# Patient Record
Sex: FEMALE | Race: WHITE | Employment: OTHER | URBAN - METROPOLITAN AREA
[De-identification: names, ages, dates, MRNs, and addresses within clinical notes are randomized per-mention and may not be internally consistent; named-entity substitution may affect disease eponyms.]

---

## 2017-01-11 PROBLEM — M81.0 OSTEOPOROSIS: Status: ACTIVE | Noted: 2017-01-11

## 2017-02-23 ENCOUNTER — HOSPITAL ENCOUNTER (OUTPATIENT)
Dept: INFUSION THERAPY | Age: 77
Discharge: HOME | End: 2017-02-23
Payer: MEDICARE

## 2017-02-23 VITALS
SYSTOLIC BLOOD PRESSURE: 164 MMHG | OXYGEN SATURATION: 96 % | HEART RATE: 69 BPM | DIASTOLIC BLOOD PRESSURE: 79 MMHG | TEMPERATURE: 98 F | RESPIRATION RATE: 18 BRPM

## 2017-02-23 LAB
ANION GAP BLD CALC-SCNC: 8 MMOL/L (ref 7–16)
BUN SERPL-MCNC: 23 MG/DL (ref 8–23)
CALCIUM SERPL-MCNC: 9.3 MG/DL (ref 8.3–10.4)
CHLORIDE SERPL-SCNC: 106 MMOL/L (ref 98–107)
CO2 SERPL-SCNC: 31 MMOL/L (ref 21–32)
CREAT SERPL-MCNC: 1.12 MG/DL (ref 0.6–1)
GLUCOSE SERPL-MCNC: 103 MG/DL (ref 65–100)
POTASSIUM SERPL-SCNC: 4 MMOL/L (ref 3.5–5.1)
SODIUM SERPL-SCNC: 145 MMOL/L (ref 136–145)

## 2017-02-23 PROCEDURE — 96372 THER/PROPH/DIAG INJ SC/IM: CPT

## 2017-02-23 PROCEDURE — 74011250636 HC RX REV CODE- 250/636: Performed by: FAMILY MEDICINE

## 2017-02-23 PROCEDURE — 80048 BASIC METABOLIC PNL TOTAL CA: CPT | Performed by: FAMILY MEDICINE

## 2017-02-23 RX ADMIN — DENOSUMAB 60 MG: 60 INJECTION SUBCUTANEOUS at 15:25

## 2017-02-23 NOTE — PROGRESS NOTES
Arrived to the [facility] ambulatory. Lab via butterfly right AC and Prolia inj completed. Patient tolerated well. Any issues or concerns during appointment: no.  Patient aware of next infusion appointment on. No other apt at this time. Discharged to home ambulatory.

## 2017-08-30 ENCOUNTER — HOSPITAL ENCOUNTER (OUTPATIENT)
Dept: INFUSION THERAPY | Age: 77
Discharge: HOME | End: 2017-08-30
Payer: MEDICARE

## 2017-08-30 VITALS
DIASTOLIC BLOOD PRESSURE: 70 MMHG | HEART RATE: 74 BPM | OXYGEN SATURATION: 98 % | RESPIRATION RATE: 16 BRPM | SYSTOLIC BLOOD PRESSURE: 141 MMHG | TEMPERATURE: 97.8 F

## 2017-08-30 PROCEDURE — 74011250636 HC RX REV CODE- 250/636: Performed by: FAMILY MEDICINE

## 2017-08-30 PROCEDURE — 96372 THER/PROPH/DIAG INJ SC/IM: CPT

## 2017-08-30 RX ADMIN — DENOSUMAB 60 MG: 60 INJECTION SUBCUTANEOUS at 13:00

## 2017-08-30 NOTE — PROGRESS NOTES
Pt ambulatory to area without complaints. Received injection per order, tolerated well. No return appt. Advised to call dr with any issues/concerns. Discharged home without complaints.

## 2017-10-14 ENCOUNTER — HOSPITAL ENCOUNTER (OUTPATIENT)
Dept: MAMMOGRAPHY | Age: 77
Discharge: HOME | End: 2017-10-14
Attending: FAMILY MEDICINE
Payer: MEDICARE

## 2017-10-14 DIAGNOSIS — Z12.31 VISIT FOR SCREENING MAMMOGRAM: ICD-10-CM

## 2017-10-14 PROCEDURE — 77067 SCR MAMMO BI INCL CAD: CPT

## 2018-03-27 PROBLEM — E11.21 TYPE 2 DIABETES WITH NEPHROPATHY (HCC): Status: ACTIVE | Noted: 2018-03-27

## 2018-07-28 ENCOUNTER — HOSPITAL ENCOUNTER (OUTPATIENT)
Dept: MRI IMAGING | Age: 78
Discharge: HOME | End: 2018-07-28
Attending: FAMILY MEDICINE
Payer: MEDICARE

## 2018-07-28 DIAGNOSIS — R42 DIZZINESS: ICD-10-CM

## 2018-07-28 PROCEDURE — 70551 MRI BRAIN STEM W/O DYE: CPT

## 2018-08-16 PROBLEM — I63.81 LACUNAR INFARCTION (HCC): Status: ACTIVE | Noted: 2018-08-16

## 2018-08-16 PROBLEM — R26.81 UNSTEADY GAIT: Status: ACTIVE | Noted: 2018-08-16

## 2018-08-16 PROBLEM — G62.9 AXONAL POLYNEUROPATHY: Status: ACTIVE | Noted: 2018-08-16

## 2018-08-28 ENCOUNTER — HOSPITAL ENCOUNTER (OUTPATIENT)
Dept: ULTRASOUND IMAGING | Age: 78
Discharge: HOME | End: 2018-08-28
Attending: PSYCHIATRY & NEUROLOGY
Payer: MEDICARE

## 2018-08-28 ENCOUNTER — HOSPITAL ENCOUNTER (OUTPATIENT)
Dept: MRI IMAGING | Age: 78
Discharge: HOME | End: 2018-08-28
Attending: PSYCHIATRY & NEUROLOGY
Payer: MEDICARE

## 2018-08-28 DIAGNOSIS — I63.81 LACUNAR INFARCTION (HCC): ICD-10-CM

## 2018-08-28 PROCEDURE — 70544 MR ANGIOGRAPHY HEAD W/O DYE: CPT

## 2018-08-28 PROCEDURE — 93880 EXTRACRANIAL BILAT STUDY: CPT

## 2018-08-29 ENCOUNTER — HOSPITAL ENCOUNTER (OUTPATIENT)
Dept: PHYSICAL THERAPY | Age: 78
Discharge: HOME | End: 2018-08-29
Payer: MEDICARE

## 2018-08-29 DIAGNOSIS — R42 VERTIGO: ICD-10-CM

## 2018-08-29 DIAGNOSIS — R26.81 UNSTEADY GAIT: ICD-10-CM

## 2018-08-29 PROCEDURE — 97162 PT EVAL MOD COMPLEX 30 MIN: CPT

## 2018-08-29 PROCEDURE — G8979 MOBILITY GOAL STATUS: HCPCS

## 2018-08-29 PROCEDURE — G8978 MOBILITY CURRENT STATUS: HCPCS

## 2018-08-29 PROCEDURE — 97530 THERAPEUTIC ACTIVITIES: CPT

## 2018-08-29 PROCEDURE — 97110 THERAPEUTIC EXERCISES: CPT

## 2018-08-29 NOTE — THERAPY EVALUATION
Teja Mills  : [id number] [facility],[address]  [address] [place] [address].  [phone]   [phone]        OUTPATIENT PHYSICAL THERAPY:Initial Assessment 2018         ICD-10: Treatment Diagnosis: Muscle weakness (generalized) (M62.81)Other abnormalities of gait and mobility (R26.89)  Precautions/Allergies:   Adhesive tape-silicones; Azopt [brinzolamide]; Other medication; Statins-hmg-coa reductase inhibitors; and Sulfa (sulfonamide antibiotics)   Fall Risk Score: 7 (? 5 = High Risk)  MD Orders: eval and treat vestibular dysfunction, unsteady gait from neuropathy MEDICAL/REFERRING DIAGNOSIS:  Unsteady gait [R26.81]  Vertigo [R42]   DATE OF ONSET: years  REFERRING PHYSICIAN: Sami Velazquez MD  RETURN PHYSICIAN APPOINTMENT: 10/8/18     INITIAL ASSESSMENT:  Ms. Jaylan Roberts presents with weakness and balance loss from various sources inc neuropathy. She will benefit from skilled PT to increase strength and balance and address vestibular issues as indicated to improve pt's safety with gait. PROBLEM LIST (Impacting functional limitations):  1. Decreased Strength  2. Decreased ADL/Functional Activities  3. Decreased Transfer Abilities  4. Decreased Ambulation Ability/Technique  5. Decreased Balance  6. Decreased Activity Tolerance  7. Decreased Flexibility/Joint Mobility  8. Decreased [place] with Home Exercise Program INTERVENTIONS PLANNED:  1. Balance Exercise  2. Home Exercise Program (HEP)  3. Therapeutic Activites  4. Therapeutic Exercise/Strengthening    TREATMENT PLAN:  Effective Dates: 2018 TO 2018 (90 days). Frequency/Duration: 2 times a week for 90 Days  GOALS: (Goals have been discussed and agreed upon with patient.)  Short-Term Functional Goals: Time Frame: 2 weeks  1. Pt to report compliance with HEP. 2. Pt to increase DF to 5 degrees bilaterally for improved gait mechanics. Discharge Goals: Time Frame: 12 weeks  1.  Pt to increase hip abd strength to 4/5 for more stability in gait. 2. Pt to demonstrate > 4 sec SLS for reciprocal gait on stairs. 3. Pt to use assistive device if needed for safe ambulation. Rehabilitation Potential For Stated Goals: Fair  Regarding Lisa Sotomayor's therapy, I certify that the treatment plan above will be carried out by a therapist or under their direction. Thank you for this referral,  Иван Ramirez, PT       Referring Physician Signature: Sandie Au MD              Date                      HISTORY:   History of Present Injury/Illness (Reason for Referral):  Pt has hearing loss in left ear and was told to get hearing aids. She did not and saw another specialist who said she needed treatment on her ear before she got hearing aids. She has had that and is being treated for a mastoid infection. She has diabetic neuropathy. She is going to have a NCV 10/8. She tripped over her 's oxygen line and fell about 3 months ago. She loses her balance a lot. She does not want to use a walker. She takes 2 meds that cause dizziness. She walks her dog in her yard and walks to get the mail but no exercise. She was told her vertigo was meniere's years ago but other doctors have contradicted that. She had vestibular rehab without any symptom changes. Past Medical History/Comorbidities:   Ms. 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); Cardiomyopathy, ischemic (1/14/2016); Chronic vertigo; DM2 (diabetes mellitus, type 2) ([address] GERD (gastroesophageal reflux disease); Hearing loss; History of AAA (abdominal aortic aneurysm) repair (2002, 1/14/2016); History of bilateral cataract extraction; History of coronary artery disease (2006, 2007); History of gallbladder disease; History of hernia repair; History of seborrheic keratosis; Hyperlipidemia; Hypertension; Lacunar infarction ([address] (8/16/2018); Meniere's disease; Menopause;  Morbid obesity ([address] OA (osteoarthritis); Orthostatic hypotension (1/14/2016); Osteopenia with high risk of fracture (2014); Post-menopausal osteoporosis; Postmenopausal osteoporosis; Unspecified sleep apnea; Unsteady gait (8/16/2018); and Visit for dental examination. Ms. Frank Hong  has a past surgical history that includes hx open cholecystectomy (1979); hx orthopaedic; hx cataract removal (3/10); hx hernia repair; pr cardiac surg procedure unlist; hx hysterectomy (1997); hx oophorectomy (1982); and pr breast surgery procedure unlisted (mid 2000's). Social History/Living Environment:     lives in 2 story home with  but doesn't need to go upstairs  Prior Level of Function/Work/Activity:  retired  Dominant Side:         RIGHT  Current Medications:    Current Outpatient Prescriptions:     potassium chloride (KLOR-CON) 10 mEq tablet, Take 1 Tab by mouth daily. , Disp: 90 Tab, Rfl: 3    triamterene-hydroCHLOROthiazide (MAXZIDE) 37.5-25 mg per tablet, Take 1 Tab by mouth daily. , Disp: 90 Tab, Rfl: 3    losartan (COZAAR) 100 mg tablet, Take 1 Tab by mouth daily. , Disp: 90 Tab, Rfl: 3    clopidogrel (PLAVIX) 75 mg tab, Take 1 Tab by mouth daily. , Disp: 90 Tab, Rfl: 3    metoprolol succinate (TOPROL XL) 100 mg tablet, Take 1 Tab by mouth nightly., Disp: 90 Tab, Rfl: 3    pitavastatin calcium (LIVALO) 4 mg tab tablet, Take 1 Tab by mouth daily. , Disp: 90 Tab, Rfl: 1    nitroglycerin (NITROSTAT) 0.4 mg SL tablet, 1 Tab by SubLINGual route every five (5) minutes as needed. , Disp: 25 Tab, Rfl: 6    fluticasone (FLONASE) 50 mcg/actuation nasal spray, 2 Sprays by Both Nostrils route daily for 90 days. , Disp: 3 Bottle, Rfl: 3    albuterol (PROAIR HFA) 90 mcg/actuation inhaler, Take 2 Puffs by inhalation four (4) times daily as needed for up to 90 days. , Disp: 3 Inhaler, Rfl: 3    Cholecalciferol, Vitamin D3, (VITAMIN D3) 1,000 unit cap, Take 2,000 Units by mouth daily. , Disp: , Rfl:     aspirin (ASPIRIN) 325 mg tablet, Take 325 mg by mouth every morning., Disp: , Rfl:    Date Last Reviewed:  8/29/2018   # of Personal Factors/Comorbidities that affect the Plan of Care: 1-2: MODERATE COMPLEXITY   EXAMINATION:   Observation/Orthostatic Postural Assessment:          Pt has antalgic gait with right glut med lurch. The left shoe is worn across the heel more than the right. Palpation:          Mild tenderness at left greater trochanter  ROM:     0 degrees DF bilaterally      Strength:     Right hip abd, ext, PF 2/5, quad 5/5  Left hip abd, ext 3/5, PF 2/5, quad 5/5               Neurological Screen:        unremarkable  Functional Mobility:         Gait/Ambulation:  Antalgic level surfaces        Transfers:  Uses UE's to assist        Stairs:  Step-to pattern and uses UE's to pull up  Balance:          Unable to perform   Body Structures Involved:  1. Nerves  2. Muscles Body Functions Affected:  1. Sensory/Pain  2. Neuromusculoskeletal  3. Movement Related Activities and Participation Affected:  1. General Tasks and Demands  2. Mobility  3. Self Care  4. Domestic Life  5. Community, Social and [place]   # of elements that affect the Plan of Care: 3: MODERATE COMPLEXITY   CLINICAL PRESENTATION:   Presentation: Evolving clinical presentation with changing clinical characteristics: MODERATE COMPLEXITY   CLINICAL DECISION MAKING:   Outcome Measure: Tool Used: Diaz Balance Scale  Score:  Initial: 39/56 Most Recent: X/56 (Date: -- )   Interpretation of Score: Each section is scored on a 0-4 scale, 0 representing the patients inability to perform the task and 4 representing independence. The scores of each section are added together for a total score of 56. The higher the patients score, the more independent the patient is. Any score below 45 indicates increased risk for falls. Score 56 55-45 44-34 33-23 22-12 11-1 0   Modifier CH CI CJ CK CL CM CN     ?  Mobility - Walking and Moving Around:     - CURRENT STATUS: CJ - 20%-39% impaired, limited or restricted    - GOAL STATUS: CI - 1%-19% impaired, limited or restricted    - D/C STATUS:  ---------------To be determined---------------    Medical Necessity:   · Patient demonstrates fair rehab potential due to higher previous functional level. Reason for Services/Other Comments:  · Patient continues to require present interventions due to patient's inability to ambulate safely all surfaces. Use of outcome tool(s) and clinical judgement create a POC that gives a: Questionable prediction of patient's progress: MODERATE COMPLEXITY   TREATMENT:   (In addition to Assessment/Re-Assessment sessions the following treatments were rendered)  THERAPEUTIC ACTIVITY: ( see below for minutes): Therapeutic activities per grid below to improve mobility. Required moderate verbal and manual cues to promote motor control of bilateral, lower extremity(s). THERAPEUTIC EXERCISE: (see below for minutes):  Exercises per grid below to improve strength. Required moderate verbal and manual cues to promote proper body alignment, promote proper body posture and promote proper body mechanics. Progressed resistance, range, repetitions and complexity of movement as indicated. Date: 8/29/18       Modalities:                                Therapeutic Exercise: 15 mins       LAQ B 20 reps       Slant board 3 x 10 sec       Clams B 20 reps       S/L hip abd B With assist  20 reps                       Proprioceptive Activities:                                Manual Therapy:                        Therapeutic Activities: 15 mins       Pt education, postural education, HEP 5 mins       bridging 20 reps       SLS holding pelvis level With assist  5 reps               HEP: see hand out  [employer] Portal  Treatment/Session Assessment:  Pt has right > left hip weakness with subsequent antalgic gait.   She doesn't want to address the vestibular issues with therapy right now as she is still undergoing treatment with the ENT and will let us know if she wants to pursue this at a later date. She understood HEP technique and rationale. · Pain/ Symptoms: Initial:   0/10 Post Session:  0/10 ·   Compliance with Program/Exercises: Will assess as treatment progresses. · Recommendations/Intent for next treatment session: \"Next visit will focus on advancements to more challenging activities\".   Total Treatment Duration:  PT Patient Time In/Time Out  Time In: 0845  Time Out: 0930     Grant Lopez, PT

## 2018-08-31 ENCOUNTER — HOSPITAL ENCOUNTER (OUTPATIENT)
Dept: PHYSICAL THERAPY | Age: 78
Discharge: HOME | End: 2018-08-31
Payer: MEDICARE

## 2018-08-31 PROCEDURE — 97530 THERAPEUTIC ACTIVITIES: CPT

## 2018-08-31 PROCEDURE — 97110 THERAPEUTIC EXERCISES: CPT

## 2018-08-31 NOTE — PROGRESS NOTES
pls notify pt, MRA brain vessels were normal; carotid ultrasound showed mild irregularity, but no blockage. Results are good. Continue plavix.

## 2018-08-31 NOTE — PROGRESS NOTES
Efra Bryant  : [phone] [id number] [facility],[address]  [address] [place] [address]  [phone]   [phone]        OUTPATIENT PHYSICAL THERAPY:Daily Note 2018         ICD-10: Treatment Diagnosis: Muscle weakness (generalized) (M62.81)Other abnormalities of gait and mobility (R26.89)  Precautions/Allergies:   Adhesive tape-silicones; Azopt [brinzolamide]; Other medication; Statins-hmg-coa reductase inhibitors; and Sulfa (sulfonamide antibiotics)   Fall Risk Score: 7 (? 5 = High Risk)  MD Orders: eval and treat vestibular dysfunction, unsteady gait from neuropathy MEDICAL/REFERRING DIAGNOSIS:  Unsteadiness on feet [R26.81]  Dizziness and giddiness [R42]   DATE OF ONSET: years  REFERRING PHYSICIAN: April Verde MD  RETURN PHYSICIAN APPOINTMENT: 10/8/18     INITIAL ASSESSMENT:  Ms. Nanci Dillon presents with weakness and balance loss from various sources inc neuropathy. She will benefit from skilled PT to increase strength and balance and address vestibular issues as indicated to improve pt's safety with gait. PROBLEM LIST (Impacting functional limitations):  1. Decreased Strength  2. Decreased ADL/Functional Activities  3. Decreased Transfer Abilities  4. Decreased Ambulation Ability/Technique  5. Decreased Balance  6. Decreased Activity Tolerance  7. Decreased Flexibility/Joint Mobility  8. Decreased [place] with Home Exercise Program INTERVENTIONS PLANNED:  1. Balance Exercise  2. Home Exercise Program (HEP)  3. Therapeutic Activites  4. Therapeutic Exercise/Strengthening    TREATMENT PLAN:  Effective Dates: 2018 TO 2018 (90 days). Frequency/Duration: 2 times a week for 90 Days  GOALS: (Goals have been discussed and agreed upon with patient.)  Short-Term Functional Goals: Time Frame: 2 weeks  1. Pt to report compliance with HEP. 2. Pt to increase DF to 5 degrees bilaterally for improved gait mechanics.   Discharge Goals: Time Frame: 12 weeks  1. Pt to increase hip abd strength to 4/5 for more stability in gait. 2. Pt to demonstrate > 4 sec SLS for reciprocal gait on stairs. 3. Pt to use assistive device if needed for safe ambulation. HISTORY:   History of Present Injury/Illness (Reason for Referral):  Pt has hearing loss in left ear and was told to get hearing aids. She did not and saw another specialist who said she needed treatment on her ear before she got hearing aids. She has had that and is being treated for a mastoid infection. She has diabetic neuropathy. She is going to have a NCV 10/8. She tripped over her 's oxygen line and fell about 3 months ago. She loses her balance a lot. She does not want to use a walker. She takes 2 meds that cause dizziness. She walks her dog in her yard and walks to get the mail but no exercise. She was told her vertigo was meniere's years ago but other doctors have contradicted that. She had vestibular rehab without any symptom changes. Past Medical History/Comorbidities:   Ms. 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); Cardiomyopathy, ischemic (1/14/2016); Chronic vertigo; DM2 (diabetes mellitus, type 2) ([address] GERD (gastroesophageal reflux disease); Hearing loss; History of AAA (abdominal aortic aneurysm) repair (2002, 1/14/2016); History of bilateral cataract extraction; History of coronary artery disease (2006, 2007); History of gallbladder disease; History of hernia repair; History of seborrheic keratosis; Hyperlipidemia; Hypertension; Lacunar infarction ([address] (8/16/2018); Meniere's disease; Menopause; Morbid obesity ([address] OA (osteoarthritis); Orthostatic hypotension (1/14/2016); Osteopenia with high risk of fracture (2014); Post-menopausal osteoporosis; Postmenopausal osteoporosis; Unspecified sleep apnea; Unsteady gait (8/16/2018); and Visit for dental examination.   Ms. Elizabeth Burton  has a past surgical history that includes hx open cholecystectomy (1979); hx orthopaedic; hx cataract removal (3/10); hx hernia repair; pr cardiac surg procedure unlist; hx hysterectomy (1997); hx oophorectomy (1982); and pr breast surgery procedure unlisted (mid 2000's). Social History/Living Environment:     lives in 2 story home with  but doesn't need to go upstairs  Prior Level of Function/Work/Activity:  retired  Dominant Side:         RIGHT  Current Medications:    Current Outpatient Prescriptions:     potassium chloride (KLOR-CON) 10 mEq tablet, Take 1 Tab by mouth daily. , Disp: 90 Tab, Rfl: 3    triamterene-hydroCHLOROthiazide (MAXZIDE) 37.5-25 mg per tablet, Take 1 Tab by mouth daily. , Disp: 90 Tab, Rfl: 3    losartan (COZAAR) 100 mg tablet, Take 1 Tab by mouth daily. , Disp: 90 Tab, Rfl: 3    clopidogrel (PLAVIX) 75 mg tab, Take 1 Tab by mouth daily. , Disp: 90 Tab, Rfl: 3    metoprolol succinate (TOPROL XL) 100 mg tablet, Take 1 Tab by mouth nightly., Disp: 90 Tab, Rfl: 3    pitavastatin calcium (LIVALO) 4 mg tab tablet, Take 1 Tab by mouth daily. , Disp: 90 Tab, Rfl: 1    nitroglycerin (NITROSTAT) 0.4 mg SL tablet, 1 Tab by SubLINGual route every five (5) minutes as needed. , Disp: 25 Tab, Rfl: 6    fluticasone (FLONASE) 50 mcg/actuation nasal spray, 2 Sprays by Both Nostrils route daily for 90 days. , Disp: 3 Bottle, Rfl: 3    albuterol (PROAIR HFA) 90 mcg/actuation inhaler, Take 2 Puffs by inhalation four (4) times daily as needed for up to 90 days. , Disp: 3 Inhaler, Rfl: 3    Cholecalciferol, Vitamin D3, (VITAMIN D3) 1,000 unit cap, Take 2,000 Units by mouth daily. , Disp: , Rfl:     aspirin (ASPIRIN) 325 mg tablet, Take 325 mg by mouth every morning., Disp: , Rfl:    Date Last Reviewed:  8/31/2018   EXAMINATION:   Observation/Orthostatic Postural Assessment:          Pt has antalgic gait with right glut med lurch. The left shoe is worn across the heel more than the right.   Palpation:          Mild tenderness at left greater trochanter  ROM:     0 degrees DF bilaterally      Strength:     Right hip abd, ext, PF 2/5, quad 5/5  Left hip abd, ext 3/5, PF 2/5, quad 5/5               Neurological Screen:        unremarkable  Functional Mobility:         Gait/Ambulation:  Antalgic level surfaces        Transfers:  Uses UE's to assist        Stairs:  Step-to pattern and uses UE's to pull up  Balance:          Unable to perform   Body Structures Involved:  1. Nerves  2. Muscles Body Functions Affected:  1. Sensory/Pain  2. Neuromusculoskeletal  3. Movement Related Activities and Participation Affected:  1. General Tasks and Demands  2. Mobility  3. Self Care  4. Domestic Life  5. Community, Social and Civic Life   CLINICAL PRESENTATION:   CLINICAL DECISION MAKING:   Outcome Measure: Tool Used: Diaz Balance Scale  Score:  Initial: 39/56 Most Recent: X/56 (Date: -- )   Interpretation of Score: Each section is scored on a 0-4 scale, 0 representing the patients inability to perform the task and 4 representing independence. The scores of each section are added together for a total score of 56. The higher the patients score, the more independent the patient is. Any score below 45 indicates increased risk for falls. Score 56 55-45 44-34 33-23 22-12 11-1 0   Modifier CH CI CJ CK CL CM CN     ? Mobility - Walking and Moving Around:     - CURRENT STATUS: CJ - 20%-39% impaired, limited or restricted    - GOAL STATUS: CI - 1%-19% impaired, limited or restricted    - D/C STATUS:  ---------------To be determined---------------    Medical Necessity:   · Patient demonstrates fair rehab potential due to higher previous functional level. Reason for Services/Other Comments:  · Patient continues to require present interventions due to patient's inability to ambulate safely all surfaces.    TREATMENT:   (In addition to Assessment/Re-Assessment sessions the following treatments were rendered)  THERAPEUTIC ACTIVITY: ( see below for minutes): Therapeutic activities per grid below to improve mobility. Required moderate verbal and manual cues to promote motor control of bilateral, lower extremity(s). THERAPEUTIC EXERCISE: (see below for minutes):  Exercises per grid below to improve strength. Required moderate verbal and manual cues to promote proper body alignment, promote proper body posture and promote proper body mechanics. Progressed resistance, range, repetitions and complexity of movement as indicated. Date: 8/29/18 8/31/18  Visit 2      Modalities:                                Therapeutic Exercise: 15 mins 25 mins      LAQ B 20 reps 30 reps      Slant board 3 x 10 sec 3 x 10 sec      Clams B 20 reps 30 reps      S/L hip abd B With assist  20 reps With assist  30 reps      Glut sets  Supine, standing  5 mins      Calf raises  30 reps              Proprioceptive Activities:                                Manual Therapy:                        Therapeutic Activities: 15 mins 15 mins      Pt education, postural education, HEP 5 mins       bridging 20 reps 20 reps      SLS holding pelvis level B With assist  5 reps Wit assist  5 reps      Sit to stand  15 reps  With assist              HEP: see hand out  [employer] Portal  Treatment/Session Assessment:  Marked weakness in hip/core muscles. Good ex rogelio. Unable to perform glut set. Continue as indicated. · Pain/ Symptoms: Initial:   0/10 Post Session:  No pain after ex's. ·   Compliance with Program/Exercises: Will assess as treatment progresses. · Recommendations/Intent for next treatment session: \"Next visit will focus on advancements to more challenging activities\".   Total Treatment Duration:  PT Patient Time In/Time Out  Time In: 0845  Time Out: 0930     Blu Lopez, PT

## 2018-09-05 ENCOUNTER — HOSPITAL ENCOUNTER (OUTPATIENT)
Dept: PHYSICAL THERAPY | Age: 78
Discharge: HOME | End: 2018-09-05
Payer: MEDICARE

## 2018-09-05 PROCEDURE — 97530 THERAPEUTIC ACTIVITIES: CPT

## 2018-09-05 PROCEDURE — 97110 THERAPEUTIC EXERCISES: CPT

## 2018-09-05 NOTE — PROGRESS NOTES
Evelyn Oh  : 1940 [id number] [facility],[address]  [address] [place] [address].  [phone]   [phone]        OUTPATIENT PHYSICAL THERAPY:Daily Note 2018         ICD-10: Treatment Diagnosis: Muscle weakness (generalized) (M62.81)Other abnormalities of gait and mobility (R26.89)  Precautions/Allergies:   Adhesive tape-silicones; Azopt [brinzolamide]; Other medication; Statins-hmg-coa reductase inhibitors; and Sulfa (sulfonamide antibiotics)   Fall Risk Score: 7 (? 5 = High Risk)  MD Orders: eval and treat vestibular dysfunction, unsteady gait from neuropathy MEDICAL/REFERRING DIAGNOSIS:  Unsteadiness on feet [R26.81]  Dizziness and giddiness [R42]   DATE OF ONSET: years  REFERRING PHYSICIAN: Soha Mabry MD  RETURN PHYSICIAN APPOINTMENT: 10/8/18     INITIAL ASSESSMENT:  Ms. Kathe Lane presents with weakness and balance loss from various sources inc neuropathy. She will benefit from skilled PT to increase strength and balance and address vestibular issues as indicated to improve pt's safety with gait. PROBLEM LIST (Impacting functional limitations):  1. Decreased Strength  2. Decreased ADL/Functional Activities  3. Decreased Transfer Abilities  4. Decreased Ambulation Ability/Technique  5. Decreased Balance  6. Decreased Activity Tolerance  7. Decreased Flexibility/Joint Mobility  8. Decreased [place] with Home Exercise Program INTERVENTIONS PLANNED:  1. Balance Exercise  2. Home Exercise Program (HEP)  3. Therapeutic Activites  4. Therapeutic Exercise/Strengthening    TREATMENT PLAN:  Effective Dates: 2018 TO 2018 (90 days). Frequency/Duration: 2 times a week for 90 Days  GOALS: (Goals have been discussed and agreed upon with patient.)  Short-Term Functional Goals: Time Frame: 2 weeks  1. Pt to report compliance with HEP. 2. Pt to increase DF to 5 degrees bilaterally for improved gait mechanics.   Discharge Goals: Time Frame: 12 weeks  1. Pt to increase hip abd strength to 4/5 for more stability in gait. 2. Pt to demonstrate > 4 sec SLS for reciprocal gait on stairs. 3. Pt to use assistive device if needed for safe ambulation. HISTORY:   History of Present Injury/Illness (Reason for Referral):  Pt has hearing loss in left ear and was told to get hearing aids. She did not and saw another specialist who said she needed treatment on her ear before she got hearing aids. She has had that and is being treated for a mastoid infection. She has diabetic neuropathy. She is going to have a NCV 10/8. She tripped over her 's oxygen line and fell about 3 months ago. She loses her balance a lot. She does not want to use a walker. She takes 2 meds that cause dizziness. She walks her dog in her yard and walks to get the mail but no exercise. She was told her vertigo was meniere's years ago but other doctors have contradicted that. She had vestibular rehab without any symptom changes. Past Medical History/Comorbidities:   Ms. 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); Cardiomyopathy, ischemic (1/14/2016); Chronic vertigo; DM2 (diabetes mellitus, type 2) ([address] GERD (gastroesophageal reflux disease); Hearing loss; History of AAA (abdominal aortic aneurysm) repair (2002, 1/14/2016); History of bilateral cataract extraction; History of coronary artery disease (2006, 2007); History of gallbladder disease; History of hernia repair; History of seborrheic keratosis; Hyperlipidemia; Hypertension; Lacunar infarction ([address] (8/16/2018); Meniere's disease; Menopause; Morbid obesity ([address] OA (osteoarthritis); Orthostatic hypotension (1/14/2016); Osteopenia with high risk of fracture (2014); Post-menopausal osteoporosis; Postmenopausal osteoporosis; Unspecified sleep apnea; Unsteady gait (8/16/2018); and Visit for dental examination.   Ms. Leah Soria  has a past surgical history that includes hx open cholecystectomy (1979); hx orthopaedic; hx cataract removal (3/10); hx hernia repair; pr cardiac surg procedure unlist; hx hysterectomy (1997); hx oophorectomy (1982); and pr breast surgery procedure unlisted (mid 2000's). Social History/Living Environment:     lives in 2 story home with  but doesn't need to go upstairs  Prior Level of Function/Work/Activity:  retired  Dominant Side:         RIGHT  Current Medications:    Current Outpatient Prescriptions:     potassium chloride (KLOR-CON) 10 mEq tablet, Take 1 Tab by mouth daily. , Disp: 90 Tab, Rfl: 3    triamterene-hydroCHLOROthiazide (MAXZIDE) 37.5-25 mg per tablet, Take 1 Tab by mouth daily. , Disp: 90 Tab, Rfl: 3    losartan (COZAAR) 100 mg tablet, Take 1 Tab by mouth daily. , Disp: 90 Tab, Rfl: 3    clopidogrel (PLAVIX) 75 mg tab, Take 1 Tab by mouth daily. , Disp: 90 Tab, Rfl: 3    metoprolol succinate (TOPROL XL) 100 mg tablet, Take 1 Tab by mouth nightly., Disp: 90 Tab, Rfl: 3    pitavastatin calcium (LIVALO) 4 mg tab tablet, Take 1 Tab by mouth daily. , Disp: 90 Tab, Rfl: 1    nitroglycerin (NITROSTAT) 0.4 mg SL tablet, 1 Tab by SubLINGual route every five (5) minutes as needed. , Disp: 25 Tab, Rfl: 6    fluticasone (FLONASE) 50 mcg/actuation nasal spray, 2 Sprays by Both Nostrils route daily for 90 days. , Disp: 3 Bottle, Rfl: 3    albuterol (PROAIR HFA) 90 mcg/actuation inhaler, Take 2 Puffs by inhalation four (4) times daily as needed for up to 90 days. , Disp: 3 Inhaler, Rfl: 3    Cholecalciferol, Vitamin D3, (VITAMIN D3) 1,000 unit cap, Take 2,000 Units by mouth daily. , Disp: , Rfl:     aspirin (ASPIRIN) 325 mg tablet, Take 325 mg by mouth every morning., Disp: , Rfl:    Date Last Reviewed:  9/5/2018   EXAMINATION:   Observation/Orthostatic Postural Assessment:          Pt has antalgic gait with right glut med lurch. The left shoe is worn across the heel more than the right.   Palpation:          Mild tenderness at left greater trochanter  ROM:     0 degrees DF bilaterally      Strength:     Right hip abd, ext, PF 2/5, quad 5/5  Left hip abd, ext 3/5, PF 2/5, quad 5/5               Neurological Screen:        unremarkable  Functional Mobility:         Gait/Ambulation:  Antalgic level surfaces        Transfers:  Uses UE's to assist        Stairs:  Step-to pattern and uses UE's to pull up  Balance:          Unable to perform   Body Structures Involved:  1. Nerves  2. Muscles Body Functions Affected:  1. Sensory/Pain  2. Neuromusculoskeletal  3. Movement Related Activities and Participation Affected:  1. General Tasks and Demands  2. Mobility  3. Self Care  4. Domestic Life  5. Community, Social and Civic Life   CLINICAL PRESENTATION:   CLINICAL DECISION MAKING:   Outcome Measure: Tool Used: Diaz Balance Scale  Score:  Initial: 39/56 Most Recent: X/56 (Date: -- )   Interpretation of Score: Each section is scored on a 0-4 scale, 0 representing the patients inability to perform the task and 4 representing independence. The scores of each section are added together for a total score of 56. The higher the patients score, the more independent the patient is. Any score below 45 indicates increased risk for falls. Score 56 55-45 44-34 33-23 22-12 11-1 0   Modifier CH CI CJ CK CL CM CN     ? Mobility - Walking and Moving Around:     - CURRENT STATUS: CJ - 20%-39% impaired, limited or restricted    - GOAL STATUS: CI - 1%-19% impaired, limited or restricted    - D/C STATUS:  ---------------To be determined---------------    Medical Necessity:   · Patient demonstrates fair rehab potential due to higher previous functional level. Reason for Services/Other Comments:  · Patient continues to require present interventions due to patient's inability to ambulate safely all surfaces.    TREATMENT:   (In addition to Assessment/Re-Assessment sessions the following treatments were rendered)  THERAPEUTIC ACTIVITY: ( see below for minutes): Therapeutic activities per grid below to improve mobility. Required moderate verbal and manual cues to promote motor control of bilateral, lower extremity(s). THERAPEUTIC EXERCISE: (see below for minutes):  Exercises per grid below to improve strength. Required moderate verbal and manual cues to promote proper body alignment, promote proper body posture and promote proper body mechanics. Progressed resistance, range, repetitions and complexity of movement as indicated. Date: 8/29/18 8/31/18  Visit 2 9/5/18  Visit 3       Modalities:                                        Therapeutic Exercise: 15 mins 25 mins 20 mins       LAQ B 20 reps 30 reps        Slant board 3 x 10 sec 3 x 10 sec 5 x 10 sec       Clams B 20 reps 30 reps 30 reps       S/L hip abd B With assist  20 reps With assist  30 reps 30 reps  With assist       Glut sets  Supine, standing  5 mins        Calf raises  30 reps 30 reps       Standing right hip diagonal   30 reps                 Proprioceptive Activities:                                        Manual Therapy:                              Therapeutic Activities: 15 mins 15 mins 25 mins       Pt education, postural education, HEP 5 mins         bridging 20 reps 20 reps 30 reps       SLS holding pelvis level B With assist  5 reps With assist  5 reps With assist  5 reps       Sit to stand  15 reps  With assist 20 reps  With assist       Stepping with left foot holding pelvis level with UE support   X 15 reps       Foam standing eyes open and closed   4 mins                           HEP: see hand out  [employer] Portal  Treatment/Session Assessment:  Able to control pelvis with cueing. Weakness persists in hip abd and quads. Will continue as indicated. · Pain/ Symptoms: Initial:   I am dizzy today. I am out of my antibiotics today so the doctor won't be happy to know it didn't work. My legs are fine. The left one was sore after I left for a couple of days.   0/10  Post Session:  No pain after ex's. ·   Compliance with Program/Exercises: Will assess as treatment progresses. · Recommendations/Intent for next treatment session: \"Next visit will focus on advancements to more challenging activities\".   Total Treatment Duration:  PT Patient Time In/Time Out  Time In: 0930  Time Out: 1015 April Albert Lopez PT

## 2018-09-07 ENCOUNTER — HOSPITAL ENCOUNTER (OUTPATIENT)
Dept: PHYSICAL THERAPY | Age: 78
Discharge: HOME | End: 2018-09-07
Payer: MEDICARE

## 2018-09-07 PROCEDURE — 97530 THERAPEUTIC ACTIVITIES: CPT

## 2018-09-07 PROCEDURE — 97110 THERAPEUTIC EXERCISES: CPT

## 2018-09-07 NOTE — PROGRESS NOTES
Coretta Nelson  : 1940 [facility] [address]  [phone]   [phone]        OUTPATIENT PHYSICAL THERAPY:Daily Note 2018         ICD-10: Treatment Diagnosis: Muscle weakness (generalized) (M62.81)Other abnormalities of gait and mobility (R26.89)  Precautions/Allergies:   Adhesive tape-silicones; Azopt [brinzolamide]; Other medication; Statins-hmg-coa reductase inhibitors; and Sulfa (sulfonamide antibiotics)   Fall Risk Score: 7 (? 5 = High Risk)  MD Orders: eval and treat vestibular dysfunction, unsteady gait from neuropathy MEDICAL/REFERRING DIAGNOSIS:  Unsteadiness on feet [R26.81]  Dizziness and giddiness [R42]   DATE OF ONSET: years  REFERRING PHYSICIAN: Sierra Cool MD  RETURN PHYSICIAN APPOINTMENT: 10/8/18     INITIAL ASSESSMENT:  Ms. Dayron Dowling presents with weakness and balance loss from various sources inc neuropathy. She will benefit from skilled PT to increase strength and balance and address vestibular issues as indicated to improve pt's safety with gait. PROBLEM LIST (Impacting functional limitations):  1. Decreased Strength  2. Decreased ADL/Functional Activities  3. Decreased Transfer Abilities  4. Decreased Ambulation Ability/Technique  5. Decreased Balance  6. Decreased Activity Tolerance  7. Decreased Flexibility/Joint Mobility  8. Decreased [place] with Home Exercise Program INTERVENTIONS PLANNED:  1. Balance Exercise  2. Home Exercise Program (HEP)  3. Therapeutic Activites  4. Therapeutic Exercise/Strengthening    TREATMENT PLAN:  Effective Dates: 2018 TO 2018 (90 days). Frequency/Duration: 2 times a week for 90 Days  GOALS: (Goals have been discussed and agreed upon with patient.)  Short-Term Functional Goals: Time Frame: 2 weeks  1. Pt to report compliance with HEP. 2. Pt to increase DF to 5 degrees bilaterally for improved gait mechanics.   Discharge Goals: Time Frame: 12 weeks  1. Pt to increase hip abd strength to 4/5 for more stability in gait. 2. Pt to demonstrate > 4 sec SLS for reciprocal gait on stairs. 3. Pt to use assistive device if needed for safe ambulation. HISTORY:   History of Present Injury/Illness (Reason for Referral):  Pt has hearing loss in left ear and was told to get hearing aids. She did not and saw another specialist who said she needed treatment on her ear before she got hearing aids. She has had that and is being treated for a mastoid infection. She has diabetic neuropathy. She is going to have a NCV 10/8. She tripped over her 's oxygen line and fell about 3 months ago. She loses her balance a lot. She does not want to use a walker. She takes 2 meds that cause dizziness. She walks her dog in her yard and walks to get the mail but no exercise. She was told her vertigo was meniere's years ago but other doctors have contradicted that. She had vestibular rehab without any symptom changes. Past Medical History/Comorbidities:   Ms. 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); Cardiomyopathy, ischemic (1/14/2016); Chronic vertigo; DM2 (diabetes mellitus, type 2) ([address] GERD (gastroesophageal reflux disease); Hearing loss; History of AAA (abdominal aortic aneurysm) repair (2002, 1/14/2016); History of bilateral cataract extraction; History of coronary artery disease (2006, 2007); History of gallbladder disease; History of hernia repair; History of seborrheic keratosis; Hyperlipidemia; Hypertension; Lacunar infarction ([address] (8/16/2018); Meniere's disease; Menopause; Morbid obesity ([address] OA (osteoarthritis); Orthostatic hypotension (1/14/2016); Osteopenia with high risk of fracture (2014); Post-menopausal osteoporosis; Postmenopausal osteoporosis; Unspecified sleep apnea; Unsteady gait (8/16/2018); and Visit for dental examination.   Ms. Leah Soria  has a past surgical history that includes hx open cholecystectomy (1979); hx orthopaedic; hx cataract removal (3/10); hx hernia repair; pr cardiac surg procedure unlist; hx hysterectomy (1997); hx oophorectomy (1982); and pr breast surgery procedure unlisted (mid 2000's). Social History/Living Environment:     lives in 2 story home with  but doesn't need to go upstairs  Prior Level of Function/Work/Activity:  retired  Dominant Side:         RIGHT  Current Medications:    Current Outpatient Prescriptions:     potassium chloride (KLOR-CON) 10 mEq tablet, Take 1 Tab by mouth daily. , Disp: 90 Tab, Rfl: 3    triamterene-hydroCHLOROthiazide (MAXZIDE) 37.5-25 mg per tablet, Take 1 Tab by mouth daily. , Disp: 90 Tab, Rfl: 3    losartan (COZAAR) 100 mg tablet, Take 1 Tab by mouth daily. , Disp: 90 Tab, Rfl: 3    clopidogrel (PLAVIX) 75 mg tab, Take 1 Tab by mouth daily. , Disp: 90 Tab, Rfl: 3    metoprolol succinate (TOPROL XL) 100 mg tablet, Take 1 Tab by mouth nightly., Disp: 90 Tab, Rfl: 3    pitavastatin calcium (LIVALO) 4 mg tab tablet, Take 1 Tab by mouth daily. , Disp: 90 Tab, Rfl: 1    nitroglycerin (NITROSTAT) 0.4 mg SL tablet, 1 Tab by SubLINGual route every five (5) minutes as needed. , Disp: 25 Tab, Rfl: 6    fluticasone (FLONASE) 50 mcg/actuation nasal spray, 2 Sprays by Both Nostrils route daily for 90 days. , Disp: 3 Bottle, Rfl: 3    albuterol (PROAIR HFA) 90 mcg/actuation inhaler, Take 2 Puffs by inhalation four (4) times daily as needed for up to 90 days. , Disp: 3 Inhaler, Rfl: 3    Cholecalciferol, Vitamin D3, (VITAMIN D3) 1,000 unit cap, Take 2,000 Units by mouth daily. , Disp: , Rfl:     aspirin (ASPIRIN) 325 mg tablet, Take 325 mg by mouth every morning., Disp: , Rfl:    Date Last Reviewed:  9/7/2018   EXAMINATION:   Observation/Orthostatic Postural Assessment:          Pt has antalgic gait with right glut med lurch. The left shoe is worn across the heel more than the right.   Palpation:          Mild tenderness at left greater trochanter  ROM:     0 degrees DF bilaterally      Strength:     Right hip abd, ext, PF 2/5, quad 5/5  Left hip abd, ext 3/5, PF 2/5, quad 5/5               Neurological Screen:        unremarkable  Functional Mobility:         Gait/Ambulation:  Antalgic level surfaces        Transfers:  Uses UE's to assist        Stairs:  Step-to pattern and uses UE's to pull up  Balance:          Unable to perform   Body Structures Involved:  1. Nerves  2. Muscles Body Functions Affected:  1. Sensory/Pain  2. Neuromusculoskeletal  3. Movement Related Activities and Participation Affected:  1. General Tasks and Demands  2. Mobility  3. Self Care  4. Domestic Life  5. Community, Social and Civic Life   CLINICAL PRESENTATION:   CLINICAL DECISION MAKING:   Outcome Measure: Tool Used: Diaz Balance Scale  Score:  Initial: 39/56 Most Recent: X/56 (Date: -- )   Interpretation of Score: Each section is scored on a 0-4 scale, 0 representing the patients inability to perform the task and 4 representing independence. The scores of each section are added together for a total score of 56. The higher the patients score, the more independent the patient is. Any score below 45 indicates increased risk for falls. Score 56 55-45 44-34 33-23 22-12 11-1 0   Modifier CH CI CJ CK CL CM CN     ? Mobility - Walking and Moving Around:     - CURRENT STATUS: CJ - 20%-39% impaired, limited or restricted    - GOAL STATUS: CI - 1%-19% impaired, limited or restricted    - D/C STATUS:  ---------------To be determined---------------    Medical Necessity:   · Patient demonstrates fair rehab potential due to higher previous functional level. Reason for Services/Other Comments:  · Patient continues to require present interventions due to patient's inability to ambulate safely all surfaces.    TREATMENT:   (In addition to Assessment/Re-Assessment sessions the following treatments were rendered)  THERAPEUTIC ACTIVITY: ( see below for minutes): Therapeutic activities per grid below to improve mobility. Required moderate verbal and manual cues to promote motor control of bilateral, lower extremity(s). THERAPEUTIC EXERCISE: (see below for minutes):  Exercises per grid below to improve strength. Required moderate verbal and manual cues to promote proper body alignment, promote proper body posture and promote proper body mechanics. Progressed resistance, range, repetitions and complexity of movement as indicated. Date: 8/29/18 8/31/18  Visit 2 9/5/18  Visit 3 9/7/18  Visit 4      Modalities:                                        Therapeutic Exercise: 15 mins 25 mins 20 mins 20 mins      LAQ B 20 reps 30 reps        Slant board 3 x 10 sec 3 x 10 sec 5 x 10 sec 5 x 10 sec      Clams B 20 reps 30 reps 30 reps 30 reps      S/L hip abd B With assist  20 reps With assist  30 reps 30 reps  With assist 30 reps  With assist      Glut sets  Supine, standing  5 mins        Calf raises  30 reps 30 reps 30 reps      Standing right hip diagonal   30 reps 30 reps                Proprioceptive Activities:                                        Manual Therapy:                              Therapeutic Activities: 15 mins 15 mins 25 mins 25 mins      Pt education, postural education, HEP 5 mins         bridging 20 reps 20 reps 30 reps 30 reps      SLS holding pelvis level B With assist  5 reps With assist  5 reps With assist  5 reps With assist  5 reps      Sit to stand  15 reps  With assist 20 reps  With assist       Stepping with left foot holding pelvis level with UE support   X 15 reps X 30 reps      Foam standing eyes open and closed   4 mins 6 mins                          HEP: see hand out  [employer] Portal  Treatment/Session Assessment:   Pt has significant limp until cued to control pelvis. She can hold with min pelvic drop. · Pain/ Symptoms: Initial:  0/10 The ENT doesn't think I have BPPV. He wants the neurologist to see what she thinks.   Post Session: 0/10 ·   Compliance with Program/Exercises: Will assess as treatment progresses. · Recommendations/Intent for next treatment session: \"Next visit will focus on advancements to more challenging activities\".   Total Treatment Duration:  PT Patient Time In/Time Out  Time In: 1100  Time Out: Chris Olvera PT

## 2018-09-12 ENCOUNTER — HOSPITAL ENCOUNTER (OUTPATIENT)
Dept: PHYSICAL THERAPY | Age: 78
Discharge: HOME | End: 2018-09-12
Payer: MEDICARE

## 2018-09-12 PROCEDURE — 97530 THERAPEUTIC ACTIVITIES: CPT

## 2018-09-12 PROCEDURE — 97110 THERAPEUTIC EXERCISES: CPT

## 2018-09-12 NOTE — PROGRESS NOTES
Jarrod Berg  : 1940 [address] [place] [address],[address]  [address] [place] [address].  [phone]   [phone]        OUTPATIENT PHYSICAL THERAPY:Daily Note 2018         ICD-10: Treatment Diagnosis: Muscle weakness (generalized) (M62.81)Other abnormalities of gait and mobility (R26.89)  Precautions/Allergies:   Adhesive tape-silicones; Azopt [brinzolamide]; Other medication; Statins-hmg-coa reductase inhibitors; and Sulfa (sulfonamide antibiotics)   Fall Risk Score: 7 (? 5 = High Risk)  MD Orders: eval and treat vestibular dysfunction, unsteady gait from neuropathy MEDICAL/REFERRING DIAGNOSIS:  Unsteadiness on feet [R26.81]  Dizziness and giddiness [R42]   DATE OF ONSET: years  REFERRING PHYSICIAN: Michelle Harris MD  RETURN PHYSICIAN APPOINTMENT: 10/8/18     INITIAL ASSESSMENT:  Ms. Neela Galloway presents with weakness and balance loss from various sources inc neuropathy. She will benefit from skilled PT to increase strength and balance and address vestibular issues as indicated to improve pt's safety with gait. PROBLEM LIST (Impacting functional limitations):  1. Decreased Strength  2. Decreased ADL/Functional Activities  3. Decreased Transfer Abilities  4. Decreased Ambulation Ability/Technique  5. Decreased Balance  6. Decreased Activity Tolerance  7. Decreased Flexibility/Joint Mobility  8. Decreased [place] with Home Exercise Program INTERVENTIONS PLANNED:  1. Balance Exercise  2. Home Exercise Program (HEP)  3. Therapeutic Activites  4. Therapeutic Exercise/Strengthening    TREATMENT PLAN:  Effective Dates: 2018 TO 2018 (90 days). Frequency/Duration: 2 times a week for 90 Days  GOALS: (Goals have been discussed and agreed upon with patient.)  Short-Term Functional Goals: Time Frame: 2 weeks  1. Pt to report compliance with HEP. 2. Pt to increase DF to 5 degrees bilaterally for improved gait mechanics.   Discharge Goals: Time Frame: 12 weeks  1. Pt to increase hip abd strength to 4/5 for more stability in gait. 2. Pt to demonstrate > 4 sec SLS for reciprocal gait on stairs. 3. Pt to use assistive device if needed for safe ambulation. HISTORY:   History of Present Injury/Illness (Reason for Referral):  Pt has hearing loss in left ear and was told to get hearing aids. She did not and saw another specialist who said she needed treatment on her ear before she got hearing aids. She has had that and is being treated for a mastoid infection. She has diabetic neuropathy. She is going to have a NCV 10/8. She tripped over her 's oxygen line and fell about 3 months ago. She loses her balance a lot. She does not want to use a walker. She takes 2 meds that cause dizziness. She walks her dog in her yard and walks to get the mail but no exercise. She was told her vertigo was meniere's years ago but other doctors have contradicted that. She had vestibular rehab without any symptom changes. Past Medical History/Comorbidities:   Ms. 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); Cardiomyopathy, ischemic (1/14/2016); Chronic vertigo; DM2 (diabetes mellitus, type 2) ([address] GERD (gastroesophageal reflux disease); Hearing loss; History of AAA (abdominal aortic aneurysm) repair (2002, 1/14/2016); History of bilateral cataract extraction; History of coronary artery disease (2006, 2007); History of gallbladder disease; History of hernia repair; History of seborrheic keratosis; Hyperlipidemia; Hypertension; Lacunar infarction ([address] (8/16/2018); Meniere's disease; Menopause; Morbid obesity ([address] OA (osteoarthritis); Orthostatic hypotension (1/14/2016); Osteopenia with high risk of fracture (2014); Post-menopausal osteoporosis; Postmenopausal osteoporosis; Unspecified sleep apnea; Unsteady gait (8/16/2018); and Visit for dental examination.   Ms. Karoline Lopez  has a past surgical history that includes hx open cholecystectomy (1979); hx orthopaedic; hx cataract removal (3/10); hx hernia repair; pr cardiac surg procedure unlist; hx hysterectomy (1997); hx oophorectomy (1982); and pr breast surgery procedure unlisted (mid 2000's). Social History/Living Environment:     lives in 2 story home with  but doesn't need to go upstairs  Prior Level of Function/Work/Activity:  retired  Dominant Side:         RIGHT  Current Medications:    Current Outpatient Prescriptions:     pitavastatin calcium (LIVALO) 4 mg tab tablet, Take 1 Tab by mouth daily. , Disp: 90 Tab, Rfl: 3    potassium chloride (KLOR-CON) 10 mEq tablet, Take 1 Tab by mouth daily. , Disp: 90 Tab, Rfl: 3    triamterene-hydroCHLOROthiazide (MAXZIDE) 37.5-25 mg per tablet, Take 1 Tab by mouth daily. , Disp: 90 Tab, Rfl: 3    losartan (COZAAR) 100 mg tablet, Take 1 Tab by mouth daily. , Disp: 90 Tab, Rfl: 3    clopidogrel (PLAVIX) 75 mg tab, Take 1 Tab by mouth daily. , Disp: 90 Tab, Rfl: 3    metoprolol succinate (TOPROL XL) 100 mg tablet, Take 1 Tab by mouth nightly., Disp: 90 Tab, Rfl: 3    nitroglycerin (NITROSTAT) 0.4 mg SL tablet, 1 Tab by SubLINGual route every five (5) minutes as needed. , Disp: 25 Tab, Rfl: 6    fluticasone (FLONASE) 50 mcg/actuation nasal spray, 2 Sprays by Both Nostrils route daily for 90 days. , Disp: 3 Bottle, Rfl: 3    albuterol (PROAIR HFA) 90 mcg/actuation inhaler, Take 2 Puffs by inhalation four (4) times daily as needed for up to 90 days. , Disp: 3 Inhaler, Rfl: 3    Cholecalciferol, Vitamin D3, (VITAMIN D3) 1,000 unit cap, Take 2,000 Units by mouth daily. , Disp: , Rfl:     aspirin (ASPIRIN) 325 mg tablet, Take 325 mg by mouth every morning., Disp: , Rfl:    Date Last Reviewed:  9/12/2018   EXAMINATION:   Observation/Orthostatic Postural Assessment:          Pt has antalgic gait with right glut med lurch. The left shoe is worn across the heel more than the right.   Palpation:          Mild tenderness at left greater trochanter  ROM:     0 degrees DF bilaterally      Strength:     Right hip abd, ext, PF 2/5, quad 5/5  Left hip abd, ext 3/5, PF 2/5, quad 5/5               Neurological Screen:        unremarkable  Functional Mobility:         Gait/Ambulation:  Antalgic level surfaces        Transfers:  Uses UE's to assist        Stairs:  Step-to pattern and uses UE's to pull up  Balance:          Unable to perform   Body Structures Involved:  1. Nerves  2. Muscles Body Functions Affected:  1. Sensory/Pain  2. Neuromusculoskeletal  3. Movement Related Activities and Participation Affected:  1. General Tasks and Demands  2. Mobility  3. Self Care  4. Domestic Life  5. Community, Social and Civic Life   CLINICAL PRESENTATION:   CLINICAL DECISION MAKING:   Outcome Measure: Tool Used: Diaz Balance Scale  Score:  Initial: 39/56 Most Recent: X/56 (Date: -- )   Interpretation of Score: Each section is scored on a 0-4 scale, 0 representing the patients inability to perform the task and 4 representing independence. The scores of each section are added together for a total score of 56. The higher the patients score, the more independent the patient is. Any score below 45 indicates increased risk for falls. Score 56 55-45 44-34 33-23 22-12 11-1 0   Modifier CH CI CJ CK CL CM CN     ? Mobility - Walking and Moving Around:     - CURRENT STATUS: CJ - 20%-39% impaired, limited or restricted    - GOAL STATUS: CI - 1%-19% impaired, limited or restricted    - D/C STATUS:  ---------------To be determined---------------    Medical Necessity:   · Patient demonstrates fair rehab potential due to higher previous functional level. Reason for Services/Other Comments:  · Patient continues to require present interventions due to patient's inability to ambulate safely all surfaces.    TREATMENT:   (In addition to Assessment/Re-Assessment sessions the following treatments were rendered)  THERAPEUTIC ACTIVITY: ( see below for minutes): Therapeutic activities per grid below to improve mobility. Required moderate verbal and manual cues to promote motor control of bilateral, lower extremity(s). THERAPEUTIC EXERCISE: (see below for minutes):  Exercises per grid below to improve strength. Required moderate verbal and manual cues to promote proper body alignment, promote proper body posture and promote proper body mechanics. Progressed resistance, range, repetitions and complexity of movement as indicated. Date: 8/29/18 8/31/18  Visit 2 9/5/18  Visit 3 9/7/18  Visit 4 9/12/18  Visit 5     Modalities:                                        Therapeutic Exercise: 15 mins 25 mins 20 mins 20 mins 20 mins     LAQ B 20 reps 30 reps   30 reps     Slant board 3 x 10 sec 3 x 10 sec 5 x 10 sec 5 x 10 sec 5 x 10 sec     Clams B 20 reps 30 reps 30 reps 30 reps 30 reps     S/L hip abd B With assist  20 reps With assist  30 reps 30 reps  With assist 30 reps  With assist 30 reps  With assist     Glut sets  Supine, standing  5 mins        Calf raises  30 reps 30 reps 30 reps 30 reps     Standing right hip diagonal   30 reps 30 reps 30 reps               Proprioceptive Activities:                                        Manual Therapy:                              Therapeutic Activities: 15 mins 15 mins 25 mins 25 mins 25 mins     Pt education, postural education, HEP 5 mins         bridging 20 reps 20 reps 30 reps 30 reps 30 reps     SLS holding pelvis level B With assist  5 reps With assist  5 reps With assist  5 reps With assist  5 reps With assist  5 reps     Sit to stand  15 reps  With assist 20 reps  With assist       Stepping with left foot holding pelvis level with UE support   X 15 reps X 30 reps 30 reps     Foam standing eyes open and closed   4 mins 6 mins 5 mins                         HEP: see hand out  [facility] Portal  Treatment/Session Assessment:   Pt showing steady progress with ex rogelio and performance. Right > left hip weakness.  Continue as indicated. · Pain/ Symptoms: Initial: no pain. I came down the steps today for the first time. Post Session:  0/10 no pain just tired ·   Compliance with Program/Exercises: Will assess as treatment progresses. · Recommendations/Intent for next treatment session: \"Next visit will focus on advancements to more challenging activities\".   Total Treatment Duration:  PT Patient Time In/Time Out  Time In: 0930  Time Out: 1015     Leatha Lopez, PT

## 2018-09-14 ENCOUNTER — HOSPITAL ENCOUNTER (OUTPATIENT)
Dept: PHYSICAL THERAPY | Age: 78
Discharge: HOME | End: 2018-09-14
Payer: MEDICARE

## 2018-09-14 PROCEDURE — 97530 THERAPEUTIC ACTIVITIES: CPT

## 2018-09-14 PROCEDURE — 97110 THERAPEUTIC EXERCISES: CPT

## 2018-09-14 NOTE — PROGRESS NOTES
Reji Aiken  : 1940 [address]  [phone]   [phone]        OUTPATIENT PHYSICAL THERAPY:Daily Note 2018         ICD-10: Treatment Diagnosis: Muscle weakness (generalized) (M62.81)Other abnormalities of gait and mobility (R26.89)  Precautions/Allergies:   Adhesive tape-silicones; Azopt [brinzolamide]; Other medication; Statins-hmg-coa reductase inhibitors; and Sulfa (sulfonamide antibiotics)   Fall Risk Score: 7 (? 5 = High Risk)  MD Orders: eval and treat vestibular dysfunction, unsteady gait from neuropathy MEDICAL/REFERRING DIAGNOSIS:  Unsteadiness on feet [R26.81]  Dizziness and giddiness [R42]   DATE OF ONSET: years  REFERRING PHYSICIAN: Chuck Magallon MD  RETURN PHYSICIAN APPOINTMENT: 10/8/18     INITIAL ASSESSMENT:  Ms. Socorro Burch presents with weakness and balance loss from various sources inc neuropathy. She will benefit from skilled PT to increase strength and balance and address vestibular issues as indicated to improve pt's safety with gait. PROBLEM LIST (Impacting functional limitations):  1. Decreased Strength  2. Decreased ADL/Functional Activities  3. Decreased Transfer Abilities  4. Decreased Ambulation Ability/Technique  5. Decreased Balance  6. Decreased Activity Tolerance  7. Decreased Flexibility/Joint Mobility  8. Decreased [place] with Home Exercise Program INTERVENTIONS PLANNED:  1. Balance Exercise  2. Home Exercise Program (HEP)  3. Therapeutic Activites  4. Therapeutic Exercise/Strengthening    TREATMENT PLAN:  Effective Dates: 2018 TO 2018 (90 days). Frequency/Duration: 2 times a week for 90 Days  GOALS: (Goals have been discussed and agreed upon with patient.)  Short-Term Functional Goals: Time Frame: 2 weeks  1. Pt to report compliance with HEP. 2. Pt to increase DF to 5 degrees bilaterally for improved gait mechanics.   Discharge Goals: Time Frame: 12 weeks  1. Pt to increase hip abd strength to 4/5 for more stability in gait. 2. Pt to demonstrate > 4 sec SLS for reciprocal gait on stairs. 3. Pt to use assistive device if needed for safe ambulation. HISTORY:   History of Present Injury/Illness (Reason for Referral):  Pt has hearing loss in left ear and was told to get hearing aids. She did not and saw another specialist who said she needed treatment on her ear before she got hearing aids. She has had that and is being treated for a mastoid infection. She has diabetic neuropathy. She is going to have a NCV 10/8. She tripped over her 's oxygen line and fell about 3 months ago. She loses her balance a lot. She does not want to use a walker. She takes 2 meds that cause dizziness. She walks her dog in her yard and walks to get the mail but no exercise. She was told her vertigo was meniere's years ago but other doctors have contradicted that. She had vestibular rehab without any symptom changes. Past Medical History/Comorbidities:   Ms. 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); Cardiomyopathy, ischemic (1/14/2016); Chronic vertigo; DM2 (diabetes mellitus, type 2) ([address] GERD (gastroesophageal reflux disease); Hearing loss; History of AAA (abdominal aortic aneurysm) repair (2002, 1/14/2016); History of bilateral cataract extraction; History of coronary artery disease (2006, 2007); History of gallbladder disease; History of hernia repair; History of seborrheic keratosis; Hyperlipidemia; Hypertension; Lacunar infarction ([address] (8/16/2018); Meniere's disease; Menopause; Morbid obesity ([address] OA (osteoarthritis); Orthostatic hypotension (1/14/2016); Osteopenia with high risk of fracture (2014); Post-menopausal osteoporosis; Postmenopausal osteoporosis; Unspecified sleep apnea; Unsteady gait (8/16/2018); and Visit for dental examination.   Ms. Leah Soria  has a past surgical history that includes hx open cholecystectomy (1979); hx orthopaedic; hx cataract removal (3/10); hx hernia repair; pr cardiac surg procedure unlist; hx hysterectomy (1997); hx oophorectomy (1982); and pr breast surgery procedure unlisted (mid 2000's). Social History/Living Environment:     lives in 2 story home with  but doesn't need to go upstairs  Prior Level of Function/Work/Activity:  retired  Dominant Side:         RIGHT  Current Medications:    Current Outpatient Prescriptions:     pitavastatin calcium (LIVALO) 4 mg tab tablet, Take 1 Tab by mouth daily. , Disp: 90 Tab, Rfl: 3    potassium chloride (KLOR-CON) 10 mEq tablet, Take 1 Tab by mouth daily. , Disp: 90 Tab, Rfl: 3    triamterene-hydroCHLOROthiazide (MAXZIDE) 37.5-25 mg per tablet, Take 1 Tab by mouth daily. , Disp: 90 Tab, Rfl: 3    losartan (COZAAR) 100 mg tablet, Take 1 Tab by mouth daily. , Disp: 90 Tab, Rfl: 3    clopidogrel (PLAVIX) 75 mg tab, Take 1 Tab by mouth daily. , Disp: 90 Tab, Rfl: 3    metoprolol succinate (TOPROL XL) 100 mg tablet, Take 1 Tab by mouth nightly., Disp: 90 Tab, Rfl: 3    nitroglycerin (NITROSTAT) 0.4 mg SL tablet, 1 Tab by SubLINGual route every five (5) minutes as needed. , Disp: 25 Tab, Rfl: 6    fluticasone (FLONASE) 50 mcg/actuation nasal spray, 2 Sprays by Both Nostrils route daily for 90 days. , Disp: 3 Bottle, Rfl: 3    albuterol (PROAIR HFA) 90 mcg/actuation inhaler, Take 2 Puffs by inhalation four (4) times daily as needed for up to 90 days. , Disp: 3 Inhaler, Rfl: 3    Cholecalciferol, Vitamin D3, (VITAMIN D3) 1,000 unit cap, Take 2,000 Units by mouth daily. , Disp: , Rfl:     aspirin (ASPIRIN) 325 mg tablet, Take 325 mg by mouth every morning., Disp: , Rfl:    Date Last Reviewed:  9/14/2018   EXAMINATION:   Observation/Orthostatic Postural Assessment:          Pt has antalgic gait with right glut med lurch. The left shoe is worn across the heel more than the right.   Palpation:          Mild tenderness at left greater trochanter  ROM:     0 degrees DF bilaterally      Strength:     Right hip abd, ext, PF 2/5, quad 5/5  Left hip abd, ext 3/5, PF 2/5, quad 5/5               Neurological Screen:        unremarkable  Functional Mobility:         Gait/Ambulation:  Antalgic level surfaces        Transfers:  Uses UE's to assist        Stairs:  Step-to pattern and uses UE's to pull up  Balance:          Unable to perform   Body Structures Involved:  1. Nerves  2. Muscles Body Functions Affected:  1. Sensory/Pain  2. Neuromusculoskeletal  3. Movement Related Activities and Participation Affected:  1. General Tasks and Demands  2. Mobility  3. Self Care  4. Domestic Life  5. Community, Social and Civic Life   CLINICAL PRESENTATION:   CLINICAL DECISION MAKING:   Outcome Measure: Tool Used: Diaz Balance Scale  Score:  Initial: 39/56 Most Recent: X/56 (Date: -- )   Interpretation of Score: Each section is scored on a 0-4 scale, 0 representing the patients inability to perform the task and 4 representing independence. The scores of each section are added together for a total score of 56. The higher the patients score, the more independent the patient is. Any score below 45 indicates increased risk for falls. Score 56 55-45 44-34 33-23 22-12 11-1 0   Modifier CH CI CJ CK CL CM CN     ? Mobility - Walking and Moving Around:     - CURRENT STATUS: CJ - 20%-39% impaired, limited or restricted    - GOAL STATUS: CI - 1%-19% impaired, limited or restricted    - D/C STATUS:  ---------------To be determined---------------    Medical Necessity:   · Patient demonstrates fair rehab potential due to higher previous functional level. Reason for Services/Other Comments:  · Patient continues to require present interventions due to patient's inability to ambulate safely all surfaces.    TREATMENT:   (In addition to Assessment/Re-Assessment sessions the following treatments were rendered)  THERAPEUTIC ACTIVITY: ( see below for minutes): Therapeutic activities per grid below to improve mobility. Required moderate verbal and manual cues to promote motor control of bilateral, lower extremity(s). THERAPEUTIC EXERCISE: (see below for minutes):  Exercises per grid below to improve strength. Required moderate verbal and manual cues to promote proper body alignment, promote proper body posture and promote proper body mechanics. Progressed resistance, range, repetitions and complexity of movement as indicated.     Date: 8/29/18 8/31/18  Visit 2 9/5/18  Visit 3 9/7/18  Visit 4 9/12/18  Visit 5 9/14/18  Visit 6    Modalities:                                        Therapeutic Exercise: 15 mins 25 mins 20 mins 20 mins 20 mins 20 mins    LAQ B 20 reps 30 reps   30 reps 30 reps      Slant board 3 x 10 sec 3 x 10 sec 5 x 10 sec 5 x 10 sec 5 x 10 sec 5 x 10 sec    Clams B 20 reps 30 reps 30 reps 30 reps 30 reps 30 reps    S/L hip abd B With assist  20 reps With assist  30 reps 30 reps  With assist 30 reps  With assist 30 reps  With assist 30 reps  With assist    Glut sets  Supine, standing  5 mins        Calf raises  30 reps 30 reps 30 reps 30 reps 30 reps    Standing right hip diagonal   30 reps 30 reps 30 reps 30 reps              Proprioceptive Activities:                                        Manual Therapy:                              Therapeutic Activities: 15 mins 15 mins 25 mins 25 mins 25 mins 25 mins    Pt education, postural education, HEP 5 mins         bridging 20 reps 20 reps 30 reps 30 reps 30 reps 30 reps    SLS holding pelvis level B With assist  5 reps With assist  5 reps With assist  5 reps With assist  5 reps With assist  5 reps With assist  5 reps    Sit to stand  15 reps  With assist 20 reps  With assist       Stepping with left foot holding pelvis level with UE support   X 15 reps X 30 reps 30 reps 30 reps    Foam standing eyes open and closed   4 mins 6 mins 5 mins 5 mins                        HEP: see hand out  [facility] Portal  Treatment/Session Assessment:   Right > left hip weakness. Good effort and rogelio. Continue as indicated. · Pain/ Symptoms: Initial: no pain. The right hip gets more tired than the left. It is hard to hold that side. Post Session:  0/10 no pain just tired ·   Compliance with Program/Exercises: Will assess as treatment progresses. · Recommendations/Intent for next treatment session: \"Next visit will focus on advancements to more challenging activities\".   Total Treatment Duration:  PT Patient Time In/Time Out  Time In: 0845  Time Out: 0930     Tawnya Lopez, PT

## 2018-09-19 ENCOUNTER — HOSPITAL ENCOUNTER (OUTPATIENT)
Dept: PHYSICAL THERAPY | Age: 78
Discharge: HOME | End: 2018-09-19
Payer: MEDICARE

## 2018-09-19 PROCEDURE — 97110 THERAPEUTIC EXERCISES: CPT

## 2018-09-19 PROCEDURE — 97530 THERAPEUTIC ACTIVITIES: CPT

## 2018-09-21 ENCOUNTER — HOSPITAL ENCOUNTER (OUTPATIENT)
Dept: PHYSICAL THERAPY | Age: 78
Discharge: HOME | End: 2018-09-21
Payer: MEDICARE

## 2018-09-21 PROCEDURE — 97110 THERAPEUTIC EXERCISES: CPT

## 2018-09-21 PROCEDURE — G8978 MOBILITY CURRENT STATUS: HCPCS

## 2018-09-21 PROCEDURE — 97530 THERAPEUTIC ACTIVITIES: CPT

## 2018-09-21 PROCEDURE — G8979 MOBILITY GOAL STATUS: HCPCS

## 2018-09-21 NOTE — PROGRESS NOTES
Kerline Santillan  : 1940 [id number] [facility],[address]  [address] [place] [address].  [phone]   [phone]        OUTPATIENT PHYSICAL THERAPY:Daily Note and Progress Report 2018         ICD-10: Treatment Diagnosis: Muscle weakness (generalized) (M62.81)Other abnormalities of gait and mobility (R26.89)  Precautions/Allergies:   Adhesive tape-silicones; Azopt [brinzolamide]; Other medication; Statins-hmg-coa reductase inhibitors; and Sulfa (sulfonamide antibiotics)   Fall Risk Score: 7 (? 5 = High Risk)  MD Orders: eval and treat vestibular dysfunction, unsteady gait from neuropathy MEDICAL/REFERRING DIAGNOSIS:  Unsteadiness on feet [R26.81]  Dizziness and giddiness [R42]   DATE OF ONSET: years  REFERRING PHYSICIAN: Kemi Becerra MD  RETURN PHYSICIAN APPOINTMENT: 10/8/18     INITIAL ASSESSMENT:  Ms. Justyna Byrd presents with weakness and balance loss from various sources inc neuropathy. She will benefit from skilled PT to increase strength and balance and address vestibular issues as indicated to improve pt's safety with gait. PROBLEM LIST (Impacting functional limitations):  1. Decreased Strength  2. Decreased ADL/Functional Activities  3. Decreased Transfer Abilities  4. Decreased Ambulation Ability/Technique  5. Decreased Balance  6. Decreased Activity Tolerance  7. Decreased Flexibility/Joint Mobility  8. Decreased [place] with Home Exercise Program INTERVENTIONS PLANNED:  1. Balance Exercise  2. Home Exercise Program (HEP)  3. Therapeutic Activites  4. Therapeutic Exercise/Strengthening    TREATMENT PLAN:  Effective Dates: 2018 TO 2018 (90 days). Frequency/Duration: 2 times a week for 90 Days  GOALS: (Goals have been discussed and agreed upon with patient.)  Short-Term Functional Goals: Time Frame: 2 weeks  1. Pt to report compliance with HEP. - ongoing  2. Pt to increase DF to 5 degrees bilaterally for improved gait mechanics. - MET  Discharge Goals: Time Frame: 12 weeks  1. Pt to increase hip abd strength to 4/5 for more stability in gait. - ongoing  2. Pt to demonstrate > 4 sec SLS for reciprocal gait on stairs. - ongoing  3. Pt to use assistive device if needed for safe ambulation. - MET              HISTORY:   History of Present Injury/Illness (Reason for Referral):  Pt has hearing loss in left ear and was told to get hearing aids. She did not and saw another specialist who said she needed treatment on her ear before she got hearing aids. She has had that and is being treated for a mastoid infection. She has diabetic neuropathy. She is going to have a NCV 10/8. She tripped over her 's oxygen line and fell about 3 months ago. She loses her balance a lot. She does not want to use a walker. She takes 2 meds that cause dizziness. She walks her dog in her yard and walks to get the mail but no exercise. She was told her vertigo was meniere's years ago but other doctors have contradicted that. She had vestibular rehab without any symptom changes. Past Medical History/Comorbidities:   Ms. 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); Cardiomyopathy, ischemic (1/14/2016); Chronic vertigo; DM2 (diabetes mellitus, type 2) ([address] GERD (gastroesophageal reflux disease); Hearing loss; History of AAA (abdominal aortic aneurysm) repair (2002, 1/14/2016); History of bilateral cataract extraction; History of coronary artery disease (2006, 2007); History of gallbladder disease; History of hernia repair; History of seborrheic keratosis; Hyperlipidemia; Hypertension; Lacunar infarction ([address] (8/16/2018); Meniere's disease; Menopause; Morbid obesity ([address] OA (osteoarthritis); Orthostatic hypotension (1/14/2016); Osteopenia with high risk of fracture (2014); Post-menopausal osteoporosis; Postmenopausal osteoporosis; Unspecified sleep apnea; Unsteady gait (8/16/2018); and Visit for dental examination.   Ms. Delmy Fontenot  has a past surgical history that includes hx open cholecystectomy (1979); hx orthopaedic; hx cataract removal (3/10); hx hernia repair; pr cardiac surg procedure unlist; hx hysterectomy (1997); hx oophorectomy (1982); and pr breast surgery procedure unlisted (mid 2000's). Social History/Living Environment:     lives in 2 story home with  but doesn't need to go upstairs  Prior Level of Function/Work/Activity:  retired  Dominant Side:         RIGHT  Current Medications:    Current Outpatient Prescriptions:     pitavastatin calcium (LIVALO) 4 mg tab tablet, Take 1 Tab by mouth daily. , Disp: 90 Tab, Rfl: 3    potassium chloride (KLOR-CON) 10 mEq tablet, Take 1 Tab by mouth daily. , Disp: 90 Tab, Rfl: 3    triamterene-hydroCHLOROthiazide (MAXZIDE) 37.5-25 mg per tablet, Take 1 Tab by mouth daily. , Disp: 90 Tab, Rfl: 3    losartan (COZAAR) 100 mg tablet, Take 1 Tab by mouth daily. , Disp: 90 Tab, Rfl: 3    clopidogrel (PLAVIX) 75 mg tab, Take 1 Tab by mouth daily. , Disp: 90 Tab, Rfl: 3    metoprolol succinate (TOPROL XL) 100 mg tablet, Take 1 Tab by mouth nightly., Disp: 90 Tab, Rfl: 3    nitroglycerin (NITROSTAT) 0.4 mg SL tablet, 1 Tab by SubLINGual route every five (5) minutes as needed. , Disp: 25 Tab, Rfl: 6    fluticasone (FLONASE) 50 mcg/actuation nasal spray, 2 Sprays by Both Nostrils route daily for 90 days. , Disp: 3 Bottle, Rfl: 3    albuterol (PROAIR HFA) 90 mcg/actuation inhaler, Take 2 Puffs by inhalation four (4) times daily as needed for up to 90 days. , Disp: 3 Inhaler, Rfl: 3    Cholecalciferol, Vitamin D3, (VITAMIN D3) 1,000 unit cap, Take 2,000 Units by mouth daily. , Disp: , Rfl:     aspirin (ASPIRIN) 325 mg tablet, Take 325 mg by mouth every morning., Disp: , Rfl:    Date Last Reviewed:  9/21/2018   EXAMINATION:   Observation/Orthostatic Postural Assessment:          Pt has antalgic gait with right glut med lurch.   The left shoe is worn across the heel more than the right.  Palpation:          Mild tenderness at left greater trochanter  ROM:     5 degrees DF bilaterally 9/21/18      Strength:     Right hip abd, ext, PF 3-/5, quad 5/5  Left hip abd, ext 3+/5, PF 3-/5, quad 5/5  9/21/18               Neurological Screen:        unremarkable  Functional Mobility:         Gait/Ambulation:  Antalgic level surfaces        Transfers:  Uses UE's to assist        Stairs:  Step-to pattern and uses UE's to pull up  Balance:         < 1 sec B 9/21/18   Body Structures Involved:  1. Nerves  2. Muscles Body Functions Affected:  1. Sensory/Pain  2. Neuromusculoskeletal  3. Movement Related Activities and Participation Affected:  1. General Tasks and Demands  2. Mobility  3. Self Care  4. Domestic Life  5. Community, Social and Civic Life   CLINICAL PRESENTATION:   CLINICAL DECISION MAKING:   Outcome Measure: Tool Used: Diaz Balance Scale  Score:  Initial: 39/56 Most Recent: 43/56 (Date: 9/21/18 )   Interpretation of Score: Each section is scored on a 0-4 scale, 0 representing the patients inability to perform the task and 4 representing independence. The scores of each section are added together for a total score of 56. The higher the patients score, the more independent the patient is. Any score below 45 indicates increased risk for falls. Score 56 55-45 44-34 33-23 22-12 11-1 0   Modifier CH CI CJ CK CL CM CN     ? Mobility - Walking and Moving Around:     - CURRENT STATUS: CJ - 20%-39% impaired, limited or restricted    - GOAL STATUS: CI - 1%-19% impaired, limited or restricted    - D/C STATUS:  ---------------To be determined---------------    Medical Necessity:   · Patient demonstrates fair rehab potential due to higher previous functional level. Reason for Services/Other Comments:  · Patient continues to require present interventions due to patient's inability to ambulate safely all surfaces.    TREATMENT:   (In addition to Assessment/Re-Assessment sessions the following treatments were rendered)  THERAPEUTIC ACTIVITY: ( see below for minutes): Therapeutic activities per grid below to improve mobility. Required moderate verbal and manual cues to promote motor control of bilateral, lower extremity(s). THERAPEUTIC EXERCISE: (see below for minutes):  Exercises per grid below to improve strength. Required moderate verbal and manual cues to promote proper body alignment, promote proper body posture and promote proper body mechanics. Progressed resistance, range, repetitions and complexity of movement as indicated.     Date: 8/29/18 8/31/18  Visit 2 9/5/18  Visit 3 9/7/18  Visit 4 9/12/18  Visit 5 9/14/18  Visit 6 9/19/18  Visit 7 9/21/18  Visit 8  PN     Modalities:                                                    Therapeutic Exercise: 15 mins 25 mins 20 mins 20 mins 20 mins 20 mins 25 mins 25 mins     LAQ B 20 reps 30 reps   30 reps 30 reps   30 reps  5 lb 30 reps  5 lb     Slant board 3 x 10 sec 3 x 10 sec 5 x 10 sec 5 x 10 sec 5 x 10 sec 5 x 10 sec 5 x 10 sec 5 x 10 sec     Clams B 20 reps 30 reps 30 reps 30 reps 30 reps 30 reps 2 x 20 reps 2 x 20 reps     S/L hip abd B With assist  20 reps With assist  30 reps 30 reps  With assist 30 reps  With assist 30 reps  With assist 30 reps  With assist 2 x 20 reps  With assist 2 x 20 reps  With assist     Glut sets  Supine, standing  5 mins           Calf raises  30 reps 30 reps 30 reps 30 reps 30 reps 30 reps 30 reps     Standing right hip diagonal   30 reps 30 reps 30 reps 30 reps 30 reps 30 reps                  Proprioceptive Activities:                                                    Manual Therapy:                                       Therapeutic Activities: 15 mins 15 mins 25 mins 25 mins 25 mins 25 mins 20 mins 20 mins     Pt education, postural education, HEP 5 mins            bridging 20 reps 20 reps 30 reps 30 reps 30 reps 30 reps 30 reps 30 reps     SLS holding pelvis level B With assist  5 reps With assist  5 reps With assist  5 reps With assist  5 reps With assist  5 reps With assist  5 reps 5 reps  With assist  X 5 with assist     Sit to stand  15 reps  With assist 20 reps  With assist          Stepping with left foot holding pelvis level with UE support   X 15 reps X 30 reps 30 reps 30 reps 30 reps 30 reps     Foam standing eyes open and closed   4 mins 6 mins 5 mins 5 mins 5 mins 5 mins                               HEP: see hand out  [employer] Portal  Treatment/Session Assessment:  Pt has shown some strength gains. She reports not doing ex's at home much because she sits with her . Continue with strengthening and balance training as rogelio. · Pain/ Symptoms: Initial:  0/10 Post Session:  0/10 I just get really tired. ·   Compliance with Program/Exercises: Will assess as treatment progresses. · Recommendations/Intent for next treatment session: \"Next visit will focus on advancements to more challenging activities\".   Total Treatment Duration:  PT Patient Time In/Time Out  Time In: 0845  Time Out: 0930 April Albert Lopez PT

## 2018-09-26 ENCOUNTER — HOSPITAL ENCOUNTER (OUTPATIENT)
Dept: PHYSICAL THERAPY | Age: 78
Discharge: HOME | End: 2018-09-26
Payer: MEDICARE

## 2018-09-26 PROCEDURE — 97530 THERAPEUTIC ACTIVITIES: CPT

## 2018-09-26 PROCEDURE — 97110 THERAPEUTIC EXERCISES: CPT

## 2018-09-26 NOTE — PROGRESS NOTES
Toño Goodman  : 1940 Dakota Melita P[address]  [address] [place] [address].  [phone]   [phone]        OUTPATIENT PHYSICAL THERAPY:Daily Note 2018         ICD-10: Treatment Diagnosis: Muscle weakness (generalized) (M62.81)Other abnormalities of gait and mobility (R26.89)  Precautions/Allergies:   Adhesive tape-silicones; Azopt [brinzolamide]; Other medication; Statins-hmg-coa reductase inhibitors; and Sulfa (sulfonamide antibiotics)   Fall Risk Score: 7 (? 5 = High Risk)  MD Orders: eval and treat vestibular dysfunction, unsteady gait from neuropathy MEDICAL/REFERRING DIAGNOSIS:  Unsteadiness on feet [R26.81]  Dizziness and giddiness [R42]   DATE OF ONSET: years  REFERRING PHYSICIAN: Anna Quintana MD  RETURN PHYSICIAN APPOINTMENT: 10/8/18     INITIAL ASSESSMENT:  Ms. Tete De La Torre presents with weakness and balance loss from various sources inc neuropathy. She will benefit from skilled PT to increase strength and balance and address vestibular issues as indicated to improve pt's safety with gait. PROBLEM LIST (Impacting functional limitations):  1. Decreased Strength  2. Decreased ADL/Functional Activities  3. Decreased Transfer Abilities  4. Decreased Ambulation Ability/Technique  5. Decreased Balance  6. Decreased Activity Tolerance  7. Decreased Flexibility/Joint Mobility  8. Decreased [place] with Home Exercise Program INTERVENTIONS PLANNED:  1. Balance Exercise  2. Home Exercise Program (HEP)  3. Therapeutic Activites  4. Therapeutic Exercise/Strengthening    TREATMENT PLAN:  Effective Dates: 2018 TO 2018 (90 days). Frequency/Duration: 2 times a week for 90 Days  GOALS: (Goals have been discussed and agreed upon with patient.)  Short-Term Functional Goals: Time Frame: 2 weeks  1. Pt to report compliance with HEP. - ongoing  2.  Pt to increase DF to 5 degrees bilaterally for improved gait mechanics. - MET  Discharge Goals: Time Frame: 12 weeks  1. Pt to increase hip abd strength to 4/5 for more stability in gait. - ongoing  2. Pt to demonstrate > 4 sec SLS for reciprocal gait on stairs. - ongoing  3. Pt to use assistive device if needed for safe ambulation. - MET              HISTORY:   History of Present Injury/Illness (Reason for Referral):  Pt has hearing loss in left ear and was told to get hearing aids. She did not and saw another specialist who said she needed treatment on her ear before she got hearing aids. She has had that and is being treated for a mastoid infection. She has diabetic neuropathy. She is going to have a NCV 10/8. She tripped over her 's oxygen line and fell about 3 months ago. She loses her balance a lot. She does not want to use a walker. She takes 2 meds that cause dizziness. She walks her dog in her yard and walks to get the mail but no exercise. She was told her vertigo was meniere's years ago but other doctors have contradicted that. She had vestibular rehab without any symptom changes. Past Medical History/Comorbidities:   Ms. 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); Cardiomyopathy, ischemic (1/14/2016); Chronic vertigo; DM2 (diabetes mellitus, type 2) ([address] GERD (gastroesophageal reflux disease); Hearing loss; History of AAA (abdominal aortic aneurysm) repair (2002, 1/14/2016); History of bilateral cataract extraction; History of coronary artery disease (2006, 2007); History of gallbladder disease; History of hernia repair; History of seborrheic keratosis; Hyperlipidemia; Hypertension; Lacunar infarction ([address] (8/16/2018); Meniere's disease; Menopause; Morbid obesity ([address] OA (osteoarthritis); Orthostatic hypotension (1/14/2016); Osteopenia with high risk of fracture (2014); Post-menopausal osteoporosis; Postmenopausal osteoporosis; Unspecified sleep apnea; Unsteady gait (8/16/2018); and Visit for dental examination.   Ms. Hetal Davis  has a past surgical history that includes hx open cholecystectomy (1979); hx orthopaedic; hx cataract removal (3/10); hx hernia repair; pr cardiac surg procedure unlist; hx hysterectomy (1997); hx oophorectomy (1982); and pr breast surgery procedure unlisted (mid 2000's). Social History/Living Environment:     lives in 2 story home with  but doesn't need to go upstairs  Prior Level of Function/Work/Activity:  retired  Dominant Side:         RIGHT  Current Medications:    Current Outpatient Prescriptions:     pitavastatin calcium (LIVALO) 4 mg tab tablet, Take 1 Tab by mouth daily. , Disp: 90 Tab, Rfl: 3    potassium chloride (KLOR-CON) 10 mEq tablet, Take 1 Tab by mouth daily. , Disp: 90 Tab, Rfl: 3    triamterene-hydroCHLOROthiazide (MAXZIDE) 37.5-25 mg per tablet, Take 1 Tab by mouth daily. , Disp: 90 Tab, Rfl: 3    losartan (COZAAR) 100 mg tablet, Take 1 Tab by mouth daily. , Disp: 90 Tab, Rfl: 3    clopidogrel (PLAVIX) 75 mg tab, Take 1 Tab by mouth daily. , Disp: 90 Tab, Rfl: 3    metoprolol succinate (TOPROL XL) 100 mg tablet, Take 1 Tab by mouth nightly., Disp: 90 Tab, Rfl: 3    nitroglycerin (NITROSTAT) 0.4 mg SL tablet, 1 Tab by SubLINGual route every five (5) minutes as needed. , Disp: 25 Tab, Rfl: 6    fluticasone (FLONASE) 50 mcg/actuation nasal spray, 2 Sprays by Both Nostrils route daily for 90 days. , Disp: 3 Bottle, Rfl: 3    albuterol (PROAIR HFA) 90 mcg/actuation inhaler, Take 2 Puffs by inhalation four (4) times daily as needed for up to 90 days. , Disp: 3 Inhaler, Rfl: 3    Cholecalciferol, Vitamin D3, (VITAMIN D3) 1,000 unit cap, Take 2,000 Units by mouth daily. , Disp: , Rfl:     aspirin (ASPIRIN) 325 mg tablet, Take 325 mg by mouth every morning., Disp: , Rfl:    Date Last Reviewed:  9/26/2018   EXAMINATION:   Observation/Orthostatic Postural Assessment:          Pt has antalgic gait with right glut med lurch. The left shoe is worn across the heel more than the right.   Palpation: Mild tenderness at left greater trochanter  ROM:     5 degrees DF bilaterally 9/21/18      Strength:     Right hip abd, ext, PF 3-/5, quad 5/5  Left hip abd, ext 3+/5, PF 3-/5, quad 5/5  9/21/18               Neurological Screen:        unremarkable  Functional Mobility:         Gait/Ambulation:  Antalgic level surfaces        Transfers:  Uses UE's to assist        Stairs:  Step-to pattern and uses UE's to pull up  Balance:         < 1 sec B 9/21/18   Body Structures Involved:  1. Nerves  2. Muscles Body Functions Affected:  1. Sensory/Pain  2. Neuromusculoskeletal  3. Movement Related Activities and Participation Affected:  1. General Tasks and Demands  2. Mobility  3. Self Care  4. Domestic Life  5. Community, Social and Civic Life   CLINICAL PRESENTATION:   CLINICAL DECISION MAKING:   Outcome Measure: Tool Used: Diaz Balance Scale  Score:  Initial: 39/56 Most Recent: 43/56 (Date: 9/21/18 )   Interpretation of Score: Each section is scored on a 0-4 scale, 0 representing the patients inability to perform the task and 4 representing independence. The scores of each section are added together for a total score of 56. The higher the patients score, the more independent the patient is. Any score below 45 indicates increased risk for falls. Score 56 55-45 44-34 33-23 22-12 11-1 0   Modifier CH CI CJ CK CL CM CN     ? Mobility - Walking and Moving Around:     - CURRENT STATUS: CJ - 20%-39% impaired, limited or restricted    - GOAL STATUS: CI - 1%-19% impaired, limited or restricted    - D/C STATUS:  ---------------To be determined---------------    Medical Necessity:   · Patient demonstrates fair rehab potential due to higher previous functional level. Reason for Services/Other Comments:  · Patient continues to require present interventions due to patient's inability to ambulate safely all surfaces.    TREATMENT:   (In addition to Assessment/Re-Assessment sessions the following treatments were rendered)  THERAPEUTIC ACTIVITY: ( see below for minutes): Therapeutic activities per grid below to improve mobility. Required moderate verbal and manual cues to promote motor control of bilateral, lower extremity(s). THERAPEUTIC EXERCISE: (see below for minutes):  Exercises per grid below to improve strength. Required moderate verbal and manual cues to promote proper body alignment, promote proper body posture and promote proper body mechanics. Progressed resistance, range, repetitions and complexity of movement as indicated.     Date: 8/29/18 8/31/18  Visit 2 9/5/18  Visit 3 9/7/18  Visit 4 9/12/18  Visit 5 9/14/18  Visit 6 9/19/18  Visit 7 9/21/18  Visit 8  PN 9/26/18  Visit 9      Modalities:                                                            Therapeutic Exercise: 15 mins 25 mins 20 mins 20 mins 20 mins 20 mins 25 mins 25 mins 20 mins      LAQ B 20 reps 30 reps   30 reps 30 reps   30 reps  5 lb 30 reps  5 lb       Slant board 3 x 10 sec 3 x 10 sec 5 x 10 sec 5 x 10 sec 5 x 10 sec 5 x 10 sec 5 x 10 sec 5 x 10 sec 5 x 10 sec      Clams B 20 reps 30 reps 30 reps 30 reps 30 reps 30 reps 2 x 20 reps 2 x 20 reps 30 reps      S/L hip abd B With assist  20 reps With assist  30 reps 30 reps  With assist 30 reps  With assist 30 reps  With assist 30 reps  With assist 2 x 20 reps  With assist 2 x 20 reps  With assist 30 reps  assist      Glut sets  Supine, standing  5 mins             Calf raises  30 reps 30 reps 30 reps 30 reps 30 reps 30 reps 30 reps 30 reps      Standing right hip diagonal   30 reps 30 reps 30 reps 30 reps 30 reps 30 reps 30 reps                     Proprioceptive Activities:                                                            Manual Therapy:                                             Therapeutic Activities: 15 mins 15 mins 25 mins 25 mins 25 mins 25 mins 20 mins 20 mins 25 mins      Pt education, postural education, HEP 5 mins              bridging 20 reps 20 reps 30 reps 30 reps 30 reps 30 reps 30 reps 30 reps 30 reps      SLS holding pelvis level B With assist  5 reps With assist  5 reps With assist  5 reps With assist  5 reps With assist  5 reps With assist  5 reps 5 reps  With assist  X 5 with assist X 5   assist      Sit to stand  15 reps  With assist 20 reps  With assist      20 reps  assist      Stepping with left foot holding pelvis level with UE support   X 15 reps X 30 reps 30 reps 30 reps 30 reps 30 reps 30 reps      Foam standing eyes open and closed   4 mins 6 mins 5 mins 5 mins 5 mins 5 mins 5 mins      Step ups lat         3 inches  X 10 each                     HEP: see hand out  [employer] Portal  Treatment/Session Assessment:  Right hip weakness functionally remains. Some good strength gains noted. Continue as indicated. · Pain/ Symptoms: Initial:  0/10 I can tell I am stronger even at home now. Post Session:  0/10  ·   Compliance with Program/Exercises: Will assess as treatment progresses. · Recommendations/Intent for next treatment session: \"Next visit will focus on advancements to more challenging activities\".   Total Treatment Duration:  PT Patient Time In/Time Out  Time In: 0930  Time Out: 1015     Silvia Lopez, PT

## 2018-09-28 ENCOUNTER — HOSPITAL ENCOUNTER (OUTPATIENT)
Dept: PHYSICAL THERAPY | Age: 78
Discharge: HOME | End: 2018-09-28
Payer: MEDICARE

## 2018-09-28 PROCEDURE — 97530 THERAPEUTIC ACTIVITIES: CPT

## 2018-09-28 PROCEDURE — 97110 THERAPEUTIC EXERCISES: CPT

## 2018-10-05 ENCOUNTER — HOSPITAL ENCOUNTER (OUTPATIENT)
Dept: PHYSICAL THERAPY | Age: 78
Discharge: HOME | End: 2018-10-05
Payer: MEDICARE

## 2018-10-10 ENCOUNTER — HOSPITAL ENCOUNTER (OUTPATIENT)
Dept: PHYSICAL THERAPY | Age: 78
Discharge: HOME | End: 2018-10-10
Payer: MEDICARE

## 2018-10-10 PROCEDURE — 97530 THERAPEUTIC ACTIVITIES: CPT

## 2018-10-10 PROCEDURE — 97110 THERAPEUTIC EXERCISES: CPT

## 2018-10-10 NOTE — PROGRESS NOTES
Mika Celi  : 1940 [address] [place] [address],[address]  [address] [place] [address]  [phone]   [phone]        OUTPATIENT PHYSICAL THERAPY:Daily Note 10/10/2018         ICD-10: Treatment Diagnosis: Muscle weakness (generalized) (M62.81)Other abnormalities of gait and mobility (R26.89)  Precautions/Allergies:   Adhesive tape-silicones; Azopt [brinzolamide]; Other medication; Statins-hmg-coa reductase inhibitors; and Sulfa (sulfonamide antibiotics)   Fall Risk Score: 7 (? 5 = High Risk)  MD Orders: eval and treat vestibular dysfunction, unsteady gait from neuropathy MEDICAL/REFERRING DIAGNOSIS:  Unsteadiness on feet [R26.81]  Dizziness and giddiness [R42]   DATE OF ONSET: years  REFERRING PHYSICIAN: Shamir Christianson MD  RETURN PHYSICIAN APPOINTMENT: 10/8/18     INITIAL ASSESSMENT:  Ms. Juani Key presents with weakness and balance loss from various sources inc neuropathy. She will benefit from skilled PT to increase strength and balance and address vestibular issues as indicated to improve pt's safety with gait. PROBLEM LIST (Impacting functional limitations):  1. Decreased Strength  2. Decreased ADL/Functional Activities  3. Decreased Transfer Abilities  4. Decreased Ambulation Ability/Technique  5. Decreased Balance  6. Decreased Activity Tolerance  7. Decreased Flexibility/Joint Mobility  8. Decreased [place] with Home Exercise Program INTERVENTIONS PLANNED:  1. Balance Exercise  2. Home Exercise Program (HEP)  3. Therapeutic Activites  4. Therapeutic Exercise/Strengthening    TREATMENT PLAN:  Effective Dates: 2018 TO 2018 (90 days). Frequency/Duration: 2 times a week for 90 Days  GOALS: (Goals have been discussed and agreed upon with patient.)  Short-Term Functional Goals: Time Frame: 2 weeks  1. Pt to report compliance with HEP. - ongoing  2.  Pt to increase DF to 5 degrees bilaterally for improved gait mechanics. - MET  Discharge Goals: Time Frame: 12 weeks  1. Pt to increase hip abd strength to 4/5 for more stability in gait. - ongoing  2. Pt to demonstrate > 4 sec SLS for reciprocal gait on stairs. - ongoing  3. Pt to use assistive device if needed for safe ambulation. - MET              HISTORY:   History of Present Injury/Illness (Reason for Referral):  Pt has hearing loss in left ear and was told to get hearing aids. She did not and saw another specialist who said she needed treatment on her ear before she got hearing aids. She has had that and is being treated for a mastoid infection. She has diabetic neuropathy. She is going to have a NCV 10/8. She tripped over her 's oxygen line and fell about 3 months ago. She loses her balance a lot. She does not want to use a walker. She takes 2 meds that cause dizziness. She walks her dog in her yard and walks to get the mail but no exercise. She was told her vertigo was meniere's years ago but other doctors have contradicted that. She had vestibular rehab without any symptom changes. Past Medical History/Comorbidities:   Ms. 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); Cardiomyopathy, ischemic (1/14/2016); Chronic vertigo; DM2 (diabetes mellitus, type 2) ([address] GERD (gastroesophageal reflux disease); Hearing loss; History of AAA (abdominal aortic aneurysm) repair (2002, 1/14/2016); History of bilateral cataract extraction; History of coronary artery disease (2006, 2007); History of gallbladder disease; History of hernia repair; History of seborrheic keratosis; Hyperlipidemia; Hypertension; Lacunar infarction (8/16/2018); Meniere's disease; Menopause; Morbid obesity ([address] OA (osteoarthritis); Orthostatic hypotension (1/14/2016); Osteopenia with high risk of fracture (2014); Post-menopausal osteoporosis; Postmenopausal osteoporosis; Unspecified sleep apnea; Unsteady gait (8/16/2018); and Visit for dental examination.   Ms. Roverto Weber  has a past surgical history that includes hx open cholecystectomy (1979); hx orthopaedic; hx cataract removal (3/10); hx hernia repair; pr cardiac surg procedure unlist; hx hysterectomy (1997); hx oophorectomy (1982); and pr breast surgery procedure unlisted (mid 2000's). Social History/Living Environment:     lives in 2 story home with  but doesn't need to go upstairs  Prior Level of Function/Work/Activity:  retired  Dominant Side:         RIGHT  Current Medications:    Current Outpatient Prescriptions:     pitavastatin calcium (LIVALO) 4 mg tab tablet, Take 1 Tab by mouth daily. , Disp: 90 Tab, Rfl: 3    potassium chloride (KLOR-CON) 10 mEq tablet, Take 1 Tab by mouth daily. , Disp: 90 Tab, Rfl: 3    triamterene-hydroCHLOROthiazide (MAXZIDE) 37.5-25 mg per tablet, Take 1 Tab by mouth daily. , Disp: 90 Tab, Rfl: 3    losartan (COZAAR) 100 mg tablet, Take 1 Tab by mouth daily. , Disp: 90 Tab, Rfl: 3    clopidogrel (PLAVIX) 75 mg tab, Take 1 Tab by mouth daily. , Disp: 90 Tab, Rfl: 3    metoprolol succinate (TOPROL XL) 100 mg tablet, Take 1 Tab by mouth nightly., Disp: 90 Tab, Rfl: 3    nitroglycerin (NITROSTAT) 0.4 mg SL tablet, 1 Tab by SubLINGual route every five (5) minutes as needed. , Disp: 25 Tab, Rfl: 6    fluticasone (FLONASE) 50 mcg/actuation nasal spray, 2 Sprays by Both Nostrils route daily for 90 days. , Disp: 3 Bottle, Rfl: 3    albuterol (PROAIR HFA) 90 mcg/actuation inhaler, Take 2 Puffs by inhalation four (4) times daily as needed for up to 90 days. , Disp: 3 Inhaler, Rfl: 3    Cholecalciferol, Vitamin D3, (VITAMIN D3) 1,000 unit cap, Take 2,000 Units by mouth daily. , Disp: , Rfl:     aspirin (ASPIRIN) 325 mg tablet, Take 325 mg by mouth every morning., Disp: , Rfl:    Date Last Reviewed:  10/10/2018   EXAMINATION:   Observation/Orthostatic Postural Assessment:          Pt has antalgic gait with right glut med lurch. The left shoe is worn across the heel more than the right.   Palpation:          Mild tenderness at left greater trochanter  ROM:     5 degrees DF bilaterally 9/21/18      Strength:     Right hip abd, ext, PF 3-/5, quad 5/5  Left hip abd, ext 3+/5, PF 3-/5, quad 5/5  9/21/18               Neurological Screen:        unremarkable  Functional Mobility:         Gait/Ambulation:  Antalgic level surfaces        Transfers:  Uses UE's to assist        Stairs:  Step-to pattern and uses UE's to pull up  Balance:         < 1 sec B 9/21/18   Body Structures Involved:  1. Nerves  2. Muscles Body Functions Affected:  1. Sensory/Pain  2. Neuromusculoskeletal  3. Movement Related Activities and Participation Affected:  1. General Tasks and Demands  2. Mobility  3. Self Care  4. Domestic Life  5. Community, Social and Civic Life   CLINICAL PRESENTATION:   CLINICAL DECISION MAKING:   Outcome Measure: Tool Used: Diaz Balance Scale  Score:  Initial: 39/56 Most Recent: 43/56 (Date: 9/21/18 )   Interpretation of Score: Each section is scored on a 0-4 scale, 0 representing the patients inability to perform the task and 4 representing independence. The scores of each section are added together for a total score of 56. The higher the patients score, the more independent the patient is. Any score below 45 indicates increased risk for falls. Score 56 55-45 44-34 33-23 22-12 11-1 0   Modifier CH CI CJ CK CL CM CN     ? Mobility - Walking and Moving Around:     - CURRENT STATUS: CJ - 20%-39% impaired, limited or restricted    - GOAL STATUS: CI - 1%-19% impaired, limited or restricted    - D/C STATUS:  ---------------To be determined---------------    Medical Necessity:   · Patient demonstrates fair rehab potential due to higher previous functional level. Reason for Services/Other Comments:  · Patient continues to require present interventions due to patient's inability to ambulate safely all surfaces.    TREATMENT:   (In addition to Assessment/Re-Assessment sessions the following treatments were rendered)  THERAPEUTIC ACTIVITY: ( see below for minutes): Therapeutic activities per grid below to improve mobility. Required moderate verbal and manual cues to promote motor control of bilateral, lower extremity(s). THERAPEUTIC EXERCISE: (see below for minutes):  Exercises per grid below to improve strength. Required moderate verbal and manual cues to promote proper body alignment, promote proper body posture and promote proper body mechanics. Progressed resistance, range, repetitions and complexity of movement as indicated.     Date: 8/29/18 8/31/18  Visit 2 9/5/18  Visit 3 9/7/18  Visit 4 9/12/18  Visit 5 9/14/18  Visit 6 9/19/18  Visit 7 9/21/18  Visit 8  PN 9/26/18  Visit 9 9/28/18  Visit 10 10/10/18  Visit 11    Modalities:                                                            Therapeutic Exercise: 15 mins 25 mins 20 mins 20 mins 20 mins 20 mins 25 mins 25 mins 20 mins 20 mins 20 mins    LAQ B 20 reps 30 reps   30 reps 30 reps   30 reps  5 lb 30 reps  5 lb       Slant board 3 x 10 sec 3 x 10 sec 5 x 10 sec 5 x 10 sec 5 x 10 sec 5 x 10 sec 5 x 10 sec 5 x 10 sec 5 x 10 sec 5 x 10 sec 5 x 10 sec    Clams B 20 reps 30 reps 30 reps 30 reps 30 reps 30 reps 2 x 20 reps 2 x 20 reps 30 reps X 30 X 30    S/L hip abd B With assist  20 reps With assist  30 reps 30 reps  With assist 30 reps  With assist 30 reps  With assist 30 reps  With assist 2 x 20 reps  With assist 2 x 20 reps  With assist 30 reps  assist X 30 assist  X 30   With assist    Glut sets  Supine, standing  5 mins             Calf raises  30 reps 30 reps 30 reps 30 reps 30 reps 30 reps 30 reps 30 reps X 30 X 30    Standing right hip diagonal   30 reps 30 reps 30 reps 30 reps 30 reps 30 reps 30 reps X 30 X 30    Standing hp ext          X 30 X 30                   Proprioceptive Activities:                                                            Manual Therapy:                                             Therapeutic Activities: 15 mins 15 mins 25 mins 25 mins 25 mins 25 mins 20 mins 20 mins 25 mins 25 mins 25 mins    Pt education, postural education, HEP 5 mins              bridging 20 reps 20 reps 30 reps 30 reps 30 reps 30 reps 30 reps 30 reps 30 reps X 30 X 30    SLS holding pelvis level B With assist  5 reps With assist  5 reps With assist  5 reps With assist  5 reps With assist  5 reps With assist  5 reps 5 reps  With assist  X 5 with assist X 5   assist X 5 assist X 5 assist    Sit to stand  15 reps  With assist 20 reps  With assist      20 reps  assist Squats   X 20 Squats  X 20    Stepping with left foot holding pelvis level with UE support   X 15 reps X 30 reps 30 reps 30 reps 30 reps 30 reps 30 reps X 30 X 30    Foam standing eyes open and closed   4 mins 6 mins 5 mins 5 mins 5 mins 5 mins 5 mins 5 mins 5 mins    Step ups lat         3 inches  X 10 each      perturbations          3 mins 3 mins                   HEP: see hand out  [employer] Portal  Treatment/Session Assessment: right hip weakness remains most limiting with gait, limping. Continue as indicated. · Pain/ Symptoms: Initial:  0/10 Post Session:  0/10  ·   Compliance with Program/Exercises: Will assess as treatment progresses. · Recommendations/Intent for next treatment session: \"Next visit will focus on advancements to more challenging activities\".   Total Treatment Duration:  PT Patient Time In/Time Out  Time In: 1100  Time Out: Chris Olvera, PT

## 2018-10-12 ENCOUNTER — HOSPITAL ENCOUNTER (OUTPATIENT)
Dept: PHYSICAL THERAPY | Age: 78
Discharge: HOME | End: 2018-10-12
Payer: MEDICARE

## 2018-10-12 PROCEDURE — 97110 THERAPEUTIC EXERCISES: CPT

## 2018-10-12 PROCEDURE — 97530 THERAPEUTIC ACTIVITIES: CPT

## 2018-10-12 NOTE — PROGRESS NOTES
Trena Meier  : 1940 [address] [address].  [phone]   [phone]        OUTPATIENT PHYSICAL THERAPY:Daily Note 10/12/2018         ICD-10: Treatment Diagnosis: Muscle weakness (generalized) (M62.81)Other abnormalities of gait and mobility (R26.89)  Precautions/Allergies:   Adhesive tape-silicones; Azopt [brinzolamide]; Other medication; Statins-hmg-coa reductase inhibitors; and Sulfa (sulfonamide antibiotics)   Fall Risk Score: 7 (? 5 = High Risk)  MD Orders: eval and treat vestibular dysfunction, unsteady gait from neuropathy MEDICAL/REFERRING DIAGNOSIS:  Unsteadiness on feet [R26.81]  Dizziness and giddiness [R42]   DATE OF ONSET: years  REFERRING PHYSICIAN: Ava Kemp MD  RETURN PHYSICIAN APPOINTMENT: 10/8/18     INITIAL ASSESSMENT:  Ms. Freeman Samaritan presents with weakness and balance loss from various sources inc neuropathy. She will benefit from skilled PT to increase strength and balance and address vestibular issues as indicated to improve pt's safety with gait. PROBLEM LIST (Impacting functional limitations):  1. Decreased Strength  2. Decreased ADL/Functional Activities  3. Decreased Transfer Abilities  4. Decreased Ambulation Ability/Technique  5. Decreased Balance  6. Decreased Activity Tolerance  7. Decreased Flexibility/Joint Mobility  8. Decreased [place] with Home Exercise Program INTERVENTIONS PLANNED:  1. Balance Exercise  2. Home Exercise Program (HEP)  3. Therapeutic Activites  4. Therapeutic Exercise/Strengthening    TREATMENT PLAN:  Effective Dates: 2018 TO 2018 (90 days). Frequency/Duration: 2 times a week for 90 Days  GOALS: (Goals have been discussed and agreed upon with patient.)  Short-Term Functional Goals: Time Frame: 2 weeks  1. Pt to report compliance with HEP. - ongoing  2.  Pt to increase DF to 5 degrees bilaterally for improved gait mechanics. - MET  Discharge Goals: Time Frame: 12 weeks  1. Pt to increase hip abd strength to 4/5 for more stability in gait. - ongoing  2. Pt to demonstrate > 4 sec SLS for reciprocal gait on stairs. - ongoing  3. Pt to use assistive device if needed for safe ambulation. - MET              HISTORY:   History of Present Injury/Illness (Reason for Referral):  Pt has hearing loss in left ear and was told to get hearing aids. She did not and saw another specialist who said she needed treatment on her ear before she got hearing aids. She has had that and is being treated for a mastoid infection. She has diabetic neuropathy. She is going to have a NCV 10/8. She tripped over her 's oxygen line and fell about 3 months ago. She loses her balance a lot. She does not want to use a walker. She takes 2 meds that cause dizziness. She walks her dog in her yard and walks to get the mail but no exercise. She was told her vertigo was meniere's years ago but other doctors have contradicted that. She had vestibular rehab without any symptom changes. Past Medical History/Comorbidities:   Ms. 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); Cardiomyopathy, ischemic (1/14/2016); Chronic vertigo; DM2 (diabetes mellitus, type 2) ([address] GERD (gastroesophageal reflux disease); Hearing loss; History of AAA (abdominal aortic aneurysm) repair (2002, 1/14/2016); History of bilateral cataract extraction; History of coronary artery disease (2006, 2007); History of gallbladder disease; History of hernia repair; History of seborrheic keratosis; Hyperlipidemia; Hypertension; Lacunar infarction (8/16/2018); Meniere's disease; Menopause; Morbid obesity ([address] OA (osteoarthritis); Orthostatic hypotension (1/14/2016); Osteopenia with high risk of fracture (2014); Post-menopausal osteoporosis; Postmenopausal osteoporosis; Unspecified sleep apnea; Unsteady gait (8/16/2018); and Visit for dental examination.   Ms. Omkar Wilder  has a past surgical history that includes hx open cholecystectomy (1979); hx orthopaedic; hx cataract removal (3/10); hx hernia repair; pr cardiac surg procedure unlist; hx hysterectomy (1997); hx oophorectomy (1982); and pr breast surgery procedure unlisted (mid 2000's). Social History/Living Environment:     lives in 2 story home with  but doesn't need to go upstairs  Prior Level of Function/Work/Activity:  retired  Dominant Side:         RIGHT  Current Medications:    Current Outpatient Prescriptions:     pitavastatin calcium (LIVALO) 4 mg tab tablet, Take 1 Tab by mouth daily. , Disp: 90 Tab, Rfl: 3    potassium chloride (KLOR-CON) 10 mEq tablet, Take 1 Tab by mouth daily. , Disp: 90 Tab, Rfl: 3    triamterene-hydroCHLOROthiazide (MAXZIDE) 37.5-25 mg per tablet, Take 1 Tab by mouth daily. , Disp: 90 Tab, Rfl: 3    losartan (COZAAR) 100 mg tablet, Take 1 Tab by mouth daily. , Disp: 90 Tab, Rfl: 3    clopidogrel (PLAVIX) 75 mg tab, Take 1 Tab by mouth daily. , Disp: 90 Tab, Rfl: 3    metoprolol succinate (TOPROL XL) 100 mg tablet, Take 1 Tab by mouth nightly., Disp: 90 Tab, Rfl: 3    nitroglycerin (NITROSTAT) 0.4 mg SL tablet, 1 Tab by SubLINGual route every five (5) minutes as needed. , Disp: 25 Tab, Rfl: 6    fluticasone (FLONASE) 50 mcg/actuation nasal spray, 2 Sprays by Both Nostrils route daily for 90 days. , Disp: 3 Bottle, Rfl: 3    albuterol (PROAIR HFA) 90 mcg/actuation inhaler, Take 2 Puffs by inhalation four (4) times daily as needed for up to 90 days. , Disp: 3 Inhaler, Rfl: 3    Cholecalciferol, Vitamin D3, (VITAMIN D3) 1,000 unit cap, Take 2,000 Units by mouth daily. , Disp: , Rfl:     aspirin (ASPIRIN) 325 mg tablet, Take 325 mg by mouth every morning., Disp: , Rfl:    Date Last Reviewed:  10/12/2018   EXAMINATION:   Observation/Orthostatic Postural Assessment:          Pt has antalgic gait with right glut med lurch. The left shoe is worn across the heel more than the right.   Palpation:          Mild tenderness at left greater trochanter  ROM:     5 degrees DF bilaterally 9/21/18      Strength:     Right hip abd, ext, PF 3-/5, quad 5/5  Left hip abd, ext 3+/5, PF 3-/5, quad 5/5  9/21/18               Neurological Screen:        unremarkable  Functional Mobility:         Gait/Ambulation:  Antalgic level surfaces        Transfers:  Uses UE's to assist        Stairs:  Step-to pattern and uses UE's to pull up  Balance:         < 1 sec B 9/21/18   Body Structures Involved:  1. Nerves  2. Muscles Body Functions Affected:  1. Sensory/Pain  2. Neuromusculoskeletal  3. Movement Related Activities and Participation Affected:  1. General Tasks and Demands  2. Mobility  3. Self Care  4. Domestic Life  5. Community, Social and Civic Life   CLINICAL PRESENTATION:   CLINICAL DECISION MAKING:   Outcome Measure: Tool Used: Diaz Balance Scale  Score:  Initial: 39/56 Most Recent: 43/56 (Date: 9/21/18 )   Interpretation of Score: Each section is scored on a 0-4 scale, 0 representing the patients inability to perform the task and 4 representing independence. The scores of each section are added together for a total score of 56. The higher the patients score, the more independent the patient is. Any score below 45 indicates increased risk for falls. Score 56 55-45 44-34 33-23 22-12 11-1 0   Modifier CH CI CJ CK CL CM CN     ? Mobility - Walking and Moving Around:     - CURRENT STATUS: CJ - 20%-39% impaired, limited or restricted    - GOAL STATUS: CI - 1%-19% impaired, limited or restricted    - D/C STATUS:  ---------------To be determined---------------    Medical Necessity:   · Patient demonstrates fair rehab potential due to higher previous functional level. Reason for Services/Other Comments:  · Patient continues to require present interventions due to patient's inability to ambulate safely all surfaces.    TREATMENT:   (In addition to Assessment/Re-Assessment sessions the following treatments were rendered)  THERAPEUTIC ACTIVITY: ( see below for minutes): Therapeutic activities per grid below to improve mobility. Required moderate verbal and manual cues to promote motor control of bilateral, lower extremity(s). THERAPEUTIC EXERCISE: (see below for minutes):  Exercises per grid below to improve strength. Required moderate verbal and manual cues to promote proper body alignment, promote proper body posture and promote proper body mechanics. Progressed resistance, range, repetitions and complexity of movement as indicated.     Date: 8/29/18 8/31/18  Visit 2 9/5/18  Visit 3 9/7/18  Visit 4 9/12/18  Visit 5 9/14/18  Visit 6 9/19/18  Visit 7 9/21/18  Visit 8  PN 9/26/18  Visit 9 9/28/18  Visit 10 10/10/18  Visit 11 10/12/18  Visit 12      Modalities:                                                                        Therapeutic Exercise: 15 mins 25 mins 20 mins 20 mins 20 mins 20 mins 25 mins 25 mins 20 mins 20 mins 20 mins 20 mins      LAQ B 20 reps 30 reps   30 reps 30 reps   30 reps  5 lb 30 reps  5 lb          Slant board 3 x 10 sec 3 x 10 sec 5 x 10 sec 5 x 10 sec 5 x 10 sec 5 x 10 sec 5 x 10 sec 5 x 10 sec 5 x 10 sec 5 x 10 sec 5 x 10 sec 5 x 10 sec      Clams B 20 reps 30 reps 30 reps 30 reps 30 reps 30 reps 2 x 20 reps 2 x 20 reps 30 reps X 30 X 30 X 30      S/L hip abd B With assist  20 reps With assist  30 reps 30 reps  With assist 30 reps  With assist 30 reps  With assist 30 reps  With assist 2 x 20 reps  With assist 2 x 20 reps  With assist 30 reps  assist X 30 assist  X 30   With assist X 30   With assist      Glut sets  Supine, standing  5 mins                Calf raises  30 reps 30 reps 30 reps 30 reps 30 reps 30 reps 30 reps 30 reps X 30 X 30 X 30      Standing right hip diagonal   30 reps 30 reps 30 reps 30 reps 30 reps 30 reps 30 reps X 30 X 30 X 30      Standing hp ext          X 30 X 30 X 30      Seated manual hip abd resistance             X 30                        Proprioceptive Activities:                                                                        Manual Therapy:                                                      Therapeutic Activities: 15 mins 15 mins 25 mins 25 mins 25 mins 25 mins 20 mins 20 mins 25 mins 25 mins 25 mins 25 mins      Pt education, postural education, HEP 5 mins                 bridging 20 reps 20 reps 30 reps 30 reps 30 reps 30 reps 30 reps 30 reps 30 reps X 30 X 30 X 30 symm  X 20 staggered foot position      SLS holding pelvis level B With assist  5 reps With assist  5 reps With assist  5 reps With assist  5 reps With assist  5 reps With assist  5 reps 5 reps  With assist  X 5 with assist X 5   assist X 5 assist X 5 assist X 10      Sit to stand  15 reps  With assist 20 reps  With assist      20 reps  assist Squats   X 20 Squats  X 20 Squats  X 20      Stepping with left foot holding pelvis level with UE support   X 15 reps X 30 reps 30 reps 30 reps 30 reps 30 reps 30 reps X 30 X 30 X 30      Foam standing eyes open and closed   4 mins 6 mins 5 mins 5 mins 5 mins 5 mins 5 mins 5 mins 5 mins 5 mins      Step ups lat         3 inches  X 10 each         perturbations          3 mins 3 mins       Lat stepping            3 laps      Walking holding pelvis level watching in mirror            4 laps                        HEP: see hand out  [facility] Portal  Treatment/Session Assessment:  Pt has a difficult time stabilizing hips in gait, right > left. Continue as indicated. · Pain/ Symptoms: Initial:  0/10 Post Session:  0/10  ·   Compliance with Program/Exercises: Will assess as treatment progresses. · Recommendations/Intent for next treatment session: \"Next visit will focus on advancements to more challenging activities\".   Total Treatment Duration:  PT Patient Time In/Time Out  Time In: 0930  Time Out: 1015     Elizabeth Lopez PT

## 2018-10-17 ENCOUNTER — HOSPITAL ENCOUNTER (OUTPATIENT)
Dept: PHYSICAL THERAPY | Age: 78
Discharge: HOME | End: 2018-10-17
Payer: MEDICARE

## 2018-10-17 PROCEDURE — 97530 THERAPEUTIC ACTIVITIES: CPT

## 2018-10-17 PROCEDURE — 97110 THERAPEUTIC EXERCISES: CPT

## 2018-10-17 NOTE — PROGRESS NOTES
Angela Began  : 1940 [id number] [facility]  [address] [place] [address].  [phone]   [phone]        OUTPATIENT PHYSICAL THERAPY:Daily Note 10/17/2018         ICD-10: Treatment Diagnosis: Muscle weakness (generalized) (M62.81)Other abnormalities of gait and mobility (R26.89)  Precautions/Allergies:   Adhesive tape-silicones; Azopt [brinzolamide]; Other medication; Statins-hmg-coa reductase inhibitors; and Sulfa (sulfonamide antibiotics)   Fall Risk Score: 7 (? 5 = High Risk)  MD Orders: eval and treat vestibular dysfunction, unsteady gait from neuropathy MEDICAL/REFERRING DIAGNOSIS:  Unsteadiness on feet [R26.81]  Dizziness and giddiness [R42]   DATE OF ONSET: years  REFERRING PHYSICIAN: Adiel Cain MD  RETURN PHYSICIAN APPOINTMENT: 10/8/18     INITIAL ASSESSMENT:  Ms. Allen Grayson presents with weakness and balance loss from various sources inc neuropathy. She will benefit from skilled PT to increase strength and balance and address vestibular issues as indicated to improve pt's safety with gait. PROBLEM LIST (Impacting functional limitations):  1. Decreased Strength  2. Decreased ADL/Functional Activities  3. Decreased Transfer Abilities  4. Decreased Ambulation Ability/Technique  5. Decreased Balance  6. Decreased Activity Tolerance  7. Decreased Flexibility/Joint Mobility  8. Decreased [place] with Home Exercise Program INTERVENTIONS PLANNED:  1. Balance Exercise  2. Home Exercise Program (HEP)  3. Therapeutic Activites  4. Therapeutic Exercise/Strengthening    TREATMENT PLAN:  Effective Dates: 2018 TO 2018 (90 days). Frequency/Duration: 2 times a week for 90 Days  GOALS: (Goals have been discussed and agreed upon with patient.)  Short-Term Functional Goals: Time Frame: 2 weeks  1. Pt to report compliance with HEP. - ongoing  2.  Pt to increase DF to 5 degrees bilaterally for improved gait mechanics. - MET  Discharge Goals: Time Frame: 12 weeks  1. Pt to increase hip abd strength to 4/5 for more stability in gait. - ongoing  2. Pt to demonstrate > 4 sec SLS for reciprocal gait on stairs. - ongoing  3. Pt to use assistive device if needed for safe ambulation. - MET              HISTORY:   History of Present Injury/Illness (Reason for Referral):  Pt has hearing loss in left ear and was told to get hearing aids. She did not and saw another specialist who said she needed treatment on her ear before she got hearing aids. She has had that and is being treated for a mastoid infection. She has diabetic neuropathy. She is going to have a NCV 10/8. She tripped over her 's oxygen line and fell about 3 months ago. She loses her balance a lot. She does not want to use a walker. She takes 2 meds that cause dizziness. She walks her dog in her yard and walks to get the mail but no exercise. She was told her vertigo was meniere's years ago but other doctors have contradicted that. She had vestibular rehab without any symptom changes. Past Medical History/Comorbidities:   Ms. Chance Jurado  has a past medical history of Asthma, Axonal polyneuropathy, Cardiomyopathy, ischemic, Chronic vertigo, DM2 (diabetes mellitus, type 2) ([address] GERD (gastroesophageal reflux disease), Hearing loss, History of AAA (abdominal aortic aneurysm) repair, History of bilateral cataract extraction, History of coronary artery disease, History of gallbladder disease, History of hernia repair, History of seborrheic keratosis, Hyperlipidemia, Hypertension, Lacunar infarction, Meniere's disease, Menopause, Morbid obesity ([address] OA (osteoarthritis), Orthostatic hypotension, Osteopenia with high risk of fracture, Post-menopausal osteoporosis, Postmenopausal osteoporosis, Unspecified sleep apnea, Unsteady gait, and Visit for dental examination.   Ms. Chance Jurado  has a past surgical history that includes hx open cholecystectomy (1979); hx orthopaedic; hx cataract removal (3/10); hx hernia repair; pr cardiac surg procedure unlist; hx hysterectomy (1997); hx oophorectomy (1982); pr breast surgery procedure unlisted (mid 2000's); VITRECTOMY POSTERIOR 25 GAUGE (Left, 9/17/2012); and VITRECTOMY POSTERIOR (Left, 3/31/2010). Social History/Living Environment:     lives in 2 story home with  but doesn't need to go upstairs  Prior Level of Function/Work/Activity:  retired  Dominant Side:         RIGHT  Current Medications:    Current Outpatient Medications:     cyanocobalamin (VITAMIN B-12) 1,000 mcg sublingual tablet, Take 1,000 mcg by mouth daily. Indications: OTC, Disp: , Rfl:     varicella-zoster recombinant, PF, (SHINGRIX, PF,) 50 mcg/0.5 mL susr injection, 0.5mL by IntraMUSCular route once now and then repeat in 2-6 months, Disp: 0.5 mL, Rfl: 1    pitavastatin calcium (LIVALO) 4 mg tab tablet, Take 1 Tab by mouth daily. , Disp: 90 Tab, Rfl: 3    potassium chloride (KLOR-CON) 10 mEq tablet, Take 1 Tab by mouth daily. , Disp: 90 Tab, Rfl: 3    triamterene-hydroCHLOROthiazide (MAXZIDE) 37.5-25 mg per tablet, Take 1 Tab by mouth daily. , Disp: 90 Tab, Rfl: 3    losartan (COZAAR) 100 mg tablet, Take 1 Tab by mouth daily. , Disp: 90 Tab, Rfl: 3    clopidogrel (PLAVIX) 75 mg tab, Take 1 Tab by mouth daily. , Disp: 90 Tab, Rfl: 3    metoprolol succinate (TOPROL XL) 100 mg tablet, Take 1 Tab by mouth nightly., Disp: 90 Tab, Rfl: 3    nitroglycerin (NITROSTAT) 0.4 mg SL tablet, 1 Tab by SubLINGual route every five (5) minutes as needed. , Disp: 25 Tab, Rfl: 6    fluticasone (FLONASE) 50 mcg/actuation nasal spray, 2 Sprays by Both Nostrils route daily for 90 days. , Disp: 3 Bottle, Rfl: 3    albuterol (PROAIR HFA) 90 mcg/actuation inhaler, Take 2 Puffs by inhalation four (4) times daily as needed for up to 90 days. , Disp: 3 Inhaler, Rfl: 3    Cholecalciferol, Vitamin D3, (VITAMIN D3) 1,000 unit cap, Take 2,000 Units by mouth daily. , Disp: , Rfl:     aspirin (ASPIRIN) 325 mg tablet, Take 325 mg by mouth every morning., Disp: , Rfl:    Date Last Reviewed:  10/17/2018   EXAMINATION:   Observation/Orthostatic Postural Assessment:          Pt has antalgic gait with right glut med lurch. The left shoe is worn across the heel more than the right. Palpation:          Mild tenderness at left greater trochanter  ROM:     5 degrees DF bilaterally 9/21/18      Strength:     Right hip abd, ext, PF 3-/5, quad 5/5  Left hip abd, ext 3+/5, PF 3-/5, quad 5/5  9/21/18               Neurological Screen:        unremarkable  Functional Mobility:         Gait/Ambulation:  Antalgic level surfaces        Transfers:  Uses UE's to assist        Stairs:  Step-to pattern and uses UE's to pull up  Balance:         < 1 sec B 9/21/18   Body Structures Involved:  1. Nerves  2. Muscles Body Functions Affected:  1. Sensory/Pain  2. Neuromusculoskeletal  3. Movement Related Activities and Participation Affected:  1. General Tasks and Demands  2. Mobility  3. Self Care  4. Domestic Life  5. Community, Social and Civic Life   CLINICAL PRESENTATION:   CLINICAL DECISION MAKING:   Outcome Measure: Tool Used: Diaz Balance Scale  Score:  Initial: 39/56 Most Recent: 43/56 (Date: 9/21/18 )   Interpretation of Score: Each section is scored on a 0-4 scale, 0 representing the patients inability to perform the task and 4 representing independence. The scores of each section are added together for a total score of 56. The higher the patients score, the more independent the patient is. Any score below 45 indicates increased risk for falls. Score 56 55-45 44-34 33-23 22-12 11-1 0   Modifier CH CI CJ CK CL CM CN     ?  Mobility - Walking and Moving Around:     - CURRENT STATUS: CJ - 20%-39% impaired, limited or restricted    - GOAL STATUS: CI - 1%-19% impaired, limited or restricted    - D/C STATUS:  ---------------To be determined---------------    Medical Necessity:   · Patient demonstrates fair rehab potential due to higher previous functional level. Reason for Services/Other Comments:  · Patient continues to require present interventions due to patient's inability to ambulate safely all surfaces. TREATMENT:   (In addition to Assessment/Re-Assessment sessions the following treatments were rendered)  THERAPEUTIC ACTIVITY: ( see below for minutes): Therapeutic activities per grid below to improve mobility. Required moderate verbal and manual cues to promote motor control of bilateral, lower extremity(s). THERAPEUTIC EXERCISE: (see below for minutes):  Exercises per grid below to improve strength. Required moderate verbal and manual cues to promote proper body alignment, promote proper body posture and promote proper body mechanics. Progressed resistance, range, repetitions and complexity of movement as indicated.     Date: 8/29/18 8/31/18  Visit 2 9/5/18  Visit 3 9/7/18  Visit 4 9/12/18  Visit 5 9/14/18  Visit 6 9/19/18  Visit 7 9/21/18  Visit 8  PN 9/26/18  Visit 9 9/28/18  Visit 10 10/10/18  Visit 11 10/12/18  Visit 12 10/17/18  Visit 13     Modalities:                                                                        Therapeutic Exercise: 15 mins 25 mins 20 mins 20 mins 20 mins 20 mins 25 mins 25 mins 20 mins 20 mins 20 mins 20 mins 15 mins     LAQ B 20 reps 30 reps   30 reps 30 reps   30 reps  5 lb 30 reps  5 lb          Slant board 3 x 10 sec 3 x 10 sec 5 x 10 sec 5 x 10 sec 5 x 10 sec 5 x 10 sec 5 x 10 sec 5 x 10 sec 5 x 10 sec 5 x 10 sec 5 x 10 sec 5 x 10 sec      Clams B 20 reps 30 reps 30 reps 30 reps 30 reps 30 reps 2 x 20 reps 2 x 20 reps 30 reps X 30 X 30 X 30      S/L hip abd B With assist  20 reps With assist  30 reps 30 reps  With assist 30 reps  With assist 30 reps  With assist 30 reps  With assist 2 x 20 reps  With assist 2 x 20 reps  With assist 30 reps  assist X 30 assist  X 30   With assist X 30   With assist      Glut sets  Supine, standing  5 mins                Calf raises  30 reps 30 reps 30 reps 30 reps 30 reps 30 reps 30 reps 30 reps X 30 X 30 X 30 X 30     Standing right hip diagonal   30 reps 30 reps 30 reps 30 reps 30 reps 30 reps 30 reps X 30 X 30 X 30 X 30     Standing hp ext          X 30 X 30 X 30 X 30     Seated manual hip abd resistance             X 30      Seated marching             X 30                       Proprioceptive Activities:                                                                        Manual Therapy:                                                      Therapeutic Activities: 15 mins 15 mins 25 mins 25 mins 25 mins 25 mins 20 mins 20 mins 25 mins 25 mins 25 mins 25 mins 25 mins     Pt education, postural education, HEP 5 mins                 bridging 20 reps 20 reps 30 reps 30 reps 30 reps 30 reps 30 reps 30 reps 30 reps X 30 X 30 X 30 symm  X 20 staggered foot position      SLS holding pelvis level B With assist  5 reps With assist  5 reps With assist  5 reps With assist  5 reps With assist  5 reps With assist  5 reps 5 reps  With assist  X 5 with assist X 5   assist X 5 assist X 5 assist X 10 X 10      Sit to stand  15 reps  With assist 20 reps  With assist      20 reps  assist Squats   X 20 Squats  X 20 Squats  X 20 Squats  X 20     Stepping with left foot holding pelvis level with UE support   X 15 reps X 30 reps 30 reps 30 reps 30 reps 30 reps 30 reps X 30 X 30 X 30 X 30     Foam standing eyes open and closed   4 mins 6 mins 5 mins 5 mins 5 mins 5 mins 5 mins 5 mins 5 mins 5 mins      Step ups lat         3 inches  X 10 each    3 inches   x 15 each     perturbations          3 mins 3 mins       Lat stepping            3 laps 4 laps     Walking holding pelvis level watching in mirror            4 laps X 3 laps     Tandem stance              X 2 cycles     HEP: see hand out  [facility] Portal  Treatment/Session Assessment:  Gait improved with cues until pt fatigues then pelvic drop becomes significant again. Continue strengthening as rogelio.     · Pain/ Symptoms: Initial:  0/10 Post Session:  0/10  ·   Compliance with Program/Exercises: Will assess as treatment progresses. · Recommendations/Intent for next treatment session: \"Next visit will focus on advancements to more challenging activities\".   Total Treatment Duration:  PT Patient Time In/Time Out  Time In: 0415  Time Out: 0500     Leigh Lopez, NISHA

## 2018-10-19 ENCOUNTER — HOSPITAL ENCOUNTER (OUTPATIENT)
Dept: PHYSICAL THERAPY | Age: 78
Discharge: HOME | End: 2018-10-19
Payer: MEDICARE

## 2018-10-19 PROCEDURE — 97110 THERAPEUTIC EXERCISES: CPT

## 2018-10-19 PROCEDURE — 97530 THERAPEUTIC ACTIVITIES: CPT

## 2018-10-19 NOTE — PROGRESS NOTES
[facility]  : [address]  [address] [facility].  [phone]   [phone]        OUTPATIENT PHYSICAL THERAPY:Daily Note 10/19/2018         ICD-10: Treatment Diagnosis: Muscle weakness (generalized) (M62.81)Other abnormalities of gait and mobility (R26.89)  Precautions/Allergies:   Adhesive tape-silicones; Azopt [brinzolamide]; Other medication; Statins-hmg-coa reductase inhibitors; and Sulfa (sulfonamide antibiotics)   Fall Risk Score: 7 (? 5 = High Risk)  MD Orders: eval and treat vestibular dysfunction, unsteady gait from neuropathy MEDICAL/REFERRING DIAGNOSIS:  Unsteadiness on feet [R26.81]  Dizziness and giddiness [R42]   DATE OF ONSET: years  REFERRING PHYSICIAN: Carlie Russell MD  RETURN PHYSICIAN APPOINTMENT: 10/8/18     INITIAL ASSESSMENT:  Ms. Neal Garcia presents with weakness and balance loss from various sources inc neuropathy. She will benefit from skilled PT to increase strength and balance and address vestibular issues as indicated to improve pt's safety with gait. PROBLEM LIST (Impacting functional limitations):  1. Decreased Strength  2. Decreased ADL/Functional Activities  3. Decreased Transfer Abilities  4. Decreased Ambulation Ability/Technique  5. Decreased Balance  6. Decreased Activity Tolerance  7. Decreased Flexibility/Joint Mobility  8. Decreased [place] with Home Exercise Program INTERVENTIONS PLANNED:  1. Balance Exercise  2. Home Exercise Program (HEP)  3. Therapeutic Activites  4. Therapeutic Exercise/Strengthening    TREATMENT PLAN:  Effective Dates: 2018 TO 2018 (90 days). Frequency/Duration: 2 times a week for 90 Days  GOALS: (Goals have been discussed and agreed upon with patient.)  Short-Term Functional Goals: Time Frame: 2 weeks  1. Pt to report compliance with HEP. - ongoing  2.  Pt to increase DF to 5 degrees bilaterally for improved gait mechanics. - MET  Discharge Goals: Time Frame: 12 weeks  1. Pt to increase hip abd strength to 4/5 for more stability in gait. - ongoing  2. Pt to demonstrate > 4 sec SLS for reciprocal gait on stairs. - ongoing  3. Pt to use assistive device if needed for safe ambulation. - MET              HISTORY:   History of Present Injury/Illness (Reason for Referral):  Pt has hearing loss in left ear and was told to get hearing aids. She did not and saw another specialist who said she needed treatment on her ear before she got hearing aids. She has had that and is being treated for a mastoid infection. She has diabetic neuropathy. She is going to have a NCV 10/8. She tripped over her 's oxygen line and fell about 3 months ago. She loses her balance a lot. She does not want to use a walker. She takes 2 meds that cause dizziness. She walks her dog in her yard and walks to get the mail but no exercise. She was told her vertigo was meniere's years ago but other doctors have contradicted that. She had vestibular rehab without any symptom changes. Past Medical History/Comorbidities:   Ms. Ricardo Licona  has a past medical history of Asthma, Axonal polyneuropathy, Cardiomyopathy, ischemic, Chronic vertigo, DM2 (diabetes mellitus, type 2) ([address] GERD (gastroesophageal reflux disease), Hearing loss, History of AAA (abdominal aortic aneurysm) repair, History of bilateral cataract extraction, History of coronary artery disease, History of gallbladder disease, History of hernia repair, History of seborrheic keratosis, Hyperlipidemia, Hypertension, Lacunar infarction, Meniere's disease, Menopause, Morbid obesity ([address] OA (osteoarthritis), Orthostatic hypotension, Osteopenia with high risk of fracture, Post-menopausal osteoporosis, Postmenopausal osteoporosis, Unspecified sleep apnea, Unsteady gait, and Visit for dental examination.   Ms. Ricardo Licona  has a past surgical history that includes hx open cholecystectomy (1979); hx orthopaedic; hx cataract removal (3/10); hx hernia repair; pr cardiac surg procedure unlist; hx hysterectomy (1997); hx oophorectomy (1982); pr breast surgery procedure unlisted (mid 2000's); VITRECTOMY POSTERIOR 25 GAUGE (Left, 9/17/2012); and VITRECTOMY POSTERIOR (Left, 3/31/2010). Social History/Living Environment:     lives in 2 story home with  but doesn't need to go upstairs  Prior Level of Function/Work/Activity:  retired  Dominant Side:         RIGHT  Current Medications:    Current Outpatient Medications:     cyanocobalamin (VITAMIN B-12) 1,000 mcg sublingual tablet, Take 1,000 mcg by mouth daily. Indications: OTC, Disp: , Rfl:     varicella-zoster recombinant, PF, (SHINGRIX, PF,) 50 mcg/0.5 mL susr injection, 0.5mL by IntraMUSCular route once now and then repeat in 2-6 months, Disp: 0.5 mL, Rfl: 1    pitavastatin calcium (LIVALO) 4 mg tab tablet, Take 1 Tab by mouth daily. , Disp: 90 Tab, Rfl: 3    potassium chloride (KLOR-CON) 10 mEq tablet, Take 1 Tab by mouth daily. , Disp: 90 Tab, Rfl: 3    triamterene-hydroCHLOROthiazide (MAXZIDE) 37.5-25 mg per tablet, Take 1 Tab by mouth daily. , Disp: 90 Tab, Rfl: 3    losartan (COZAAR) 100 mg tablet, Take 1 Tab by mouth daily. , Disp: 90 Tab, Rfl: 3    clopidogrel (PLAVIX) 75 mg tab, Take 1 Tab by mouth daily. , Disp: 90 Tab, Rfl: 3    metoprolol succinate (TOPROL XL) 100 mg tablet, Take 1 Tab by mouth nightly., Disp: 90 Tab, Rfl: 3    nitroglycerin (NITROSTAT) 0.4 mg SL tablet, 1 Tab by SubLINGual route every five (5) minutes as needed. , Disp: 25 Tab, Rfl: 6    fluticasone (FLONASE) 50 mcg/actuation nasal spray, 2 Sprays by Both Nostrils route daily for 90 days. , Disp: 3 Bottle, Rfl: 3    albuterol (PROAIR HFA) 90 mcg/actuation inhaler, Take 2 Puffs by inhalation four (4) times daily as needed for up to 90 days. , Disp: 3 Inhaler, Rfl: 3    Cholecalciferol, Vitamin D3, (VITAMIN D3) 1,000 unit cap, Take 2,000 Units by mouth daily. , Disp: , Rfl:     aspirin (ASPIRIN) 325 mg tablet, Take 325 mg by mouth every morning., Disp: , Rfl:    Date Last Reviewed:  10/19/2018   EXAMINATION:   Observation/Orthostatic Postural Assessment:          Pt has antalgic gait with right glut med lurch. The left shoe is worn across the heel more than the right. Palpation:          Mild tenderness at left greater trochanter  ROM:     5 degrees DF bilaterally 9/21/18      Strength:     Right hip abd, ext, PF 3-/5, quad 5/5  Left hip abd, ext 3+/5, PF 3-/5, quad 5/5  9/21/18               Neurological Screen:        unremarkable  Functional Mobility:         Gait/Ambulation:  Antalgic level surfaces        Transfers:  Uses UE's to assist        Stairs:  Step-to pattern and uses UE's to pull up  Balance:         < 1 sec B 9/21/18   Body Structures Involved:  1. Nerves  2. Muscles Body Functions Affected:  1. Sensory/Pain  2. Neuromusculoskeletal  3. Movement Related Activities and Participation Affected:  1. General Tasks and Demands  2. Mobility  3. Self Care  4. Domestic Life  5. Community, Social and Civic Life   CLINICAL PRESENTATION:   CLINICAL DECISION MAKING:   Outcome Measure: Tool Used: Diaz Balance Scale  Score:  Initial: 39/56 Most Recent: 43/56 (Date: 9/21/18 )   Interpretation of Score: Each section is scored on a 0-4 scale, 0 representing the patients inability to perform the task and 4 representing independence. The scores of each section are added together for a total score of 56. The higher the patients score, the more independent the patient is. Any score below 45 indicates increased risk for falls. Score 56 55-45 44-34 33-23 22-12 11-1 0   Modifier CH CI CJ CK CL CM CN     ?  Mobility - Walking and Moving Around:     - CURRENT STATUS: CJ - 20%-39% impaired, limited or restricted    - GOAL STATUS: CI - 1%-19% impaired, limited or restricted    - D/C STATUS:  ---------------To be determined---------------    Medical Necessity:   · Patient demonstrates fair rehab potential due to higher previous functional level. Reason for Services/Other Comments:  · Patient continues to require present interventions due to patient's inability to ambulate safely all surfaces. TREATMENT:   (In addition to Assessment/Re-Assessment sessions the following treatments were rendered)  THERAPEUTIC ACTIVITY: ( see below for minutes): Therapeutic activities per grid below to improve mobility. Required moderate verbal and manual cues to promote motor control of bilateral, lower extremity(s). THERAPEUTIC EXERCISE: (see below for minutes):  Exercises per grid below to improve strength. Required moderate verbal and manual cues to promote proper body alignment, promote proper body posture and promote proper body mechanics. Progressed resistance, range, repetitions and complexity of movement as indicated.     Date: 8/29/18 8/31/18  Visit 2 9/5/18  Visit 3 9/7/18  Visit 4 9/12/18  Visit 5 9/14/18  Visit 6 9/19/18  Visit 7 9/21/18  Visit 8  PN 9/26/18  Visit 9 9/28/18  Visit 10 10/10/18  Visit 11 10/12/18  Visit 12 10/17/18  Visit 13 10/19/18  Visit 14      Modalities:                                                                                Therapeutic Exercise: 15 mins 25 mins 20 mins 20 mins 20 mins 20 mins 25 mins 25 mins 20 mins 20 mins 20 mins 20 mins 15 mins 15 mins      LAQ B 20 reps 30 reps   30 reps 30 reps   30 reps  5 lb 30 reps  5 lb            Slant board 3 x 10 sec 3 x 10 sec 5 x 10 sec 5 x 10 sec 5 x 10 sec 5 x 10 sec 5 x 10 sec 5 x 10 sec 5 x 10 sec 5 x 10 sec 5 x 10 sec 5 x 10 sec  5 x 10 sec      Clams B 20 reps 30 reps 30 reps 30 reps 30 reps 30 reps 2 x 20 reps 2 x 20 reps 30 reps X 30 X 30 X 30        S/L hip abd B With assist  20 reps With assist  30 reps 30 reps  With assist 30 reps  With assist 30 reps  With assist 30 reps  With assist 2 x 20 reps  With assist 2 x 20 reps  With assist 30 reps  assist X 30 assist  X 30   With assist X 30   With assist        Glut sets  Supine, standing  5 mins Calf raises  30 reps 30 reps 30 reps 30 reps 30 reps 30 reps 30 reps 30 reps X 30 X 30 X 30 X 30 X 30      Standing right hip diagonal   30 reps 30 reps 30 reps 30 reps 30 reps 30 reps 30 reps X 30 X 30 X 30 X 30 X 30      Standing hp ext          X 30 X 30 X 30 X 30 X 30      Seated manual hip abd resistance             X 30        Seated marching             X 30 Standing   X 30                          Proprioceptive Activities:                                                                                Manual Therapy:                                                            Therapeutic Activities: 15 mins 15 mins 25 mins 25 mins 25 mins 25 mins 20 mins 20 mins 25 mins 25 mins 25 mins 25 mins 25 mins 25 mins      Pt education, postural education, HEP 5 mins                   bridging 20 reps 20 reps 30 reps 30 reps 30 reps 30 reps 30 reps 30 reps 30 reps X 30 X 30 X 30 symm  X 20 staggered foot position        SLS holding pelvis level B With assist  5 reps With assist  5 reps With assist  5 reps With assist  5 reps With assist  5 reps With assist  5 reps 5 reps  With assist  X 5 with assist X 5   assist X 5 assist X 5 assist X 10 X 10  X 10      Sit to stand  15 reps  With assist 20 reps  With assist      20 reps  assist Squats   X 20 Squats  X 20 Squats  X 20 Squats  X 20 Squats  X 20      Stepping with left foot holding pelvis level with UE support   X 15 reps X 30 reps 30 reps 30 reps 30 reps 30 reps 30 reps X 30 X 30 X 30 X 30 X 30      Foam standing eyes open and closed   4 mins 6 mins 5 mins 5 mins 5 mins 5 mins 5 mins 5 mins 5 mins 5 mins  4 mins      Step ups lat         3 inches  X 10 each    3 inches   x 15 each 3 inches   X 15 each      perturbations          3 mins 3 mins         Lat stepping            3 laps 4 laps 4 laps      Walking holding pelvis level watching in mirror            4 laps X 3 laps 3 laps      Tandem stance              X 2 cycles X 2 cycles      HEP: see hand out  [facility] Portal  Treatment/Session Assessment:  Pt can control right hip weakness better initially until she fatigues. She fatigues quickly. Continue as indicated. · Pain/ Symptoms: Initial:  0/10  I was a little sore in my hips last time but of course I usually am. Post Session:  0/10 legs are just tired ·   Compliance with Program/Exercises: Will assess as treatment progresses. · Recommendations/Intent for next treatment session: \"Next visit will focus on advancements to more challenging activities\".   Total Treatment Duration:  PT Patient Time In/Time Out  Time In: 0930  Time Out: 1015     Yoon Lopez, PT

## 2018-10-23 ENCOUNTER — HOSPITAL ENCOUNTER (OUTPATIENT)
Dept: PHYSICAL THERAPY | Age: 78
Discharge: HOME | End: 2018-10-23
Payer: MEDICARE

## 2018-10-23 PROCEDURE — 97530 THERAPEUTIC ACTIVITIES: CPT

## 2018-10-23 PROCEDURE — 97110 THERAPEUTIC EXERCISES: CPT

## 2018-10-23 NOTE — PROGRESS NOTES
Carla Chawla  : 1940 [id number] [address].  [phone]   [phone]        OUTPATIENT PHYSICAL THERAPY:Daily Note 10/23/2018         ICD-10: Treatment Diagnosis: Muscle weakness (generalized) (M62.81)Other abnormalities of gait and mobility (R26.89)  Precautions/Allergies:   Adhesive tape-silicones; Azopt [brinzolamide]; Other medication; Statins-hmg-coa reductase inhibitors; and Sulfa (sulfonamide antibiotics)   Fall Risk Score: 7 (? 5 = High Risk)  MD Orders: eval and treat vestibular dysfunction, unsteady gait from neuropathy MEDICAL/REFERRING DIAGNOSIS:  Unsteadiness on feet [R26.81]  Dizziness and giddiness [R42]   DATE OF ONSET: years  REFERRING PHYSICIAN: Donnis Ganser, MD  RETURN PHYSICIAN APPOINTMENT: 10/8/18     INITIAL ASSESSMENT:  Ms. Corinna Jarvis presents with weakness and balance loss from various sources inc neuropathy. She will benefit from skilled PT to increase strength and balance and address vestibular issues as indicated to improve pt's safety with gait. PROBLEM LIST (Impacting functional limitations):  1. Decreased Strength  2. Decreased ADL/Functional Activities  3. Decreased Transfer Abilities  4. Decreased Ambulation Ability/Technique  5. Decreased Balance  6. Decreased Activity Tolerance  7. Decreased Flexibility/Joint Mobility  8. Decreased [place] with Home Exercise Program INTERVENTIONS PLANNED:  1. Balance Exercise  2. Home Exercise Program (HEP)  3. Therapeutic Activites  4. Therapeutic Exercise/Strengthening    TREATMENT PLAN:  Effective Dates: 2018 TO 2018 (90 days). Frequency/Duration: 2 times a week for 90 Days  GOALS: (Goals have been discussed and agreed upon with patient.)  Short-Term Functional Goals: Time Frame: 2 weeks  1. Pt to report compliance with HEP. - ongoing  2.  Pt to increase DF to 5 degrees bilaterally for improved gait mechanics. - MET  Discharge Goals: Time Frame: 12 weeks  1. Pt to increase hip abd strength to 4/5 for more stability in gait. - ongoing  2. Pt to demonstrate > 4 sec SLS for reciprocal gait on stairs. - ongoing  3. Pt to use assistive device if needed for safe ambulation. - MET              HISTORY:   History of Present Injury/Illness (Reason for Referral):  Pt has hearing loss in left ear and was told to get hearing aids. She did not and saw another specialist who said she needed treatment on her ear before she got hearing aids. She has had that and is being treated for a mastoid infection. She has diabetic neuropathy. She is going to have a NCV 10/8. She tripped over her 's oxygen line and fell about 3 months ago. She loses her balance a lot. She does not want to use a walker. She takes 2 meds that cause dizziness. She walks her dog in her yard and walks to get the mail but no exercise. She was told her vertigo was meniere's years ago but other doctors have contradicted that. She had vestibular rehab without any symptom changes. Past Medical History/Comorbidities:   Ms. Juanito Watson  has a past medical history of Asthma, Axonal polyneuropathy, Cardiomyopathy, ischemic, Chronic vertigo, DM2 (diabetes mellitus, type 2) ([address] GERD (gastroesophageal reflux disease), Hearing loss, History of AAA (abdominal aortic aneurysm) repair, History of bilateral cataract extraction, History of coronary artery disease, History of gallbladder disease, History of hernia repair, History of seborrheic keratosis, Hyperlipidemia, Hypertension, Lacunar infarction, Meniere's disease, Menopause, Morbid obesity ([address] OA (osteoarthritis), Orthostatic hypotension, Osteopenia with high risk of fracture, Post-menopausal osteoporosis, Postmenopausal osteoporosis, Unspecified sleep apnea, Unsteady gait, and Visit for dental examination.   Ms. Juaniot Watson  has a past surgical history that includes hx open cholecystectomy (1979); hx orthopaedic; hx cataract removal (3/10); hx hernia repair; pr cardiac surg procedure unlist; hx hysterectomy (1997); hx oophorectomy (1982); pr breast surgery procedure unlisted (mid 2000's); VITRECTOMY POSTERIOR 25 GAUGE (Left, 9/17/2012); and VITRECTOMY POSTERIOR (Left, 3/31/2010). Social History/Living Environment:     lives in 2 story home with  but doesn't need to go upstairs  Prior Level of Function/Work/Activity:  retired  Dominant Side:         RIGHT  Current Medications:    Current Outpatient Medications:     cyanocobalamin (VITAMIN B-12) 1,000 mcg sublingual tablet, Take 1,000 mcg by mouth daily. Indications: OTC, Disp: , Rfl:     varicella-zoster recombinant, PF, (SHINGRIX, PF,) 50 mcg/0.5 mL susr injection, 0.5mL by IntraMUSCular route once now and then repeat in 2-6 months, Disp: 0.5 mL, Rfl: 1    pitavastatin calcium (LIVALO) 4 mg tab tablet, Take 1 Tab by mouth daily. , Disp: 90 Tab, Rfl: 3    potassium chloride (KLOR-CON) 10 mEq tablet, Take 1 Tab by mouth daily. , Disp: 90 Tab, Rfl: 3    triamterene-hydroCHLOROthiazide (MAXZIDE) 37.5-25 mg per tablet, Take 1 Tab by mouth daily. , Disp: 90 Tab, Rfl: 3    losartan (COZAAR) 100 mg tablet, Take 1 Tab by mouth daily. , Disp: 90 Tab, Rfl: 3    clopidogrel (PLAVIX) 75 mg tab, Take 1 Tab by mouth daily. , Disp: 90 Tab, Rfl: 3    metoprolol succinate (TOPROL XL) 100 mg tablet, Take 1 Tab by mouth nightly., Disp: 90 Tab, Rfl: 3    nitroglycerin (NITROSTAT) 0.4 mg SL tablet, 1 Tab by SubLINGual route every five (5) minutes as needed. , Disp: 25 Tab, Rfl: 6    fluticasone (FLONASE) 50 mcg/actuation nasal spray, 2 Sprays by Both Nostrils route daily for 90 days. , Disp: 3 Bottle, Rfl: 3    albuterol (PROAIR HFA) 90 mcg/actuation inhaler, Take 2 Puffs by inhalation four (4) times daily as needed for up to 90 days. , Disp: 3 Inhaler, Rfl: 3    Cholecalciferol, Vitamin D3, (VITAMIN D3) 1,000 unit cap, Take 2,000 Units by mouth daily. , Disp: , Rfl:     aspirin (ASPIRIN) 325 mg tablet, Take 325 mg by mouth every morning., Disp: , Rfl:    Date Last Reviewed:  10/23/2018   EXAMINATION:   Observation/Orthostatic Postural Assessment:          Pt has antalgic gait with right glut med lurch. The left shoe is worn across the heel more than the right. Palpation:          Mild tenderness at left greater trochanter  ROM:     5 degrees DF bilaterally 9/21/18      Strength:     Right hip abd, ext, PF 3-/5, quad 5/5  Left hip abd, ext 3+/5, PF 3-/5, quad 5/5  9/21/18               Neurological Screen:        unremarkable  Functional Mobility:         Gait/Ambulation:  Antalgic level surfaces        Transfers:  Uses UE's to assist        Stairs:  Step-to pattern and uses UE's to pull up  Balance:         < 1 sec B 9/21/18   Body Structures Involved:  1. Nerves  2. Muscles Body Functions Affected:  1. Sensory/Pain  2. Neuromusculoskeletal  3. Movement Related Activities and Participation Affected:  1. General Tasks and Demands  2. Mobility  3. Self Care  4. Domestic Life  5. Community, Social and Civic Life   CLINICAL PRESENTATION:   CLINICAL DECISION MAKING:   Outcome Measure: Tool Used: Diaz Balance Scale  Score:  Initial: 39/56 Most Recent: 43/56 (Date: 9/21/18 )   Interpretation of Score: Each section is scored on a 0-4 scale, 0 representing the patients inability to perform the task and 4 representing independence. The scores of each section are added together for a total score of 56. The higher the patients score, the more independent the patient is. Any score below 45 indicates increased risk for falls. Score 56 55-45 44-34 33-23 22-12 11-1 0   Modifier CH CI CJ CK CL CM CN     ?  Mobility - Walking and Moving Around:     - CURRENT STATUS: CJ - 20%-39% impaired, limited or restricted    - GOAL STATUS: CI - 1%-19% impaired, limited or restricted    - D/C STATUS:  ---------------To be determined---------------    Medical Necessity:   · Patient demonstrates fair rehab potential due to higher previous functional level. Reason for Services/Other Comments:  · Patient continues to require present interventions due to patient's inability to ambulate safely all surfaces. TREATMENT:   (In addition to Assessment/Re-Assessment sessions the following treatments were rendered)  THERAPEUTIC ACTIVITY: ( see below for minutes): Therapeutic activities per grid below to improve mobility. Required moderate verbal and manual cues to promote motor control of bilateral, lower extremity(s). THERAPEUTIC EXERCISE: (see below for minutes):  Exercises per grid below to improve strength. Required moderate verbal and manual cues to promote proper body alignment, promote proper body posture and promote proper body mechanics. Progressed resistance, range, repetitions and complexity of movement as indicated.     Date: 8/29/18 8/31/18  Visit 2 9/5/18  Visit 3 9/7/18  Visit 4 9/12/18  Visit 5 9/14/18  Visit 6 9/19/18  Visit 7 9/21/18  Visit 8  PN 9/26/18  Visit 9 9/28/18  Visit 10 10/10/18  Visit 11 10/12/18  Visit 12 10/17/18  Visit 13 10/19/18  Visit 14 10/23/18  Visit 15     Modalities:                                                                                Therapeutic Exercise: 15 mins 25 mins 20 mins 20 mins 20 mins 20 mins 25 mins 25 mins 20 mins 20 mins 20 mins 20 mins 15 mins 15 mins 15 mins     LAQ B 20 reps 30 reps   30 reps 30 reps   30 reps  5 lb 30 reps  5 lb            Slant board 3 x 10 sec 3 x 10 sec 5 x 10 sec 5 x 10 sec 5 x 10 sec 5 x 10 sec 5 x 10 sec 5 x 10 sec 5 x 10 sec 5 x 10 sec 5 x 10 sec 5 x 10 sec  5 x 10 sec 5 x 10 sec     Clams B 20 reps 30 reps 30 reps 30 reps 30 reps 30 reps 2 x 20 reps 2 x 20 reps 30 reps X 30 X 30 X 30        S/L hip abd B With assist  20 reps With assist  30 reps 30 reps  With assist 30 reps  With assist 30 reps  With assist 30 reps  With assist 2 x 20 reps  With assist 2 x 20 reps  With assist 30 reps  assist X 30 assist  X 30   With assist X 30   With assist Glut sets  Supine, standing  5 mins                  Calf raises  30 reps 30 reps 30 reps 30 reps 30 reps 30 reps 30 reps 30 reps X 30 X 30 X 30 X 30 X 30 X 30     Standing right hip diagonal   30 reps 30 reps 30 reps 30 reps 30 reps 30 reps 30 reps X 30 X 30 X 30 X 30 X 30 X 30     Standing hp ext          X 30 X 30 X 30 X 30 X 30 X 30     Seated manual hip abd resistance             X 30        Seated marching             X 30 Standing   X 30 Standing  X 30                         Proprioceptive Activities:                                                                                Manual Therapy:                                                            Therapeutic Activities: 15 mins 15 mins 25 mins 25 mins 25 mins 25 mins 20 mins 20 mins 25 mins 25 mins 25 mins 25 mins 25 mins 25 mins 25 mins     Pt education, postural education, HEP 5 mins                   bridging 20 reps 20 reps 30 reps 30 reps 30 reps 30 reps 30 reps 30 reps 30 reps X 30 X 30 X 30 symm  X 20 staggered foot position        SLS holding pelvis level B With assist  5 reps With assist  5 reps With assist  5 reps With assist  5 reps With assist  5 reps With assist  5 reps 5 reps  With assist  X 5 with assist X 5   assist X 5 assist X 5 assist X 10 X 10  X 10 X 10     Sit to stand  15 reps  With assist 20 reps  With assist      20 reps  assist Squats   X 20 Squats  X 20 Squats  X 20 Squats  X 20 Squats  X 20 Squats  X 20     Stepping with left foot holding pelvis level with UE support   X 15 reps X 30 reps 30 reps 30 reps 30 reps 30 reps 30 reps X 30 X 30 X 30 X 30 X 30 X 30     Foam standing eyes open and closed   4 mins 6 mins 5 mins 5 mins 5 mins 5 mins 5 mins 5 mins 5 mins 5 mins  4 mins 4 mins     Step ups lat         3 inches  X 10 each    3 inches   x 15 each 3 inches   X 15 each 3 inches  X 15 each     perturbations          3 mins 3 mins         Lat stepping            3 laps 4 laps 4 laps 4 laps     Walking holding pelvis level watching in mirror            4 laps X 3 laps 3 laps      Tandem stance              X 2 cycles X 2 cycles X 2 cycles     HEP: see hand out  [employer]  Treatment/Session Assessment:   Right hip weakness persists. Pt seems to require cues both manually and verbally to control glut med lurch as she doesn't do it spontaneously or with just verbal reminders. Continue strengthening as indicated. · Pain/ Symptoms: Initial:  0/10 I guess it is getting stronger. It is hard to tell. I am dizzy. He cleaned my ear out again yesterday. Post Session:  0/10 ·   Compliance with Program/Exercises: Will assess as treatment progresses. · Recommendations/Intent for next treatment session: \"Next visit will focus on advancements to more challenging activities\".   Total Treatment Duration:  PT Patient Time In/Time Out  Time In: 1015  Time Out: [address]

## 2018-10-26 ENCOUNTER — HOSPITAL ENCOUNTER (OUTPATIENT)
Dept: PHYSICAL THERAPY | Age: 78
Discharge: HOME | End: 2018-10-26
Payer: MEDICARE

## 2018-10-26 PROCEDURE — G8978 MOBILITY CURRENT STATUS: HCPCS

## 2018-10-26 PROCEDURE — 97530 THERAPEUTIC ACTIVITIES: CPT

## 2018-10-26 PROCEDURE — 97110 THERAPEUTIC EXERCISES: CPT

## 2018-10-26 PROCEDURE — G8979 MOBILITY GOAL STATUS: HCPCS

## 2018-10-30 ENCOUNTER — HOSPITAL ENCOUNTER (OUTPATIENT)
Dept: PHYSICAL THERAPY | Age: 78
Discharge: HOME | End: 2018-10-30
Payer: MEDICARE

## 2018-10-30 PROCEDURE — 97110 THERAPEUTIC EXERCISES: CPT

## 2018-10-30 PROCEDURE — 97530 THERAPEUTIC ACTIVITIES: CPT

## 2018-10-30 NOTE — PROGRESS NOTES
Leticia Mcbride  : 1940 [id number] [address].  [phone]   [phone]        OUTPATIENT PHYSICAL THERAPY:Daily Note 10/30/2018         ICD-10: Treatment Diagnosis: Muscle weakness (generalized) (M62.81)Other abnormalities of gait and mobility (R26.89)  Precautions/Allergies:   Adhesive tape-silicones; Azopt [brinzolamide]; Other medication; Statins-hmg-coa reductase inhibitors; and Sulfa (sulfonamide antibiotics)   Fall Risk Score: 7 (? 5 = High Risk)  MD Orders: eval and treat vestibular dysfunction, unsteady gait from neuropathy MEDICAL/REFERRING DIAGNOSIS:  Unsteadiness on feet [R26.81]  Dizziness and giddiness [R42]   DATE OF ONSET: years  REFERRING PHYSICIAN: Ifrah Acuna MD  RETURN PHYSICIAN APPOINTMENT: 10/8/18     INITIAL ASSESSMENT:  Ms. Roverto Weber presents with weakness and balance loss from various sources inc neuropathy. She will benefit from skilled PT to increase strength and balance and address vestibular issues as indicated to improve pt's safety with gait. PROBLEM LIST (Impacting functional limitations):  1. Decreased Strength  2. Decreased ADL/Functional Activities  3. Decreased Transfer Abilities  4. Decreased Ambulation Ability/Technique  5. Decreased Balance  6. Decreased Activity Tolerance  7. Decreased Flexibility/Joint Mobility  8. Decreased [place] with Home Exercise Program INTERVENTIONS PLANNED:  1. Balance Exercise  2. Home Exercise Program (HEP)  3. Therapeutic Activites  4. Therapeutic Exercise/Strengthening    TREATMENT PLAN:  Effective Dates: 2018 TO 2018 (90 days). Frequency/Duration: 2 times a week for 90 Days  GOALS: (Goals have been discussed and agreed upon with patient.)  Short-Term Functional Goals: Time Frame: 2 weeks  1. Pt to report compliance with HEP. - ongoing  2.  Pt to increase DF to 5 degrees bilaterally for improved gait mechanics. - MET  Discharge Goals: Time Frame: 12 weeks  1. Pt to increase hip abd strength to 4/5 for more stability in gait. - ongoing  2. Pt to demonstrate > 4 sec SLS for reciprocal gait on stairs. - ongoing  3. Pt to use assistive device if needed for safe ambulation. - MET              HISTORY:   History of Present Injury/Illness (Reason for Referral):  Pt has hearing loss in left ear and was told to get hearing aids. She did not and saw another specialist who said she needed treatment on her ear before she got hearing aids. She has had that and is being treated for a mastoid infection. She has diabetic neuropathy. She is going to have a NCV 10/8. She tripped over her 's oxygen line and fell about 3 months ago. She loses her balance a lot. She does not want to use a walker. She takes 2 meds that cause dizziness. She walks her dog in her yard and walks to get the mail but no exercise. She was told her vertigo was meniere's years ago but other doctors have contradicted that. She had vestibular rehab without any symptom changes. Past Medical History/Comorbidities:   Ms. Melisa Mariano  has a past medical history of Asthma, Axonal polyneuropathy, Cardiomyopathy, ischemic, Chronic vertigo, DM2 (diabetes mellitus, type 2) ([address] GERD (gastroesophageal reflux disease), Hearing loss, History of AAA (abdominal aortic aneurysm) repair, History of bilateral cataract extraction, History of coronary artery disease, History of gallbladder disease, History of hernia repair, History of seborrheic keratosis, Hyperlipidemia, Hypertension, Lacunar infarction, Meniere's disease, Menopause, Morbid obesity ([address] OA (osteoarthritis), Orthostatic hypotension, Osteopenia with high risk of fracture, Post-menopausal osteoporosis, Postmenopausal osteoporosis, Unspecified sleep apnea, Unsteady gait, and Visit for dental examination.   Ms. Melisa Mariano  has a past surgical history that includes hx open cholecystectomy (1979); hx orthopaedic; hx cataract removal (3/10); hx hernia repair; pr cardiac surg procedure unlist; hx hysterectomy (1997); hx oophorectomy (1982); pr breast surgery procedure unlisted (mid 2000's); VITRECTOMY POSTERIOR 25 GAUGE (Left, 9/17/2012); and VITRECTOMY POSTERIOR (Left, 3/31/2010). Social History/Living Environment:     lives in 2 story home with  but doesn't need to go upstairs  Prior Level of Function/Work/Activity:  retired  Dominant Side:         RIGHT  Current Medications:    Current Outpatient Medications:     cyanocobalamin (VITAMIN B-12) 1,000 mcg sublingual tablet, Take 1,000 mcg by mouth daily. Indications: OTC, Disp: , Rfl:     varicella-zoster recombinant, PF, (SHINGRIX, PF,) 50 mcg/0.5 mL susr injection, 0.5mL by IntraMUSCular route once now and then repeat in 2-6 months, Disp: 0.5 mL, Rfl: 1    pitavastatin calcium (LIVALO) 4 mg tab tablet, Take 1 Tab by mouth daily. , Disp: 90 Tab, Rfl: 3    potassium chloride (KLOR-CON) 10 mEq tablet, Take 1 Tab by mouth daily. , Disp: 90 Tab, Rfl: 3    triamterene-hydroCHLOROthiazide (MAXZIDE) 37.5-25 mg per tablet, Take 1 Tab by mouth daily. , Disp: 90 Tab, Rfl: 3    losartan (COZAAR) 100 mg tablet, Take 1 Tab by mouth daily. , Disp: 90 Tab, Rfl: 3    clopidogrel (PLAVIX) 75 mg tab, Take 1 Tab by mouth daily. , Disp: 90 Tab, Rfl: 3    metoprolol succinate (TOPROL XL) 100 mg tablet, Take 1 Tab by mouth nightly., Disp: 90 Tab, Rfl: 3    nitroglycerin (NITROSTAT) 0.4 mg SL tablet, 1 Tab by SubLINGual route every five (5) minutes as needed. , Disp: 25 Tab, Rfl: 6    fluticasone (FLONASE) 50 mcg/actuation nasal spray, 2 Sprays by Both Nostrils route daily for 90 days. , Disp: 3 Bottle, Rfl: 3    albuterol (PROAIR HFA) 90 mcg/actuation inhaler, Take 2 Puffs by inhalation four (4) times daily as needed for up to 90 days. , Disp: 3 Inhaler, Rfl: 3    Cholecalciferol, Vitamin D3, (VITAMIN D3) 1,000 unit cap, Take 2,000 Units by mouth daily. , Disp: , Rfl:     aspirin (ASPIRIN) 325 mg tablet, Take 325 mg by mouth every morning., Disp: , Rfl:    Date Last Reviewed:  10/30/2018   EXAMINATION:   Observation/Orthostatic Postural Assessment:          Pt has antalgic gait with right glut med lurch. The left shoe is worn across the heel more than the right. Palpation:          Mild tenderness at left greater trochanter  ROM:     5 degrees DF bilaterally 9/21/18      Strength:     Right hip abd, ext, PF 3-/5, quad 5/5  Left hip abd, ext 3+/5, PF 3-/5, quad 5/5  10/26/18               Neurological Screen:        unremarkable  Functional Mobility:         Gait/Ambulation:  Antalgic level surfaces        Transfers:  Uses UE's to assist        Stairs:  Step-to pattern and uses UE's to pull up  Balance:          1 sec R, 2 sec L  10/26/18   Body Structures Involved:  1. Nerves  2. Muscles Body Functions Affected:  1. Sensory/Pain  2. Neuromusculoskeletal  3. Movement Related Activities and Participation Affected:  1. General Tasks and Demands  2. Mobility  3. Self Care  4. Domestic Life  5. Community, Social and Civic Life   CLINICAL PRESENTATION:   CLINICAL DECISION MAKING:   Outcome Measure: Tool Used: Diaz Balance Scale  Score:  Initial: 39/56 Most Recent: 43/56 (Date: 9/21/18 )                       44/56 (Date: 10/26/18)   Interpretation of Score: Each section is scored on a 0-4 scale, 0 representing the patients inability to perform the task and 4 representing independence. The scores of each section are added together for a total score of 56. The higher the patients score, the more independent the patient is. Any score below 45 indicates increased risk for falls. Score 56 55-45 44-34 33-23 22-12 11-1 0   Modifier CH CI CJ CK CL CM CN     ?  Mobility - Walking and Moving Around:     - CURRENT STATUS: CJ - 20%-39% impaired, limited or restricted    - GOAL STATUS: CI - 1%-19% impaired, limited or restricted    - D/C STATUS:  ---------------To be determined---------------    Medical Necessity: · Patient demonstrates fair rehab potential due to higher previous functional level. Reason for Services/Other Comments:  · Patient continues to require present interventions due to patient's inability to ambulate safely all surfaces. TREATMENT:   (In addition to Assessment/Re-Assessment sessions the following treatments were rendered)  THERAPEUTIC ACTIVITY: ( see below for minutes): Therapeutic activities per grid below to improve mobility. Required moderate verbal and manual cues to promote motor control of bilateral, lower extremity(s). THERAPEUTIC EXERCISE: (see below for minutes):  Exercises per grid below to improve strength. Required moderate verbal and manual cues to promote proper body alignment, promote proper body posture and promote proper body mechanics. Progressed resistance, range, repetitions and complexity of movement as indicated.     Date: 8/29/18 8/31/18  Visit 2 9/5/18  Visit 3 9/7/18  Visit 4 9/12/18  Visit 5 9/14/18  Visit 6 9/19/18  Visit 7 9/21/18  Visit 8  PN 9/26/18  Visit 9 9/28/18  Visit 10 10/10/18  Visit 11 10/12/18  Visit 12 10/17/18  Visit 13 10/19/18  Visit 14 10/23/18  Visit 15 10/26/18  Visit 16  PN 10/30/18  Visit 17    Modalities:                                                                                    Therapeutic Exercise: 15 mins 25 mins 20 mins 20 mins 20 mins 20 mins 25 mins 25 mins 20 mins 20 mins 20 mins 20 mins 15 mins 15 mins 15 mins 15 mins 15 mins    LAQ B 20 reps 30 reps   30 reps 30 reps   30 reps  5 lb 30 reps  5 lb             Slant board 3 x 10 sec 3 x 10 sec 5 x 10 sec 5 x 10 sec 5 x 10 sec 5 x 10 sec 5 x 10 sec 5 x 10 sec 5 x 10 sec 5 x 10 sec 5 x 10 sec 5 x 10 sec  5 x 10 sec 5 x 10 sec 5 x 10 sec 5 x 10 sec    Clams B 20 reps 30 reps 30 reps 30 reps 30 reps 30 reps 2 x 20 reps 2 x 20 reps 30 reps X 30 X 30 X 30         S/L hip abd B With assist  20 reps With assist  30 reps 30 reps  With assist 30 reps  With assist 30 reps  With assist 30 reps  With assist 2 x 20 reps  With assist 2 x 20 reps  With assist 30 reps  assist X 30 assist  X 30   With assist X 30   With assist         Glut sets  Supine, standing  5 mins                   Calf raises  30 reps 30 reps 30 reps 30 reps 30 reps 30 reps 30 reps 30 reps X 30 X 30 X 30 X 30 X 30 X 30 X 30 X 30    Standing right hip diagonal   30 reps 30 reps 30 reps 30 reps 30 reps 30 reps 30 reps X 30 X 30 X 30 X 30 X 30 X 30 X 30 X 30    Standing hp ext          X 30 X 30 X 30 X 30 X 30 X 30 X 30 X 30    Seated manual hip abd resistance             X 30         Seated marching             X 30 Standing   X 30 Standing  X 30 Standing  X 30 Standing  X 30                         Proprioceptive Activities:                                                                                    Manual Therapy:                                                               Therapeutic Activities: 15 mins 15 mins 25 mins 25 mins 25 mins 25 mins 20 mins 20 mins 25 mins 25 mins 25 mins 25 mins 25 mins 25 mins 25 mins 25 mins 25 mins    Pt education, postural education, HEP 5 mins                    bridging 20 reps 20 reps 30 reps 30 reps 30 reps 30 reps 30 reps 30 reps 30 reps X 30 X 30 X 30 symm  X 20 staggered foot position         SLS holding pelvis level B With assist  5 reps With assist  5 reps With assist  5 reps With assist  5 reps With assist  5 reps With assist  5 reps 5 reps  With assist  X 5 with assist X 5   assist X 5 assist X 5 assist X 10 X 10  X 10 X 10 X 10 X 10    Sit to stand  15 reps  With assist 20 reps  With assist      20 reps  assist Squats   X 20 Squats  X 20 Squats  X 20 Squats  X 20 Squats  X 20 Squats  X 20 Squats  X 30 Squats   X 30    Stepping with left foot holding pelvis level with UE support   X 15 reps X 30 reps 30 reps 30 reps 30 reps 30 reps 30 reps X 30 X 30 X 30 X 30 X 30 X 30 X 30 X 30    Foam standing eyes open and closed   4 mins 6 mins 5 mins 5 mins 5 mins 5 mins 5 mins 5 mins 5 mins 5 mins  4 mins 4 mins  3 mins    Step ups lat         3 inches  X 10 each    3 inches   x 15 each 3 inches   X 15 each 3 inches  X 15 each 3 inches   X 15 3 inches  X 20 B    perturbations          3 mins 3 mins          Lat stepping            3 laps 4 laps 4 laps 4 laps 4 laps On foam  4 laps    Walking holding pelvis level watching in mirror            4 laps X 3 laps 3 laps       Tandem stance              X 2 cycles X 2 cycles X 2 cycles X 2 cycles X 2 cycles    HEP: see hand out  [facility] Portal  Treatment/Session Assessment:  Pt needs cues to hold pelvis level but can engage right glut med more efficiently. Continue as indicated. · Pain/ Symptoms: 0/10 no pain. I found out I have a lot of infection and need 4 teeth pulled on that side with the drainage from my ear so that may be adding to all my issues. Post Session:  0/10 ·   Compliance with Program/Exercises: Will assess as treatment progresses. · Recommendations/Intent for next treatment session: \"Next visit will focus on advancements to more challenging activities\".   Total Treatment Duration:  PT Patient Time In/Time Out  Time In: 1015  Time Out: [address] [facility],

## 2018-11-02 ENCOUNTER — HOSPITAL ENCOUNTER (OUTPATIENT)
Dept: PHYSICAL THERAPY | Age: 78
Discharge: HOME | End: 2018-11-02
Payer: MEDICARE

## 2018-11-02 PROCEDURE — G8980 MOBILITY D/C STATUS: HCPCS

## 2018-11-02 PROCEDURE — 97530 THERAPEUTIC ACTIVITIES: CPT

## 2018-11-02 PROCEDURE — G8979 MOBILITY GOAL STATUS: HCPCS

## 2018-11-02 PROCEDURE — 97110 THERAPEUTIC EXERCISES: CPT

## 2018-11-02 NOTE — THERAPY DISCHARGE
Manisha Fernandez  : 1940 [id number] [facility],[address]  [address] [place] [address]  [phone]   [phone]        OUTPATIENT PHYSICAL THERAPY:Daily Note and Discharge 2018         ICD-10: Treatment Diagnosis: Muscle weakness (generalized) (M62.81)Other abnormalities of gait and mobility (R26.89)  Precautions/Allergies:   Adhesive tape-silicones; Azopt [brinzolamide]; Other medication; Statins-hmg-coa reductase inhibitors; and Sulfa (sulfonamide antibiotics)   Fall Risk Score: 7 (? 5 = High Risk)  MD Orders: eval and treat vestibular dysfunction, unsteady gait from neuropathy MEDICAL/REFERRING DIAGNOSIS:  Unsteadiness on feet [R26.81]  Dizziness and giddiness [R42]   DATE OF ONSET: years  REFERRING PHYSICIAN: Cynthia Gallo MD  RETURN PHYSICIAN APPOINTMENT: 10/8/18     INITIAL ASSESSMENT:  Ms. Adarsh Bullock presents with weakness and balance loss from various sources inc neuropathy. She will benefit from skilled PT to increase strength and balance and address vestibular issues as indicated to improve pt's safety with gait. PROBLEM LIST (Impacting functional limitations):  1. Decreased Strength  2. Decreased ADL/Functional Activities  3. Decreased Transfer Abilities  4. Decreased Ambulation Ability/Technique  5. Decreased Balance  6. Decreased Activity Tolerance  7. Decreased Flexibility/Joint Mobility  8. Decreased [place] with Home Exercise Program INTERVENTIONS PLANNED:  1. Balance Exercise  2. Home Exercise Program (HEP)  3. Therapeutic Activites  4. Therapeutic Exercise/Strengthening    TREATMENT PLAN:  Effective Dates: 2018 TO 2018 (90 days). Frequency/Duration: 2 times a week for 90 Days  GOALS: (Goals have been discussed and agreed upon with patient.)  Short-Term Functional Goals: Time Frame: 2 weeks  1. Pt to report compliance with HEP. - ongoing  2. Pt to increase DF to 5 degrees bilaterally for improved gait mechanics.  - MET  Discharge Goals: Time Frame: 12 weeks  1. Pt to increase hip abd strength to 4/5 for more stability in gait. - ongoing  2. Pt to demonstrate > 4 sec SLS for reciprocal gait on stairs. - ongoing  3. Pt to use assistive device if needed for safe ambulation. - MET              HISTORY:   History of Present Injury/Illness (Reason for Referral):  Pt has hearing loss in left ear and was told to get hearing aids. She did not and saw another specialist who said she needed treatment on her ear before she got hearing aids. She has had that and is being treated for a mastoid infection. She has diabetic neuropathy. She is going to have a NCV 10/8. She tripped over her 's oxygen line and fell about 3 months ago. She loses her balance a lot. She does not want to use a walker. She takes 2 meds that cause dizziness. She walks her dog in her yard and walks to get the mail but no exercise. She was told her vertigo was meniere's years ago but other doctors have contradicted that. She had vestibular rehab without any symptom changes. Past Medical History/Comorbidities:   Ms. Frank Burgess  has a past medical history of Asthma, Axonal polyneuropathy, Cardiomyopathy, ischemic, Chronic vertigo, DM2 (diabetes mellitus, type 2) ([address] GERD (gastroesophageal reflux disease), Hearing loss, History of AAA (abdominal aortic aneurysm) repair, History of bilateral cataract extraction, History of coronary artery disease, History of gallbladder disease, History of hernia repair, History of seborrheic keratosis, Hyperlipidemia, Hypertension, Lacunar infarction, Meniere's disease, Menopause, Morbid obesity ([address] OA (osteoarthritis), Orthostatic hypotension, Osteopenia with high risk of fracture, Post-menopausal osteoporosis, Postmenopausal osteoporosis, Unspecified sleep apnea, Unsteady gait, and Visit for dental examination.   Ms. Frank Burgess  has a past surgical history that includes hx open cholecystectomy (1979); hx orthopaedic; hx cataract removal (3/10); hx hernia repair; pr cardiac surg procedure unlist; hx hysterectomy (1997); hx oophorectomy (1982); pr breast surgery procedure unlisted (mid 2000's); VITRECTOMY POSTERIOR 25 GAUGE (Left, 9/17/2012); and VITRECTOMY POSTERIOR (Left, 3/31/2010). Social History/Living Environment:     lives in 2 story home with  but doesn't need to go upstairs  Prior Level of Function/Work/Activity:  retired  Dominant Side:         RIGHT  Current Medications:    Current Outpatient Medications:     cyanocobalamin (VITAMIN B-12) 1,000 mcg sublingual tablet, Take 1,000 mcg by mouth daily. Indications: OTC, Disp: , Rfl:     varicella-zoster recombinant, PF, (SHINGRIX, PF,) 50 mcg/0.5 mL susr injection, 0.5mL by IntraMUSCular route once now and then repeat in 2-6 months, Disp: 0.5 mL, Rfl: 1    pitavastatin calcium (LIVALO) 4 mg tab tablet, Take 1 Tab by mouth daily. , Disp: 90 Tab, Rfl: 3    potassium chloride (KLOR-CON) 10 mEq tablet, Take 1 Tab by mouth daily. , Disp: 90 Tab, Rfl: 3    triamterene-hydroCHLOROthiazide (MAXZIDE) 37.5-25 mg per tablet, Take 1 Tab by mouth daily. , Disp: 90 Tab, Rfl: 3    losartan (COZAAR) 100 mg tablet, Take 1 Tab by mouth daily. , Disp: 90 Tab, Rfl: 3    clopidogrel (PLAVIX) 75 mg tab, Take 1 Tab by mouth daily. , Disp: 90 Tab, Rfl: 3    metoprolol succinate (TOPROL XL) 100 mg tablet, Take 1 Tab by mouth nightly., Disp: 90 Tab, Rfl: 3    nitroglycerin (NITROSTAT) 0.4 mg SL tablet, 1 Tab by SubLINGual route every five (5) minutes as needed. , Disp: 25 Tab, Rfl: 6    fluticasone (FLONASE) 50 mcg/actuation nasal spray, 2 Sprays by Both Nostrils route daily for 90 days. , Disp: 3 Bottle, Rfl: 3    albuterol (PROAIR HFA) 90 mcg/actuation inhaler, Take 2 Puffs by inhalation four (4) times daily as needed for up to 90 days. , Disp: 3 Inhaler, Rfl: 3    Cholecalciferol, Vitamin D3, (VITAMIN D3) 1,000 unit cap, Take 2,000 Units by mouth daily. , Disp: , Rfl:     aspirin (ASPIRIN) 325 mg tablet, Take 325 mg by mouth every morning., Disp: , Rfl:    Date Last Reviewed:  11/2/2018   EXAMINATION:   Observation/Orthostatic Postural Assessment:          Pt has antalgic gait with right glut med lurch. The left shoe is worn across the heel more than the right. Palpation:          Mild tenderness at left greater trochanter  ROM:     5 degrees DF bilaterally 9/21/18      Strength:     Right hip abd, ext, PF 3+/5, quad 5/5  Left hip abd, ext 4/5, PF 4/5, quad 5/5  11/2/18               Neurological Screen:        unremarkable  Functional Mobility:         Gait/Ambulation:  Antalgic level surfaces        Transfers:  Uses UE's to assist        Stairs:  reciprocal  Balance:          1 sec R, 2 sec L  11/218   Body Structures Involved:  1. Nerves  2. Muscles Body Functions Affected:  1. Sensory/Pain  2. Neuromusculoskeletal  3. Movement Related Activities and Participation Affected:  1. General Tasks and Demands  2. Mobility  3. Self Care  4. Domestic Life  5. Community, Social and Civic Life   CLINICAL PRESENTATION:   CLINICAL DECISION MAKING:   Outcome Measure: Tool Used: Diaz Balance Scale  Score:  Initial: 39/56 Most Recent: 43/56 (Date: 9/21/18 )                       44/56 (Date: 10/26/18)                       45/56 (Date: 11/2/18)   Interpretation of Score: Each section is scored on a 0-4 scale, 0 representing the patients inability to perform the task and 4 representing independence. The scores of each section are added together for a total score of 56. The higher the patients score, the more independent the patient is. Any score below 45 indicates increased risk for falls. Score 56 55-45 44-34 33-23 22-12 11-1 0   Modifier CH CI CJ CK CL CM CN     ?  Mobility - Walking and Moving Around:     - CURRENT STATUS: CI - 1%-19% impaired, limited or restricted    - GOAL STATUS: CI - 1%-19% impaired, limited or restricted    - D/C STATUS:  CI - 1%-19% impaired, limited or restricted     TREATMENT:   (In addition to Assessment/Re-Assessment sessions the following treatments were rendered)  THERAPEUTIC ACTIVITY: ( see below for minutes): Therapeutic activities per grid below to improve mobility. Required moderate verbal and manual cues to promote motor control of bilateral, lower extremity(s). THERAPEUTIC EXERCISE: (see below for minutes):  Exercises per grid below to improve strength. Required moderate verbal and manual cues to promote proper body alignment, promote proper body posture and promote proper body mechanics. Progressed resistance, range, repetitions and complexity of movement as indicated.     Date: 8/29/18 8/31/18  Visit 2 9/5/18  Visit 3 9/7/18  Visit 4 9/12/18  Visit 5 9/14/18  Visit 6 9/19/18  Visit 7 9/21/18  Visit 8  PN 9/26/18  Visit 9 9/28/18  Visit 10 10/10/18  Visit 11 10/12/18  Visit 12 10/17/18  Visit 13 10/19/18  Visit 14 10/23/18  Visit 15 10/26/18  Visit 16  PN 10/30/18  Visit 17 11/2/18  Visit 18   Modalities:                                                                                    Therapeutic Exercise: 15 mins 25 mins 20 mins 20 mins 20 mins 20 mins 25 mins 25 mins 20 mins 20 mins 20 mins 20 mins 15 mins 15 mins 15 mins 15 mins 15 mins 15 mins   LAQ B 20 reps 30 reps   30 reps 30 reps   30 reps  5 lb 30 reps  5 lb             Slant board 3 x 10 sec 3 x 10 sec 5 x 10 sec 5 x 10 sec 5 x 10 sec 5 x 10 sec 5 x 10 sec 5 x 10 sec 5 x 10 sec 5 x 10 sec 5 x 10 sec 5 x 10 sec  5 x 10 sec 5 x 10 sec 5 x 10 sec 5 x 10 sec 5 x 10 sec   Clams B 20 reps 30 reps 30 reps 30 reps 30 reps 30 reps 2 x 20 reps 2 x 20 reps 30 reps X 30 X 30 X 30         S/L hip abd B With assist  20 reps With assist  30 reps 30 reps  With assist 30 reps  With assist 30 reps  With assist 30 reps  With assist 2 x 20 reps  With assist 2 x 20 reps  With assist 30 reps  assist X 30 assist  X 30   With assist X 30   With assist         Glut sets  Supine, standing  5 mins Calf raises  30 reps 30 reps 30 reps 30 reps 30 reps 30 reps 30 reps 30 reps X 30 X 30 X 30 X 30 X 30 X 30 X 30 X 30 X 30   Standing right hip diagonal   30 reps 30 reps 30 reps 30 reps 30 reps 30 reps 30 reps X 30 X 30 X 30 X 30 X 30 X 30 X 30 X 30 X 30   Standing hp ext          X 30 X 30 X 30 X 30 X 30 X 30 X 30 X 30 X 30   Seated manual hip abd resistance             X 30         Seated marching             X 30 Standing   X 30 Standing  X 30 Standing  X 30 Standing  X 30 Standing  X 30                        Proprioceptive Activities:                                                                                    Manual Therapy:                                                               Therapeutic Activities: 15 mins 15 mins 25 mins 25 mins 25 mins 25 mins 20 mins 20 mins 25 mins 25 mins 25 mins 25 mins 25 mins 25 mins 25 mins 25 mins 25 mins 25 mins   Pt education, postural education, HEP 5 mins                    bridging 20 reps 20 reps 30 reps 30 reps 30 reps 30 reps 30 reps 30 reps 30 reps X 30 X 30 X 30 symm  X 20 staggered foot position         SLS holding pelvis level B With assist  5 reps With assist  5 reps With assist  5 reps With assist  5 reps With assist  5 reps With assist  5 reps 5 reps  With assist  X 5 with assist X 5   assist X 5 assist X 5 assist X 10 X 10  X 10 X 10 X 10 X 10 X 10   Sit to stand  15 reps  With assist 20 reps  With assist      20 reps  assist Squats   X 20 Squats  X 20 Squats  X 20 Squats  X 20 Squats  X 20 Squats  X 20 Squats  X 30 Squats   X 30 Squats  X 30   Stepping with left foot holding pelvis level with UE support   X 15 reps X 30 reps 30 reps 30 reps 30 reps 30 reps 30 reps X 30 X 30 X 30 X 30 X 30 X 30 X 30 X 30 X 30   Foam standing eyes open and closed   4 mins 6 mins 5 mins 5 mins 5 mins 5 mins 5 mins 5 mins 5 mins 5 mins  4 mins 4 mins  3 mins 3 mins   Step ups lat         3 inches  X 10 each    3 inches   x 15 each 3 inches   X 15 each 3 inches  X 15 each 3 inches   X 15 3 inches  X 20 B 3 inches  X 20 B   perturbations          3 mins 3 mins          Lat stepping            3 laps 4 laps 4 laps 4 laps 4 laps On foam  4 laps On foam  4 laps   Walking holding pelvis level watching in mirror            4 laps X 3 laps 3 laps       Tandem stance              X 2 cycles X 2 cycles X 2 cycles X 2 cycles X 2 cycles X 2 cycles   HEP: see hand out  [facility]  Treatment/Session Assessment: Pt has shown good steady improvement in strength and stability in gait. Weakness persists but pt is independent with ambulation on all surfaces inc reciprocal pattern on stairs. She is to continue with HEP and contact me with any questions. · Pain/ Symptoms: 0/10 I am doing better I think. I feel good about what I can do but of course this ear and head thing make a big difference as well. I hope they get this figured out soon.  Post Session:  0/10     Total Treatment Duration:  PT Patient Time In/Time Out  Time In: 0930  Time Out: 1015     Sara Lopez, PT

## 2018-11-20 ENCOUNTER — HOSPITAL ENCOUNTER (OUTPATIENT)
Dept: ULTRASOUND IMAGING | Age: 78
Discharge: HOME | End: 2018-11-20
Attending: FAMILY MEDICINE
Payer: MEDICARE

## 2018-11-20 DIAGNOSIS — R10.31 RLQ ABDOMINAL PAIN: ICD-10-CM

## 2018-11-20 PROCEDURE — 76700 US EXAM ABDOM COMPLETE: CPT

## 2018-12-03 ENCOUNTER — HOSPITAL ENCOUNTER (OUTPATIENT)
Dept: CT IMAGING | Age: 78
Discharge: HOME | End: 2018-12-03
Attending: FAMILY MEDICINE
Payer: MEDICARE

## 2018-12-03 DIAGNOSIS — R10.31 RLQ ABDOMINAL PAIN: ICD-10-CM

## 2018-12-03 LAB — CREAT BLD-MCNC: 1.2 MG/DL (ref 0.8–1.5)

## 2018-12-03 PROCEDURE — 82565 ASSAY OF CREATININE: CPT

## 2018-12-03 PROCEDURE — 74177 CT ABD & PELVIS W/CONTRAST: CPT

## 2018-12-03 PROCEDURE — 74011000258 HC RX REV CODE- 258: Performed by: FAMILY MEDICINE

## 2018-12-03 PROCEDURE — 74011636320 HC RX REV CODE- 636/320: Performed by: FAMILY MEDICINE

## 2018-12-03 RX ORDER — SODIUM CHLORIDE 0.9 % (FLUSH) 0.9 %
10 SYRINGE (ML) INJECTION
Status: COMPLETED | OUTPATIENT
Start: 2018-12-03 | End: 2018-12-03

## 2018-12-03 RX ADMIN — IOPAMIDOL 100 ML: 755 INJECTION, SOLUTION INTRAVENOUS at 13:51

## 2018-12-03 RX ADMIN — Medication 10 ML: at 13:51

## 2018-12-03 RX ADMIN — SODIUM CHLORIDE 100 ML: 900 INJECTION, SOLUTION INTRAVENOUS at 13:51

## 2018-12-03 RX ADMIN — DIATRIZOATE MEGLUMINE AND DIATRIZOATE SODIUM 15 ML: 660; 100 LIQUID ORAL; RECTAL at 13:51

## 2018-12-28 ENCOUNTER — HOSPITAL ENCOUNTER (OUTPATIENT)
Dept: MAMMOGRAPHY | Age: 78
Discharge: HOME | End: 2018-12-28
Attending: FAMILY MEDICINE
Payer: MEDICARE

## 2018-12-28 DIAGNOSIS — Z12.39 SCREENING BREAST EXAMINATION: ICD-10-CM

## 2018-12-28 DIAGNOSIS — M94.9 DISORDER OF BONE AND CARTILAGE: ICD-10-CM

## 2018-12-28 DIAGNOSIS — M89.9 DISORDER OF BONE AND CARTILAGE: ICD-10-CM

## 2018-12-28 PROCEDURE — 77067 SCR MAMMO BI INCL CAD: CPT

## 2018-12-28 PROCEDURE — 77080 DXA BONE DENSITY AXIAL: CPT

## 2019-05-02 PROBLEM — Z98.890 HISTORY OF AAA (ABDOMINAL AORTIC ANEURYSM) REPAIR: Status: ACTIVE | Noted: 2019-05-02

## 2020-05-20 ENCOUNTER — HOSPITAL ENCOUNTER (EMERGENCY)
Age: 80
Discharge: HOME | End: 2020-05-20
Attending: EMERGENCY MEDICINE
Payer: MEDICARE

## 2020-05-20 ENCOUNTER — APPOINTMENT (OUTPATIENT)
Dept: CT IMAGING | Age: 80
End: 2020-05-20
Attending: EMERGENCY MEDICINE
Payer: MEDICARE

## 2020-05-20 VITALS
TEMPERATURE: 98 F | RESPIRATION RATE: 17 BRPM | HEART RATE: 75 BPM | BODY MASS INDEX: 28.82 KG/M2 | HEIGHT: 65 IN | WEIGHT: 173 LBS | OXYGEN SATURATION: 98 % | DIASTOLIC BLOOD PRESSURE: 80 MMHG | SYSTOLIC BLOOD PRESSURE: 181 MMHG

## 2020-05-20 DIAGNOSIS — R51.9 ACUTE NONINTRACTABLE HEADACHE, UNSPECIFIED HEADACHE TYPE: Primary | ICD-10-CM

## 2020-05-20 DIAGNOSIS — I16.0 HYPERTENSIVE URGENCY: ICD-10-CM

## 2020-05-20 LAB
ALBUMIN SERPL-MCNC: 4.1 G/DL (ref 3.2–4.6)
ALBUMIN/GLOB SERPL: 1.2 {RATIO} (ref 1.2–3.5)
ALP SERPL-CCNC: 122 U/L (ref 50–136)
ALT SERPL-CCNC: 19 U/L (ref 12–65)
ANION GAP SERPL CALC-SCNC: 9 MMOL/L (ref 7–16)
AST SERPL-CCNC: 14 U/L (ref 15–37)
ATRIAL RATE: 91 BPM
BASOPHILS # BLD: 0 K/UL (ref 0–0.2)
BASOPHILS NFR BLD: 1 % (ref 0–2)
BILIRUB SERPL-MCNC: 0.3 MG/DL (ref 0.2–1.1)
BUN SERPL-MCNC: 20 MG/DL (ref 8–23)
CALCIUM SERPL-MCNC: 9.5 MG/DL (ref 8.3–10.4)
CALCULATED P AXIS, ECG09: 63 DEGREES
CALCULATED R AXIS, ECG10: -55 DEGREES
CALCULATED T AXIS, ECG11: 112 DEGREES
CHLORIDE SERPL-SCNC: 103 MMOL/L (ref 98–107)
CO2 SERPL-SCNC: 29 MMOL/L (ref 21–32)
CREAT SERPL-MCNC: 0.98 MG/DL (ref 0.6–1)
DIAGNOSIS, 93000: NORMAL
DIFFERENTIAL METHOD BLD: NORMAL
EOSINOPHIL # BLD: 0.2 K/UL (ref 0–0.8)
EOSINOPHIL NFR BLD: 2 % (ref 0.5–7.8)
ERYTHROCYTE [DISTWIDTH] IN BLOOD BY AUTOMATED COUNT: 13.1 % (ref 11.9–14.6)
GLOBULIN SER CALC-MCNC: 3.5 G/DL (ref 2.3–3.5)
GLUCOSE SERPL-MCNC: 140 MG/DL (ref 65–100)
HCT VFR BLD AUTO: 40.8 % (ref 35.8–46.3)
HGB BLD-MCNC: 12.8 G/DL (ref 11.7–15.4)
IMM GRANULOCYTES # BLD AUTO: 0 K/UL (ref 0–0.5)
IMM GRANULOCYTES NFR BLD AUTO: 0 % (ref 0–5)
LYMPHOCYTES # BLD: 3.1 K/UL (ref 0.5–4.6)
LYMPHOCYTES NFR BLD: 39 % (ref 13–44)
MCH RBC QN AUTO: 28.6 PG (ref 26.1–32.9)
MCHC RBC AUTO-ENTMCNC: 31.4 G/DL (ref 31.4–35)
MCV RBC AUTO: 91.3 FL (ref 79.6–97.8)
MONOCYTES # BLD: 0.8 K/UL (ref 0.1–1.3)
MONOCYTES NFR BLD: 10 % (ref 4–12)
NEUTS SEG # BLD: 3.9 K/UL (ref 1.7–8.2)
NEUTS SEG NFR BLD: 48 % (ref 43–78)
NRBC # BLD: 0 K/UL (ref 0–0.2)
P-R INTERVAL, ECG05: 168 MS
PLATELET # BLD AUTO: 193 K/UL (ref 150–450)
PMV BLD AUTO: 11.8 FL (ref 9.4–12.3)
POTASSIUM SERPL-SCNC: 3.6 MMOL/L (ref 3.5–5.1)
PROT SERPL-MCNC: 7.6 G/DL (ref 6.3–8.2)
Q-T INTERVAL, ECG07: 366 MS
QRS DURATION, ECG06: 146 MS
QTC CALCULATION (BEZET), ECG08: 450 MS
RBC # BLD AUTO: 4.47 M/UL (ref 4.05–5.2)
SODIUM SERPL-SCNC: 141 MMOL/L (ref 136–145)
VENTRICULAR RATE, ECG03: 91 BPM
WBC # BLD AUTO: 8 K/UL (ref 4.3–11.1)

## 2020-05-20 PROCEDURE — 96375 TX/PRO/DX INJ NEW DRUG ADDON: CPT

## 2020-05-20 PROCEDURE — 99285 EMERGENCY DEPT VISIT HI MDM: CPT

## 2020-05-20 PROCEDURE — 85025 COMPLETE CBC W/AUTO DIFF WBC: CPT

## 2020-05-20 PROCEDURE — 93005 ELECTROCARDIOGRAM TRACING: CPT | Performed by: EMERGENCY MEDICINE

## 2020-05-20 PROCEDURE — 74011250636 HC RX REV CODE- 250/636: Performed by: EMERGENCY MEDICINE

## 2020-05-20 PROCEDURE — 80053 COMPREHEN METABOLIC PANEL: CPT

## 2020-05-20 PROCEDURE — 96374 THER/PROPH/DIAG INJ IV PUSH: CPT

## 2020-05-20 PROCEDURE — 70450 CT HEAD/BRAIN W/O DYE: CPT

## 2020-05-20 RX ORDER — HYDRALAZINE HYDROCHLORIDE 20 MG/ML
10 INJECTION INTRAMUSCULAR; INTRAVENOUS
Status: COMPLETED | OUTPATIENT
Start: 2020-05-20 | End: 2020-05-20

## 2020-05-20 RX ORDER — METOCLOPRAMIDE HYDROCHLORIDE 5 MG/ML
5 INJECTION INTRAMUSCULAR; INTRAVENOUS
Status: COMPLETED | OUTPATIENT
Start: 2020-05-20 | End: 2020-05-20

## 2020-05-20 RX ORDER — DIPHENHYDRAMINE HYDROCHLORIDE 50 MG/ML
12.5 INJECTION, SOLUTION INTRAMUSCULAR; INTRAVENOUS
Status: COMPLETED | OUTPATIENT
Start: 2020-05-20 | End: 2020-05-20

## 2020-05-20 RX ORDER — HYDRALAZINE HYDROCHLORIDE 50 MG/1
50 TABLET, FILM COATED ORAL 3 TIMES DAILY
Qty: 270 TAB | Refills: 3 | Status: SHIPPED | OUTPATIENT
Start: 2020-05-20 | End: 2020-07-14

## 2020-05-20 RX ADMIN — SODIUM CHLORIDE 1000 ML: 9 INJECTION, SOLUTION INTRAVENOUS at 17:05

## 2020-05-20 RX ADMIN — DIPHENHYDRAMINE HYDROCHLORIDE 12.5 MG: 50 INJECTION, SOLUTION INTRAMUSCULAR; INTRAVENOUS at 17:05

## 2020-05-20 RX ADMIN — HYDRALAZINE HYDROCHLORIDE 10 MG: 20 INJECTION, SOLUTION INTRAMUSCULAR; INTRAVENOUS at 17:06

## 2020-05-20 RX ADMIN — METOCLOPRAMIDE 5 MG: 5 INJECTION, SOLUTION INTRAMUSCULAR; INTRAVENOUS at 17:05

## 2020-05-20 NOTE — ED PROVIDER NOTES
68-year-old female with history of hypertension, Ménière's disease, history of left mastoidectomy secondary to spontaneous CSF leak, diabetes, GERD and coronary disease presents to the emergency department complaining of headache noted on awakening this morning. Patient denies any significant history of headaches, but thinks this 1 might be a migraine second of the fact she also saw these semicircular lines in the periphery of her vision which have now improved after dose of Tylenol. She also complains of very high blood pressure. She states after her surgery for the CSF leak last October she was placed on acetazolamide and her cardiologist stopped her [address]. She was subsequently placed on hydralazine, which she was taking until approximately a month ago when she ran out. Patient states her acetazolamide was stopped 2 months ago, and she was never placed back on her Maxide. She denies any new visual changes although they are currently working up some visual loss in her left eye. She denies any paralysis or paresthesias or neck pain, nausea or vomiting. The history is provided by the patient. Hypertension    This is a new problem. Episode onset: Unclear, patient's blood pressure machine stopped working correctly approximately a month ago. The problem has been gradually worsening. Associated symptoms include blurred vision and headaches. Pertinent negatives include no chest pain, no orthopnea, no palpitations, no PND, no anxiety, no confusion, no malaise/fatigue, no tinnitus, no neck pain, no peripheral edema, no dizziness, no shortness of breath, no nausea and no vomiting. There are no associated agents to hypertension. Risk factors include hypertension, dyslipidemia, diabetes mellitus, postmenopause and obesity.         Past Medical History:   Diagnosis Date    Asthma     exercise induced?-- has chronic cough-- prn inhaler    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    Cardiomyopathy, ischemic 1/14/2016    Chronic vertigo     DM2 (diabetes mellitus, type 2) ([facility])     does not check glucose    GERD (gastroesophageal reflux disease)     Hearing loss     History of bilateral cataract extraction     History of coronary artery disease 2006, 2007    MI x 2 after stents placed-- with in 1 week-- stents x3    History of gallbladder disease     History of hernia repair     History of seborrheic keratosis     Hyperlipidemia     Hypertension     Lacunar infarction ([address] 8/16/2018    Meniere's disease     Menopause     Morbid obesity ([address]     OA (osteoarthritis)     Orthostatic hypotension 1/14/2016    Osteopenia with high risk of fracture 2014    Osteopenia with significant interval density loss in the hips since 2014    Post-menopausal osteoporosis     Postmenopausal osteoporosis     Unspecified sleep apnea     does not use c pap, pt. states asymptomatic since weight loss    Unsteady gait 8/16/2018    Visit for dental examination     SEVERAL YEARS AGO       Past Surgical History:   Procedure Laterality Date    BREAST SURGERY PROCEDURE UNLISTED  mid 2000's    benign right breast biopsy    CARDIAC SURG PROCEDURE UNLIST      heart cath x 4, last 2007, stents x 3, last 2006    HX CATARACT REMOVAL  3/10    bilat and retina repair    HX HERNIA REPAIR      multiple surgery site- midline, umbilical    HX HYSTERECTOMY  1997    HX MASTOIDECTOMY Left     repair of leaking csf into middle ear    HX OOPHORECTOMY  1982     and cyst    HX OPEN CHOLECYSTECTOMY  1979    HX ORTHOPAEDIC      left  -- hammer toes         Family History:   Problem Relation Age of Onset    Heart Disease Father     Heart Attack Father 76        mi    Diabetes Brother     Stroke Paternal Aunt     Heart Disease Paternal Uncle     Heart Disease Maternal Grandfather     Stroke Maternal Grandfather     Heart Disease Paternal Grandmother     Stroke Paternal Grandmother     Hypertension Mother     Breast Cancer Neg Hx        Social History     Socioeconomic History    Marital status:      Spouse name: Not on file    Number of children: Not on file    Years of education: Not on file    Highest education level: Not on file   Occupational History    Not on file   Social Needs    Financial resource strain: Not on file    Food insecurity     Worry: Not on file     Inability: Not on file    Transportation needs     Medical: Not on file     Non-medical: Not on file   Tobacco Use    Smoking status: Never Smoker    Smokeless tobacco: Never Used   Substance and Sexual Activity    Alcohol use: No    Drug use: No     Types: Prescription, OTC    Sexual activity: Not on file   Lifestyle    Physical activity     Days per week: Not on file     Minutes per session: Not on file    Stress: Not on file   Relationships    Social connections     Talks on phone: Not on file     Gets together: Not on file     Attends Yazidi service: Not on file     Active member of club or organization: Not on file     Attends meetings of clubs or organizations: Not on file     Relationship status: Not on file    Intimate partner violence     Fear of current or ex partner: Not on file     Emotionally abused: Not on file     Physically abused: Not on file     Forced sexual activity: Not on file   Other Topics Concern    Not on file   Social History Narrative    Not on file         ALLERGIES: Adhesive tape-silicones; Azopt [brinzolamide]; Other medication; Statins-hmg-coa reductase inhibitors; and Sulfa (sulfonamide antibiotics)    Review of Systems   Constitutional: Negative for chills, fever and malaise/fatigue. HENT: Negative for tinnitus. Eyes: Positive for blurred vision. Respiratory: Negative for shortness of breath. Cardiovascular: Negative for chest pain, palpitations, orthopnea and PND. Gastrointestinal: Negative for abdominal pain, nausea and vomiting. Musculoskeletal: Negative for neck pain. Neurological: Positive for headaches. Negative for dizziness. Psychiatric/Behavioral: Negative for confusion. All other systems reviewed and are negative. Vitals:    05/20/20 1615 05/20/20 1620 05/20/20 1631   BP: (!) 227/100     Pulse: 95 90 99   Resp: 18 14    Temp: 98 °F (36.7 °C)     SpO2: 99% 96% 98%   Weight: 78.5 kg (173 lb)     Height: 5' 5\" (1.651 m)              Physical Exam  Vitals signs and nursing note reviewed. Constitutional:       General: She is not in acute distress. Appearance: She is well-developed. HENT:      Head: Normocephalic and atraumatic. Right Ear: External ear normal.      Left Ear: External ear normal.      Nose: Nose normal.   Eyes:      Extraocular Movements: Extraocular movements intact. Conjunctiva/sclera: Conjunctivae normal.      Pupils: Pupils are equal, round, and reactive to light. Neck:      Musculoskeletal: Normal range of motion and neck supple. Cardiovascular:      Rate and Rhythm: Normal rate and regular rhythm. Pulses: Normal pulses. Heart sounds: Normal heart sounds. No murmur. Pulmonary:      Effort: Pulmonary effort is normal.      Breath sounds: Normal breath sounds. Abdominal:      General: Bowel sounds are normal.      Palpations: Abdomen is soft. Tenderness: There is no abdominal tenderness. Musculoskeletal: Normal range of motion. Skin:     General: Skin is warm and dry. Capillary Refill: Capillary refill takes less than 2 seconds. Neurological:      Mental Status: She is alert and oriented to person, place, and time. Cranial Nerves: Cranial nerves are intact. Sensory: Sensation is intact. Motor: Motor function is intact. Coordination: Coordination is intact.    Psychiatric:         Mood and Affect: Mood and affect normal.         Speech: Speech normal.         Behavior: Behavior normal.         Cognition and Memory: Cognition and memory normal.          MDM  Number of Diagnoses or Management Options  Acute nonintractable headache, unspecified headache type: new and requires workup  Hypertensive urgency: new and requires workup     Amount and/or Complexity of Data Reviewed  Clinical lab tests: reviewed and ordered  Tests in the radiology section of CPT®: ordered and reviewed  Obtain history from someone other than the patient: yes  Review and summarize past medical records: yes  Independent visualization of images, tracings, or specimens: yes    Risk of Complications, Morbidity, and/or Mortality  Presenting problems: high  Diagnostic procedures: moderate  Management options: moderate    Patient Progress  Patient progress: improved    ED Course as of May 20 1822   Wed May 20, 2020   1732 Headache resolved, blood pressure 172/69. Patient currently being transported to CT.    [BB]      ED Course User Index  [BB] Betsy Dawn MD       Procedures    The patient was observed in the ED. patient asymptomatic at time of discharge. Results Reviewed:      Recent Results (from the past 24 hour(s))   CBC WITH AUTOMATED DIFF    Collection Time: 05/20/20  4:25 PM   Result Value Ref Range    WBC 8.0 4.3 - 11.1 K/uL    RBC 4.47 4.05 - 5.2 M/uL    HGB 12.8 11.7 - 15.4 g/dL    HCT 40.8 35.8 - 46.3 %    MCV 91.3 79.6 - 97.8 FL    MCH 28.6 26.1 - 32.9 PG    MCHC 31.4 31.4 - 35.0 g/dL    RDW 13.1 11.9 - 14.6 %    PLATELET 807 317 - 314 K/uL    MPV 11.8 9.4 - 12.3 FL    ABSOLUTE NRBC 0.00 0.0 - 0.2 K/uL    DF AUTOMATED      NEUTROPHILS 48 43 - 78 %    LYMPHOCYTES 39 13 - 44 %    MONOCYTES 10 4.0 - 12.0 %    EOSINOPHILS 2 0.5 - 7.8 %    BASOPHILS 1 0.0 - 2.0 %    IMMATURE GRANULOCYTES 0 0.0 - 5.0 %    ABS. NEUTROPHILS 3.9 1.7 - 8.2 K/UL    ABS. LYMPHOCYTES 3.1 0.5 - 4.6 K/UL    ABS. MONOCYTES 0.8 0.1 - 1.3 K/UL    ABS. EOSINOPHILS 0.2 0.0 - 0.8 K/UL    ABS. BASOPHILS 0.0 0.0 - 0.2 K/UL    ABS. IMM.  GRANS. 0.0 0.0 - 0.5 K/UL   METABOLIC PANEL, COMPREHENSIVE    Collection Time: 05/20/20  4:25 PM   Result Value Ref Range    Sodium 141 136 - 145 mmol/L Potassium 3.6 3.5 - 5.1 mmol/L    Chloride 103 98 - 107 mmol/L    CO2 29 21 - 32 mmol/L    Anion gap 9 7 - 16 mmol/L    Glucose 140 (H) 65 - 100 mg/dL    BUN 20 8 - 23 MG/DL    Creatinine 0.98 0.6 - 1.0 MG/DL    GFR est AA >60 >60 ml/min/1.73m2    GFR est non-AA 58 (L) >60 ml/min/1.73m2    Calcium 9.5 8.3 - 10.4 MG/DL    Bilirubin, total 0.3 0.2 - 1.1 MG/DL    ALT (SGPT) 19 12 - 65 U/L    AST (SGOT) 14 (L) 15 - 37 U/L    Alk. phosphatase 122 50 - 136 U/L    Protein, total 7.6 6.3 - 8.2 g/dL    Albumin 4.1 3.2 - 4.6 g/dL    Globulin 3.5 2.3 - 3.5 g/dL    A-G Ratio 1.2 1.2 - 3.5       CT HEAD WO CONT   Final Result   Impression: Chronic appearing white matter change without acute intracranial   abnormality. I discussed the results of all labs, procedures, radiographs, and treatments with the patient and available family. Treatment plan is agreed upon and the patient is ready for discharge. All voiced understanding of the discharge plan and medication instructions or changes as appropriate. Questions about treatment in the ED were answered. All were encouraged to return should symptoms worsen or new problems develop.

## 2020-05-20 NOTE — ED TRIAGE NOTES
Pt ambulatory to room #5 wearing a mask. Pt reports she woke-up this morning \"with a strange headache in my right temple. As the day has gone one, the headache has been dull and stretched across my forehead. \" Pt reports she took her BP at home and got 223/100. Pt reports she took her BP several times, 10 minutes apart, and got similar readings. Pt reports she took all of the readings in the left arm. Pt reports she has been taking her BP medication as directed and has not missed any readings. Pt reports having visual disturbances with the right temple headache this morning. Pt states, \"I woke-up, and right when I sat up, I had an intense pain in my right temple. I had little jagged edges in my vision where I couldn't see normally, but all that has gone away now. I'm just having a dull headache across my forehead now. \" Pt reports some mild SOB. Pt denies any CP. Pt reports difficulty hearing in left ear. Pt is seen at [address] for left hearing problems and \"lightheadedness. \" Pt denies any sudden weakness, difficulty speaking or slurred speech. Pt reports she took an Extra Strength Tylenol around 1430 \"and it helped my headache a lot. \" Pt rates her current pain at 2/10.

## 2020-05-20 NOTE — ED NOTES
I have reviewed discharge instructions with the patient. The patient verbalized understanding. Patient left ED via Discharge Method: ambulatory to Home with daughter  Opportunity for questions and clarification provided. Patient given 1 scripts. No e-sign        To continue your aftercare when you leave the hospital, you may receive an automated call from our care team to check in on how you are doing. This is a free service and part of our promise to provide the best care and service to meet your aftercare needs.  If you have questions, or wish to unsubscribe from this service please call [phone]. Thank you for Choosing our [facility] Emergency Department.

## 2020-05-20 NOTE — DISCHARGE INSTRUCTIONS
Patient Education        Headache: Care Instructions  Your Care Instructions    Headaches have many possible causes. Most headaches aren't a sign of a more serious problem, and they will get better on their own. Home treatment may help you feel better faster. The doctor has checked you carefully, but problems can develop later. If you notice any problems or new symptoms, get medical treatment right away. Follow-up care is a key part of your treatment and safety. Be sure to make and go to all appointments, and call your doctor if you are having problems. It's also a good idea to know your test results and keep a list of the medicines you take. How can you care for yourself at home? · Do not drive if you have taken a prescription pain medicine. · Rest in a quiet, dark room until your headache is gone. Close your eyes and try to relax or go to sleep. Don't watch TV or read. · Put a cold, moist cloth or cold pack on the painful area for 10 to 20 minutes at a time. Put a thin cloth between the cold pack and your skin. · Use a warm, moist towel or a heating pad set on low to relax tight shoulder and neck muscles. · Have someone gently massage your neck and shoulders. · Take pain medicines exactly as directed. ? If the doctor gave you a prescription medicine for pain, take it as prescribed. ? If you are not taking a prescription pain medicine, ask your doctor if you can take an over-the-counter medicine. · Be careful not to take pain medicine more often than the instructions allow, because you may get worse or more frequent headaches when the medicine wears off. · Do not ignore new symptoms that occur with a headache, such as a fever, weakness or numbness, vision changes, or confusion. These may be signs of a more serious problem. To prevent headaches  · Keep a headache diary so you can figure out what triggers your headaches. Avoiding triggers may help you prevent headaches.  Record when each headache began, how long it lasted, and what the pain was like (throbbing, aching, stabbing, or dull). Write down any other symptoms you had with the headache, such as nausea, flashing lights or dark spots, or sensitivity to bright light or loud noise. Note if the headache occurred near your period. List anything that might have triggered the headache, such as certain foods (chocolate, cheese, wine) or odors, smoke, bright light, stress, or lack of sleep. · Find healthy ways to deal with stress. Headaches are most common during or right after stressful times. Take time to relax before and after you do something that has caused a headache in the past.  · Try to keep your muscles relaxed by keeping good posture. Check your jaw, face, neck, and shoulder muscles for tension, and try relaxing them. When sitting at a desk, change positions often, and stretch for 30 seconds each hour. · Get plenty of sleep and exercise. · Eat regularly and well. Long periods without food can trigger a headache. · Treat yourself to a massage. Some people find that regular massages are very helpful in relieving tension. · Limit caffeine by not drinking too much coffee, tea, or soda. But don't quit caffeine suddenly, because that can also give you headaches. · Reduce eyestrain from computers by blinking frequently and looking away from the computer screen every so often. Make sure you have proper eyewear and that your monitor is set up properly, about an arm's length away. · Seek help if you have depression or anxiety. Your headaches may be linked to these conditions. Treatment can both prevent headaches and help with symptoms of anxiety or depression. When should you call for help? Call 911 anytime you think you may need emergency care. For example, call if:    · You have signs of a stroke. These may include:  ? Sudden numbness, paralysis, or weakness in your face, arm, or leg, especially on only one side of your body.   ? Sudden vision changes. ? Sudden trouble speaking. ? Sudden confusion or trouble understanding simple statements. ? Sudden problems with walking or balance. ? A sudden, severe headache that is different from past headaches.    Call your doctor now or seek immediate medical care if:    · You have a new or worse headache.     · Your headache gets much worse. Where can you learn more? Go to http://tayla-ami.info/  Enter M271 in the search box to learn more about \"Headache: Care Instructions. \"  Current as of: November 19, 2019Content Version: 12.4  © 9760-2670 [employer]. Care instructions adapted under license by [employer] (which disclaims liability or warranty for this information). If you have questions about a medical condition or this instruction, always ask your healthcare professional. [address] any warranty or liability for your use of this information. Patient Education        Acute High Blood Pressure: Care Instructions  Your Care Instructions    Acute high blood pressure is very high blood pressure. It's a serious problem. Very high blood pressure can damage your brain, heart, eyes, and kidneys. You may have been given medicines to lower your blood pressure. You may have gotten them as pills or through a needle in one of your veins. This is called an IV. And maybe you were given other medicines too. These can be needed when high blood pressure causes other problems. To keep your blood pressure at a lower level, you may need to make healthy lifestyle changes. And you will probably need to take medicines. Be sure to follow up with your doctor about your blood pressure and what you can do about it. Follow-up care is a key part of your treatment and safety. Be sure to make and go to all appointments, and call your doctor if you are having problems.  It's also a good idea to know your test results and keep a list of the medicines you take.  How can you care for yourself at home? · See your doctor as often as he or she recommends. This is to make sure your blood pressure is under control. You will probably go at least 2 times a year. But it may be more often at first.  · Take your blood pressure medicine exactly as prescribed. You may take one or more types. They include diuretics, beta-blockers, ACE inhibitors, calcium channel blockers, and angiotensin II receptor blockers. Call your doctor if you think you are having a problem with your medicine. · If you take blood pressure medicine, talk to your doctor before you take decongestants or anti-inflammatory medicine, such as ibuprofen. These can raise blood pressure. · Learn how to check your blood pressure at home. Check it often. · Ask your doctor if it's okay to drink alcohol. · Talk to your doctor about lifestyle changes that can help blood pressure. These include being active and managing your weight. · Don't smoke. Smoking increases your risk for heart attack and stroke. When should you call for help? Call  911 anytime you think you may need emergency care. This may mean having symptoms that suggest that your blood pressure is causing a serious heart or blood vessel problem. Your blood pressure may be over 180/120.   For example, call  911 if:    · You have symptoms of a heart attack. These may include:  ? Chest pain or pressure, or a strange feeling in the chest.  ? Sweating. ? Shortness of breath. ? Nausea or vomiting. ? Pain, pressure, or a strange feeling in the back, neck, jaw, or upper belly or in one or both shoulders or arms. ? Lightheadedness or sudden weakness. ? A fast or irregular heartbeat.     · You have symptoms of a stroke. These may include:  ? Sudden numbness, tingling, weakness, or loss of movement in your face, arm, or leg, especially on only one side of your body. ? Sudden vision changes. ? Sudden trouble speaking.   ? Sudden confusion or trouble understanding simple statements. ? Sudden problems with walking or balance. ? A sudden, severe headache that is different from past headaches.     · You have severe back or belly pain.    Do not wait until your blood pressure comes down on its own. Get help right away.   Call your doctor now or seek immediate care if:    · Your blood pressure is much higher than normal (such as 180/120 or higher), but you don't have symptoms.     · You think high blood pressure is causing symptoms, such as:  ? Severe headache.  ? Blurry vision.    Watch closely for changes in your health, and be sure to contact your doctor if:    · Your blood pressure measures higher than your doctor recommends at least 2 times. That means the top number is higher or the bottom number is higher, or both.     · You think you may be having side effects from your blood pressure medicine. Where can you learn more? Go to http://tayla-ami.info/  Enter H919 in the search box to learn more about \"Acute High Blood Pressure: Care Instructions. \"  Current as of: December 15, 2019Content Version: 12.4  © 1867-3797 [employer]. Care instructions adapted under license by [employer] (which disclaims liability or warranty for this information). If you have questions about a medical condition or this instruction, always ask your healthcare professional. [address] any warranty or liability for your use of this information.

## 2020-05-21 ENCOUNTER — PATIENT OUTREACH (OUTPATIENT)
Dept: CASE MANAGEMENT | Age: 80
End: 2020-05-21

## 2020-05-21 NOTE — PROGRESS NOTES
Patient contacted regarding recent discharge and COVID-19 risk   Care Transition Nurse/ Ambulatory Care Manager contacted the patient by telephone to perform post discharge assessment. Verified name and  with patient as identifiers. Patient has following risk factors of: diabetes. CTN/ACM reviewed discharge instructions, medical action plan and red flags related to discharge diagnosis. Reviewed and educated them on any new and changed medications related to discharge diagnosis. Advised obtaining a 90-day supply of all daily and as-needed medications. Education provided regarding infection prevention, and signs and symptoms of COVID-19 and when to seek medical attention with patient who verbalized understanding. Discussed exposure protocols and quarantine from [address] you at higher risk for severe illness  and given an opportunity for questions and concerns. The patient agrees to contact the COVID-19 hotline [phone] or PCP office for questions related to their healthcare. CTN/ACM provided contact information for future reference. From CDC: Are you at higher risk for severe illness?  Wash your hands often.  Avoid close contact (6 feet, which is about two arm lengths) with people who are sick.  Put distance between yourself and other people if COVID-19 is spreading in your community.  Clean and disinfect frequently touched surfaces.  Avoid all cruise travel and non-essential air travel.  Call your healthcare professional if you have concerns about COVID-19 and your underlying condition or if you are sick. For more information on steps you can take to protect yourself, see CDC's How to Protect Yourself      Patient/family/caregiver given information for Angelito Diaz and agrees to enroll no  Patient's preferred e-mail:    Patient's preferred phone number:   Based on Loop alert triggers, patient will be contacted by nurse care manager for worsening symptoms.     Plan for follow-up call in 7-14 days based on severity of symptoms and risk factors.

## 2020-06-05 ENCOUNTER — PATIENT OUTREACH (OUTPATIENT)
Dept: CASE MANAGEMENT | Age: 80
End: 2020-06-05

## 2020-06-05 NOTE — PROGRESS NOTES
This note will not be viewable in [address] Patient resolved from Transition of Care episode on 6/5/2020  Discussed COVID-19 related testing which was not done at this time. Test results were not done. Patient informed of results, if available? Patient/family has been provided the following resources and education related to COVID-19:                         Signs, symptoms and red flags related to COVID-19            CDC exposure and quarantine guidelines            [employer] exposure contact - [phone]            Contact for their local Department of Health                 Patient currently reports that the following symptoms have improved:  no new symptoms and no worsening symptoms. No further outreach scheduled with this CTN/ACM/LPN/HC/ MA. Episode of Care resolved. Patient has this CTN/ACM/LPN/HC/MA contact information if future needs arise.

## 2020-10-29 ENCOUNTER — HOSPITAL ENCOUNTER (OUTPATIENT)
Dept: GENERAL RADIOLOGY | Age: 80
Discharge: HOME | End: 2020-10-29

## 2020-10-29 DIAGNOSIS — R05.9 COUGH: ICD-10-CM

## 2021-03-24 PROBLEM — I65.22 LEFT CAROTID ARTERY STENOSIS: Status: ACTIVE | Noted: 2021-03-24

## 2021-05-19 PROBLEM — H90.A32 MIXED CONDUCTIVE AND SENSORINEURAL HEARING LOSS OF LEFT EAR WITH RESTRICTED HEARING OF RIGHT EAR: Status: ACTIVE | Noted: 2018-06-07

## 2021-05-19 PROBLEM — G96.00 CSF LEAK: Status: ACTIVE | Noted: 2019-07-30

## 2021-07-09 PROBLEM — R00.2 PALPITATIONS: Status: ACTIVE | Noted: 2021-07-09

## 2021-10-28 PROBLEM — I65.22 LEFT CAROTID ARTERY STENOSIS: Status: RESOLVED | Noted: 2021-03-24 | Resolved: 2021-10-28

## 2022-02-16 ENCOUNTER — TRANSCRIBE ORDER (OUTPATIENT)
Dept: SCHEDULING | Age: 82
End: 2022-02-16

## 2022-02-16 DIAGNOSIS — G96.00 CSF LEAK: Primary | ICD-10-CM

## 2022-03-02 ENCOUNTER — HOSPITAL ENCOUNTER (OUTPATIENT)
Dept: CT IMAGING | Age: 82
Discharge: HOME | End: 2022-03-02
Attending: OTOLARYNGOLOGY

## 2022-03-02 ENCOUNTER — HOSPITAL ENCOUNTER (OUTPATIENT)
Dept: CT IMAGING | Age: 82
Discharge: HOME | End: 2022-03-02
Attending: NURSE PRACTITIONER

## 2022-03-02 DIAGNOSIS — G96.00 CSF LEAK: ICD-10-CM

## 2022-03-02 DIAGNOSIS — G44.52 NEW DAILY PERSISTENT HEADACHE: ICD-10-CM

## 2022-03-18 PROBLEM — G62.9 AXONAL POLYNEUROPATHY: Status: ACTIVE | Noted: 2018-08-16

## 2022-03-19 PROBLEM — G96.00 CSF LEAK: Status: ACTIVE | Noted: 2019-07-30

## 2022-03-19 PROBLEM — R26.81 UNSTEADY GAIT: Status: ACTIVE | Noted: 2018-08-16

## 2022-03-19 PROBLEM — H90.A32 MIXED CONDUCTIVE AND SENSORINEURAL HEARING LOSS OF LEFT EAR WITH RESTRICTED HEARING OF RIGHT EAR: Status: ACTIVE | Noted: 2018-06-07

## 2022-03-19 PROBLEM — E11.21 TYPE 2 DIABETES WITH NEPHROPATHY (HCC): Status: ACTIVE | Noted: 2018-03-27

## 2022-03-19 PROBLEM — I63.81 LACUNAR INFARCTION (HCC): Status: ACTIVE | Noted: 2018-08-16

## 2022-03-19 PROBLEM — R00.2 PALPITATIONS: Status: ACTIVE | Noted: 2021-07-09

## 2022-03-19 PROBLEM — Z98.890 HISTORY OF AAA (ABDOMINAL AORTIC ANEURYSM) REPAIR: Status: ACTIVE | Noted: 2019-05-02

## 2022-03-19 PROBLEM — M81.0 OSTEOPOROSIS: Status: ACTIVE | Noted: 2017-01-11

## 2022-06-30 DIAGNOSIS — E11.42 TYPE 2 DIABETES MELLITUS WITH DIABETIC POLYNEUROPATHY, WITHOUT LONG-TERM CURRENT USE OF INSULIN (HCC): ICD-10-CM

## 2022-06-30 DIAGNOSIS — E78.00 HYPERCHOLESTEROLEMIA: ICD-10-CM

## 2022-06-30 DIAGNOSIS — E55.9 VITAMIN D DEFICIENCY: ICD-10-CM

## 2022-06-30 DIAGNOSIS — I10 ESSENTIAL HYPERTENSION: Primary | ICD-10-CM

## 2022-07-07 ENCOUNTER — TELEPHONE (OUTPATIENT)
Dept: FAMILY MEDICINE CLINIC | Facility: CLINIC | Age: 82
End: 2022-07-07

## 2022-07-07 LAB
AVERAGE GLUCOSE: ABNORMAL
HBA1C MFR BLD: 6.5 %

## 2022-07-07 NOTE — TELEPHONE ENCOUNTER
----- Message from Gabriela Stewart sent at 7/7/2022 11:03 AM EDT -----  Subject: Appointment Request    Reason for Call: Established Patient Appointment needed: Routine (Patient   Request) No Script    QUESTIONS    Reason for appointment request? Available appointments did not meet   patient need     Additional Information for Provider? pt. is requesting an in person appt.   late morning or after lunch, the only thing available are virtual visits   and she does not want a virtual  ---------------------------------------------------------------------------  --------------  [id number]  [phone]; OK to leave message on voicemail  ---------------------------------------------------------------------------  --------------  SCRIPT ANSWERS  JACOB Screen: Moody Dawson

## 2022-07-08 ENCOUNTER — TELEPHONE (OUTPATIENT)
Dept: FAMILY MEDICINE CLINIC | Facility: CLINIC | Age: 82
End: 2022-07-08

## 2022-07-08 NOTE — TELEPHONE ENCOUNTER
----- Message from Sharita Wood sent at 7/8/2022 11:06 AM EDT -----  Subject: Appointment Request    Reason for Call: Established Patient Appointment needed: Routine Existing   Condition Follow Up    QUESTIONS    Reason for appointment request? Available appointments did not meet   patient need     Additional Information for Provider? pt needs to set up a f/u for her labs   that in in person.    ---------------------------------------------------------------------------  --------------  Arnold LORA  [phone]; OK to leave message on voicemail  ---------------------------------------------------------------------------  --------------  SCRIPT ANSWERS  JACOB Screen: Melva Sol

## 2022-07-13 ENCOUNTER — OFFICE VISIT (OUTPATIENT)
Dept: FAMILY MEDICINE CLINIC | Facility: CLINIC | Age: 82
End: 2022-07-13
Payer: MEDICARE

## 2022-07-13 VITALS
HEIGHT: 64 IN | SYSTOLIC BLOOD PRESSURE: 130 MMHG | WEIGHT: 152 LBS | OXYGEN SATURATION: 97 % | BODY MASS INDEX: 25.95 KG/M2 | HEART RATE: 73 BPM | DIASTOLIC BLOOD PRESSURE: 80 MMHG

## 2022-07-13 DIAGNOSIS — I10 ESSENTIAL HYPERTENSION: ICD-10-CM

## 2022-07-13 DIAGNOSIS — G96.00 CSF LEAK: ICD-10-CM

## 2022-07-13 DIAGNOSIS — E11.42 TYPE 2 DIABETES MELLITUS WITH DIABETIC POLYNEUROPATHY, WITHOUT LONG-TERM CURRENT USE OF INSULIN (HCC): Primary | ICD-10-CM

## 2022-07-13 DIAGNOSIS — I25.10 CORONARY ARTERY DISEASE INVOLVING NATIVE HEART WITHOUT ANGINA PECTORIS, UNSPECIFIED VESSEL OR LESION TYPE: ICD-10-CM

## 2022-07-13 DIAGNOSIS — N18.30 STAGE 3 CHRONIC KIDNEY DISEASE, UNSPECIFIED WHETHER STAGE 3A OR 3B CKD (HCC): ICD-10-CM

## 2022-07-13 DIAGNOSIS — E03.9 HYPOTHYROIDISM, UNSPECIFIED TYPE: ICD-10-CM

## 2022-07-13 DIAGNOSIS — E78.00 HYPERCHOLESTEROLEMIA: ICD-10-CM

## 2022-07-13 DIAGNOSIS — E55.9 VITAMIN D DEFICIENCY: ICD-10-CM

## 2022-07-13 PROCEDURE — G8417 CALC BMI ABV UP PARAM F/U: HCPCS | Performed by: NURSE PRACTITIONER

## 2022-07-13 PROCEDURE — 3044F HG A1C LEVEL LT 7.0%: CPT | Performed by: NURSE PRACTITIONER

## 2022-07-13 PROCEDURE — 1090F PRES/ABSN URINE INCON ASSESS: CPT | Performed by: NURSE PRACTITIONER

## 2022-07-13 PROCEDURE — 1036F TOBACCO NON-USER: CPT | Performed by: NURSE PRACTITIONER

## 2022-07-13 PROCEDURE — G8399 PT W/DXA RESULTS DOCUMENT: HCPCS | Performed by: NURSE PRACTITIONER

## 2022-07-13 PROCEDURE — 99214 OFFICE O/P EST MOD 30 MIN: CPT | Performed by: NURSE PRACTITIONER

## 2022-07-13 PROCEDURE — G8427 DOCREV CUR MEDS BY ELIG CLIN: HCPCS | Performed by: NURSE PRACTITIONER

## 2022-07-13 PROCEDURE — 1123F ACP DISCUSS/DSCN MKR DOCD: CPT | Performed by: NURSE PRACTITIONER

## 2022-07-13 RX ORDER — MONTELUKAST SODIUM 10 MG/1
10 TABLET ORAL DAILY
Qty: 90 TABLET | Refills: 3 | Status: SHIPPED | OUTPATIENT
Start: 2022-07-13

## 2022-07-13 RX ORDER — LEVOTHYROXINE SODIUM 0.03 MG/1
25 TABLET ORAL
Qty: 90 TABLET | Refills: 3 | Status: SHIPPED | OUTPATIENT
Start: 2022-07-13 | End: 2022-10-21 | Stop reason: ALTCHOICE

## 2022-07-13 RX ORDER — FAMOTIDINE 40 MG/1
40 TABLET, FILM COATED ORAL DAILY
Qty: 90 TABLET | Refills: 3 | Status: SHIPPED | OUTPATIENT
Start: 2022-07-13

## 2022-07-13 RX ORDER — TRIAMTERENE AND HYDROCHLOROTHIAZIDE 37.5; 25 MG/1; MG/1
1 CAPSULE ORAL DAILY
Qty: 90 CAPSULE | Refills: 3 | Status: SHIPPED | OUTPATIENT
Start: 2022-07-13

## 2022-07-13 RX ORDER — ERGOCALCIFEROL (VITAMIN D2) 1250 MCG
2000 CAPSULE ORAL DAILY
COMMUNITY

## 2022-07-13 NOTE — PROGRESS NOTES
PROGRESS NOTE    Chief Complaint   Patient presents with    Medication Refill    Follow-up     lab follow up       SUBJECTIVE:     Wong Paulino is a very pleasant 78 y.o. female with hx of hypertension, hyperlipidemia, CAD, asthma, squamous cell carcinoma of left ear, CSF leak, and arthritis, seen today in office for follow-up for lab results review and medication refill. Patient reports feeling well overall. She is currently following Dr. Lori Stephens with ENT with [address] [facility] with recent squamous cell of left ear since May. Patient reports she had lesion removed but still not healing. Patient was recently seen 2 days ago and had another biopsy completed. Her left ear remained swollen and red. She was started on Cipro twice daily. She is scheduled to return back every 2 weeks to follow with her ENT to ensure complete resolve. Patient also has been seen and evaluated her cardiologist.  Plavix was discontinued due to easy bruising. Patient is still currently taking ASA 81 mg daily. Otherwise, patient reports no chest pain, no shortness of breath, no palpitation, no unilateral or focal weakness, no nausea, no vomiting, no diarrhea. Past Medical History, Past Surgical History, Family history, Social History, and Medications were all reviewed with the patient today and updated as necessary.        Current Outpatient Medications   Medication Sig Dispense Refill    ergocalciferol (ERGOCALCIFEROL) 1.25 MG (26046 UT) capsule Take 2,000 Units by mouth daily      mupirocin (BACTROBAN) 2 % ointment       levothyroxine (SYNTHROID) 25 MCG tablet Take 1 tablet by mouth every morning (before breakfast) And wait at least 30 minutes before eating for thyroid 90 tablet 3    triamterene-hydroCHLOROthiazide (DYAZIDE) 37.5-25 MG per capsule Take 1 capsule by mouth daily For blood pressure 90 capsule 3    famotidine (PEPCID) 40 MG tablet Take 1 tablet by mouth daily For stomach reflux/protection 90 tablet 3  montelukast (SINGULAIR) 10 MG tablet Take 1 tablet by mouth daily For cough/postnasal drip 90 tablet 3    pitavastatin (LIVALO) 4 MG TABS tablet Take 1 tablet by mouth daily For cholesterol 90 tablet 3    albuterol sulfate  (90 Base) MCG/ACT inhaler Inhale 2 puffs into the lungs every 4 hours as needed      aspirin 81 MG EC tablet Take 81 mg by mouth daily      Cholecalciferol 50 MCG (2000 UT) CAPS Take 2,000 Units by mouth daily      fluticasone (FLONASE) 50 MCG/ACT nasal spray 2 sprays by Nasal route daily      metoprolol succinate (TOPROL XL) 100 MG extended release tablet Take 100 mg by mouth      nitroGLYCERIN (NITROSTAT) 0.4 MG SL tablet Place 0.4 mg under the tongue      senna-docusate (PERICOLACE) 8.6-50 MG per tablet Take 2 tablets by mouth       No current facility-administered medications for this visit. Allergies   Allergen Reactions    Other Hives, Itching and Rash     Band aids cause contact rash after several hours.  Brinzolamide Nausea Only    Hydralazine Other (See Comments)     Other reaction(s): Drowsiness/Fatigue    Adhesive Tape Rash     Band aids cause contact rash after several hours. Band aids cause contact rash after several hours.  Statins Other (See Comments) and Rash     Other reaction(s):  Other (See Comments), Other- (not listed) - Allergy  Aching muscles  Aching muscles  Aching muscles  Aching muscles      Sulfa Antibiotics Nausea Only, Other (See Comments) and Nausea And Vomiting     High fever, \"exterme nausea\"  Other reaction(s): Fever  High fever, \"exterme nausea\"       Patient Active Problem List   Diagnosis    Axonal polyneuropathy    Meniere's disease    Type 2 diabetes mellitus with diabetic polyneuropathy, without long-term current use of insulin (HCC)    Orthostatic hypotension    Essential hypertension    Palpitations    CSF leak    Mixed conductive and sensorineural hearing loss of left ear with restricted hearing of right ear  CAD (coronary artery disease)    Lacunar infarction ([address]    Unsteady gait    History of AAA (abdominal aortic aneurysm) repair    Hypercholesterolemia    GERD (gastroesophageal reflux disease)    AAA [facility] aneurysm) [facility]    Osteoporosis    Type 2 diabetes with nephropathy ([facility])    Vertigo    Cardiomyopathy, ischemic    Hearing loss    Chronic renal disease, stage III ([address] [[id number]]     Past Medical History:   Diagnosis Date    Asthma     exercise induced?-- has chronic cough-- prn inhaler    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    Cardiomyopathy, ischemic 1/14/2016    Chronic vertigo     DM2 (diabetes mellitus, type 2) ([facility])     does not check glucose    GERD (gastroesophageal reflux disease)     Hearing loss     History of bilateral cataract extraction     History of coronary artery disease 2006, 2007    MI x 2 after stents placed-- with in 1 week-- stents x3    History of gallbladder disease     History of hernia repair     History of seborrheic keratosis     Hyperlipidemia     Hypertension     Lacunar infarction ([address] 8/16/2018    Meniere's disease     Menopause     Morbid obesity ([address]     OA (osteoarthritis)     Orthostatic hypotension 1/14/2016    Osteopenia with high risk of fracture 2014    Osteopenia with significant interval density loss in the hips since 2014    Post-menopausal osteoporosis     Postmenopausal osteoporosis     Unspecified sleep apnea     does not use c pap, pt. states asymptomatic since weight loss    Unsteady gait 8/16/2018    Visit for dental examination     SEVERAL YEARS AGO     Past Surgical History:   Procedure Laterality Date    BREAST SURGERY  mid 2000's    benign right breast biopsy    CATARACT REMOVAL  3/10    bilat and retina repair    CHOLECYSTECTOMY, OPEN  1979    HERNIA REPAIR      multiple surgery site- midline, umbilical    HYSTERECTOMY (CERVIX STATUS UNKNOWN)  1997    MASTOIDECTOMY Left     repair of leaking csf into middle ear    ORTHOPEDIC SURGERY      left  -- hammer toes    OVARY REMOVAL  1982     and cyst    [place] CARDIAC SURG PROCEDURE UNLIST      heart cath x 4, last 2007, stents x 3, last 2006     Family History   Problem Relation Age of Onset    Diabetes Brother     Stroke Paternal Aunt     Breast Cancer Neg Hx     Heart Disease Paternal Uncle     Heart Disease Father     Stroke Paternal Grandmother     Heart Disease Maternal Grandfather     Stroke Maternal Grandfather     Heart Disease Paternal Grandmother     Heart Attack Father 76        mi    Hypertension Mother      Social History     Tobacco Use    Smoking status: Never Smoker    Smokeless tobacco: Never Used   Substance Use Topics    Alcohol use: No         REVIEW OF SYSTEM    Review of Systems   Constitutional: Negative. Negative for activity change, appetite change, chills, diaphoresis, fatigue, fever and unexpected weight change. HENT: Negative. Negative for congestion, dental problem, drooling, ear discharge, ear pain, facial swelling, hearing loss, mouth sores, nosebleeds, postnasal drip, rhinorrhea, sinus pressure, sinus pain, sneezing, sore throat, tinnitus, trouble swallowing and voice change. Eyes: Negative. Negative for photophobia, pain, discharge, redness, itching and visual disturbance. Respiratory: Negative. Negative for apnea, cough, choking, chest tightness, shortness of breath, wheezing and stridor. Cardiovascular: Negative. Negative for chest pain, palpitations and leg swelling. Gastrointestinal: Negative. Negative for abdominal distention, abdominal pain, anal bleeding, blood in stool, constipation, diarrhea, nausea, rectal pain and vomiting. Endocrine: Negative. Negative for cold intolerance, heat intolerance, polydipsia, polyphagia and polyuria. Genitourinary: Negative.   Negative for decreased urine volume, difficulty urinating, dysuria, enuresis, flank pain, frequency, genital sores, hematuria and urgency. Musculoskeletal: Negative. Negative for arthralgias, back pain, gait problem, joint swelling, myalgias, neck pain and neck stiffness. Skin: Positive for rash. Negative for color change, pallor and wound. Left ear redness and swelling   Allergic/Immunologic: Negative. Negative for environmental allergies, food allergies and immunocompromised state. Neurological: Negative. Negative for dizziness, tremors, seizures, syncope, facial asymmetry, speech difficulty, weakness, light-headedness, numbness and headaches. Hematological: Negative. Negative for adenopathy. Does not bruise/bleed easily. Psychiatric/Behavioral: Negative. Negative for agitation, behavioral problems, confusion, decreased concentration, dysphoric mood, hallucinations, self-injury, sleep disturbance and suicidal ideas. The patient is not nervous/anxious and is not hyperactive. OBJECTIVE:  /80 (Site: Left Upper Arm, Position: Sitting)   Pulse 73   Ht 5' 4\" (1.626 m)   Wt 152 lb (68.9 kg)   SpO2 97%   BMI 26.09 kg/m²      Physical Exam  Constitutional:       General: She is not in acute distress. Appearance: Normal appearance. She is not ill-appearing, toxic-appearing or diaphoretic. HENT:      Head: Normocephalic and atraumatic. Ears:      Comments: Left pinna erythematous and mild swelling, small wound with suture intact  Neck:      Vascular: No carotid bruit. Cardiovascular:      Rate and Rhythm: Normal rate and regular rhythm. Pulses: Normal pulses. Heart sounds: Normal heart sounds. Pulmonary:      Effort: Pulmonary effort is normal. No respiratory distress. Breath sounds: Normal breath sounds. No stridor. No wheezing, rhonchi or rales. Chest:      Chest wall: No tenderness. Abdominal:      General: Abdomen is flat. Bowel sounds are normal. There is no distension. Palpations: Abdomen is soft.  There is no shifting dullness, fluid wave, hepatomegaly, splenomegaly, mass or pulsatile mass. Tenderness: There is no abdominal tenderness. There is no right CVA tenderness, left CVA tenderness, guarding or rebound. Negative signs include Corbin's sign, Rovsing's sign, McBurney's sign, psoas sign and obturator sign. Hernia: No hernia is present. Musculoskeletal:         General: Normal range of motion. Cervical back: Normal range of motion and neck supple. No rigidity or tenderness. Lymphadenopathy:      Cervical: No cervical adenopathy. Skin:     General: Skin is warm and dry. Capillary Refill: Capillary refill takes less than 2 seconds. Neurological:      General: No focal deficit present. Mental Status: She is alert and oriented to person, place, and time. Psychiatric:         Mood and Affect: Mood normal.         Behavior: Behavior normal.         Thought Content: Thought content normal.         Judgment: Judgment normal.           Medical problems and test results were reviewed with the patient today. ASSESSMENT and PLAN    1. Type 2 diabetes mellitus with diabetic polyneuropathy, without long-term current use of insulin ([facility])  -     Comprehensive Metabolic Panel; Future  -     Hemoglobin A1C; Future  -     Lipid Panel; Future  -     TSH; Future  -     AMB POC URINE, MICROALBUMIN; Future  -     Magnesium; Future  -     AMB POC KTY COMPLETE CBC; Future    A1c has improved from 6.6 now to 6.5. We will have patient continue with low carbohydrate diet and increase in exercise regimen as tolerated. We will recheck labs in 3 months. 2. Hypothyroidism, unspecified type   TSH is normal at 2.6. We will have patient continue with current therapy. Repeat level in 3 months. 3. Hypercholesterolemia   Lipid panel stable with LDL of 76. At goal.  We will have patient continue current therapy. 4. Essential hypertension   Blood pressure today office 130/80. Continue current therapy.     5. Coronary artery disease involving native heart without angina pectoris, unspecified vessel or lesion type   Stable. Per cardiology recommendation. 6. Stage 3 chronic kidney disease, unspecified whether stage 3a or 3b CKD (HCC)   Creatinine is improving at 1.38 from 1.44. Patient reports she has been trying to increase her water intake daily. Discussed referral to nephrology for further evaluation. Patient respectfully declined this time. 7. Vitamin D deficiency  -     Vitamin D 25 Hydroxy; Future    Vitamin D level is normal at 56.5. Continue current therapy. 8. CSF leak   Patient is currently following ENT for her CSF leak monitoring and for her squamous cell lesion of left ear. She is currently scheduled for every 2-week follow-up until lesion is completely healed. We will defer to specialist for recommendation and treatment. Orders Placed This Encounter   Procedures    Comprehensive Metabolic Panel     Standing Status:   Future     Standing Expiration Date:   7/14/2023    Hemoglobin A1C     Standing Status:   Future     Standing Expiration Date:   7/14/2023    Lipid Panel     Standing Status:   Future     Standing Expiration Date:   7/14/2023     Order Specific Question:   Is Patient Fasting?/# of Hours     Answer:   8 Hours    TSH     Standing Status:   Future     Standing Expiration Date:   7/14/2023    Vitamin D 25 Hydroxy     Standing Status:   Future     Standing Expiration Date:   7/14/2023    Magnesium     Standing Status:   Future     Standing Expiration Date:   7/14/2023    AMB POC URINE, MICROALBUMIN     Standing Status:   Future     Standing Expiration Date:   7/14/2023    AMB POC KTY COMPLETE CBC     Standing Status:   Future     Standing Expiration Date:   7/14/2023         Elements of this note have been dictated using speech recognition software. As a result, errors of speech recognition may have occurred.       On this date 7/13/2022 I have spent 30 minutes reviewing previous notes, test results and face to face with the patient discussing the diagnosis and importance of compliance with the treatment plan as well as documenting on the day of the visit. Greater than 50% of this visit was spent counseling the patient about test results, prognosis, importance of compliance, education about disease process, benefits of medications, instructions for management of acute symptoms, and follow up plans. Return in about 3 months (around 10/13/2022) for Follow up with fasting labs prior.      Daphne Gonzalez, APRN - CNP

## 2022-07-14 PROBLEM — N18.30 CHRONIC RENAL DISEASE, STAGE III (HCC): Status: ACTIVE | Noted: 2022-07-14

## 2022-07-14 ASSESSMENT — ENCOUNTER SYMPTOMS
WHEEZING: 0
ABDOMINAL PAIN: 0
COUGH: 0
NAUSEA: 0
EYE REDNESS: 0
GASTROINTESTINAL NEGATIVE: 1
ALLERGIC/IMMUNOLOGIC NEGATIVE: 1
VOMITING: 0
BACK PAIN: 0
RECTAL PAIN: 0
CONSTIPATION: 0
COLOR CHANGE: 0
PHOTOPHOBIA: 0
RESPIRATORY NEGATIVE: 1
ABDOMINAL DISTENTION: 0
CHOKING: 0
STRIDOR: 0
SINUS PRESSURE: 0
EYES NEGATIVE: 1
DIARRHEA: 0
CHEST TIGHTNESS: 0
EYE ITCHING: 0
SORE THROAT: 0
EYE DISCHARGE: 0
APNEA: 0
RHINORRHEA: 0
BLOOD IN STOOL: 0
VOICE CHANGE: 0
FACIAL SWELLING: 0
TROUBLE SWALLOWING: 0
EYE PAIN: 0
SINUS PAIN: 0
ANAL BLEEDING: 0
SHORTNESS OF BREATH: 0

## 2022-07-24 ENCOUNTER — APPOINTMENT (OUTPATIENT)
Dept: ULTRASOUND IMAGING | Age: 82
End: 2022-07-24
Payer: MEDICARE

## 2022-07-24 ENCOUNTER — HOSPITAL ENCOUNTER (EMERGENCY)
Age: 82
Discharge: HOME | End: 2022-07-24
Attending: EMERGENCY MEDICINE | Admitting: EMERGENCY MEDICINE
Payer: MEDICARE

## 2022-07-24 VITALS
BODY MASS INDEX: 25.95 KG/M2 | WEIGHT: 152 LBS | RESPIRATION RATE: 16 BRPM | SYSTOLIC BLOOD PRESSURE: 133 MMHG | DIASTOLIC BLOOD PRESSURE: 77 MMHG | HEART RATE: 87 BPM | HEIGHT: 64 IN | TEMPERATURE: 97.3 F | OXYGEN SATURATION: 97 %

## 2022-07-24 DIAGNOSIS — M76.62 ACHILLES TENDINITIS OF LEFT LOWER EXTREMITY: ICD-10-CM

## 2022-07-24 DIAGNOSIS — R11.2 NON-INTRACTABLE VOMITING WITH NAUSEA, UNSPECIFIED VOMITING TYPE: ICD-10-CM

## 2022-07-24 DIAGNOSIS — M79.89 LEG SWELLING: Primary | ICD-10-CM

## 2022-07-24 LAB
ALBUMIN SERPL-MCNC: 3.7 G/DL (ref 3.2–4.6)
ALBUMIN/GLOB SERPL: 1.4 {RATIO} (ref 1.2–3.5)
ALP SERPL-CCNC: 68 U/L (ref 50–136)
ALT SERPL-CCNC: 21 U/L (ref 12–65)
ANION GAP SERPL CALC-SCNC: 5 MMOL/L (ref 7–16)
AST SERPL-CCNC: 18 U/L (ref 15–37)
BASOPHILS # BLD: 0.1 K/UL (ref 0–0.2)
BASOPHILS NFR BLD: 0 % (ref 0–2)
BILIRUB SERPL-MCNC: 0.5 MG/DL (ref 0.2–1.1)
BUN SERPL-MCNC: 33 MG/DL (ref 8–23)
CALCIUM SERPL-MCNC: 9.6 MG/DL (ref 8.3–10.4)
CHLORIDE SERPL-SCNC: 108 MMOL/L (ref 98–107)
CO2 SERPL-SCNC: 29 MMOL/L (ref 21–32)
CREAT SERPL-MCNC: 1.3 MG/DL (ref 0.6–1)
DIFFERENTIAL METHOD BLD: ABNORMAL
EKG ATRIAL RATE: 71 BPM
EKG DIAGNOSIS: NORMAL
EKG P AXIS: 12 DEGREES
EKG P-R INTERVAL: 175 MS
EKG Q-T INTERVAL: 449 MS
EKG QRS DURATION: 167 MS
EKG QTC CALCULATION (BAZETT): 485 MS
EKG R AXIS: -70 DEGREES
EKG T AXIS: 111 DEGREES
EKG VENTRICULAR RATE: 70 BPM
EOSINOPHIL # BLD: 0 K/UL (ref 0–0.8)
EOSINOPHIL NFR BLD: 0 % (ref 0.5–7.8)
ERYTHROCYTE [DISTWIDTH] IN BLOOD BY AUTOMATED COUNT: 12.7 % (ref 11.9–14.6)
GLOBULIN SER CALC-MCNC: 2.7 G/DL (ref 2.3–3.5)
GLUCOSE SERPL-MCNC: 137 MG/DL (ref 65–100)
HCT VFR BLD AUTO: 39.7 % (ref 35.8–46.3)
HGB BLD-MCNC: 12.7 G/DL (ref 11.7–15.4)
IMM GRANULOCYTES # BLD AUTO: 0.1 K/UL (ref 0–0.5)
IMM GRANULOCYTES NFR BLD AUTO: 1 % (ref 0–5)
LIPASE SERPL-CCNC: 97 U/L (ref 73–393)
LYMPHOCYTES # BLD: 1.2 K/UL (ref 0.5–4.6)
LYMPHOCYTES NFR BLD: 10 % (ref 13–44)
MCH RBC QN AUTO: 28.9 PG (ref 26.1–32.9)
MCHC RBC AUTO-ENTMCNC: 32 G/DL (ref 31.4–35)
MCV RBC AUTO: 90.4 FL (ref 79.6–97.8)
MONOCYTES # BLD: 1 K/UL (ref 0.1–1.3)
MONOCYTES NFR BLD: 8 % (ref 4–12)
NEUTS SEG # BLD: 9.4 K/UL (ref 1.7–8.2)
NEUTS SEG NFR BLD: 81 % (ref 43–78)
NRBC # BLD: 0 K/UL (ref 0–0.2)
PLATELET # BLD AUTO: 200 K/UL (ref 150–450)
PMV BLD AUTO: 11 FL (ref 9.4–12.3)
POTASSIUM SERPL-SCNC: 3.3 MMOL/L (ref 3.5–5.1)
PROT SERPL-MCNC: 6.4 G/DL (ref 6.3–8.2)
RBC # BLD AUTO: 4.39 M/UL (ref 4.05–5.2)
SODIUM SERPL-SCNC: 142 MMOL/L (ref 136–145)
WBC # BLD AUTO: 11.6 K/UL (ref 4.3–11.1)

## 2022-07-24 PROCEDURE — 76881 US COMPL JOINT R-T W/IMG: CPT

## 2022-07-24 PROCEDURE — 80053 COMPREHEN METABOLIC PANEL: CPT

## 2022-07-24 PROCEDURE — 93971 EXTREMITY STUDY: CPT

## 2022-07-24 PROCEDURE — 83690 ASSAY OF LIPASE: CPT

## 2022-07-24 PROCEDURE — 99284 EMERGENCY DEPT VISIT MOD MDM: CPT

## 2022-07-24 PROCEDURE — 85025 COMPLETE CBC W/AUTO DIFF WBC: CPT

## 2022-07-24 ASSESSMENT — PAIN - FUNCTIONAL ASSESSMENT: PAIN_FUNCTIONAL_ASSESSMENT: 0-10

## 2022-07-24 ASSESSMENT — ENCOUNTER SYMPTOMS
DIARRHEA: 0
FACIAL SWELLING: 0
ABDOMINAL PAIN: 1
SHORTNESS OF BREATH: 0
VOMITING: 1
NAUSEA: 1

## 2022-07-24 ASSESSMENT — PAIN DESCRIPTION - ORIENTATION: ORIENTATION: LEFT

## 2022-07-24 ASSESSMENT — PAIN DESCRIPTION - LOCATION: LOCATION: FOOT;LEG

## 2022-07-24 ASSESSMENT — PAIN DESCRIPTION - DESCRIPTORS: DESCRIPTORS: ACHING

## 2022-07-24 ASSESSMENT — PAIN DESCRIPTION - PAIN TYPE: TYPE: ACUTE PAIN

## 2022-07-24 ASSESSMENT — PAIN SCALES - GENERAL: PAINLEVEL_OUTOF10: 1

## 2022-07-24 NOTE — ED TRIAGE NOTES
Patient ambulatory to triage with mask in place. Patient reports recent left ear surgery to remove cancer that she has been taking cipro abx for since July 12, 2022. Pt reports since she has been taking the cipro she has began having left leg calf pain with swelling to left ankle which is causing her difficulty walking. Purple discoloration noted to the inside of left hill/ankle. Reports having nausea and vomiting that began during the night last night. A+O x4. Daughter currently at bedside. Also states she had upper abdominal pain since this morning and reports feeling constipated.

## 2022-07-24 NOTE — ED PROVIDER NOTES
[facility] Emergency Department Provider Note                   PCP:                BERTO Villarreal CNP               Age: 78 y.o. Sex: female       ICD-10-CM    1. Leg swelling  M79.89 Vascular duplex lower extremity venous left     Vascular duplex lower extremity venous left      2. Achilles tendinitis of left lower extremity  M76.62       3. Non-intractable vomiting with nausea, unspecified vomiting type  R11.2           DISPOSITION Decision To Discharge 07/24/2022 02:31:16 PM        MDM  Number of Diagnoses or Management Options  Achilles tendinitis of left lower extremity  Leg swelling  Non-intractable vomiting with nausea, unspecified vomiting type  Diagnosis management comments: I wore appropriate PPE throughout this patient's ED visit. Herb Olivares MD, 1:20 PM      2:34 PM  No DVT. Acute Achilles tendinitis from recent fluoroquinolone use. Discussed risks of rupture and return precautions. Amount and/or Complexity of Data Reviewed  Clinical lab tests: reviewed and ordered  Tests in the radiology section of CPT®: ordered and reviewed  Review and summarize past medical records: yes         Orders Placed This Encounter   Procedures    APPLY [address] LEFT NON VASC COMPLETE    CBC with Auto Differential    Comprehensive Metabolic Panel    Lipase    Apply ace wrap    EKG 12 Lead    Vascular duplex lower extremity venous left        Vicky Bhatt is a 78 y.o. female who presents to the Emergency Department with chief complaint of    Chief Complaint   Patient presents with    Other      78-year-old female with a history of Ménière's disease, sleep apnea, high cholesterol, orthostatic hypotension, cardiomyopathy, diabetes, GERD, hypertension presents with shooting pain radiating from her left calf to her ankle followed by swelling and bruising around her ankle 3 days ago. She has been taking ciprofloxacin since July 12 after having a biopsy of her left ear.   She also reports diffuse abdominal cramping with multiple zones of vomiting last night. Vomiting has resolved, but still has nausea. No documented fevers or diarrhea. No leg injury or similar symptoms in the past.  She has had constipation since starting antibiotic. Review of Systems   Constitutional:  Negative for fever. HENT:  Negative for facial swelling. Eyes:  Negative for visual disturbance. Respiratory:  Negative for shortness of breath. Cardiovascular:  Positive for leg swelling. Negative for chest pain. Gastrointestinal:  Positive for abdominal pain, nausea and vomiting. Negative for diarrhea. Genitourinary:  Negative for dysuria. Musculoskeletal:  Positive for arthralgias, joint swelling and myalgias. Skin:  Positive for wound. Negative for rash. Neurological:  Negative for speech difficulty. Psychiatric/Behavioral:  Negative for confusion. All other systems reviewed and are negative.     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    Cardiomyopathy, ischemic 1/14/2016    Chronic vertigo     DM2 (diabetes mellitus, type 2) ([facility])     does not check glucose    GERD (gastroesophageal reflux disease)     Hearing loss     History of bilateral cataract extraction     History of coronary artery disease 2006, 2007    MI x 2 after stents placed-- with in 1 week-- stents x3    History of gallbladder disease     History of hernia repair     History of seborrheic keratosis     Hyperlipidemia     Hypertension     Lacunar infarction ([address] 8/16/2018    Meniere's disease     Menopause     Morbid obesity ([address]     OA (osteoarthritis)     Orthostatic hypotension 1/14/2016    Osteopenia with high risk of fracture 2014    Osteopenia with significant interval density loss in the hips since 2014    Post-menopausal osteoporosis     Postmenopausal osteoporosis     Unspecified sleep apnea     does not use c pap, pt. states asymptomatic since weight loss    Unsteady gait 8/16/2018    Visit for dental examination     SEVERAL YEARS AGO        Past Surgical History:   Procedure Laterality Date    BREAST SURGERY  mid 2000's    benign right breast biopsy    CATARACT REMOVAL  3/10    bilat and retina repair    CHOLECYSTECTOMY, OPEN  1979    HERNIA REPAIR      multiple surgery site- midline, umbilical    HYSTERECTOMY (CERVIX STATUS UNKNOWN)  1997    MASTOIDECTOMY Left     repair of leaking csf into middle ear    ORTHOPEDIC SURGERY      left  -- hammer toes    OVARY REMOVAL  1982     and cyst    [place] CARDIAC SURG PROCEDURE UNLIST      heart cath x 4, last 2007, stents x 3, last 2006        Family History   Problem Relation Age of Onset    Diabetes Brother     Stroke Paternal Aunt     Breast Cancer Neg Hx     Heart Disease Paternal Uncle     Heart Disease Father     Stroke Paternal Grandmother     Heart Disease Maternal Grandfather     Stroke Maternal Grandfather     Heart Disease Paternal Grandmother     Heart Attack Father 76        mi    Hypertension Mother         Social History     Socioeconomic History    Marital status:     Tobacco Use    Smoking status: Never    Smokeless tobacco: Never   Substance and Sexual Activity    Alcohol use: No    Drug use: No     Types: OTC, Prescription         Other, Brinzolamide, Hydralazine, Adhesive tape, Statins, and Sulfa antibiotics     Previous Medications    ALBUTEROL SULFATE  (90 BASE) MCG/ACT INHALER    Inhale 2 puffs into the lungs every 4 hours as needed    ASPIRIN 81 MG EC TABLET    Take 81 mg by mouth daily    CHOLECALCIFEROL 50 MCG (2000 UT) CAPS    Take 2,000 Units by mouth daily    ERGOCALCIFEROL (ERGOCALCIFEROL) 1.25 MG (62220 UT) CAPSULE    Take 2,000 Units by mouth daily    FAMOTIDINE (PEPCID) 40 MG TABLET    Take 1 tablet by mouth daily For stomach reflux/protection    FLUTICASONE (FLONASE) 50 MCG/ACT NASAL SPRAY    2 sprays by Nasal route daily LEVOTHYROXINE (SYNTHROID) 25 MCG TABLET    Take 1 tablet by mouth every morning (before breakfast) And wait at least 30 minutes before eating for thyroid    METOPROLOL SUCCINATE (TOPROL XL) 100 MG EXTENDED RELEASE TABLET    Take 100 mg by mouth    MONTELUKAST (SINGULAIR) 10 MG TABLET    Take 1 tablet by mouth daily For cough/postnasal drip    MUPIROCIN (BACTROBAN) 2 % OINTMENT        NITROGLYCERIN (NITROSTAT) 0.4 MG SL TABLET    Place 0.4 mg under the tongue    PITAVASTATIN (LIVALO) 4 MG TABS TABLET    Take 1 tablet by mouth daily For cholesterol    SENNA-DOCUSATE (PERICOLACE) 8.6-50 MG PER TABLET    Take 2 tablets by mouth    TRIAMTERENE-HYDROCHLOROTHIAZIDE (DYAZIDE) 37.5-25 MG PER CAPSULE    Take 1 capsule by mouth daily For blood pressure        Vitals signs and nursing note reviewed. Patient Vitals for the past 4 hrs:   Temp Pulse Resp BP SpO2   07/24/22 1253 98.3 °F (36.8 °C) 74 18 (!) 147/59 97 %          Physical Exam  Vitals and nursing note reviewed. Constitutional:       Appearance: Normal appearance. She is well-developed. HENT:      Head: Normocephalic and atraumatic. Ears:        Nose: Nose normal.      Mouth/Throat:      Mouth: Mucous membranes are moist.   Eyes:      Extraocular Movements: Extraocular movements intact. Pupils: Pupils are equal, round, and reactive to light. Cardiovascular:      Rate and Rhythm: Normal rate and regular rhythm. Heart sounds: Normal heart sounds. Pulmonary:      Effort: Pulmonary effort is normal. No respiratory distress. Breath sounds: Normal breath sounds. Abdominal:      General: Abdomen is flat. There is no distension. Tenderness: There is no abdominal tenderness. Musculoskeletal:         General: Normal range of motion. Left lower leg: Swelling and tenderness present. No bony tenderness. Edema present. Left ankle: Swelling and ecchymosis present. Normal range of motion. Left Achilles Tendon: Tenderness present.

## 2022-07-24 NOTE — ED NOTES
I have reviewed discharge instructions with the patient. The patient verbalized understanding. Patient left ED via Discharge Method: ambulatory to Home with self. Opportunity for questions and clarification provided. Patient given 1 scripts. To continue your aftercare when you leave the hospital, you may receive an automated call from our care team to check in on how you are doing. This is a free service and part of our promise to provide the best care and service to meet your aftercare needs.  If you have questions, or wish to unsubscribe from this service please call [phone]. Thank you for Choosing our [address] [place] [address] Emergency Department.         Harman Morales RN  07/24/22 [id number]

## 2022-08-11 ENCOUNTER — CARE COORDINATION (OUTPATIENT)
Dept: CARE COORDINATION | Facility: CLINIC | Age: 82
End: 2022-08-11

## 2022-08-17 ENCOUNTER — HOME HEALTH ADMISSION (OUTPATIENT)
Dept: HOME HEALTH SERVICES | Facility: HOME HEALTH | Age: 82
End: 2022-08-17
Payer: MEDICARE

## 2022-08-17 ENCOUNTER — OFFICE VISIT (OUTPATIENT)
Dept: FAMILY MEDICINE CLINIC | Facility: CLINIC | Age: 82
End: 2022-08-17
Payer: MEDICARE

## 2022-08-17 VITALS
BODY MASS INDEX: 26.09 KG/M2 | OXYGEN SATURATION: 98 % | DIASTOLIC BLOOD PRESSURE: 82 MMHG | HEART RATE: 76 BPM | WEIGHT: 152 LBS | SYSTOLIC BLOOD PRESSURE: 122 MMHG

## 2022-08-17 DIAGNOSIS — M79.662 PAIN IN LEFT LOWER LEG: ICD-10-CM

## 2022-08-17 DIAGNOSIS — Z48.02 ENCOUNTER FOR REMOVAL OF SUTURES: ICD-10-CM

## 2022-08-17 DIAGNOSIS — M77.52 TENDINITIS OF LEFT ANKLE: Primary | ICD-10-CM

## 2022-08-17 DIAGNOSIS — M25.572 CHRONIC PAIN OF LEFT ANKLE: ICD-10-CM

## 2022-08-17 DIAGNOSIS — G89.29 CHRONIC PAIN OF LEFT ANKLE: ICD-10-CM

## 2022-08-17 PROBLEM — G47.30 SLEEP APNEA: Status: ACTIVE | Noted: 2022-08-17

## 2022-08-17 PROBLEM — R47.9 DIFFICULTY WITH SPEECH: Status: ACTIVE | Noted: 2019-06-20

## 2022-08-17 PROBLEM — H65.32 CHRONIC MUCOID OTITIS MEDIA OF LEFT EAR: Status: ACTIVE | Noted: 2019-04-25

## 2022-08-17 PROBLEM — R13.10 DYSPHAGIA: Status: ACTIVE | Noted: 2019-06-20

## 2022-08-17 PROBLEM — H92.12 OTORRHEA OF LEFT EAR: Status: ACTIVE | Noted: 2018-08-20

## 2022-08-17 PROCEDURE — 99214 OFFICE O/P EST MOD 30 MIN: CPT | Performed by: NURSE PRACTITIONER

## 2022-08-17 PROCEDURE — G8399 PT W/DXA RESULTS DOCUMENT: HCPCS | Performed by: NURSE PRACTITIONER

## 2022-08-17 PROCEDURE — G8417 CALC BMI ABV UP PARAM F/U: HCPCS | Performed by: NURSE PRACTITIONER

## 2022-08-17 PROCEDURE — G8427 DOCREV CUR MEDS BY ELIG CLIN: HCPCS | Performed by: NURSE PRACTITIONER

## 2022-08-17 PROCEDURE — 1123F ACP DISCUSS/DSCN MKR DOCD: CPT | Performed by: NURSE PRACTITIONER

## 2022-08-17 PROCEDURE — 1036F TOBACCO NON-USER: CPT | Performed by: NURSE PRACTITIONER

## 2022-08-17 PROCEDURE — 1090F PRES/ABSN URINE INCON ASSESS: CPT | Performed by: NURSE PRACTITIONER

## 2022-08-17 ASSESSMENT — ENCOUNTER SYMPTOMS
TROUBLE SWALLOWING: 0
WHEEZING: 0
RECTAL PAIN: 0
PHOTOPHOBIA: 0
SHORTNESS OF BREATH: 0
SORE THROAT: 0
FACIAL SWELLING: 0
APNEA: 0
GASTROINTESTINAL NEGATIVE: 1
EYE DISCHARGE: 0
EYES NEGATIVE: 1
SINUS PAIN: 0
CHEST TIGHTNESS: 0
RESPIRATORY NEGATIVE: 1
EYE REDNESS: 0
BLOOD IN STOOL: 0
DIARRHEA: 0
ALLERGIC/IMMUNOLOGIC NEGATIVE: 1
COLOR CHANGE: 0
CONSTIPATION: 0
ABDOMINAL PAIN: 0
EYE ITCHING: 0
BACK PAIN: 0
EYE PAIN: 0
NAUSEA: 0
CHOKING: 0
ANAL BLEEDING: 0
ABDOMINAL DISTENTION: 0
COUGH: 0
RHINORRHEA: 0
VOMITING: 0
SINUS PRESSURE: 0
VOICE CHANGE: 0
STRIDOR: 0

## 2022-08-17 NOTE — PROGRESS NOTES
breakfast) And wait at least 30 minutes before eating for thyroid 90 tablet 3    triamterene-hydroCHLOROthiazide (DYAZIDE) 37.5-25 MG per capsule Take 1 capsule by mouth daily For blood pressure 90 capsule 3    famotidine (PEPCID) 40 MG tablet Take 1 tablet by mouth daily For stomach reflux/protection 90 tablet 3    montelukast (SINGULAIR) 10 MG tablet Take 1 tablet by mouth daily For cough/postnasal drip 90 tablet 3    pitavastatin (LIVALO) 4 MG TABS tablet Take 1 tablet by mouth daily For cholesterol 90 tablet 3    albuterol sulfate  (90 Base) MCG/ACT inhaler Inhale 2 puffs into the lungs every 4 hours as needed      aspirin 81 MG EC tablet Take 81 mg by mouth daily      Cholecalciferol 50 MCG (2000 UT) CAPS Take 2,000 Units by mouth daily      fluticasone (FLONASE) 50 MCG/ACT nasal spray 2 sprays by Nasal route daily      metoprolol succinate (TOPROL XL) 100 MG extended release tablet Take 100 mg by mouth      nitroGLYCERIN (NITROSTAT) 0.4 MG SL tablet Place 0.4 mg under the tongue      senna-docusate (PERICOLACE) 8.6-50 MG per tablet Take 2 tablets by mouth       No current facility-administered medications for this visit. Allergies   Allergen Reactions    Other Hives, Itching and Rash     Band aids cause contact rash after several hours. Brinzolamide Nausea Only    Ciprofloxacin      tendinitis    Hydralazine Other (See Comments)     Other reaction(s): Drowsiness/Fatigue    Adhesive Tape Rash     Band aids cause contact rash after several hours. Band aids cause contact rash after several hours. Statins Other (See Comments) and Rash     Other reaction(s):  Other (See Comments), Other- (not listed) - Allergy  Aching muscles  Aching muscles  Aching muscles  Aching muscles      Sulfa Antibiotics Nausea Only, Other (See Comments) and Nausea And Vomiting     High fever, \"exterme nausea\"  Other reaction(s): Fever  High fever, \"exterme nausea\"       Patient Active Problem List   Diagnosis Axonal polyneuropathy    Meniere's disease    Type 2 diabetes mellitus with diabetic polyneuropathy, without long-term current use of insulin ([facility])    Orthostatic hypotension    Essential hypertension    Palpitations    CSF leak    Mixed conductive and sensorineural hearing loss of left ear with restricted hearing of right ear    CAD (coronary artery disease)    Lacunar infarction ([address]    Unsteady gait    History of AAA (abdominal aortic aneurysm) repair    Hypercholesterolemia    GERD (gastroesophageal reflux disease)    AAA (abdominal aortic aneurysm) ([facility])    Osteoporosis    Type 2 diabetes with nephropathy ([facility])    Vertigo    Cardiomyopathy, ischemic    Hearing loss    Chronic renal disease, stage III ([facility]) [[id number]]    Chronic mucoid otitis media of left ear    Difficulty with speech    Dysphagia    Otorrhea of left ear    Sleep apnea     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    Cardiomyopathy, ischemic 1/14/2016    Chronic vertigo     DM2 (diabetes mellitus, type 2) ([facility])     does not check glucose    GERD (gastroesophageal reflux disease)     Hearing loss     History of bilateral cataract extraction     History of coronary artery disease 2006, 2007    MI x 2 after stents placed-- with in 1 week-- stents x3    History of gallbladder disease     History of hernia repair     History of seborrheic keratosis     Hyperlipidemia     Hypertension     Lacunar infarction ([address] 8/16/2018    Meniere's disease     Menopause     Morbid obesity ([address]     OA (osteoarthritis)     Orthostatic hypotension 1/14/2016    Osteopenia with high risk of fracture 2014    Osteopenia with significant interval density loss in the hips since 2014    Post-menopausal osteoporosis     Postmenopausal osteoporosis     Unspecified sleep apnea     does not use c pap, pt. states asymptomatic since weight loss    Unsteady gait 8/16/2018    Visit for dental examination     SEVERAL YEARS AGO     Past Surgical History:   Procedure Laterality Date    BREAST SURGERY  mid 2000's    benign right breast biopsy    CATARACT REMOVAL  3/10    bilat and retina repair    CHOLECYSTECTOMY, OPEN  1979    HERNIA REPAIR      multiple surgery site- midline, umbilical    HYSTERECTOMY (CERVIX STATUS UNKNOWN)  1997    MASTOIDECTOMY Left     repair of leaking csf into middle ear    ORTHOPEDIC SURGERY      left  -- hammer toes    OVARY REMOVAL  1982     and cyst    CT CARDIAC SURG PROCEDURE UNLIST      heart cath x 4, last 2007, stents x 3, last 2006     Family History   Problem Relation Age of Onset    Diabetes Brother     Stroke Paternal Aunt     Breast Cancer Neg Hx     Heart Disease Paternal Uncle     Heart Disease Father     Stroke Paternal Grandmother     Heart Disease Maternal Grandfather     Stroke Maternal Grandfather     Heart Disease Paternal Grandmother     Heart Attack Father 76        mi    Hypertension Mother      Social History     Tobacco Use    Smoking status: Never    Smokeless tobacco: Never   Substance Use Topics    Alcohol use: No         REVIEW OF SYSTEM    Review of Systems   Constitutional: Negative. Negative for activity change, appetite change, chills, diaphoresis, fatigue, fever and unexpected weight change. HENT: Negative. Negative for congestion, dental problem, drooling, ear discharge, ear pain, facial swelling, hearing loss, mouth sores, nosebleeds, postnasal drip, rhinorrhea, sinus pressure, sinus pain, sneezing, sore throat, tinnitus, trouble swallowing and voice change. Eyes: Negative. Negative for photophobia, pain, discharge, redness, itching and visual disturbance. Respiratory: Negative. Negative for apnea, cough, choking, chest tightness, shortness of breath, wheezing and stridor. Cardiovascular: Negative. Negative for chest pain, palpitations and leg swelling. Gastrointestinal: Negative.   Negative for abdominal distention, abdominal pain, anal bleeding, blood in stool, constipation, diarrhea, nausea, rectal pain and vomiting. Endocrine: Negative. Negative for cold intolerance, heat intolerance, polydipsia, polyphagia and polyuria. Genitourinary: Negative. Negative for decreased urine volume, difficulty urinating, dysuria, enuresis, flank pain, frequency, genital sores, hematuria and urgency. Musculoskeletal:  Positive for arthralgias (Left ankle and foot), gait problem (Left leg swelling and discomfort) and myalgias (Left calf tightness and pain). Negative for back pain, joint swelling, neck pain and neck stiffness. Skin: Negative. Negative for color change, pallor, rash and wound. Allergic/Immunologic: Negative. Negative for environmental allergies, food allergies and immunocompromised state. Neurological:  Negative for dizziness, tremors, seizures, syncope, facial asymmetry, speech difficulty, weakness, light-headedness, numbness and headaches. Hematological: Negative. Negative for adenopathy. Does not bruise/bleed easily. Psychiatric/Behavioral: Negative. Negative for agitation, behavioral problems, confusion, decreased concentration, dysphoric mood, hallucinations, self-injury, sleep disturbance and suicidal ideas. The patient is not nervous/anxious and is not hyperactive. OBJECTIVE:  /82 (Site: Left Upper Arm, Position: Sitting)   Pulse 76   Wt 152 lb (68.9 kg)   SpO2 98%   BMI 26.09 kg/m²      Physical Exam  Constitutional:       General: She is not in acute distress. Appearance: Normal appearance. She is not ill-appearing, toxic-appearing or diaphoretic. HENT:      Head: Normocephalic and atraumatic. Right Ear: External ear normal.      Ears:      Comments: Left external auricle with good granulous tissue with 1 small suture noted. There is no open wound or lesion, no active drainage or discharge. Mild tenderness to palpation. No warmth.   Cardiovascular:      Rate and Rhythm: Normal rate and regular rhythm. Pulses: Normal pulses. Heart sounds: Normal heart sounds. Pulmonary:      Effort: Pulmonary effort is normal. No respiratory distress. Breath sounds: Normal breath sounds. No stridor. No wheezing, rhonchi or rales. Chest:      Chest wall: No tenderness. Abdominal:      Palpations: There is no shifting dullness, fluid wave, hepatomegaly, splenomegaly or pulsatile mass. Tenderness: Negative signs include Corbin's sign, Rovsing's sign, McBurney's sign, psoas sign and obturator sign. Musculoskeletal:      Left lower leg: Edema (Trace edema at Achilles region and medial malleolus region.) present. Legs:       Comments: Patient with flexion of left foot at approximately 15 degrees. Extension of left foot at approximately 60 degrees. Tenderness with movement. Lateral movement of left ankle limited   Skin:     General: Skin is warm and dry. Capillary Refill: Capillary refill takes less than 2 seconds. Neurological:      General: No focal deficit present. Mental Status: She is alert and oriented to person, place, and time. Psychiatric:         Mood and Affect: Mood normal.         Behavior: Behavior normal.         Thought Content: Thought content normal.         Judgment: Judgment normal.         Medical problems and test results were reviewed with the patient today. ASSESSMENT and PLAN    1. Tendinitis of left ankle  -     [facility]    Swelling and tenderness significantly improved per patient since incidence occurrence on 7/24. Patient has limited range of motion and due to recent incident in addition to her age, increased risk of injuries and accidents, home health physical therapy ordered. 2. Chronic pain of left ankle  -     [address]    As above. 3. Pain in left lower leg  -     [address]    As above.     4. 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  Suture removed without incident or difficulty. Patient tolerated procedure well. No bleeding, no drainage or discharge. Patient was advised to monitor area for any signs or symptoms of infection and to let us know immediately of any concerns. Orders Placed This Encounter   Procedures    [facility]     Referral Priority:   Routine     Referral Type:   [address] [facility]     Referral Reason:   Specialty Services Required     Requested Specialty:   [address]     Number of Visits Requested:   1         Elements of this note have been dictated using speech recognition software. As a result, errors of speech recognition may have occurred. On this date 8/17/2022 I have spent 30 minutes reviewing previous notes, test results and face to face with the patient discussing the diagnosis and importance of compliance with the treatment plan as well as documenting on the day of the visit. Greater than 50% of this visit was spent counseling the patient about test results, prognosis, importance of compliance, education about disease process, benefits of medications, instructions for management of acute symptoms, and follow up plans. No follow-ups on file.      Angeles Faulkner, APRN - CNP

## 2022-08-19 ENCOUNTER — HOME CARE VISIT (OUTPATIENT)
Dept: SCHEDULING | Facility: HOME HEALTH | Age: 82
End: 2022-08-19
Payer: MEDICARE

## 2022-08-19 VITALS
SYSTOLIC BLOOD PRESSURE: 120 MMHG | RESPIRATION RATE: 16 BRPM | HEART RATE: 72 BPM | DIASTOLIC BLOOD PRESSURE: 78 MMHG | OXYGEN SATURATION: 98 % | TEMPERATURE: 98.2 F

## 2022-08-19 PROCEDURE — 400013 HH SOC

## 2022-08-19 PROCEDURE — 1090000002 HH PPS REVENUE DEBIT

## 2022-08-19 PROCEDURE — 1090000001 HH PPS REVENUE CREDIT

## 2022-08-19 PROCEDURE — G0151 HHCP-SERV OF PT,EA 15 MIN: HCPCS

## 2022-08-19 PROCEDURE — 0221000100 HH NO PAY CLAIM PROCEDURE

## 2022-08-19 ASSESSMENT — ENCOUNTER SYMPTOMS
HEMOPTYSIS: 0
DYSPNEA ACTIVITY LEVEL: AFTER AMBULATING MORE THAN 20 FT

## 2022-08-20 PROCEDURE — 1090000001 HH PPS REVENUE CREDIT

## 2022-08-20 PROCEDURE — 1090000002 HH PPS REVENUE DEBIT

## 2022-08-21 PROCEDURE — 1090000002 HH PPS REVENUE DEBIT

## 2022-08-21 PROCEDURE — 1090000001 HH PPS REVENUE CREDIT

## 2022-08-22 ENCOUNTER — APPOINTMENT (OUTPATIENT)
Dept: GENERAL RADIOLOGY | Age: 82
End: 2022-08-22
Payer: MEDICARE

## 2022-08-22 ENCOUNTER — HOSPITAL ENCOUNTER (EMERGENCY)
Age: 82
Discharge: HOME | End: 2022-08-22
Attending: EMERGENCY MEDICINE | Admitting: EMERGENCY MEDICINE
Payer: MEDICARE

## 2022-08-22 ENCOUNTER — APPOINTMENT (OUTPATIENT)
Dept: ULTRASOUND IMAGING | Age: 82
End: 2022-08-22
Payer: MEDICARE

## 2022-08-22 VITALS
RESPIRATION RATE: 16 BRPM | SYSTOLIC BLOOD PRESSURE: 136 MMHG | TEMPERATURE: 98.4 F | HEIGHT: 64 IN | BODY MASS INDEX: 25.95 KG/M2 | HEART RATE: 84 BPM | OXYGEN SATURATION: 98 % | WEIGHT: 152 LBS | DIASTOLIC BLOOD PRESSURE: 94 MMHG

## 2022-08-22 DIAGNOSIS — M25.572 CHRONIC PAIN OF LEFT ANKLE: ICD-10-CM

## 2022-08-22 DIAGNOSIS — G89.29 CHRONIC PAIN OF LEFT ANKLE: ICD-10-CM

## 2022-08-22 DIAGNOSIS — R26.81 UNSTEADY GAIT: Primary | ICD-10-CM

## 2022-08-22 DIAGNOSIS — M79.662 PAIN OF LEFT CALF: ICD-10-CM

## 2022-08-22 DIAGNOSIS — M79.662 PAIN IN LEFT LOWER LEG: ICD-10-CM

## 2022-08-22 DIAGNOSIS — M77.52 TENDINITIS OF LEFT ANKLE: Primary | ICD-10-CM

## 2022-08-22 PROCEDURE — 1090000002 HH PPS REVENUE DEBIT

## 2022-08-22 PROCEDURE — 73590 X-RAY EXAM OF LOWER LEG: CPT

## 2022-08-22 PROCEDURE — 1090000001 HH PPS REVENUE CREDIT

## 2022-08-22 PROCEDURE — 99283 EMERGENCY DEPT VISIT LOW MDM: CPT | Performed by: EMERGENCY MEDICINE

## 2022-08-22 PROCEDURE — 93971 EXTREMITY STUDY: CPT

## 2022-08-22 ASSESSMENT — PAIN - FUNCTIONAL ASSESSMENT: PAIN_FUNCTIONAL_ASSESSMENT: NONE - DENIES PAIN

## 2022-08-22 ASSESSMENT — ENCOUNTER SYMPTOMS: COUGH: 0

## 2022-08-22 NOTE — ED TRIAGE NOTES
Patient to triage with mask in place. Patient reports left ankle/calf pain. Pt reports she was walking yesterday at [employer] and felt a pop.

## 2022-08-22 NOTE — ED PROVIDER NOTES
[facility] Provider Note                   PCP:                Aroldo Velázquez, APRN - CNP               Age: 78 y.o. Sex: female       ICD-10-CM    1. Unsteady gait  R26.81 Vascular duplex lower extremity venous left     Vascular duplex lower extremity venous left      2. Pain of left calf  M79.662           DISPOSITION Decision To Discharge 08/22/2022 04:26:19 PM        MDM  Number of Diagnoses or Management Options  Pain of left calf  Unsteady gait  Diagnosis management comments: X-ray and ultrasound both unremarkable. No clots. No fractures. There is some subcutaneous edema raising suspicion for muscular tear, patient has been on fluoroquinolones recently. Concern for muscle or tendon rupture. She already has an appointment scheduled on Friday with orthopedics. Advised that she continue treatments at home and keep this appointment. Amount and/or Complexity of Data Reviewed  Tests in the radiology section of CPT®: ordered and reviewed         Orders Placed This Encounter   Procedures    XR TIBIA FIBULA LEFT (2 VIEWS)    Vascular duplex lower extremity venous left        Baljinder Hyman is a 78 y.o. female who presents to the Emergency Department with chief complaint of    Chief Complaint   Patient presents with    Leg Pain      70-year-old female presents to this department with chief complaint of left ankle and calf pain. She reports this pain started abruptly when she was walking up the wheelchair ramp to [employer] when she felt a pop in her left calf. Associate with acute onset of pain. There is some residual pain at this time. She is recently on ciprofloxacin for an unknown ear infection. She has not yet seen orthopedics. The history is provided by the patient. No  was used. Review of Systems   Constitutional:  Negative for chills. Respiratory:  Negative for cough. Cardiovascular:  Negative for chest pain.    Musculoskeletal:  Positive for myalgias. All other systems reviewed and are negative.     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    Cardiomyopathy, ischemic 1/14/2016    Chronic vertigo     DM2 (diabetes mellitus, type 2) ([facility]     does not check glucose    GERD (gastroesophageal reflux disease)     Hearing loss     History of bilateral cataract extraction     History of coronary artery disease 2006, 2007    MI x 2 after stents placed-- with in 1 week-- stents x3    History of gallbladder disease     History of hernia repair     History of seborrheic keratosis     Hyperlipidemia     Hypertension     Lacunar infarction ([address] 8/16/2018    Meniere's disease     Menopause     Morbid obesity ([address]     OA (osteoarthritis)     Orthostatic hypotension 1/14/2016    Osteopenia with high risk of fracture 2014    Osteopenia with significant interval density loss in the hips since 2014    Post-menopausal osteoporosis     Postmenopausal osteoporosis     Unspecified sleep apnea     does not use c pap, pt. states asymptomatic since weight loss    Unsteady gait 8/16/2018    Visit for dental examination     SEVERAL YEARS AGO        Past Surgical History:   Procedure Laterality Date    BREAST SURGERY  mid 2000's    benign right breast biopsy    CATARACT REMOVAL  3/10    bilat and retina repair    CHOLECYSTECTOMY, OPEN  1979    HERNIA REPAIR      multiple surgery site- midline, umbilical    HYSTERECTOMY (CERVIX STATUS UNKNOWN)  1997    MASTOIDECTOMY Left     repair of leaking csf into middle ear    ORTHOPEDIC SURGERY      left  -- hammer toes    OVARY REMOVAL  1982     and cyst    HI CARDIAC SURG PROCEDURE UNLIST      heart cath x 4, last 2007, stents x 3, last 2006        Family History   Problem Relation Age of Onset    Diabetes Brother     Stroke Paternal Aunt     Breast Cancer Neg Hx     Heart Disease Paternal Uncle     Heart Disease Father     Stroke Paternal Grandmother     Heart Disease Maternal Grandfather     Stroke Maternal Grandfather     Heart Disease Paternal Grandmother     Heart Attack Father 76        mi    Hypertension Mother         Social History     Socioeconomic History    Marital status:    Tobacco Use    Smoking status: Never    Smokeless tobacco: Never   Substance and Sexual Activity    Alcohol use: No    Drug use: No     Types: OTC, Prescription         Other, Brinzolamide, Ciprofloxacin, Hydralazine, Adhesive tape, Statins, and Sulfa antibiotics     Previous Medications    ALBUTEROL SULFATE  (90 BASE) MCG/ACT INHALER    Inhale 2 puffs into the lungs every 4 hours as needed for Shortness of Breath    ASPIRIN 81 MG EC TABLET    Take 81 mg by mouth daily    CHOLECALCIFEROL 50 MCG (2000 UT) CAPS    Take 2,000 Units by mouth daily    ERGOCALCIFEROL (ERGOCALCIFEROL) 1.25 MG (49805 UT) CAPSULE    Take 2,000 Units by mouth daily    FAMOTIDINE (PEPCID) 40 MG TABLET    Take 1 tablet by mouth daily For stomach reflux/protection    FLUTICASONE (FLONASE) 50 MCG/ACT NASAL SPRAY    2 sprays by Nasal route daily    HANDICAP PLACARD MISC    Disability placard please.  Thank you    LEVOTHYROXINE (SYNTHROID) 25 MCG TABLET    Take 1 tablet by mouth every morning (before breakfast) And wait at least 30 minutes before eating for thyroid    METOPROLOL SUCCINATE (TOPROL XL) 100 MG EXTENDED RELEASE TABLET    Take 100 mg by mouth daily    MONTELUKAST (SINGULAIR) 10 MG TABLET    Take 1 tablet by mouth daily For cough/postnasal drip    MUPIROCIN (BACTROBAN) 2 % OINTMENT        NITROGLYCERIN (NITROSTAT) 0.4 MG SL TABLET    Place 0.4 mg under the tongue every 5 minutes as needed for Chest pain MAX OF 3 DOSES    PITAVASTATIN (LIVALO) 4 MG TABS TABLET    Take 1 tablet by mouth daily For cholesterol    SENNA-DOCUSATE (PERICOLACE) 8.6-50 MG PER TABLET    Take 2 tablets by mouth daily    TRIAMTERENE-HYDROCHLOROTHIAZIDE (DYAZIDE) 37.5-25 MG PER CAPSULE    Take 1 capsule by mouth daily For blood pressure        Vitals signs and nursing note reviewed. Patient Vitals for the past 4 hrs:   Pulse Resp BP SpO2   08/22/22 1323 78 18 (!) 141/87 96 %          Physical Exam  Vitals and nursing note reviewed. Constitutional:       General: She is not in acute distress. Appearance: Normal appearance. She is normal weight. She is not ill-appearing, toxic-appearing or diaphoretic. HENT:      Head: Normocephalic and atraumatic. Nose: Nose normal.      Mouth/Throat:      Mouth: Mucous membranes are moist.   Eyes:      Pupils: Pupils are equal, round, and reactive to light. Abdominal:      General: Abdomen is flat. Palpations: Abdomen is soft. Musculoskeletal:      Comments: Mild swelling to left calf. No circumferential swelling of entire leg, no associated erythema. Neurological:      Mental Status: She is alert. Procedures      Labs Reviewed - No data to display     Vascular duplex lower extremity venous left   Final Result   No evidence of deep venous thrombosis in the left lower extremity         XR TIBIA FIBULA LEFT (2 VIEWS)   Final Result   No acute osseous abnormality. Mild diffuse subcutaneous edema. Voice dictation software was used during the making of this note. This software is not perfect and grammatical and other typographical errors may be present. This note has not been completely proofread for errors.      Prince Howardma  08/22/22 [id number]

## 2022-08-22 NOTE — PROGRESS NOTES
Concerned for possible tendon rupture. Please have pt not ambulate. Referral urgently placed to orthopedic. Please call for an appt and notify pt. Thank you!

## 2022-08-22 NOTE — DISCHARGE INSTRUCTIONS
Everything looks good today. There is no evidence of blood clots in your leg. This is likely muscular in nature and it is important that you follow-up with the orthopedic that is scheduled on Friday. In the meantime continue Tylenol, ice on the affected area. Return here for worsening or worrisome symptoms.

## 2022-08-22 NOTE — ED NOTES
I have reviewed discharge instructions with the patient. The patient verbalized understanding. Patient left ED via Discharge Method: ambulatory to Home with self. Opportunity for questions and clarification provided. Patient given 0 scripts. To continue your aftercare when you leave the hospital, you may receive an automated call from our care team to check in on how you are doing. This is a free service and part of our promise to provide the best care and service to meet your aftercare needs.  If you have questions, or wish to unsubscribe from this service please call [phone]. Thank you for Choosing our [facility] Emergency Department.         Gela Rollins RN  08/22/22 2308

## 2022-08-23 ENCOUNTER — TELEPHONE (OUTPATIENT)
Dept: ORTHOPEDIC SURGERY | Age: 82
End: 2022-08-23

## 2022-08-23 ENCOUNTER — TELEPHONE (OUTPATIENT)
Dept: FAMILY MEDICINE CLINIC | Facility: CLINIC | Age: 82
End: 2022-08-23

## 2022-08-23 ENCOUNTER — HOME CARE VISIT (OUTPATIENT)
Dept: SCHEDULING | Facility: HOME HEALTH | Age: 82
End: 2022-08-23
Payer: MEDICARE

## 2022-08-23 PROCEDURE — 1090000002 HH PPS REVENUE DEBIT

## 2022-08-23 PROCEDURE — 1090000001 HH PPS REVENUE CREDIT

## 2022-08-23 NOTE — TELEPHONE ENCOUNTER
Celso Schulte from [facility] requesting verbal order to pause physical therapy until patient is evaluated by POA.  Verbal order given per Rob Valdez NP

## 2022-08-23 NOTE — TELEPHONE ENCOUNTER
Please  call patients  daughter  Sumi Baldwin  at [phone]    Pt  went  to     / [facility]  called  and  ask  that  she  be seen  asap    I  told  her  I would  ask you to pull the  encounter  and  see if  she  can see brien  or  Maile Look     Thank you

## 2022-08-24 ENCOUNTER — OFFICE VISIT (OUTPATIENT)
Dept: ORTHOPEDIC SURGERY | Age: 82
End: 2022-08-24
Payer: MEDICARE

## 2022-08-24 VITALS — WEIGHT: 151 LBS | HEIGHT: 65 IN | BODY MASS INDEX: 25.16 KG/M2

## 2022-08-24 DIAGNOSIS — S86.012A RUPTURE OF LEFT ACHILLES TENDON, INITIAL ENCOUNTER: Primary | ICD-10-CM

## 2022-08-24 PROCEDURE — 1090000002 HH PPS REVENUE DEBIT

## 2022-08-24 PROCEDURE — 1090000001 HH PPS REVENUE CREDIT

## 2022-08-24 NOTE — PROGRESS NOTES
The patient was prescribed a walker boot for the patient's left foot. The patient wears a size 9 shoe and I fitted them with a M size boot. The patient was fitted and instructed on the use of prescribed walker boot. I explained how to fit themselves and that the plastic flexible piece should always be on the front of the boot and secured by the Velcro straps on top. The air bladder in the boot was adjusted according to proper fit and comfort. The patient walked a short distance and acknowledged satisfaction with current fit. I also explained that they need a heel lift or a higher heeled shoe for the uninvolved LE to help normalize gait and avoid excessive low back stress/strain due to leg length inequality created from walker boot. The patient was also prescribed a heel lift to wear inside the walker boot and an evenUP to wear on the right side. Patient read and signed documenting they understand and agree to [facility]'s current DME return policy.

## 2022-08-24 NOTE — LETTER
DME Patient Authorization Form    Name: Martha Malave  : 1940  MRN: [id number]   Age: 78 y.o. Gender: female  Delivery Address: [facility]     Diagnosis:     ICD-10-CM    1. Rupture of left Achilles tendon, initial encounter  S86.012A Even UP ()     [place] ()     Heel Lift ()           Requested DME:  Even-Ups - ** ($30) X 1 - right  Heel Lift-  ($15.00) X 2 - left  WeeWalker Boot -  ($187.00) X 1 - left        Clinical Notes:     **Indicates non-covered items by insurance. Payment expected on date of service. Electronically signed by  Nilsa Oleary MD _ Date: 2022                             [facility] Tax ID # [id number]        Durable Medical Equipment and/or Orthotics Patient Consent     I understand that my physician has prescribed this medical supply as part of my treatment plan as a matter of Medical Necessity.  I understand that I have a choice in where I receive my prescribed orthopedic supplies and/or services.  I authorize [facility] to furnish this service/product and to provide my insurance carrier with any information requested in order to process for payment.  I instruct my insurance carrier to pay [facility] directly for these services/products.  I understand that my insurance carrier may deny payment for this supply because it is a non-covered item, deemed not medically necessary or considered experimental.   I understand that any cost not covered by my insurance carrier will be solely my financial responsibility.  I have received the Supplier Standards and have reviewed them.    I have received the prescribed item and have been fully instructed on the proper use of the above services/products.    ______ (Patient Initials) I understand that all DME items are non-returnable after being dispensed. Items still in sealed packaging may be returned up to 14 days after purchasing. [address] will replace items that are defective.    ______ (Patient Initials) I understand that [facility] Tim[facility] will not file a claim with my insurance carrier for this service/product and I am waiving my right to file a claim on my own for this service/product with my insurance company as this item is NON-COVERED (Denoted by the **) by my Insurance company/policy. ______ (Patient Initials) I understand that I am responsible to bring my equipment to the hospital for any surgery. ______________________________________________  ________________________  Patient / Calvin Bowers            Thank you for considering [address] Your physician has prescribed specific medical equipment or devices for your home use. The following describes your rights and responsibilities as our customer. Right to Choose Providers: You have a choice regarding which company supplies your home medical equipment and devices, and to consult your physician in this decision. You may choose a medical supply store, a home medical equipment provider, or a specialist such as [facility]. [facility] will coordinate with your physician to provide the medical equipment or devices prescribed for your home use. Right to Service:  You have the right to considerate, respectful and nondiscriminatory care. You have the right to receive accurate and easily understood information about your health care. If you speak a foreign language, or don't understand the discussions, assistance will be provided to allow you to make informed health care decisions. You have the right to know your treatment options and to participate in decisions about your care, including the right to accept or refuse treatment.   You have the right to expect a reasonable response to your requests for treatment or service. You have the right to talk in confidence with health care providers and to have your health care information protected. You have the right to receive an explanation of your bill. You have the right to complain about the service or product you receive. Patient Responsibilities:  Please provide complete and accurate information about your health insurance benefits and make arrangements for the timely payment of your bill. [facility] will, if possible, assume responsibility for billing your insurance (Medicare, Medicaid and commercial) for the prescribed equipment or devices. If your policy does not cover the prescribed product, or only covers a portion of the bill, you are responsible for any remaining balance. Return and Exchange Policy:  [facility] will honor published  Warranties for products. [facility] will accept returns or exchanges within 14 days from the date of receipt, providin) the product must be in new condition; 2) receipt as required; and 3) used disposable and hygiene products may only be returned due to a defective product. Note: Refunds will be issued in a timely manner, please allow 4-6 weeks for processing. Complaint Procedures and DME Consumer Protection Resources:  [facility] values you as a customer, and is committed to resolving patient concerns. This commitment includes understanding and documenting your concerns, conducting a review of internal procedures, and providing you with an explanation and resolution to your concerns. Should you have any questions about our services or billing process, please contact our office at (practice phone number). If we are unable to resolve the concern, you have the right to direct comments to the office of Consumer Protection, in the [address] or the [facility] office, without fear of repercussion.     Josué Portillo STANDARDS    A supplier must be in compliance with all applicable Federal and State licensure and regulatory requirements. A supplier must provide complete and accurate information on the DMEPOS supplier application. Any changes to this information must be reported to the [facility] within 30 days. An authorized individual (one whose signature is binding) must sign the application for billing privileges. A supplier must fill orders from its own inventory, or must contract with other companies for the purchase of items necessary to fill the order. A supplier may not contract with any entity that is currently excluded from the Medicare program, any [facility] program, or from any other Federal procurement or Nonprocurement programs. A supplier must advise beneficiaries that they may rent or purchase inexpensive or routinely purchased durable medical equipment, and of the purchase option for capped rental equipment. A supplier must notify beneficiaries of warranty coverage and honor all warranties under applicable State Law, and repair or replace free of charge Medicare covered items that are under warranty. A supplier must maintain a physical facility on an appropriate site. A supplier must permit CMS, or its agents to conduct on-site inspections to ascertain the supplier's compliance with these standards. The supplier location must be accessible to beneficiaries during reasonable business hours, and must maintain a visible sign and posted hours of operation. A supplier must maintain a primary business telephone listed under the name of the business in a [employer] or a toll free number available through directory assistance. The exclusive use of a beeper, answering machine or cell phone is prohibited.   A supplier must have comprehensive liability insurance in the amount of at least $300,000 that covers both the supplier's place of business and all customers and employees of the supplier. If the supplier manufactures its own items, this insurance must also cover product liability and completed operations. A supplier must agree not to initiate telephone contact with beneficiaries, with a few exceptions allowed. This standard prohibits suppliers from calling beneficiaries in order to solicit new business. A supplier is responsible for delivery and must instruct beneficiaries on use of Medicare covered items, and maintain proof of delivery. A supplier must answer questions, and respond to complaints of the beneficiaries, and maintain documentation of such contacts. A supplier must maintain and replace at no charge or repair directly, or through a service contract with another company, Medicare covered items it has rented to beneficiaries. A supplier must accept returns of substandard (less than full quality for the particular item) or unsuitable items (inappropriate for the beneficiary at the time it was fitted and rented or sold) from beneficiaries. A supplier must disclose these supplier standards to each beneficiary to whom it supplies a Medicare-covered item. A supplier must disclose to the government any person having ownership, financial, or control interest in the supplier. A supplier must not convey or reassign a supplier number; i.e., the supplier may not sell or allow another entity to use its Medicare billing number. A supplier must have a complaint resolution protocol established to address beneficiary complaints that relate to these standards. A record of these complaints must be maintained at the physical facility. Complaint records must include: the name, address, telephone number and health insurance claim number of the beneficiary, a summary of the complaint, and any action taken to resolve it. A supplier must agree to furnish CMS any information required by the Medicare statute and implementing regulations.   A supplier of DMEPOS and other items and services must be accredited by a CMS-approved accreditation organization in order to receive and retain a supplier billing number. The accreditation must indicate the specific products and services, for which the supplier is accredited in order for the supplier to receive payment for those specific products and services. A DMEPOS supplier must notify their accreditation organization when a new DMEPOS location is opened. The accreditation organization may accredit the new supplier location for three months after it is operational without requiring a new site visit. All DMEPOS supplier locations, whether owned or subcontracted, must meet the [employer] and be separately accredited in order to bill Medicare. An accredited supplier may be denied enrollment or their enrollment may be revoked, if CMS determines that they are not in compliance with the DMEPOS quality standards. A DMEPOS supplier must disclose upon enrollment all products and services, including the addition of new product lines for which they are seeking accreditation. If a new product line is added after enrollment, the DMEPOS supplier will be responsible for notifying the accrediting body of the new product so that the DMEPOS supplier can be re-surveyed and accredited for these new products. Must meet the surety bond requirements specified in 42 C. F.R. 424.57(c). Implementation date- May 4, 2009. A supplier must obtain oxygen from a state-licensed oxygen supplier. A supplier must maintain ordering and referring documentation consistent with provisions found in 42 C. F.R. 424.516(f). DMEPOS suppliers are prohibited from sharing a practice location with certain other Medicare providers and suppliers. DMEPOS suppliers must remain open to the public for a minimum of 30 hours per week with certain exceptions.

## 2022-08-24 NOTE — PROGRESS NOTES
Name: Coleman Edward  YOB: 1940  Gender: female  MRN: [id number]    CC: Left calf pain    HPI:   07/12/2022: She reports starting ciprofloxacin. She reports calf pain and stopping the Cipro due to her pain  07/24/2022: [facility] diagnosed leg swelling, Achilles tendinitis of left lower extremity. Ultrasound with no DVT.  08/21/2022: Patient reports an acute pop in her calf with increased calf pain  08/22/2022: [facility] diagnosed pain left calf, unsteady gait with negative x-ray and ultrasound. 08/24/2022: She presents today to assess her calf pain. She reports both pain and swelling have dramatically decreased    ROS/Meds/PSH/PMH/FH/SH: reviewed today    Tobacco:  reports that she has never smoked. She has never used smokeless tobacco.   ESRD stage III; type 2 diabetes with polyneuropathy; Ménière's disease; vertigo; cardiomyopathy    Physical Examination:  Patient appears to be alert and oriented with acceptable appearance. No obvious distress or SOB  CV: appears to have acceptable vascular color and capillary refill  Neuro: appears to have mostly intact light touch sensation   Skin: No significant soft tissue swelling; no pitting; no redness  MS: Standing: Plantigrade: Gait rolling walker  Left = Achilles ~ 8 cm above the insertion with 2.5 cm gap; gap closes with plantar flexion    XR: AP lateral tibia/fibula 08/22/2022 with no acute fracture noted; old changes along the mid fibula  XR Impression:  As above      Reviewed Test/Records/Documents:   07/24/2022: Left lower extremity duplex ultrasound radiologic impression: Negative for DVT  07/24/2022: Left lower extremity radiologic impression: Findings most consistent with Achilles tendinitis.   No complete rupture evident  08/22/2022: Duplex ultrasound left lower extremity radiologic impression: No evidence of deep venous thrombosis in the left lower extremity  08/22/2022: X-rays left tibia and fibula with no acute fracture    Assessment: Left Achilles tendon tear    Plan:   The patient and I discussed the above assessment. We explored treatment options. She has a definite Achilles tendon tear with a gap of at least 2.5 cm  Her gap does close with plantar flexion  At her age and medical debility, hopefully she can be treated nonoperatively  Her daughter reports that she has very poor balance and prefers to treat nonoperative  She understands the importance of keeping her foot in a plantarflexed direction  MRI scan will give more guidance about surgical treatment  Advanced medical imaging: Left calf MRI scan: Assess degree of Achilles gap after Achilles tear    DME: Placed in 3D boot with 2 heel lifts on the left: Even up on the right  We discussed Achilles care and plantarflexed protection  PT: No indication for formal PT today  She has a rolling walker    Medication - OTC meds prn:      Surgical discussion: I did go over the nuances of Achilles tendon reconstructive surgery but I will know more after the MRI scan  Follow up: After MRI scan    History and discussion of management with: Her daughter    This note was created using Dragon voice recognition software which may result in errors of speech and spelling recognition and word/phrase syntax errors.

## 2022-08-25 PROCEDURE — 1090000001 HH PPS REVENUE CREDIT

## 2022-08-25 PROCEDURE — 1090000002 HH PPS REVENUE DEBIT

## 2022-08-26 PROCEDURE — 1090000001 HH PPS REVENUE CREDIT

## 2022-08-26 PROCEDURE — 1090000002 HH PPS REVENUE DEBIT

## 2022-08-27 PROCEDURE — 1090000002 HH PPS REVENUE DEBIT

## 2022-08-27 PROCEDURE — 1090000001 HH PPS REVENUE CREDIT

## 2022-08-28 PROCEDURE — 1090000002 HH PPS REVENUE DEBIT

## 2022-08-28 PROCEDURE — 1090000001 HH PPS REVENUE CREDIT

## 2022-08-29 PROCEDURE — 1090000002 HH PPS REVENUE DEBIT

## 2022-08-29 PROCEDURE — 1090000001 HH PPS REVENUE CREDIT

## 2022-08-30 PROCEDURE — 1090000001 HH PPS REVENUE CREDIT

## 2022-08-30 PROCEDURE — 1090000002 HH PPS REVENUE DEBIT

## 2022-08-31 ENCOUNTER — OFFICE VISIT (OUTPATIENT)
Dept: ORTHOPEDIC SURGERY | Age: 82
End: 2022-08-31
Payer: MEDICARE

## 2022-08-31 VITALS — BODY MASS INDEX: 25.16 KG/M2 | WEIGHT: 151 LBS | HEIGHT: 65 IN

## 2022-08-31 DIAGNOSIS — S86.012D RUPTURE OF LEFT ACHILLES TENDON, SUBSEQUENT ENCOUNTER: Primary | ICD-10-CM

## 2022-08-31 PROCEDURE — 1090F PRES/ABSN URINE INCON ASSESS: CPT | Performed by: ORTHOPAEDIC SURGERY

## 2022-08-31 PROCEDURE — 1123F ACP DISCUSS/DSCN MKR DOCD: CPT | Performed by: ORTHOPAEDIC SURGERY

## 2022-08-31 PROCEDURE — 1090000001 HH PPS REVENUE CREDIT

## 2022-08-31 PROCEDURE — 1090000002 HH PPS REVENUE DEBIT

## 2022-08-31 PROCEDURE — 1036F TOBACCO NON-USER: CPT | Performed by: ORTHOPAEDIC SURGERY

## 2022-08-31 PROCEDURE — G8417 CALC BMI ABV UP PARAM F/U: HCPCS | Performed by: ORTHOPAEDIC SURGERY

## 2022-08-31 PROCEDURE — 99214 OFFICE O/P EST MOD 30 MIN: CPT | Performed by: ORTHOPAEDIC SURGERY

## 2022-08-31 PROCEDURE — A9999 DME SUPPLY OR ACCESSORY, NOS: HCPCS | Performed by: ORTHOPAEDIC SURGERY

## 2022-08-31 PROCEDURE — G8399 PT W/DXA RESULTS DOCUMENT: HCPCS | Performed by: ORTHOPAEDIC SURGERY

## 2022-08-31 PROCEDURE — G8427 DOCREV CUR MEDS BY ELIG CLIN: HCPCS | Performed by: ORTHOPAEDIC SURGERY

## 2022-08-31 NOTE — PROGRESS NOTES
The patient was prescribed and fitted with a heel lift for the left foot, size large. Patient read and signed documenting they understand and agree to [facility]'s current DME return policy.

## 2022-08-31 NOTE — PROGRESS NOTES
Name: Coleman Edward  YOB: 1940  Gender: female  MRN: [id number]    08/24/2022: Initial visit with me for: Left calf pain  08/31/2022: She is here to discuss her MRI scan and options. She is FWB in the boot with heel lift    HPI:   07/12/2022: She reports starting ciprofloxacin. She reports calf pain and stopping the Cipro due to her pain  07/24/2022: [facility] ER diagnosed leg swelling, Achilles tendinitis of left lower extremity. Ultrasound with no DVT.  08/21/2022: Patient reports an acute pop in her calf with increased calf pain  08/22/2022: [facility] ER diagnosed pain left calf, unsteady gait with negative x-ray and ultrasound. 08/24/2022: She presents today to assess her calf pain. She reports both pain and swelling have dramatically decreased    ROS/Meds/PSH/PMH/FH/SH: reviewed today    Tobacco:  reports that she has never smoked. She has never used smokeless tobacco.   ESRD stage III; type 2 diabetes with polyneuropathy; Ménière's disease; vertigo; cardiomyopathy    Physical Examination:  Patient appears to be alert and oriented with acceptable appearance. No obvious distress or SOB  CV: appears to have acceptable vascular color and capillary refill  Neuro: appears to have mostly intact light touch sensation   Skin: No significant soft tissue swelling; no pitting; no redness  MS: Standing: Plantigrade: Gait rolling walker  Left = no Achilles pain; actually much hard to appreciate Achilles gap    XR: AP lateral tibia/fibula 08/22/2022 with no acute fracture noted; old changes along the mid fibula  XR Impression:  As above      Reviewed Test/Records/Documents:   07/24/2022: Left lower extremity duplex ultrasound radiologic impression: Negative for DVT  07/24/2022: Left lower extremity radiologic impression: Findings most consistent with Achilles tendinitis.   No complete rupture evident  08/22/2022: Duplex ultrasound left lower extremity radiologic impression: No evidence of deep venous thrombosis in the left lower extremity  08/22/2022: X-rays left tibia and fibula with no acute fracture    08/26/2022: MRI scan left ankle without contrast: Radiologic impression from Dr. Collins Cousins:  1. Near complete to complete tear of the mid Achilles tendon with central tendinosis gap of approximately 3.5 cm.  2.  The tenderness diameter peripherally is approximately 1.5 cm. Severe background tendinosis  3. Marked thickening of the central band of the plantar fascia suggesting chronic plantar fasciitis  4. Marked subcutaneous tissue edema, likely reactive  4. Midfoot arthrosis with subchondral cystic change most pronounced at the second and third TMT joints    Assessment:    Left Achilles tendon tear    Plan:   The patient and I discussed the above assessment. We explored treatment options. 08/24/2022: Initial presentation to me I diagnosed left Achilles tendon tear with Achilles gap that appeared to close with plantar flexion    We discussed that at her age and medical debility, operative versus nonoperative treatment risks and complications  Her daughter reported that she has very poor balance and preferred to treat nonoperative    08/31/2022: We discussed the MRI scan and the gap noted on the scan. After discussing the MRI scan and surgery versus nonoperative care with risks and complications of Achilles tendon reconstruction with FHL tendon transfer versus nonoperative care accepting the Achilles gap and the Achilles weakness associated with it. After detailed discussion, she and her daughter still prefer nonoperative care.   They understand the weakness associated with Achilles deficiency and potential future AFO brace     Left 3D boot with 2 heel lifts: Even up on the right  We discussed Achilles care and constant plantarflexed protection  PT: No indication for formal PT today but eventual gait station and balance rehab PT  She has a rolling walker    Medication - OTC meds prn:    Follow-up: 4 weeks    History and discussion of management with: Her daughter    This note was created using Dragon voice recognition software which may result in errors of speech and spelling recognition and word/phrase syntax errors.

## 2022-08-31 NOTE — LETTER
DME Patient Authorization Form    Name: Adrienne Leyva  : 1940  MRN: [id number]   Age: 78 y.o. Gender: female  Delivery Address: [facility]     Diagnosis: No diagnosis found. Requested DME:  Heel Lift-  ($15.00) X 1 - left        Clinical Notes:     **Indicates non-covered items by insurance. Payment expected on date of service. Electronically signed by  Provider: Aliza Mack. Berenice Flores MD  _______________________________ Date: 2022                             [facility] Tax ID # [id number]        Durable Medical Equipment and/or Orthotics Patient Consent     I understand that my physician has prescribed this medical supply as part of my treatment plan as a matter of Medical Necessity.  I understand that I have a choice in where I receive my prescribed orthopedic supplies and/or services.  I authorize [facility] to furnish this service/product and to provide my insurance carrier with any information requested in order to process for payment.  I instruct my insurance carrier to pay [facility] directly for these services/products.  I understand that my insurance carrier may deny payment for this supply because it is a non-covered item, deemed not medically necessary or considered experimental.   I understand that any cost not covered by my insurance carrier will be solely my financial responsibility.  I have received the Supplier Standards and have reviewed them.  I have received the prescribed item and have been fully instructed on the proper use of the above services/products.    ______ (Patient Initials) I understand that all DME items are non-returnable after being dispensed. Items still in sealed packaging may be returned up to 14 days after purchasing.  [address] will replace items that are defective.    ______ (Patient Initials) I understand that [facility] will not file a claim with my insurance carrier for this service/product and I am waiving my right to file a claim on my own for this service/product with my insurance company as this item is NON-COVERED (Denoted by the **) by my Insurance company/policy. ______ (Patient Initials) I understand that I am responsible to bring my equipment to the hospital for any surgery. ______________________________________________  ________________________  Patient / Orestes Bojorquez            Thank you for considering [address] Your physician has prescribed specific medical equipment or devices for your home use. The following describes your rights and responsibilities as our customer. Right to Choose Providers: You have a choice regarding which company supplies your home medical equipment and devices, and to consult your physician in this decision. You may choose a medical supply store, a home medical equipment provider, or a specialist such as [facility]. [facility] will coordinate with your physician to provide the medical equipment or devices prescribed for your home use. Right to Service:  You have the right to considerate, respectful and nondiscriminatory care. You have the right to receive accurate and easily understood information about your health care. If you speak a foreign language, or don't understand the discussions, assistance will be provided to allow you to make informed health care decisions. You have the right to know your treatment options and to participate in decisions about your care, including the right to accept or refuse treatment. You have the right to expect a reasonable response to your requests for treatment or service.   You have the right to talk in confidence with health care providers and to have your health care information protected. You have the right to receive an explanation of your bill. You have the right to complain about the service or product you receive. Patient Responsibilities:  Please provide complete and accurate information about your health insurance benefits and make arrangements for the timely payment of your bill. [facility] will, if possible, assume responsibility for billing your insurance (Medicare, Medicaid and commercial) for the prescribed equipment or devices. If your policy does not cover the prescribed product, or only covers a portion of the bill, you are responsible for any remaining balance. Return and Exchange Policy:  [facility] will honor published  Warranties for products. [facility] will accept returns or exchanges within 14 days from the date of receipt, providin) the product must be in new condition; 2) receipt as required; and 3) used disposable and hygiene products may only be returned due to a defective product. Note: Refunds will be issued in a timely manner, please allow 4-6 weeks for processing. Complaint Procedures and DME Consumer Protection Resources:  [facility] values you as a customer, and is committed to resolving patient concerns. This commitment includes understanding and documenting your concerns, conducting a review of internal procedures, and providing you with an explanation and resolution to your concerns. Should you have any questions about our services or billing process, please contact our office at (practice phone number). If we are unable to resolve the concern, you have the right to direct comments to the office of Consumer Protection, in the [address] or the [facility] office, without fear of repercussion. DMEPOS SUPPLIER STANDARDS    A supplier must be in compliance with all applicable Federal and [employer] and regulatory requirements.   A supplier must provide complete and accurate information on the DMEPOS supplier application. Any changes to this information must be reported to the [employer] within 30 days. An authorized individual (one whose signature is binding) must sign the application for billing privileges. A supplier must fill orders from its own inventory, or must contract with other companies for the purchase of items necessary to fill the order. A supplier may not contract with any entity that is currently excluded from the Medicare program, any [facility] program, or from any other Federal procurement or Nonprocurement programs. A supplier must advise beneficiaries that they may rent or purchase inexpensive or routinely purchased durable medical equipment, and of the purchase option for capped rental equipment. A supplier must notify beneficiaries of warranty coverage and honor all warranties under applicable State Law, and repair or replace free of charge Medicare covered items that are under warranty. A supplier must maintain a physical facility on an appropriate site. A supplier must permit CMS, or its agents to conduct on-site inspections to ascertain the supplier's compliance with these standards. The supplier location must be accessible to beneficiaries during reasonable business hours, and must maintain a visible sign and posted hours of operation. A supplier must maintain a primary business telephone listed under the name of the business in a [employer] or a toll free number available through directory assistance. The exclusive use of a beeper, answering machine or cell phone is prohibited. A supplier must have comprehensive liability insurance in the amount of at least $300,000 that covers both the supplier's place of business and all customers and employees of the supplier. If the supplier manufactures its own items, this insurance must also cover product liability and completed operations.   A supplier must agree not to initiate telephone contact with beneficiaries, with a few exceptions allowed. This standard prohibits suppliers from calling beneficiaries in order to solicit new business. A supplier is responsible for delivery and must instruct beneficiaries on use of Medicare covered items, and maintain proof of delivery. A supplier must answer questions, and respond to complaints of the beneficiaries, and maintain documentation of such contacts. A supplier must maintain and replace at no charge or repair directly, or through a service contract with another company, Medicare covered items it has rented to beneficiaries. A supplier must accept returns of substandard (less than full quality for the particular item) or unsuitable items (inappropriate for the beneficiary at the time it was fitted and rented or sold) from beneficiaries. A supplier must disclose these supplier standards to each beneficiary to whom it supplies a Medicare-covered item. A supplier must disclose to the government any person having ownership, financial, or control interest in the supplier. A supplier must not convey or reassign a supplier number; i.e., the supplier may not sell or allow another entity to use its Medicare billing number. A supplier must have a complaint resolution protocol established to address beneficiary complaints that relate to these standards. A record of these complaints must be maintained at the physical facility. Complaint records must include: the name, address, telephone number and health insurance claim number of the beneficiary, a summary of the complaint, and any action taken to resolve it. A supplier must agree to furnish CMS any information required by the Medicare statute and implementing regulations. A supplier of DMEPOS and other items and services must be accredited by a CMS-approved accreditation organization in order to receive and retain a supplier billing number.  The accreditation must indicate the

## 2022-09-01 PROCEDURE — 1090000001 HH PPS REVENUE CREDIT

## 2022-09-01 PROCEDURE — 1090000002 HH PPS REVENUE DEBIT

## 2022-09-02 PROCEDURE — 1090000001 HH PPS REVENUE CREDIT

## 2022-09-02 PROCEDURE — 1090000002 HH PPS REVENUE DEBIT

## 2022-09-03 PROCEDURE — 1090000001 HH PPS REVENUE CREDIT

## 2022-09-03 PROCEDURE — 1090000002 HH PPS REVENUE DEBIT

## 2022-09-04 PROCEDURE — 1090000002 HH PPS REVENUE DEBIT

## 2022-09-04 PROCEDURE — 1090000001 HH PPS REVENUE CREDIT

## 2022-09-05 PROCEDURE — 1090000001 HH PPS REVENUE CREDIT

## 2022-09-05 PROCEDURE — 1090000002 HH PPS REVENUE DEBIT

## 2022-09-06 PROCEDURE — 1090000001 HH PPS REVENUE CREDIT

## 2022-09-06 PROCEDURE — 1090000002 HH PPS REVENUE DEBIT

## 2022-09-07 PROCEDURE — 1090000001 HH PPS REVENUE CREDIT

## 2022-09-07 PROCEDURE — 1090000002 HH PPS REVENUE DEBIT

## 2022-09-08 PROCEDURE — 1090000001 HH PPS REVENUE CREDIT

## 2022-09-08 PROCEDURE — 1090000002 HH PPS REVENUE DEBIT

## 2022-09-09 PROCEDURE — 1090000002 HH PPS REVENUE DEBIT

## 2022-09-09 PROCEDURE — 1090000001 HH PPS REVENUE CREDIT

## 2022-09-10 PROCEDURE — 1090000001 HH PPS REVENUE CREDIT

## 2022-09-10 PROCEDURE — 1090000002 HH PPS REVENUE DEBIT

## 2022-09-11 PROCEDURE — 1090000001 HH PPS REVENUE CREDIT

## 2022-09-11 PROCEDURE — 1090000002 HH PPS REVENUE DEBIT

## 2022-09-12 PROCEDURE — 1090000002 HH PPS REVENUE DEBIT

## 2022-09-12 PROCEDURE — 1090000001 HH PPS REVENUE CREDIT

## 2022-09-13 PROCEDURE — 1090000001 HH PPS REVENUE CREDIT

## 2022-09-13 PROCEDURE — 1090000002 HH PPS REVENUE DEBIT

## 2022-09-14 PROCEDURE — 1090000002 HH PPS REVENUE DEBIT

## 2022-09-14 PROCEDURE — 1090000001 HH PPS REVENUE CREDIT

## 2022-09-15 PROCEDURE — 1090000002 HH PPS REVENUE DEBIT

## 2022-09-15 PROCEDURE — 1090000001 HH PPS REVENUE CREDIT

## 2022-09-16 PROCEDURE — 1090000001 HH PPS REVENUE CREDIT

## 2022-09-16 PROCEDURE — 1090000002 HH PPS REVENUE DEBIT

## 2022-09-17 PROCEDURE — 1090000003 HH PPS REV ADJ

## 2022-09-17 PROCEDURE — 1090000002 HH PPS REVENUE DEBIT

## 2022-09-17 PROCEDURE — 1090000001 HH PPS REVENUE CREDIT

## 2022-09-22 ENCOUNTER — TELEPHONE (OUTPATIENT)
Dept: FAMILY MEDICINE CLINIC | Facility: CLINIC | Age: 82
End: 2022-09-22

## 2022-09-22 NOTE — TELEPHONE ENCOUNTER
Spoke with Venecia Parsons from [facility]. He wanted to know if they needed to send someone out to evaluate the patient. The patient is currently under the care of [facility] for tendonitis of her left ankle and they will decide when and if she needs PT based on her recovery time. Patient is currently in a boot and unable to participate in PT. Venecia Parsons said he would discharge her from their system as they are not actively treating her and if we or [facility] needed them to go out again for [facility] to let them know and they would be more than happy to restart her care.

## 2022-09-28 ENCOUNTER — OFFICE VISIT (OUTPATIENT)
Dept: ORTHOPEDIC SURGERY | Age: 82
End: 2022-09-28
Payer: MEDICARE

## 2022-09-28 DIAGNOSIS — S86.012D RUPTURE OF LEFT ACHILLES TENDON, SUBSEQUENT ENCOUNTER: Primary | ICD-10-CM

## 2022-09-28 PROCEDURE — 1123F ACP DISCUSS/DSCN MKR DOCD: CPT | Performed by: ORTHOPAEDIC SURGERY

## 2022-09-28 PROCEDURE — G8399 PT W/DXA RESULTS DOCUMENT: HCPCS | Performed by: ORTHOPAEDIC SURGERY

## 2022-09-28 PROCEDURE — G8428 CUR MEDS NOT DOCUMENT: HCPCS | Performed by: ORTHOPAEDIC SURGERY

## 2022-09-28 PROCEDURE — 1090F PRES/ABSN URINE INCON ASSESS: CPT | Performed by: ORTHOPAEDIC SURGERY

## 2022-09-28 PROCEDURE — G8417 CALC BMI ABV UP PARAM F/U: HCPCS | Performed by: ORTHOPAEDIC SURGERY

## 2022-09-28 PROCEDURE — 99213 OFFICE O/P EST LOW 20 MIN: CPT | Performed by: ORTHOPAEDIC SURGERY

## 2022-09-28 PROCEDURE — 1036F TOBACCO NON-USER: CPT | Performed by: ORTHOPAEDIC SURGERY

## 2022-09-28 NOTE — PROGRESS NOTES
Name: Mo Puentes  YOB: 1940  Gender: female  MRN: [id number]    08/24/2022: Initial visit with me for: Left calf pain  08/31/2022: Last visit with me  09/28/2022: She presents full weightbearing feeling better    HPI:   07/12/2022: She reports starting ciprofloxacin. She reports calf pain and stopping the Cipro due to her pain  07/24/2022: [facility] ER diagnosed leg swelling, Achilles tendinitis of left lower extremity. Ultrasound with no DVT.  08/21/2022: Patient reports an acute pop in her calf with increased calf pain  08/22/2022: [facility] ER diagnosed pain left calf, unsteady gait with negative x-ray and ultrasound. 08/24/2022: She presented to assess her calf pain. She reports both pain and swelling have dramatically decreased    ROS/Meds/PSH/PMH/FH/SH: reviewed today    Tobacco:  reports that she has never smoked. She has never used smokeless tobacco.   ESRD stage III; type 2 diabetes with polyneuropathy; Ménière's disease; vertigo; cardiomyopathy    Physical Examination:  Patient appears to be alert and oriented with acceptable appearance. No obvious distress or SOB  CV: appears to have acceptable vascular color and capillary refill  Neuro: appears to have mostly intact light touch sensation   Skin: No significant soft tissue swelling; no pitting; no redness  MS: Standing: Plantigrade: Gait rolling walker  Left = no Achilles pain; no gap; 5/5 strength    XR: AP lateral tibia/fibula 08/22/2022 with no acute fracture noted; old changes along the mid fibula  XR Impression:  As above      Reviewed Test/Records/Documents:   07/24/2022: Left lower extremity duplex ultrasound radiologic impression: Negative for DVT  07/24/2022: Left lower extremity radiologic impression: Findings most consistent with Achilles tendinitis.   No complete rupture evident  08/22/2022: Duplex ultrasound left lower extremity radiologic impression: No evidence of deep venous thrombosis in the left lower extremity  08/22/2022: X-rays left tibia and fibula with no acute fracture    08/26/2022: MRI scan left ankle without contrast: Radiologic impression from Dr. Karina Bojorquez:  1. Near complete to complete tear of the mid Achilles tendon with central tendinosis gap of approximately 3.5 cm.  2.  The tenderness diameter peripherally is approximately 1.5 cm. Severe background tendinosis  3. Marked thickening of the central band of the plantar fascia suggesting chronic plantar fasciitis  4. Marked subcutaneous tissue edema, likely reactive  4. Midfoot arthrosis with subchondral cystic change most pronounced at the second and third TMT joints    Assessment:    Left Achilles tendon tear    Plan:   The patient and I discussed the above assessment. We explored treatment options. She is doing remarkably well  She no longer has a gap and has excellent strength of the Achilles  She will begin physical therapy at [facility]  I instructed her and her daughter on heel lift weaning protocol weaning 1 layer every 3 days  She will stay in the 3D boot until cleared by PT to remove the boot  PT: [facility] 3 times a week 4 weeks  Surgery: No indication for surgery  Medication - OTC meds prn:    Follow-up: 4 weeks    History and discussion of management with: Her daughter    This note was created using Dragon voice recognition software which may result in errors of speech and spelling recognition and word/phrase syntax errors.

## 2022-10-03 ENCOUNTER — HOME CARE VISIT (OUTPATIENT)
Dept: HOME HEALTH SERVICES | Facility: HOME HEALTH | Age: 82
End: 2022-10-03

## 2022-10-10 ENCOUNTER — NURSE ONLY (OUTPATIENT)
Dept: FAMILY MEDICINE CLINIC | Facility: CLINIC | Age: 82
End: 2022-10-10
Payer: MEDICARE

## 2022-10-10 DIAGNOSIS — E11.42 TYPE 2 DIABETES MELLITUS WITH DIABETIC POLYNEUROPATHY, WITHOUT LONG-TERM CURRENT USE OF INSULIN (HCC): ICD-10-CM

## 2022-10-10 DIAGNOSIS — I10 ESSENTIAL HYPERTENSION: ICD-10-CM

## 2022-10-10 DIAGNOSIS — E55.9 VITAMIN D DEFICIENCY: ICD-10-CM

## 2022-10-10 LAB
25(OH)D3 SERPL-MCNC: 45.6 NG/ML (ref 30–100)
ALBUMIN SERPL-MCNC: 3.7 G/DL (ref 3.2–4.6)
ALBUMIN/GLOB SERPL: 1.2 {RATIO} (ref 1.2–3.5)
ALP SERPL-CCNC: 90 U/L (ref 50–136)
ALT SERPL-CCNC: 24 U/L (ref 12–65)
ANION GAP SERPL CALC-SCNC: 2 MMOL/L (ref 4–13)
AST SERPL-CCNC: 23 U/L (ref 15–37)
BILIRUB SERPL-MCNC: 0.3 MG/DL (ref 0.2–1.1)
BUN SERPL-MCNC: 35 MG/DL (ref 8–23)
CALCIUM SERPL-MCNC: 9.5 MG/DL (ref 8.3–10.4)
CHLORIDE SERPL-SCNC: 107 MMOL/L (ref 101–110)
CHOLEST SERPL-MCNC: 133 MG/DL
CO2 SERPL-SCNC: 28 MMOL/L (ref 21–32)
CREAT SERPL-MCNC: 1.1 MG/DL (ref 0.6–1)
EST. AVERAGE GLUCOSE BLD GHB EST-MCNC: 131 MG/DL
GLOBULIN SER CALC-MCNC: 3 G/DL (ref 2.3–3.5)
GLUCOSE SERPL-MCNC: 114 MG/DL (ref 65–100)
GRANS ABSOLUTE, POC: 3.6 K/UL
GRANULOCYTES %, POC: 55.8 %
HBA1C MFR BLD: 6.2 % (ref 4.8–5.6)
HDLC SERPL-MCNC: 40 MG/DL (ref 40–60)
HDLC SERPL: 3.3 {RATIO}
HEMATOCRIT, POC: ABNORMAL %
HEMOGLOBIN, POC: ABNORMAL G/DL
LDLC SERPL CALC-MCNC: 57.2 MG/DL
LYMPHOCYTE %, POC: 34.4 %
LYMPHS ABSOLUTE, POC: 2.2 K/UL
MAGNESIUM SERPL-MCNC: 1.8 MG/DL (ref 1.8–2.4)
MCH, POC: ABNORMAL PG (ref 40–?)
MCHC, POC: 34.5
MCV, POC: 94
MONOCYTE %, POC: 9.8 %
MONOCYTE, ABSOLUTE POC: 0.6 K/UL
MPV, POC: 8.9 FL
PLATELET COUNT, POC: 184 K/UL
POTASSIUM SERPL-SCNC: 4.4 MMOL/L (ref 3.5–5.1)
PROT SERPL-MCNC: 6.7 G/DL (ref 6.3–8.2)
RBC, POC: ABNORMAL M/UL
RDW, POC: 13 %
SODIUM SERPL-SCNC: 137 MMOL/L (ref 136–145)
TRIGL SERPL-MCNC: 179 MG/DL (ref 35–150)
TSH, 3RD GENERATION: 3.4 UIU/ML (ref 0.36–3.74)
VLDLC SERPL CALC-MCNC: 35.8 MG/DL (ref 6–23)
WBC, POC: 6.5 K/UL

## 2022-10-10 PROCEDURE — 85025 COMPLETE CBC W/AUTO DIFF WBC: CPT | Performed by: FAMILY MEDICINE

## 2022-10-12 ENCOUNTER — OFFICE VISIT (OUTPATIENT)
Dept: CARDIOLOGY CLINIC | Age: 82
End: 2022-10-12
Payer: MEDICARE

## 2022-10-12 VITALS
HEART RATE: 72 BPM | DIASTOLIC BLOOD PRESSURE: 72 MMHG | SYSTOLIC BLOOD PRESSURE: 140 MMHG | BODY MASS INDEX: 24.37 KG/M2 | HEIGHT: 66 IN

## 2022-10-12 DIAGNOSIS — I71.43 INFRARENAL ABDOMINAL AORTIC ANEURYSM (AAA) WITHOUT RUPTURE: ICD-10-CM

## 2022-10-12 DIAGNOSIS — I25.10 CORONARY ARTERY DISEASE INVOLVING NATIVE HEART WITHOUT ANGINA PECTORIS, UNSPECIFIED VESSEL OR LESION TYPE: Primary | ICD-10-CM

## 2022-10-12 DIAGNOSIS — I10 ESSENTIAL HYPERTENSION: ICD-10-CM

## 2022-10-12 DIAGNOSIS — E11.21 TYPE 2 DIABETES WITH NEPHROPATHY (HCC): ICD-10-CM

## 2022-10-12 DIAGNOSIS — E78.5 DYSLIPIDEMIA: ICD-10-CM

## 2022-10-12 DIAGNOSIS — I95.1 ORTHOSTATIC HYPOTENSION: ICD-10-CM

## 2022-10-12 DIAGNOSIS — I25.5 CARDIOMYOPATHY, ISCHEMIC: ICD-10-CM

## 2022-10-12 PROCEDURE — G8427 DOCREV CUR MEDS BY ELIG CLIN: HCPCS | Performed by: INTERNAL MEDICINE

## 2022-10-12 PROCEDURE — 1090F PRES/ABSN URINE INCON ASSESS: CPT | Performed by: INTERNAL MEDICINE

## 2022-10-12 PROCEDURE — G8484 FLU IMMUNIZE NO ADMIN: HCPCS | Performed by: INTERNAL MEDICINE

## 2022-10-12 PROCEDURE — G8420 CALC BMI NORM PARAMETERS: HCPCS | Performed by: INTERNAL MEDICINE

## 2022-10-12 PROCEDURE — 1123F ACP DISCUSS/DSCN MKR DOCD: CPT | Performed by: INTERNAL MEDICINE

## 2022-10-12 PROCEDURE — 3044F HG A1C LEVEL LT 7.0%: CPT | Performed by: INTERNAL MEDICINE

## 2022-10-12 PROCEDURE — G8399 PT W/DXA RESULTS DOCUMENT: HCPCS | Performed by: INTERNAL MEDICINE

## 2022-10-12 PROCEDURE — 1036F TOBACCO NON-USER: CPT | Performed by: INTERNAL MEDICINE

## 2022-10-12 PROCEDURE — 99214 OFFICE O/P EST MOD 30 MIN: CPT | Performed by: INTERNAL MEDICINE

## 2022-10-12 ASSESSMENT — ENCOUNTER SYMPTOMS: SHORTNESS OF BREATH: 0

## 2022-10-12 NOTE — PROGRESS NOTES
[facility] CARDIOLOGY  [address], [address] [employer] [facility], [address] [place] [address]  PHONE: [phone]      10/12/22    NAME:  Blanchie Phalen  : 1940  MRN: [id number]         SUBJECTIVE:   Blanchie Phalen is a 78 y.o. female seen for a follow up visit regarding the following:     Chief Complaint   Patient presents with    Coronary Artery Disease            HPI:  Follow up  Coronary Artery Disease   . CAD,  chronic LBBB, orthostatic hypotension with resting hypertension, dyslipidemia,  AAA, CSF leak. she suffered a ruptured Achilles tendon on Cipro therapy. She's in a boot now, they are trying to manage it conservatively  She's had no angina, though once about 3 weeks ago and just felt vaguely poor, went home and her BP was low as it has been in the past, this was the first episode in a long while. Still struggling with reduced appetite. She had eaten a bowl of cereal.             Past cardiac history:   Remote coronary stents   Treatment limited by orthostatic hypotension   Medical therapy of an obstructed PDA, 40% LAD by cath . Stress nuclear perfusion scan with anterolateral scar, minimal per-scar ischemia with normal wall motion and EF 68% in 2010. Not significantly changed 12. 2014 - patent stents, EF 45% (50-55 by echo)   2015 - Lexiscan - fixed inferolateral defect, inferior HK, EF 56%   2016- EF 50-55%, mild to mod MR   Oct 2018- EF 49%, no significant valve disease   2018- infrarenal AAA 3.8 cm   Oct 2019- EF 45-50%, no significant valve disease. CT - AAA 3.5    2020- EF 50%, LBBB with septal wma slightly more marked at the apex (no significant change)   Lexiscan - inferolateral infarction, EF 39%   Mar 2021- Carotid doppler - < 50% TREVOR. LICA  EDV 43, ratio 2.2   2021- 3 day monitor - SR with IVCD. 3 brief episodes of SVT with aberrancy, longest 19 seconds. Only one diary entry occurs during NSR.   No afib    Sep 2021- 4.2 cm Carotid < 50% bilaterally    Jan 2022 - COVID infection          Key CAD CHF Meds            triamterene-hydroCHLOROthiazide (DYAZIDE) 37.5-25 MG per capsule (Taking)    Sig - Route: Take 1 capsule by mouth daily For blood pressure - Oral    pitavastatin (LIVALO) 4 MG TABS tablet (Taking)    Sig - Route: Take 1 tablet by mouth daily For cholesterol - Oral    metoprolol succinate (TOPROL XL) 100 MG extended release tablet (Taking)    Class: Historical Med              Past Medical History, Past Surgical History, Family history, Social History, and Medications were all reviewed with the patient today and updated as necessary. Prior to Admission medications    Medication Sig Start Date End Date Taking? Authorizing Provider   Handicap Placard MISC Disability placard please.  Thank you 10/18/21  Yes Historical Provider, MD   ergocalciferol (ERGOCALCIFEROL) 1.25 MG (72651 UT) capsule Take 2,000 Units by mouth daily   Yes Historical Provider, MD   mupirocin (BACTROBAN) 2 % ointment  4/14/22  Yes Historical Provider, MD   levothyroxine (SYNTHROID) 25 MCG tablet Take 1 tablet by mouth every morning (before breakfast) And wait at least 30 minutes before eating for thyroid 7/13/22  Yes BERTO Gutierrez CNP   triamterene-hydroCHLOROthiazide (DYAZIDE) 37.5-25 MG per capsule Take 1 capsule by mouth daily For blood pressure 7/13/22  Yes BERTO Gutierrez CNP   famotidine (PEPCID) 40 MG tablet Take 1 tablet by mouth daily For stomach reflux/protection 7/13/22  Yes BERTO Gutierrez CNP   montelukast (SINGULAIR) 10 MG tablet Take 1 tablet by mouth daily For cough/postnasal drip 7/13/22  Yes BERTO Gutierrez CNP   pitavastatin (LIVALO) 4 MG TABS tablet Take 1 tablet by mouth daily For cholesterol 7/13/22  Yes BERTO Gutierrez CNP   albuterol sulfate  (90 Base) MCG/ACT inhaler Inhale 2 puffs into the lungs every 4 hours as needed for Shortness of Breath 10/29/20  Yes Ar Automatic Reconciliation aspirin 81 MG EC tablet Take 81 mg by mouth daily   Yes Ar Automatic Reconciliation   Cholecalciferol 50 MCG (2000 UT) CAPS Take 2,000 Units by mouth daily   Yes Ar Automatic Reconciliation   fluticasone (FLONASE) 50 MCG/ACT nasal spray 2 sprays by Nasal route daily 3/4/22  Yes Ar Automatic Reconciliation   metoprolol succinate (TOPROL XL) 100 MG extended release tablet Take 100 mg by mouth daily 3/28/22  Yes Ar Automatic Reconciliation   nitroGLYCERIN (NITROSTAT) 0.4 MG SL tablet Place 0.4 mg under the tongue every 5 minutes as needed for Chest pain MAX OF 3 DOSES 10/28/21  Yes Ar Automatic Reconciliation   senna-docusate (PERICOLACE) 8.6-50 MG per tablet Take 2 tablets by mouth daily 8/24/21  Yes Ar Automatic Reconciliation     Allergies   Allergen Reactions    Other Hives, Itching and Rash     Band aids cause contact rash after several hours. Brinzolamide Nausea Only    Ciprofloxacin      tendinitis    Hydralazine Other (See Comments)     Other reaction(s): Drowsiness/Fatigue    Adhesive Tape Rash     Band aids cause contact rash after several hours. Band aids cause contact rash after several hours. 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    Cardiomyopathy, ischemic 1/14/2016    Chronic vertigo     [facility]     does not check glucose    GERD (gastroesophageal reflux disease)     Hearing loss     History of bilateral cataract extraction     History of coronary artery disease 2006, 2007    MI x 2 after stents placed-- with in 1 week-- stents x3    History of gallbladder disease     History of hernia repair     History of seborrheic keratosis     Hyperlipidemia     Hypertension     Lacunar infarction ([facility] 8/16/2018    Meniere's disease     Menopause     Morbid obesity ([facility]     OA (osteoarthritis)     Orthostatic hypotension 1/14/2016    Osteopenia with high risk of fracture 2014    Osteopenia with significant interval density loss in the hips since 2014    Post-menopausal osteoporosis     Postmenopausal osteoporosis     Unspecified sleep apnea     does not use c pap, pt. states asymptomatic since weight loss    Unsteady gait 8/16/2018    Visit for dental examination     SEVERAL YEARS AGO     Past Surgical History:   Procedure Laterality Date    BREAST SURGERY  mid 2000's    benign right breast biopsy    CATARACT REMOVAL  3/10    bilat and retina repair    CHOLECYSTECTOMY, OPEN  1979    HERNIA REPAIR      multiple surgery site- midline, umbilical    HYSTERECTOMY (CERVIX STATUS UNKNOWN)  1997    MASTOIDECTOMY Left     repair of leaking csf into middle ear    ORTHOPEDIC SURGERY      left  -- hammer toes    OVARY REMOVAL  1982     and cyst    [facility] CARDIAC SURG PROCEDURE UNLIST      heart cath x 4, last 2007, stents x 3, last 2006     Family History   Problem Relation Age of Onset    Diabetes Brother     Stroke Paternal Aunt     Breast Cancer Neg Hx     Heart Disease Paternal Uncle     Heart Disease Father     Stroke Paternal Grandmother     Heart Disease Maternal Grandfather     Stroke Maternal Grandfather     Heart Disease Paternal Grandmother     Heart Attack Father 76        mi    Hypertension Mother      Social History     Tobacco Use    Smoking status: Never    Smokeless tobacco: Never   Substance Use Topics    Alcohol use: No       ROS:    Review of Systems   Constitutional: Positive for decreased appetite. Cardiovascular:  Negative for chest pain. Respiratory:  Negative for shortness of breath.          PHYSICAL EXAM:   BP (!) 140/72 Pulse 72   Ht 5' 6\" (1.676 m)   BMI 24.37 kg/m²      Wt Readings from Last 3 Encounters:   08/31/22 151 lb (68.5 kg)   08/24/22 151 lb (68.5 kg)   08/22/22 152 lb (68.9 kg)     BP Readings from Last 3 Encounters:   10/12/22 (!) 140/72   08/22/22 (!) 136/94   08/19/22 120/78     Pulse Readings from Last 3 Encounters:   10/12/22 72   08/22/22 84   08/19/22 72           Physical Exam  Constitutional:       Appearance: Normal appearance. HENT:      Head: Normocephalic and atraumatic. Eyes:      Conjunctiva/sclera: Conjunctivae normal.   Neck:      Vascular: No carotid bruit. Cardiovascular:      Rate and Rhythm: Normal rate and regular rhythm. Heart sounds: No murmur heard. No friction rub. No gallop. Pulmonary:      Effort: No respiratory distress. Breath sounds: No wheezing or rales. Musculoskeletal:         General: No swelling. Cervical back: Neck supple. Skin:     General: Skin is warm and dry. Neurological:      General: No focal deficit present. Mental Status: She is alert. Psychiatric:         Mood and Affect: Mood normal.         Behavior: Behavior normal.       Medical problems and test results were reviewed with the patient today.      DATA REVIEW    LIPID PANEL     Lab Results   Component Value Date    CHOL 133 10/10/2022    CHOL 156 03/04/2022    CHOL 179 11/29/2021     Lab Results   Component Value Date    TRIG 179 (H) 10/10/2022    TRIG 185 (H) 03/04/2022    TRIG 194 (H) 11/29/2021     Lab Results   Component Value Date    HDL 40 10/10/2022    HDL 40 03/04/2022    HDL 50 11/29/2021     Lab Results   Component Value Date    LDLCALC 57.2 10/10/2022    LDLCALC 84 03/04/2022    LDLCALC 96 11/29/2021     Lab Results   Component Value Date    LABVLDL 35.8 (H) 10/10/2022    LABVLDL 35 10/10/2019    VLDL 32 03/04/2022    VLDL 33 11/29/2021    VLDL 37 08/18/2021     Lab Results   Component Value Date    CHOLHDLRATIO 3.3 10/10/2022       BMP  Lab Results   Component Value Date/Time     10/10/2022 09:43 AM    K 4.4 10/10/2022 09:43 AM     10/10/2022 09:43 AM    CO2 28 10/10/2022 09:43 AM    BUN 35 10/10/2022 09:43 AM    CREATININE 1.10 10/10/2022 09:43 AM    GLUCOSE 114 10/10/2022 09:43 AM    CALCIUM 9.5 10/10/2022 09:43 AM      Hemoglobin A1C   Date Value Ref Range Status   10/10/2022 6.2 (H) 4.8 - 5.6 % Final       EKG        CXR/IMAGING        DEVICE INTERROGATION        OUTSIDE RECORDS REVIEW    Records from outside providers have been reviewed and summarized as noted in the HPI, past history and data review sections of this note, and reviewed with patient. .       ASSESSMENT and PLAN    Salma Singh was seen today for coronary artery disease. Diagnoses and all orders for this visit:    Coronary artery disease involving native heart without angina pectoris, unspecified vessel or lesion type    Essential hypertension    Orthostatic hypotension    Infrarenal abdominal aortic aneurysm (AAA) without rupture  -     Vascular AAA screening; Future    Cardiomyopathy, ischemic    Type 2 diabetes with nephropathy (HCC)    Dyslipidemia        IMPRESSION:    AAA has been stable, she would like to continue serial evaluation, to be done before next visit    BP at target, continue meds      Lipids at goal, continue meds    DM controlled, continue meds    Orthostatic hypotension generally well controlled, avoid overly aggressive BP therapy          Return in about 6 months (around 4/12/2023). Thank you for allowing me to participate in this patient's care. Please call or contact me if there are any questions or concerns regarding the above.       Glo Carrel, MD  10/12/22  10:48 AM

## 2022-10-21 ENCOUNTER — OFFICE VISIT (OUTPATIENT)
Dept: FAMILY MEDICINE CLINIC | Facility: CLINIC | Age: 82
End: 2022-10-21
Payer: MEDICARE

## 2022-10-21 VITALS
BODY MASS INDEX: 24.86 KG/M2 | SYSTOLIC BLOOD PRESSURE: 136 MMHG | HEART RATE: 71 BPM | DIASTOLIC BLOOD PRESSURE: 82 MMHG | WEIGHT: 154 LBS | OXYGEN SATURATION: 97 %

## 2022-10-21 DIAGNOSIS — E03.9 HYPOTHYROIDISM, UNSPECIFIED TYPE: ICD-10-CM

## 2022-10-21 DIAGNOSIS — Z23 ENCOUNTER FOR IMMUNIZATION: ICD-10-CM

## 2022-10-21 DIAGNOSIS — E55.9 VITAMIN D DEFICIENCY: ICD-10-CM

## 2022-10-21 DIAGNOSIS — I10 ESSENTIAL HYPERTENSION: ICD-10-CM

## 2022-10-21 DIAGNOSIS — L57.0 ACTINIC KERATOSIS: ICD-10-CM

## 2022-10-21 DIAGNOSIS — Z00.00 ENCOUNTER FOR ANNUAL WELLNESS VISIT (AWV) IN MEDICARE PATIENT: Primary | ICD-10-CM

## 2022-10-21 DIAGNOSIS — I25.10 CORONARY ARTERY DISEASE INVOLVING NATIVE HEART WITHOUT ANGINA PECTORIS, UNSPECIFIED VESSEL OR LESION TYPE: ICD-10-CM

## 2022-10-21 DIAGNOSIS — E11.42 TYPE 2 DIABETES MELLITUS WITH DIABETIC POLYNEUROPATHY, WITHOUT LONG-TERM CURRENT USE OF INSULIN (HCC): ICD-10-CM

## 2022-10-21 PROCEDURE — 3044F HG A1C LEVEL LT 7.0%: CPT | Performed by: NURSE PRACTITIONER

## 2022-10-21 PROCEDURE — 99214 OFFICE O/P EST MOD 30 MIN: CPT | Performed by: NURSE PRACTITIONER

## 2022-10-21 PROCEDURE — G8427 DOCREV CUR MEDS BY ELIG CLIN: HCPCS | Performed by: NURSE PRACTITIONER

## 2022-10-21 PROCEDURE — 17003 DESTRUCT PREMALG LES 2-14: CPT | Performed by: NURSE PRACTITIONER

## 2022-10-21 PROCEDURE — G8399 PT W/DXA RESULTS DOCUMENT: HCPCS | Performed by: NURSE PRACTITIONER

## 2022-10-21 PROCEDURE — 1123F ACP DISCUSS/DSCN MKR DOCD: CPT | Performed by: NURSE PRACTITIONER

## 2022-10-21 PROCEDURE — 1036F TOBACCO NON-USER: CPT | Performed by: NURSE PRACTITIONER

## 2022-10-21 PROCEDURE — 1090F PRES/ABSN URINE INCON ASSESS: CPT | Performed by: NURSE PRACTITIONER

## 2022-10-21 PROCEDURE — 90694 VACC AIIV4 NO PRSRV 0.5ML IM: CPT | Performed by: NURSE PRACTITIONER

## 2022-10-21 PROCEDURE — 17000 DESTRUCT PREMALG LESION: CPT | Performed by: NURSE PRACTITIONER

## 2022-10-21 PROCEDURE — G0008 ADMIN INFLUENZA VIRUS VAC: HCPCS | Performed by: NURSE PRACTITIONER

## 2022-10-21 PROCEDURE — G8420 CALC BMI NORM PARAMETERS: HCPCS | Performed by: NURSE PRACTITIONER

## 2022-10-21 PROCEDURE — G0439 PPPS, SUBSEQ VISIT: HCPCS | Performed by: NURSE PRACTITIONER

## 2022-10-21 PROCEDURE — G8484 FLU IMMUNIZE NO ADMIN: HCPCS | Performed by: NURSE PRACTITIONER

## 2022-10-21 RX ORDER — PNEUMOCOCCAL 20-VALENT CONJUGATE VACCINE 2.2; 2.2; 2.2; 2.2; 2.2; 2.2; 2.2; 2.2; 2.2; 2.2; 2.2; 2.2; 2.2; 2.2; 2.2; 2.2; 4.4; 2.2; 2.2; 2.2 UG/.5ML; UG/.5ML; UG/.5ML; UG/.5ML; UG/.5ML; UG/.5ML; UG/.5ML; UG/.5ML; UG/.5ML; UG/.5ML; UG/.5ML; UG/.5ML; UG/.5ML; UG/.5ML; UG/.5ML; UG/.5ML; UG/.5ML; UG/.5ML; UG/.5ML; UG/.5ML
0.5 INJECTION, SUSPENSION INTRAMUSCULAR ONCE
Qty: 0.5 ML | Refills: 0 | Status: SHIPPED | OUTPATIENT
Start: 2022-10-21 | End: 2022-10-21

## 2022-10-21 RX ORDER — NYSTATIN 100000 U/G
CREAM TOPICAL
Qty: 60 G | Refills: 0 | Status: SHIPPED | OUTPATIENT
Start: 2022-10-21

## 2022-10-21 NOTE — PROGRESS NOTES
PROGRESS NOTE    Chief Complaint   Patient presents with    Follow-up     Presenting for follow up to discuss labs, HTN, GERD, DM, HLD and Vertigo. Currently taking Levothyroxine, Pravastatin, Singular, Dyazide and Metoprolol. SUBJECTIVE:     Dario Ornelas is a very pleasant 78 y.o. female with hx of hypertension, hyperlipidemia, CAD-currently following cardiology, asthma-currently following pulmonology, squamous cell carcinoma of left ear, CSF leak-currently following ENT, and arthritis, seen today in office for follow-up for lab results review. She is also due for her AWV. Patient recently sustained a ruptured left Achilles tendon-currently under the management and care of orthopedic. She was not a surgical candidate for tendon repair and is currently wearing a boot for stability and healing. She is also working with physical therapy 3 times weekly for improvement in mobility and strengthening. She reports minimal discomfort and feels that she is getting stronger. She does ambulate with a Rollator or a walker to assist with stability. Patient reports no chest pain, no shortness of breath, no unilateral or focal weakness, no orthopnea or PND. GI and  review of systems is unremarkable. Past Medical History, Past Surgical History, Family history, Social History, and Medications were all reviewed with the patient today and updated as necessary. Current Outpatient Medications   Medication Sig Dispense Refill    nystatin (MYCOSTATIN) 848755 UNIT/GM cream Apply topically 2 times daily to rash to face 60 g 0    Handicap Placard MISC Disability placard please.  Thank you      ergocalciferol (ERGOCALCIFEROL) 1.25 MG (93997 UT) capsule Take 2,000 Units by mouth daily      mupirocin (BACTROBAN) 2 % ointment       triamterene-hydroCHLOROthiazide (DYAZIDE) 37.5-25 MG per capsule Take 1 capsule by mouth daily For blood pressure 90 capsule 3    famotidine (PEPCID) 40 MG tablet Take 1 tablet by mouth daily For stomach reflux/protection 90 tablet 3    montelukast (SINGULAIR) 10 MG tablet Take 1 tablet by mouth daily For cough/postnasal drip 90 tablet 3    pitavastatin (LIVALO) 4 MG TABS tablet Take 1 tablet by mouth daily For cholesterol 90 tablet 3    albuterol sulfate  (90 Base) MCG/ACT inhaler Inhale 2 puffs into the lungs every 4 hours as needed for Shortness of Breath      aspirin 81 MG EC tablet Take 81 mg by mouth daily      Cholecalciferol 50 MCG (2000 UT) CAPS Take 2,000 Units by mouth daily      fluticasone (FLONASE) 50 MCG/ACT nasal spray 2 sprays by Nasal route daily      metoprolol succinate (TOPROL XL) 100 MG extended release tablet Take 100 mg by mouth daily      nitroGLYCERIN (NITROSTAT) 0.4 MG SL tablet Place 0.4 mg under the tongue every 5 minutes as needed for Chest pain MAX OF 3 DOSES      senna-docusate (PERICOLACE) 8.6-50 MG per tablet Take 2 tablets by mouth daily       No current facility-administered medications for this visit. Allergies   Allergen Reactions    Other Hives, Itching and Rash     Band aids cause contact rash after several hours. Brinzolamide Nausea Only    Ciprofloxacin      tendinitis    Hydralazine Other (See Comments)     Other reaction(s): Drowsiness/Fatigue    Adhesive Tape Rash     Band aids cause contact rash after several hours. Band aids cause contact rash after several hours. Statins Other (See Comments) and Rash     Other reaction(s):  Other (See Comments), Other- (not listed) - Allergy  Aching muscles  Aching muscles  Aching muscles  Aching muscles      Sulfa Antibiotics Nausea Only, Other (See Comments) and Nausea And Vomiting     High fever, \"exterme nausea\"  Other reaction(s): Fever  High fever, \"exterme nausea\"       Patient Active Problem List   Diagnosis    Axonal polyneuropathy    Meniere's disease    Type 2 diabetes mellitus with diabetic polyneuropathy, without long-term current use of insulin (HCC)    Orthostatic hypotension    Essential hypertension    Palpitations    CSF leak    Mixed conductive and sensorineural hearing loss of left ear with restricted hearing of right ear    CAD (coronary artery disease)    Lacunar infarction ([address]    Unsteady gait    History of AAA (abdominal aortic aneurysm) repair    Hypercholesterolemia    GERD (gastroesophageal reflux disease)    AAA (abdominal aortic aneurysm)    Osteoporosis    Type 2 diabetes with nephropathy ([facility])    Vertigo    Cardiomyopathy, ischemic    Hearing loss    Chronic renal disease, stage III (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)     does not check glucose    GERD (gastroesophageal reflux disease)     Hearing loss     History of bilateral cataract extraction     History of coronary artery disease 2006, 2007    MI x 2 after stents placed-- with in 1 week-- stents x3    History of gallbladder disease     History of hernia repair     History of seborrheic keratosis     Hyperlipidemia     Hypertension     Lacunar infarction ([address] 8/16/2018    Meniere's disease     Menopause     Morbid obesity ([address]     OA (osteoarthritis)     Orthostatic hypotension 1/14/2016    Osteopenia with high risk of fracture 2014    Osteopenia with significant interval density loss in the hips since 2014    Post-menopausal osteoporosis     Postmenopausal osteoporosis     Unspecified sleep apnea     does not use c pap, pt. states asymptomatic since weight loss    Unsteady gait 8/16/2018    Visit for dental examination     SEVERAL YEARS AGO     Past Surgical History:   Procedure Laterality Date    BREAST SURGERY  mid 2000's    benign right breast biopsy    CATARACT REMOVAL  3/10    bilat and retina repair    CHOLECYSTECTOMY, OPEN  1979    HERNIA REPAIR      multiple surgery site- midline, umbilical    HYSTERECTOMY (CERVIX STATUS UNKNOWN)  1997    MASTOIDECTOMY Left     repair of leaking csf into middle ear    ORTHOPEDIC SURGERY      left  -- hammer toes    OVARY REMOVAL  1982     and cyst    [place] CARDIAC SURG PROCEDURE UNLIST      heart cath x 4, last 2007, stents x 3, last 2006     Family History   Problem Relation Age of Onset    Diabetes Brother     Stroke Paternal Aunt     Breast Cancer Neg Hx     Heart Disease Paternal Uncle     Heart Disease Father     Stroke Paternal Grandmother     Heart Disease Maternal Grandfather     Stroke Maternal Grandfather     Heart Disease Paternal Grandmother     Heart Attack Father 76        mi    Hypertension Mother      Social History     Tobacco Use    Smoking status: Never    Smokeless tobacco: Never   Substance Use Topics    Alcohol use: No         REVIEW OF SYSTEM    Review of Systems   Constitutional: Negative. Negative for activity change, appetite change, chills, diaphoresis, fatigue, fever and unexpected weight change. HENT: Negative. Negative for congestion, dental problem, drooling, ear discharge, ear pain, facial swelling, hearing loss, mouth sores, nosebleeds, postnasal drip, rhinorrhea, sinus pressure, sinus pain, sneezing, sore throat, tinnitus, trouble swallowing and voice change. Eyes: Negative. Negative for photophobia, pain, discharge, redness, itching and visual disturbance. Respiratory: Negative. Negative for apnea, cough, choking, chest tightness, shortness of breath, wheezing and stridor. Cardiovascular: Negative. Negative for chest pain, palpitations and leg swelling. Gastrointestinal: Negative. Negative for abdominal distention, abdominal pain, anal bleeding, blood in stool, constipation, diarrhea, nausea, rectal pain and vomiting. Endocrine: Negative.   Negative for cold intolerance, heat intolerance, polydipsia, polyphagia and polyuria. Genitourinary: Negative. Negative for decreased urine volume, difficulty urinating, dysuria, enuresis, flank pain, frequency, genital sores, hematuria and urgency. Musculoskeletal:  Positive for arthralgias (Left ankle and foot - significantly improved), gait problem (wears left foot boot) and myalgias (Left calf tightness and pain - significantly improved). Negative for back pain, joint swelling, neck pain and neck stiffness. Skin: Negative. Negative for color change, pallor, rash and wound. Allergic/Immunologic: Negative. Negative for environmental allergies, food allergies and immunocompromised state. Neurological:  Negative for dizziness, tremors, seizures, syncope, facial asymmetry, speech difficulty, weakness, light-headedness, numbness and headaches. Hematological: Negative. Negative for adenopathy. Does not bruise/bleed easily. Psychiatric/Behavioral: Negative. Negative for agitation, behavioral problems, confusion, decreased concentration, dysphoric mood, hallucinations, self-injury, sleep disturbance and suicidal ideas. The patient is not nervous/anxious and is not hyperactive. OBJECTIVE:  /82   Pulse 71   Wt 154 lb (69.9 kg)   SpO2 97%   BMI 24.86 kg/m²      Physical Exam  Constitutional:       General: She is not in acute distress. Appearance: Normal appearance. She is not ill-appearing, toxic-appearing or diaphoretic. HENT:      Head: Normocephalic and atraumatic. Comments: Patient with a few scattered slightly hypertrophic rough to touch lesion to face. No open wounds, no open lesion, no active drainage or discharge. Neck:      Vascular: No carotid bruit. Cardiovascular:      Rate and Rhythm: Normal rate and regular rhythm. Pulses: Normal pulses. Heart sounds: Normal heart sounds. Pulmonary:      Effort: Pulmonary effort is normal. No respiratory distress.       Breath sounds: Normal breath sounds. No stridor. No wheezing, rhonchi or rales. Chest:      Chest wall: No tenderness. Abdominal:      General: Abdomen is flat. Bowel sounds are normal. There is no distension. Palpations: Abdomen is soft. There is no shifting dullness, fluid wave, hepatomegaly, splenomegaly, mass or pulsatile mass. Tenderness: There is no abdominal tenderness. There is no right CVA tenderness, left CVA tenderness, guarding or rebound. Negative signs include Corbin's sign, Rovsing's sign, McBurney's sign, psoas sign and obturator sign. Hernia: No hernia is present. Musculoskeletal:         General: Tenderness (minimal tenderness to palpation of left Achilles) present. Normal range of motion. Cervical back: Normal range of motion and neck supple. No rigidity or tenderness. Right lower leg: No edema. Left lower leg: No edema. Lymphadenopathy:      Cervical: No cervical adenopathy. Skin:     General: Skin is warm and dry. Capillary Refill: Capillary refill takes less than 2 seconds. Findings: Lesion present. Neurological:      General: No focal deficit present. Mental Status: She is alert and oriented to person, place, and time. Psychiatric:         Mood and Affect: Mood normal.         Behavior: Behavior normal.         Thought Content: Thought content normal.         Judgment: Judgment normal.         Medical problems and test results were reviewed with the patient today.      Lab Results   Component Value Date     10/10/2022    K 4.4 10/10/2022     10/10/2022    CO2 28 10/10/2022    BUN 35 (H) 10/10/2022    CREATININE 1.10 (H) 10/10/2022    GLUCOSE 114 (H) 10/10/2022    CALCIUM 9.5 10/10/2022    PROT 6.7 10/10/2022    LABALBU 3.7 10/10/2022    BILITOT 0.3 10/10/2022    ALKPHOS 90 10/10/2022    AST 23 10/10/2022    ALT 24 10/10/2022    LABGLOM 50 (L) 10/10/2022    GFRAA 50 (L) 07/24/2022    AGRATIO 1.8 03/04/2022    GLOB 3.0 10/10/2022     Lab Results Component Value Date    WBC 11.6 (H) 07/24/2022    HGB 12.7 07/24/2022    HCT 39.7 07/24/2022    MCV 94.0 10/10/2022     07/24/2022     Lab Results   Component Value Date    TSH 3.400 03/04/2022    GHI1CZW 3.400 10/10/2022     Lab Results   Component Value Date/Time    VITD25 45.6 10/10/2022 09:43 AM      Lab Results   Component Value Date    CHOL 133 10/10/2022    CHOL 156 03/04/2022    CHOL 179 11/29/2021     Lab Results   Component Value Date    TRIG 179 (H) 10/10/2022    TRIG 185 (H) 03/04/2022    TRIG 194 (H) 11/29/2021     Lab Results   Component Value Date    HDL 40 10/10/2022    HDL 40 03/04/2022    HDL 50 11/29/2021     Lab Results   Component Value Date    LDLCALC 57.2 10/10/2022    LDLCALC 84 03/04/2022    LDLCALC 96 11/29/2021     Lab Results   Component Value Date    LABVLDL 35.8 (H) 10/10/2022    LABVLDL 35 10/10/2019    VLDL 32 03/04/2022    VLDL 33 11/29/2021    VLDL 37 08/18/2021     Lab Results   Component Value Date    CHOLHDLRATIO 3.3 10/10/2022     Hemoglobin A1C   Date Value Ref Range Status   10/10/2022 6.2 (H) 4.8 - 5.6 % Final         ASSESSMENT and PLAN    1. Encounter for annual wellness visit (AWV) in Medicare patient   Anticipatory guidance for age-seatbelts, avoid cell phone use in car (may use hands free), no texting while driving, avoid social drugs and tobacco, limit alcohol, fall safety, personal safety with appropriate use of helmets and equipment for protection, and appropriate use of sun screen and sun protection to prevent skin cancer. Encourage healthy diet and exercise daily. Patient is due for bone density test.  However, she was not able to tolerate oral biphosphonate. She also was ordered to have Prolia injection. However, she was not able to start medication due to need for dentist evaluation prior and she has not been able to do so. She respectfully declined for a repeat bone density test at this time.     2. Type 2 diabetes mellitus with diabetic polyneuropathy, without long-term current use of insulin (HCC)  -     CBC with Auto Differential; Future  -     Comprehensive Metabolic Panel; Future  -     Hemoglobin A1C; Future    A1c has improved and is now noted to be 6.2. Congratulated patient. Encourage patient to continue with healthy diet as low carbohydrate, low-fat, low-cholesterol. Repeat labs in 3 to 4 months. 3. Essential hypertension  -     Comprehensive Metabolic Panel; Future    Blood pressure today in office 136/82. Stable. Continue current therapy. 4. Actinic keratosis  -     DESTRUC PREMALIGNANT,2-14 LESIONS    Discussed in detail for risk of cryotherapy including but not limited to increased risk of infection, blister development, pain and discomfort at sites of treatment and loss of hair follicles that may results in permanent balding patch of hair at area treated. Patient was given opportunity to ask questions. All questions were answered/addressed to patient's satisfaction. Patient wishes to proceed with procedure. Informed consent obtained for procedure. There are 2 lesions that have been cryo-ed. The patient tolerated the procedure well. Patient was advised to monitor for any sign or symptom of infection including redness, open lesion, active drainage that is purulent, fever and chills. Patient was advised to let us know immediately with any sign or symptom of infection or any other concerns. Patient was also advised to apply antibiotic ointment to the sites of treatment 2-3 times daily to prevent blister development and prevent infection. 5. Vitamin D deficiency  -     Vitamin D 25 Hydroxy; Future    Vitamin D level normal.  Patient to continue current therapy. Repeat labs in 3 to 4 months. 6. Hypothyroidism, unspecified type  -     TSH with Reflex; Future    TSH level is normal.  Patient reports she has stopped use of levothyroxine 25 MCG for over 2 weeks due to complaints of nausea.   We will have patient continue to hold levothyroxine at this time we will repeat labs in 3 to 4 months. 7. Encounter for immunization  -     pneumococcal 20-valent conjugat (PREVNAR 20) 0.5 ML DANNI inj; Inject 0.5 mLs into the muscle once for 1 dose, Disp-0.5 mL, R-0Print  -     Influenza, FLUAD, (age 72 y+), IM, PF, 0.5 mL    Patient is due for annual flu and pneumonia vaccine. Discussed current CDC recommendation for immunization. Pt is amenable to receiving vaccine today in office. VIS provided. Flu vaccine administered. We will hold off on pneumonia vaccination at this time. Rx is printed for Prevnar 28 given to patient to take to pharmacy for coverage and administration. Patient agrees to let us know once vaccination is completed for record update. 8. Coronary artery disease involving native heart without angina pectoris, unspecified vessel or lesion type  -     Lipid Panel; Future    Stable. Patient to continue current therapy. Patient was advised to keep follow-up appointment with her cardiologist specialist for monitoring and treatment recommendation. Orders Placed This Encounter   Procedures    [address] [facility]    Influenza, FLUAD, (age 72 y+), IM, PF, 0.5 mL    CBC with Auto Differential     Standing Status:   Future     Standing Expiration Date:   10/22/2023    Comprehensive Metabolic Panel     Standing Status:   Future     Standing Expiration Date:   10/22/2023    Hemoglobin A1C     Standing Status:   Future     Standing Expiration Date:   10/22/2023    Lipid Panel     Standing Status:   Future     Standing Expiration Date:   10/22/2023     Order Specific Question:   Is Patient Fasting?/# of Hours     Answer:   8 Hours    TSH with Reflex     Standing Status:   Future     Standing Expiration Date:   10/22/2023    Vitamin D 25 Hydroxy     Standing Status:   Future     Standing Expiration Date:   10/22/2023         Elements of this note have been dictated using speech recognition software. As a result, errors of speech recognition may have occurred. On this date 10/21/2022 I have spent 30 minutes reviewing previous notes, test results and face to face with the patient discussing the diagnosis and importance of compliance with the treatment plan as well as documenting on the day of the visit. Greater than 50% of this visit was spent counseling the patient about test results, prognosis, importance of compliance, education about disease process, benefits of medications, instructions for management of acute symptoms, and follow up plans. Return in about 3 months (around 1/21/2023) for Follow up with fasting labs prior (a1c, cmp, cbc, tsh, lipid, vit d). BERTO Montiel - CNP    Medicare Annual Wellness Visit    Rajesh David is here for Follow-up (Presenting for follow up to discuss labs, HTN, GERD, DM, HLD and Vertigo. Currently taking Levothyroxine, Pravastatin, Singular, Dyazide and Metoprolol.)    Assessment & Plan   Encounter for annual wellness visit (AWV) in Medicare patient  Type 2 diabetes mellitus with diabetic polyneuropathy, without long-term current use of insulin ([facility]  -     CBC with Auto Differential; Future  -     Comprehensive Metabolic Panel; Future  -     Hemoglobin A1C; Future  Essential hypertension  -     Comprehensive Metabolic Panel; Future  Actinic keratosis  -     DESTRUC PREMALIGNANT,2-14 LESIONS  Vitamin D deficiency  -     Vitamin D 25 Hydroxy; Future  Hypothyroidism, unspecified type  -     TSH with Reflex; Future  Encounter for immunization  -     pneumococcal 20-valent conjugat (PREVNAR 20) 0.5 ML DANNI inj; Inject 0.5 mLs into the muscle once for 1 dose, Disp-0.5 mL, R-0Print  -     Influenza, FLUAD, (age 72 y+), IM, PF, 0.5 mL  Coronary artery disease involving native heart without angina pectoris, unspecified vessel or lesion type  -     Lipid Panel;  Future    Recommendations for Preventive Services Due: see orders and patient instructions/AVS.  Recommended screening schedule for the next 5-10 years is provided to the patient in written form: see Patient Instructions/AVS.     Return in about 3 months (around 1/21/2023) for Follow up with fasting labs prior (a1c, cmp, cbc, tsh, lipid, vit d). Subjective   The following acute and/or chronic problems were also addressed today:  As above. Patient's complete Health Risk Assessment and screening values have been reviewed and are found in Flowsheets. The following problems were reviewed today and where indicated follow up appointments were made and/or referrals ordered. Positive Risk Factor Screenings with Interventions:    Fall Risk:  Do you feel unsteady or are you worried about falling? : (!) yes (currently going to physical therapy 3 times per week)  2 or more falls in past year?: no  Fall with injury in past year?: no   Fall Risk Interventions:    Patient is currently following physical therapy 3 times weekly. Health Habits/Nutrition:  Physical Activity: Sufficiently Active    Days of Exercise per Week: 3 days    Minutes of Exercise per Session: 60 min     Have you lost any weight without trying in the past 3 months?: No     Have you seen the dentist within the past year?: (!) No  Health Habits/Nutrition Interventions:  Dental exam overdue:  patient encouraged to make appointment with his/her dentist    Hearing/Vision:  Do you or your family notice any trouble with your hearing that hasn't been managed with hearing aids?: (!) Yes (needs to get hearing aids serviced)  Do you have difficulty driving, watching TV, or doing any of your daily activities because of your eyesight?: No  Have you had an eye exam within the past year?: Yes (annual eye exam in December at [facility])  No results found. Hearing/Vision Interventions:  Hearing concerns:  patient currently wears hearing aids and has an ENT for follow up.  She reports needing to make appt for adjusting her hearing aids      ADLs:  In the past 7 days, did you need help from others to perform any of the following everyday activities: Eating, dressing, grooming, bathing, toileting, or walking/balance?: No  In the past 7 days, did you need help from others to take care of any of the following: Laundry, housekeeping, banking/finances, shopping, telephone use, food preparation, transportation, or taking medications?: (!) Yes (due to recent left foot achilles tendon rupture)  Select all that apply: (!) Housekeeping  ADL Interventions:  Due to recent ruptured left Achilles tendon, pt is having to use her walker more frequently for ambulation. Pt is needing more assistance with carrying items and housekeeping. Pt has great family support          Objective   Vitals:    10/21/22 1444   BP: 136/82   Pulse: 71   SpO2: 97%   Weight: 154 lb (69.9 kg)      Body mass index is 24.86 kg/m². Allergies   Allergen Reactions    Other Hives, Itching and Rash     Band aids cause contact rash after several hours. Brinzolamide Nausea Only    Ciprofloxacin      tendinitis    Hydralazine Other (See Comments)     Other reaction(s): Drowsiness/Fatigue    Adhesive Tape Rash     Band aids cause contact rash after several hours. Band aids cause contact rash after several hours. Statins Other (See Comments) and Rash     Other reaction(s): Other (See Comments), Other- (not listed) - Allergy  Aching muscles  Aching muscles  Aching muscles  Aching muscles      Sulfa Antibiotics Nausea Only, Other (See Comments) and Nausea And Vomiting     High fever, \"exterme nausea\"  Other reaction(s): Fever  High fever, \"exterme nausea\"       Prior to Visit Medications    Medication Sig Taking? Authorizing Provider   nystatin (MYCOSTATIN) [id number] UNIT/GM cream Apply topically 2 times daily to rash to face Yes Justin Moreau, APRN - CNP   Handicap Placard MISC Disability placard please.  Thank you  Historical Provider, MD   ergocalciferol (ERGOCALCIFEROL) 1.25 MG (37986 UT) capsule Take 2,000 Units by mouth daily  Historical Provider, MD   mupirocin (BACTROBAN) 2 % ointment   Historical Provider, MD   triamterene-hydroCHLOROthiazide (DYAZIDE) 37.5-25 MG per capsule Take 1 capsule by mouth daily For blood pressure  BERTO Vizcarra CNP   famotidine (PEPCID) 40 MG tablet Take 1 tablet by mouth daily For stomach reflux/protection  BERTO Vizcarra CNP   montelukast (SINGULAIR) 10 MG tablet Take 1 tablet by mouth daily For cough/postnasal drip  BERTO Leung CNP   pitavastatin (LIVALO) 4 MG TABS tablet Take 1 tablet by mouth daily For cholesterol  BERTO Vizcarra CNP   albuterol sulfate  (90 Base) MCG/ACT inhaler Inhale 2 puffs into the lungs every 4 hours as needed for Shortness of Breath  Ar Automatic Reconciliation   aspirin 81 MG EC tablet Take 81 mg by mouth daily  Ar Automatic Reconciliation   Cholecalciferol 50 MCG (2000 UT) CAPS Take 2,000 Units by mouth daily  Ar Automatic Reconciliation   fluticasone (FLONASE) 50 MCG/ACT nasal spray 2 sprays by Nasal route daily  Ar Automatic Reconciliation   metoprolol succinate (TOPROL XL) 100 MG extended release tablet Take 100 mg by mouth daily  Ar Automatic Reconciliation   nitroGLYCERIN (NITROSTAT) 0.4 MG SL tablet Place 0.4 mg under the tongue every 5 minutes as needed for Chest pain MAX OF 3 DOSES  Ar Automatic Reconciliation   senna-docusate (PERICOLACE) 8.6-50 MG per tablet Take 2 tablets by mouth daily  Ar Automatic Reconciliation       CareTeam (Including outside providers/suppliers regularly involved in providing care):   Patient Care Team:  BERTO Leung CNP as PCP - General  BERTO Leung CNP as PCP - [facility] Empaneled Provider     Reviewed and updated this visit:  Tobacco  Allergies  Meds  Problems  Med Hx  Surg Hx  Soc Hx  Fam Hx

## 2022-10-22 ASSESSMENT — ENCOUNTER SYMPTOMS
APNEA: 0
BACK PAIN: 0
DIARRHEA: 0
NAUSEA: 0
CHOKING: 0
RHINORRHEA: 0
SORE THROAT: 0
CONSTIPATION: 0
SHORTNESS OF BREATH: 0
ABDOMINAL DISTENTION: 0
PHOTOPHOBIA: 0
STRIDOR: 0
ALLERGIC/IMMUNOLOGIC NEGATIVE: 1
BLOOD IN STOOL: 0
ABDOMINAL PAIN: 0
VOMITING: 0
EYE ITCHING: 0
SINUS PAIN: 0
SINUS PRESSURE: 0
CHEST TIGHTNESS: 0
EYE PAIN: 0
WHEEZING: 0
EYE REDNESS: 0
GASTROINTESTINAL NEGATIVE: 1
COUGH: 0
TROUBLE SWALLOWING: 0
RESPIRATORY NEGATIVE: 1
COLOR CHANGE: 0
EYE DISCHARGE: 0
RECTAL PAIN: 0
ANAL BLEEDING: 0
VOICE CHANGE: 0
EYES NEGATIVE: 1
FACIAL SWELLING: 0

## 2022-10-22 NOTE — PATIENT INSTRUCTIONS
Personalized Preventive Plan for Alejandro Yanes - 10/21/2022  Medicare offers a range of preventive health benefits. Some of the tests and screenings are paid in full while other may be subject to a deductible, co-insurance, and/or copay. Some of these benefits include a comprehensive review of your medical history including lifestyle, illnesses that may run in your family, and various assessments and screenings as appropriate. After reviewing your medical record and screening and assessments performed today your provider may have ordered immunizations, labs, imaging, and/or referrals for you. A list of these orders (if applicable) as well as your Preventive Care list are included within your After Visit Summary for your review. Other Preventive Recommendations:    A preventive eye exam performed by an eye specialist is recommended every 1-2 years to screen for glaucoma; cataracts, macular degeneration, and other eye disorders. A preventive dental visit is recommended every 6 months. Try to get at least 150 minutes of exercise per week or 10,000 steps per day on a pedometer . Order or download the FREE \"Exercise & Physical Activity: Your Everyday Guide\" from [employer]. Call 5-724.349.4643 or search [employer] online. You need 3696-3842 mg of calcium and 6770-9521 IU of vitamin D per day. It is possible to meet your calcium requirement with diet alone, but a vitamin D supplement is usually necessary to meet this goal.  When exposed to the sun, use a sunscreen that protects against both UVA and UVB radiation with an SPF of 30 or greater. Reapply every 2 to 3 hours or after sweating, drying off with a towel, or swimming. Always wear a seat belt when traveling in a car. Always wear a helmet when riding a bicycle or motorcycle.

## 2022-10-26 ENCOUNTER — OFFICE VISIT (OUTPATIENT)
Dept: ORTHOPEDIC SURGERY | Age: 82
End: 2022-10-26
Payer: MEDICARE

## 2022-10-26 DIAGNOSIS — S86.012D RUPTURE OF LEFT ACHILLES TENDON, SUBSEQUENT ENCOUNTER: Primary | ICD-10-CM

## 2022-10-26 PROCEDURE — 99213 OFFICE O/P EST LOW 20 MIN: CPT | Performed by: ORTHOPAEDIC SURGERY

## 2022-10-26 PROCEDURE — 1090F PRES/ABSN URINE INCON ASSESS: CPT | Performed by: ORTHOPAEDIC SURGERY

## 2022-10-26 PROCEDURE — 1123F ACP DISCUSS/DSCN MKR DOCD: CPT | Performed by: ORTHOPAEDIC SURGERY

## 2022-10-26 PROCEDURE — G8399 PT W/DXA RESULTS DOCUMENT: HCPCS | Performed by: ORTHOPAEDIC SURGERY

## 2022-10-26 PROCEDURE — G8427 DOCREV CUR MEDS BY ELIG CLIN: HCPCS | Performed by: ORTHOPAEDIC SURGERY

## 2022-10-26 PROCEDURE — 1036F TOBACCO NON-USER: CPT | Performed by: ORTHOPAEDIC SURGERY

## 2022-10-26 PROCEDURE — G8420 CALC BMI NORM PARAMETERS: HCPCS | Performed by: ORTHOPAEDIC SURGERY

## 2022-10-26 PROCEDURE — G8484 FLU IMMUNIZE NO ADMIN: HCPCS | Performed by: ORTHOPAEDIC SURGERY

## 2022-10-26 NOTE — PROGRESS NOTES
Name: Blanchie Phalen  YOB: 1940  Gender: female  MRN: [id number]    08/24/2022: Initial visit with me for: Left calf pain  09/28/2022: Last visit with me  10/26/2022: She is in PT with [facility] and doing really well    HPI:   07/12/2022: She reports starting ciprofloxacin. She reports calf pain and stopping the Cipro due to her pain  07/24/2022: [facility] ER diagnosed leg swelling, Achilles tendinitis of left lower extremity. Ultrasound with no DVT.  08/21/2022: Patient reports an acute pop in her calf with increased calf pain  08/22/2022: [facility] ER diagnosed pain left calf, unsteady gait with negative x-ray and ultrasound. 08/24/2022: She presented to assess her calf pain. She reported pain and swelling had dramatically decreased    ROS/Meds/PSH/PMH/FH/SH: reviewed today    Tobacco:  reports that she has never smoked. She has never used smokeless tobacco.   ESRD stage III; type 2 diabetes with polyneuropathy; Ménière's disease; vertigo; cardiomyopathy    Physical Examination:  Patient appears to be alert and oriented with acceptable appearance. No obvious distress or SOB  CV: appears to have acceptable vascular color and capillary refill  Neuro: appears to have mostly intact light touch sensation   Skin: No significant soft tissue swelling; no pitting; no redness  MS: Standing: Plantigrade: Gait rolling walker  Left = no pain; no Achilles gap; 5/5 strength    XR: AP lateral tibia/fibula 08/22/2022 with no acute fracture noted; old changes along the mid fibula  XR Impression:  As above      Reviewed Test/Records/Documents:   07/24/2022: Left lower extremity duplex ultrasound radiologic impression: Negative for DVT  07/24/2022: Left lower extremity radiologic impression: Findings most consistent with Achilles tendinitis.   No complete rupture evident  08/22/2022: Duplex ultrasound left lower extremity radiologic impression: No evidence of deep venous thrombosis in the left lower extremity  08/22/2022: X-rays left tibia and fibula with no acute fracture    08/26/2022: MRI scan left ankle without contrast: Radiologic impression from Dr. Clara Schmidt:  1. Near complete to complete tear of the mid Achilles tendon with central tendinosis gap of approximately 3.5 cm.  2.  The tenderness diameter peripherally is approximately 1.5 cm. Severe background tendinosis  3. Marked thickening of the central band of the plantar fascia suggesting chronic plantar fasciitis  4. Marked subcutaneous tissue edema, likely reactive  4. Midfoot arthrosis with subchondral cystic change most pronounced at the second and third TMT joints    Assessment:    Left Achilles tendon tear    Plan:   The patient and I discussed the above assessment. We explored treatment options. She continues to do remarkably well. She has excellent strength of the Achilles  PT: [facility] for Achilles rehab as well as gait station and balance PT  She is in the boot with no heel lift so I think it safe now for her to completely wean the 3D boot into regular shoes. Surgery: No indication for surgery  Medication - OTC meds prn:    Follow-up: As needed     This note was created using Dragon voice recognition software which may result in errors of speech and spelling recognition and word/phrase syntax errors.

## 2022-11-17 ENCOUNTER — TELEPHONE (OUTPATIENT)
Dept: CARDIOLOGY CLINIC | Age: 82
End: 2022-11-17

## 2022-11-17 NOTE — TELEPHONE ENCOUNTER
----- Message from Juan Corea MD sent at 11/17/2022  7:19 AM EST -----  Sorry sent this to her my chart but she hasn't viewed it. AAA is stable.  thanks

## 2023-01-16 ENCOUNTER — NURSE ONLY (OUTPATIENT)
Dept: FAMILY MEDICINE CLINIC | Facility: CLINIC | Age: 83
End: 2023-01-16
Payer: MEDICARE

## 2023-01-16 DIAGNOSIS — I25.10 CORONARY ARTERY DISEASE INVOLVING NATIVE HEART WITHOUT ANGINA PECTORIS, UNSPECIFIED VESSEL OR LESION TYPE: ICD-10-CM

## 2023-01-16 DIAGNOSIS — E03.9 HYPOTHYROIDISM, UNSPECIFIED TYPE: ICD-10-CM

## 2023-01-16 DIAGNOSIS — E55.9 VITAMIN D DEFICIENCY: ICD-10-CM

## 2023-01-16 DIAGNOSIS — E11.42 TYPE 2 DIABETES MELLITUS WITH DIABETIC POLYNEUROPATHY, WITHOUT LONG-TERM CURRENT USE OF INSULIN (HCC): ICD-10-CM

## 2023-01-16 DIAGNOSIS — I10 ESSENTIAL HYPERTENSION: ICD-10-CM

## 2023-01-16 LAB
25(OH)D3 SERPL-MCNC: 35.7 NG/ML (ref 30–100)
ALBUMIN SERPL-MCNC: 3.6 G/DL (ref 3.2–4.6)
ALBUMIN/GLOB SERPL: 1.1 (ref 0.4–1.6)
ALP SERPL-CCNC: 116 U/L (ref 50–136)
ALT SERPL-CCNC: 28 U/L (ref 12–65)
ANION GAP SERPL CALC-SCNC: 5 MMOL/L (ref 2–11)
AST SERPL-CCNC: 19 U/L (ref 15–37)
BASOPHILS # BLD: 0.1 K/UL (ref 0–0.2)
BASOPHILS NFR BLD: 1 % (ref 0–2)
BILIRUB SERPL-MCNC: 0.4 MG/DL (ref 0.2–1.1)
BUN SERPL-MCNC: 30 MG/DL (ref 8–23)
CALCIUM SERPL-MCNC: 9.8 MG/DL (ref 8.3–10.4)
CHLORIDE SERPL-SCNC: 108 MMOL/L (ref 101–110)
CHOLEST SERPL-MCNC: 170 MG/DL
CO2 SERPL-SCNC: 28 MMOL/L (ref 21–32)
CREAT SERPL-MCNC: 0.9 MG/DL (ref 0.6–1)
DIFFERENTIAL METHOD BLD: NORMAL
EOSINOPHIL # BLD: 0.1 K/UL (ref 0–0.8)
EOSINOPHIL NFR BLD: 1 % (ref 0.5–7.8)
ERYTHROCYTE [DISTWIDTH] IN BLOOD BY AUTOMATED COUNT: 13.5 % (ref 11.9–14.6)
GLOBULIN SER CALC-MCNC: 3.2 G/DL (ref 2.8–4.5)
GLUCOSE SERPL-MCNC: 107 MG/DL (ref 65–100)
HCT VFR BLD AUTO: 40.6 % (ref 35.8–46.3)
HDLC SERPL-MCNC: 54 MG/DL (ref 40–60)
HDLC SERPL: 3.1
HGB BLD-MCNC: 12.8 G/DL (ref 11.7–15.4)
IMM GRANULOCYTES # BLD AUTO: 0 K/UL (ref 0–0.5)
IMM GRANULOCYTES NFR BLD AUTO: 0 % (ref 0–5)
LDLC SERPL CALC-MCNC: 86.2 MG/DL
LYMPHOCYTES # BLD: 1.9 K/UL (ref 0.5–4.6)
LYMPHOCYTES NFR BLD: 25 % (ref 13–44)
MCH RBC QN AUTO: 29.8 PG (ref 26.1–32.9)
MCHC RBC AUTO-ENTMCNC: 31.5 G/DL (ref 31.4–35)
MCV RBC AUTO: 94.4 FL (ref 82–102)
MICROALB/CREAT RATIO, POC: ABNORMAL
MICROALBUMIN URINE, POC: 50 MG/L
MONOCYTES # BLD: 0.7 K/UL (ref 0.1–1.3)
MONOCYTES NFR BLD: 9 % (ref 4–12)
NEUTS SEG # BLD: 5 K/UL (ref 1.7–8.2)
NEUTS SEG NFR BLD: 64 % (ref 43–78)
NRBC # BLD: 0 K/UL (ref 0–0.2)
PLATELET # BLD AUTO: 217 K/UL (ref 150–450)
PMV BLD AUTO: 11.8 FL (ref 9.4–12.3)
POTASSIUM SERPL-SCNC: 4.6 MMOL/L (ref 3.5–5.1)
PROT SERPL-MCNC: 6.8 G/DL (ref 6.3–8.2)
RBC # BLD AUTO: 4.3 M/UL (ref 4.05–5.2)
SODIUM SERPL-SCNC: 141 MMOL/L (ref 133–143)
T4 FREE SERPL-MCNC: 1 NG/DL (ref 0.78–1.46)
TRIGL SERPL-MCNC: 149 MG/DL (ref 35–150)
TSH W FREE THYROID IF ABNORMAL: 4.68 UIU/ML (ref 0.36–3.74)
VLDLC SERPL CALC-MCNC: 29.8 MG/DL (ref 6–23)
WBC # BLD AUTO: 7.7 K/UL (ref 4.3–11.1)

## 2023-01-16 PROCEDURE — 36415 COLL VENOUS BLD VENIPUNCTURE: CPT | Performed by: NURSE PRACTITIONER

## 2023-01-16 PROCEDURE — 82043 UR ALBUMIN QUANTITATIVE: CPT | Performed by: NURSE PRACTITIONER

## 2023-01-17 LAB
EST. AVERAGE GLUCOSE BLD GHB EST-MCNC: 131 MG/DL
HBA1C MFR BLD: 6.2 % (ref 4.8–5.6)

## 2023-01-20 ENCOUNTER — APPOINTMENT (OUTPATIENT)
Dept: NON INVASIVE DIAGNOSTICS | Age: 83
DRG: 247 | End: 2023-01-20
Payer: MEDICARE

## 2023-01-20 ENCOUNTER — HOSPITAL ENCOUNTER (INPATIENT)
Age: 83
LOS: 2 days | Discharge: HOME HEALTH | DRG: 247 | End: 2023-01-22
Attending: EMERGENCY MEDICINE | Admitting: INTERNAL MEDICINE
Payer: MEDICARE

## 2023-01-20 ENCOUNTER — APPOINTMENT (OUTPATIENT)
Dept: GENERAL RADIOLOGY | Age: 83
DRG: 247 | End: 2023-01-20
Payer: MEDICARE

## 2023-01-20 ENCOUNTER — TELEPHONE (OUTPATIENT)
Dept: FAMILY MEDICINE CLINIC | Facility: CLINIC | Age: 83
End: 2023-01-20

## 2023-01-20 DIAGNOSIS — R07.9 CHEST PAIN: ICD-10-CM

## 2023-01-20 DIAGNOSIS — I21.4 NSTEMI (NON-ST ELEVATED MYOCARDIAL INFARCTION) (HCC): Primary | ICD-10-CM

## 2023-01-20 PROBLEM — I44.7 LBBB (LEFT BUNDLE BRANCH BLOCK): Status: ACTIVE | Noted: 2023-01-20

## 2023-01-20 LAB
ALBUMIN SERPL-MCNC: 3.6 G/DL (ref 3.2–4.6)
ALBUMIN/GLOB SERPL: 0.9 (ref 0.4–1.6)
ALP SERPL-CCNC: 109 U/L (ref 50–136)
ALT SERPL-CCNC: 28 U/L (ref 12–65)
ANION GAP SERPL CALC-SCNC: 5 MMOL/L (ref 2–11)
APTT PPP: >200 SEC (ref 24.5–34.2)
AST SERPL-CCNC: 64 U/L (ref 15–37)
BILIRUB SERPL-MCNC: 0.4 MG/DL (ref 0.2–1.1)
BUN SERPL-MCNC: 30 MG/DL (ref 8–23)
CALCIUM SERPL-MCNC: 10.1 MG/DL (ref 8.3–10.4)
CHLORIDE SERPL-SCNC: 109 MMOL/L (ref 101–110)
CO2 SERPL-SCNC: 29 MMOL/L (ref 21–32)
CREAT SERPL-MCNC: 1.3 MG/DL (ref 0.6–1)
D DIMER PPP FEU-MCNC: 0.55 UG/ML(FEU)
ECHO BSA: 1.8 M2
EKG ATRIAL RATE: 62 BPM
EKG DIAGNOSIS: NORMAL
EKG P AXIS: 24 DEGREES
EKG P-R INTERVAL: 184 MS
EKG Q-T INTERVAL: 502 MS
EKG QRS DURATION: 162 MS
EKG QTC CALCULATION (BAZETT): 509 MS
EKG R AXIS: -68 DEGREES
EKG T AXIS: 117 DEGREES
EKG VENTRICULAR RATE: 62 BPM
ERYTHROCYTE [DISTWIDTH] IN BLOOD BY AUTOMATED COUNT: 13.6 % (ref 11.9–14.6)
FLUAV RNA SPEC QL NAA+PROBE: NOT DETECTED
FLUBV RNA SPEC QL NAA+PROBE: NOT DETECTED
GLOBULIN SER CALC-MCNC: 3.8 G/DL (ref 2.8–4.5)
GLUCOSE SERPL-MCNC: 153 MG/DL (ref 65–100)
HCT VFR BLD AUTO: 39.3 % (ref 35.8–46.3)
HGB BLD-MCNC: 12.4 G/DL (ref 11.7–15.4)
INR PPP: 1.2
MCH RBC QN AUTO: 29 PG (ref 26.1–32.9)
MCHC RBC AUTO-ENTMCNC: 31.6 G/DL (ref 31.4–35)
MCV RBC AUTO: 92 FL (ref 82–102)
NRBC # BLD: 0 K/UL (ref 0–0.2)
PLATELET # BLD AUTO: 221 K/UL (ref 150–450)
PMV BLD AUTO: 11.5 FL (ref 9.4–12.3)
POTASSIUM SERPL-SCNC: 4.4 MMOL/L (ref 3.5–5.1)
PROT SERPL-MCNC: 7.4 G/DL (ref 6.3–8.2)
PROTHROMBIN TIME: 15.8 SEC (ref 12.6–14.3)
RBC # BLD AUTO: 4.27 M/UL (ref 4.05–5.2)
SARS-COV-2 RDRP RESP QL NAA+PROBE: NOT DETECTED
SARS-COV-2: NORMAL
SODIUM SERPL-SCNC: 143 MMOL/L (ref 133–143)
SOURCE: NORMAL
TROPONIN I SERPL HS-MCNC: ABNORMAL PG/ML (ref 0–14)
TROPONIN I SERPL HS-MCNC: ABNORMAL PG/ML (ref 0–14)
UFH PPP CHRO-ACNC: 0.34 IU/ML (ref 0.3–0.7)
WBC # BLD AUTO: 6.9 K/UL (ref 4.3–11.1)

## 2023-01-20 PROCEDURE — 99153 MOD SED SAME PHYS/QHP EA: CPT | Performed by: INTERNAL MEDICINE

## 2023-01-20 PROCEDURE — 93005 ELECTROCARDIOGRAM TRACING: CPT | Performed by: INTERNAL MEDICINE

## 2023-01-20 PROCEDURE — 85027 COMPLETE CBC AUTOMATED: CPT

## 2023-01-20 PROCEDURE — 92928 PRQ TCAT PLMT NTRAC ST 1 LES: CPT | Performed by: INTERNAL MEDICINE

## 2023-01-20 PROCEDURE — B2111ZZ FLUOROSCOPY OF MULTIPLE CORONARY ARTERIES USING LOW OSMOLAR CONTRAST: ICD-10-PCS | Performed by: INTERNAL MEDICINE

## 2023-01-20 PROCEDURE — 99223 1ST HOSP IP/OBS HIGH 75: CPT | Performed by: INTERNAL MEDICINE

## 2023-01-20 PROCEDURE — C1887 CATHETER, GUIDING: HCPCS | Performed by: INTERNAL MEDICINE

## 2023-01-20 PROCEDURE — 2580000003 HC RX 258: Performed by: PHYSICIAN ASSISTANT

## 2023-01-20 PROCEDURE — 85520 HEPARIN ASSAY: CPT

## 2023-01-20 PROCEDURE — 92920 PRQ TRLUML C ANGIOP 1ART&/BR: CPT | Performed by: INTERNAL MEDICINE

## 2023-01-20 PROCEDURE — C1769 GUIDE WIRE: HCPCS | Performed by: INTERNAL MEDICINE

## 2023-01-20 PROCEDURE — 80053 COMPREHEN METABOLIC PANEL: CPT

## 2023-01-20 PROCEDURE — 6360000002 HC RX W HCPCS: Performed by: EMERGENCY MEDICINE

## 2023-01-20 PROCEDURE — 99152 MOD SED SAME PHYS/QHP 5/>YRS: CPT | Performed by: INTERNAL MEDICINE

## 2023-01-20 PROCEDURE — 71045 X-RAY EXAM CHEST 1 VIEW: CPT

## 2023-01-20 PROCEDURE — 2580000003 HC RX 258: Performed by: INTERNAL MEDICINE

## 2023-01-20 PROCEDURE — 96374 THER/PROPH/DIAG INJ IV PUSH: CPT

## 2023-01-20 PROCEDURE — 85610 PROTHROMBIN TIME: CPT

## 2023-01-20 PROCEDURE — C1894 INTRO/SHEATH, NON-LASER: HCPCS | Performed by: INTERNAL MEDICINE

## 2023-01-20 PROCEDURE — 02F03ZZ FRAGMENTATION IN CORONARY ARTERY, ONE ARTERY, PERCUTANEOUS APPROACH: ICD-10-PCS | Performed by: INTERNAL MEDICINE

## 2023-01-20 PROCEDURE — 027035Z DILATION OF CORONARY ARTERY, ONE ARTERY WITH TWO DRUG-ELUTING INTRALUMINAL DEVICES, PERCUTANEOUS APPROACH: ICD-10-PCS | Performed by: INTERNAL MEDICINE

## 2023-01-20 PROCEDURE — 99285 EMERGENCY DEPT VISIT HI MDM: CPT

## 2023-01-20 PROCEDURE — 6360000002 HC RX W HCPCS: Performed by: INTERNAL MEDICINE

## 2023-01-20 PROCEDURE — 96375 TX/PRO/DX INJ NEW DRUG ADDON: CPT

## 2023-01-20 PROCEDURE — 2580000003 HC RX 258: Performed by: EMERGENCY MEDICINE

## 2023-01-20 PROCEDURE — 84484 ASSAY OF TROPONIN QUANT: CPT

## 2023-01-20 PROCEDURE — 87635 SARS-COV-2 COVID-19 AMP PRB: CPT

## 2023-01-20 PROCEDURE — 93458 L HRT ARTERY/VENTRICLE ANGIO: CPT | Performed by: INTERNAL MEDICINE

## 2023-01-20 PROCEDURE — 85379 FIBRIN DEGRADATION QUANT: CPT

## 2023-01-20 PROCEDURE — 85730 THROMBOPLASTIN TIME PARTIAL: CPT

## 2023-01-20 PROCEDURE — 1100000003 HC PRIVATE W/ TELEMETRY

## 2023-01-20 PROCEDURE — 6360000004 HC RX CONTRAST MEDICATION: Performed by: INTERNAL MEDICINE

## 2023-01-20 PROCEDURE — 36415 COLL VENOUS BLD VENIPUNCTURE: CPT

## 2023-01-20 PROCEDURE — 6370000000 HC RX 637 (ALT 250 FOR IP): Performed by: EMERGENCY MEDICINE

## 2023-01-20 PROCEDURE — C1725 CATH, TRANSLUMIN NON-LASER: HCPCS | Performed by: INTERNAL MEDICINE

## 2023-01-20 PROCEDURE — 027036Z DILATION OF CORONARY ARTERY, ONE ARTERY WITH THREE DRUG-ELUTING INTRALUMINAL DEVICES, PERCUTANEOUS APPROACH: ICD-10-PCS | Performed by: INTERNAL MEDICINE

## 2023-01-20 PROCEDURE — 0715T HC PERQ TRLUML CORONRY LITHOTRP: CPT | Performed by: INTERNAL MEDICINE

## 2023-01-20 PROCEDURE — 2500000003 HC RX 250 WO HCPCS: Performed by: INTERNAL MEDICINE

## 2023-01-20 PROCEDURE — C1761 HC CATH TRANSLUM INTRAVASCULAR LITHOTRIPSY CORONARY: HCPCS | Performed by: INTERNAL MEDICINE

## 2023-01-20 PROCEDURE — C9600 PERC DRUG-EL COR STENT SING: HCPCS | Performed by: INTERNAL MEDICINE

## 2023-01-20 PROCEDURE — C1874 STENT, COATED/COV W/DEL SYS: HCPCS | Performed by: INTERNAL MEDICINE

## 2023-01-20 PROCEDURE — 2709999900 HC NON-CHARGEABLE SUPPLY: Performed by: INTERNAL MEDICINE

## 2023-01-20 PROCEDURE — 87502 INFLUENZA DNA AMP PROBE: CPT

## 2023-01-20 PROCEDURE — 4A023N7 MEASUREMENT OF CARDIAC SAMPLING AND PRESSURE, LEFT HEART, PERCUTANEOUS APPROACH: ICD-10-PCS | Performed by: INTERNAL MEDICINE

## 2023-01-20 DEVICE — STENT ONYXNG35022UX ONYX 3.50X22RX
Type: IMPLANTABLE DEVICE | Status: FUNCTIONAL
Brand: ONYX FRONTIER™

## 2023-01-20 DEVICE — STENT ONYXNG35038UX ONYX 3.50X38RX
Type: IMPLANTABLE DEVICE | Status: FUNCTIONAL
Brand: ONYX FRONTIER™

## 2023-01-20 RX ORDER — 0.9 % SODIUM CHLORIDE 0.9 %
500 INTRAVENOUS SOLUTION INTRAVENOUS
Status: COMPLETED | OUTPATIENT
Start: 2023-01-20 | End: 2023-01-20

## 2023-01-20 RX ORDER — ONDANSETRON 2 MG/ML
4 INJECTION INTRAMUSCULAR; INTRAVENOUS EVERY 6 HOURS PRN
Status: DISCONTINUED | OUTPATIENT
Start: 2023-01-20 | End: 2023-01-22 | Stop reason: HOSPADM

## 2023-01-20 RX ORDER — HYDROCODONE BITARTRATE AND ACETAMINOPHEN 5; 325 MG/1; MG/1
1 TABLET ORAL EVERY 4 HOURS PRN
Status: DISCONTINUED | OUTPATIENT
Start: 2023-01-20 | End: 2023-01-22 | Stop reason: HOSPADM

## 2023-01-20 RX ORDER — SODIUM CHLORIDE 9 MG/ML
INJECTION, SOLUTION INTRAVENOUS CONTINUOUS
Status: DISCONTINUED | OUTPATIENT
Start: 2023-01-20 | End: 2023-01-22 | Stop reason: HOSPADM

## 2023-01-20 RX ORDER — NITROGLYCERIN 0.4 MG/1
0.4 TABLET SUBLINGUAL EVERY 5 MIN PRN
Status: DISCONTINUED | OUTPATIENT
Start: 2023-01-20 | End: 2023-01-22 | Stop reason: HOSPADM

## 2023-01-20 RX ORDER — HEPARIN SODIUM 10000 [USP'U]/100ML
5-30 INJECTION, SOLUTION INTRAVENOUS CONTINUOUS
Status: DISCONTINUED | OUTPATIENT
Start: 2023-01-20 | End: 2023-01-20

## 2023-01-20 RX ORDER — ALBUTEROL SULFATE 90 UG/1
2 AEROSOL, METERED RESPIRATORY (INHALATION) EVERY 4 HOURS PRN
Status: DISCONTINUED | OUTPATIENT
Start: 2023-01-20 | End: 2023-01-22 | Stop reason: HOSPADM

## 2023-01-20 RX ORDER — SODIUM CHLORIDE 9 MG/ML
INJECTION, SOLUTION INTRAVENOUS PRN
Status: ACTIVE | OUTPATIENT
Start: 2023-01-20 | End: 2023-01-20

## 2023-01-20 RX ORDER — ONDANSETRON 2 MG/ML
4 INJECTION INTRAMUSCULAR; INTRAVENOUS EVERY 6 HOURS PRN
Status: DISCONTINUED | OUTPATIENT
Start: 2023-01-20 | End: 2023-01-20 | Stop reason: SDUPTHER

## 2023-01-20 RX ORDER — HEPARIN SODIUM 1000 [USP'U]/ML
2000 INJECTION, SOLUTION INTRAVENOUS; SUBCUTANEOUS PRN
Status: DISCONTINUED | OUTPATIENT
Start: 2023-01-20 | End: 2023-01-20

## 2023-01-20 RX ORDER — SODIUM CHLORIDE 0.9 % (FLUSH) 0.9 %
5-40 SYRINGE (ML) INJECTION PRN
Status: DISCONTINUED | OUTPATIENT
Start: 2023-01-20 | End: 2023-01-22 | Stop reason: HOSPADM

## 2023-01-20 RX ORDER — BIVALIRUDIN 250 MG/5ML
INJECTION, POWDER, LYOPHILIZED, FOR SOLUTION INTRAVENOUS PRN
Status: DISCONTINUED | OUTPATIENT
Start: 2023-01-20 | End: 2023-01-20 | Stop reason: HOSPADM

## 2023-01-20 RX ORDER — METOPROLOL SUCCINATE 100 MG/1
100 TABLET, EXTENDED RELEASE ORAL DAILY
Status: DISCONTINUED | OUTPATIENT
Start: 2023-01-20 | End: 2023-01-22 | Stop reason: HOSPADM

## 2023-01-20 RX ORDER — ONDANSETRON 4 MG/1
4 TABLET, ORALLY DISINTEGRATING ORAL EVERY 8 HOURS PRN
Status: DISCONTINUED | OUTPATIENT
Start: 2023-01-20 | End: 2023-01-22 | Stop reason: HOSPADM

## 2023-01-20 RX ORDER — FAMOTIDINE 20 MG/1
40 TABLET, FILM COATED ORAL ONCE
Status: COMPLETED | OUTPATIENT
Start: 2023-01-20 | End: 2023-01-20

## 2023-01-20 RX ORDER — HEPARIN SODIUM 1000 [USP'U]/ML
4000 INJECTION, SOLUTION INTRAVENOUS; SUBCUTANEOUS ONCE
Status: COMPLETED | OUTPATIENT
Start: 2023-01-20 | End: 2023-01-20

## 2023-01-20 RX ORDER — MAGNESIUM HYDROXIDE/ALUMINUM HYDROXICE/SIMETHICONE 120; 1200; 1200 MG/30ML; MG/30ML; MG/30ML
30 SUSPENSION ORAL
Status: COMPLETED | OUTPATIENT
Start: 2023-01-20 | End: 2023-01-20

## 2023-01-20 RX ORDER — HEPARIN SODIUM 200 [USP'U]/100ML
INJECTION, SOLUTION INTRAVENOUS CONTINUOUS PRN
Status: DISCONTINUED | OUTPATIENT
Start: 2023-01-20 | End: 2023-01-20 | Stop reason: HOSPADM

## 2023-01-20 RX ORDER — FAMOTIDINE 20 MG/1
40 TABLET, FILM COATED ORAL DAILY
Status: DISCONTINUED | OUTPATIENT
Start: 2023-01-20 | End: 2023-01-22 | Stop reason: HOSPADM

## 2023-01-20 RX ORDER — ACETAMINOPHEN 650 MG/1
650 SUPPOSITORY RECTAL EVERY 6 HOURS PRN
Status: DISCONTINUED | OUTPATIENT
Start: 2023-01-20 | End: 2023-01-22 | Stop reason: HOSPADM

## 2023-01-20 RX ORDER — ASPIRIN 81 MG/1
81 TABLET ORAL DAILY
Status: DISCONTINUED | OUTPATIENT
Start: 2023-01-21 | End: 2023-01-22 | Stop reason: HOSPADM

## 2023-01-20 RX ORDER — MONTELUKAST SODIUM 10 MG/1
10 TABLET ORAL DAILY
Status: DISCONTINUED | OUTPATIENT
Start: 2023-01-20 | End: 2023-01-22 | Stop reason: HOSPADM

## 2023-01-20 RX ORDER — HEPARIN SODIUM 1000 [USP'U]/ML
4000 INJECTION, SOLUTION INTRAVENOUS; SUBCUTANEOUS PRN
Status: DISCONTINUED | OUTPATIENT
Start: 2023-01-20 | End: 2023-01-20

## 2023-01-20 RX ORDER — SODIUM CHLORIDE 9 MG/ML
INJECTION, SOLUTION INTRAVENOUS PRN
Status: DISCONTINUED | OUTPATIENT
Start: 2023-01-20 | End: 2023-01-22 | Stop reason: HOSPADM

## 2023-01-20 RX ORDER — ACETAMINOPHEN 325 MG/1
650 TABLET ORAL EVERY 4 HOURS PRN
Status: DISCONTINUED | OUTPATIENT
Start: 2023-01-20 | End: 2023-01-20 | Stop reason: SDUPTHER

## 2023-01-20 RX ORDER — LIDOCAINE HYDROCHLORIDE 10 MG/ML
INJECTION, SOLUTION INFILTRATION; PERINEURAL PRN
Status: DISCONTINUED | OUTPATIENT
Start: 2023-01-20 | End: 2023-01-20 | Stop reason: HOSPADM

## 2023-01-20 RX ORDER — HYDROCODONE BITARTRATE AND ACETAMINOPHEN 5; 325 MG/1; MG/1
2 TABLET ORAL EVERY 4 HOURS PRN
Status: DISCONTINUED | OUTPATIENT
Start: 2023-01-20 | End: 2023-01-22 | Stop reason: HOSPADM

## 2023-01-20 RX ORDER — SODIUM CHLORIDE 0.9 % (FLUSH) 0.9 %
5-40 SYRINGE (ML) INJECTION EVERY 12 HOURS SCHEDULED
Status: DISCONTINUED | OUTPATIENT
Start: 2023-01-20 | End: 2023-01-22 | Stop reason: HOSPADM

## 2023-01-20 RX ORDER — MORPHINE SULFATE 2 MG/ML
2 INJECTION, SOLUTION INTRAMUSCULAR; INTRAVENOUS ONCE
Status: COMPLETED | OUTPATIENT
Start: 2023-01-20 | End: 2023-01-20

## 2023-01-20 RX ORDER — MORPHINE SULFATE 2 MG/ML
2 INJECTION, SOLUTION INTRAMUSCULAR; INTRAVENOUS
Status: DISCONTINUED | OUTPATIENT
Start: 2023-01-20 | End: 2023-01-22 | Stop reason: HOSPADM

## 2023-01-20 RX ORDER — MORPHINE SULFATE 4 MG/ML
4 INJECTION, SOLUTION INTRAMUSCULAR; INTRAVENOUS
Status: DISCONTINUED | OUTPATIENT
Start: 2023-01-20 | End: 2023-01-22 | Stop reason: HOSPADM

## 2023-01-20 RX ORDER — ACETAMINOPHEN 325 MG/1
650 TABLET ORAL EVERY 6 HOURS PRN
Status: DISCONTINUED | OUTPATIENT
Start: 2023-01-20 | End: 2023-01-22 | Stop reason: HOSPADM

## 2023-01-20 RX ORDER — ASPIRIN 81 MG/1
324 TABLET, CHEWABLE ORAL
Status: DISCONTINUED | OUTPATIENT
Start: 2023-01-20 | End: 2023-01-20

## 2023-01-20 RX ORDER — CLOPIDOGREL BISULFATE 75 MG/1
75 TABLET ORAL DAILY
Status: DISCONTINUED | OUTPATIENT
Start: 2023-01-21 | End: 2023-01-22 | Stop reason: HOSPADM

## 2023-01-20 RX ORDER — POLYETHYLENE GLYCOL 3350 17 G/17G
17 POWDER, FOR SOLUTION ORAL DAILY PRN
Status: DISCONTINUED | OUTPATIENT
Start: 2023-01-20 | End: 2023-01-22 | Stop reason: HOSPADM

## 2023-01-20 RX ORDER — SENNA AND DOCUSATE SODIUM 50; 8.6 MG/1; MG/1
2 TABLET, FILM COATED ORAL DAILY
Status: DISCONTINUED | OUTPATIENT
Start: 2023-01-20 | End: 2023-01-22 | Stop reason: HOSPADM

## 2023-01-20 RX ORDER — FLUTICASONE PROPIONATE 50 MCG
2 SPRAY, SUSPENSION (ML) NASAL DAILY
Status: DISCONTINUED | OUTPATIENT
Start: 2023-01-20 | End: 2023-01-22 | Stop reason: HOSPADM

## 2023-01-20 RX ORDER — MIDAZOLAM HYDROCHLORIDE 1 MG/ML
INJECTION INTRAMUSCULAR; INTRAVENOUS PRN
Status: DISCONTINUED | OUTPATIENT
Start: 2023-01-20 | End: 2023-01-20 | Stop reason: HOSPADM

## 2023-01-20 RX ORDER — ONDANSETRON 2 MG/ML
4 INJECTION INTRAMUSCULAR; INTRAVENOUS
Status: COMPLETED | OUTPATIENT
Start: 2023-01-20 | End: 2023-01-20

## 2023-01-20 RX ADMIN — SODIUM CHLORIDE 500 ML: 9 INJECTION, SOLUTION INTRAVENOUS at 10:38

## 2023-01-20 RX ADMIN — FAMOTIDINE 40 MG: 20 TABLET, FILM COATED ORAL at 10:27

## 2023-01-20 RX ADMIN — SODIUM CHLORIDE, PRESERVATIVE FREE 10 ML: 5 INJECTION INTRAVENOUS at 21:38

## 2023-01-20 RX ADMIN — HEPARIN SODIUM AND DEXTROSE 12 UNITS/KG/HR: 10000; 5 INJECTION INTRAVENOUS at 11:46

## 2023-01-20 RX ADMIN — MORPHINE SULFATE 2 MG: 2 INJECTION, SOLUTION INTRAMUSCULAR; INTRAVENOUS at 10:28

## 2023-01-20 RX ADMIN — ONDANSETRON 4 MG: 2 INJECTION INTRAMUSCULAR; INTRAVENOUS at 10:30

## 2023-01-20 RX ADMIN — ALUMINUM HYDROXIDE, MAGNESIUM HYDROXIDE, AND SIMETHICONE 30 ML: 200; 200; 20 SUSPENSION ORAL at 10:26

## 2023-01-20 RX ADMIN — HEPARIN SODIUM 4000 UNITS: 1000 INJECTION INTRAVENOUS; SUBCUTANEOUS at 11:25

## 2023-01-20 ASSESSMENT — PAIN DESCRIPTION - LOCATION: LOCATION: CHEST

## 2023-01-20 ASSESSMENT — PAIN SCALES - GENERAL
PAINLEVEL_OUTOF10: 8
PAINLEVEL_OUTOF10: 0
PAINLEVEL_OUTOF10: 8
PAINLEVEL_OUTOF10: 0
PAINLEVEL_OUTOF10: 4

## 2023-01-20 ASSESSMENT — PAIN DESCRIPTION - DESCRIPTORS: DESCRIPTORS: ACHING

## 2023-01-20 ASSESSMENT — PAIN DESCRIPTION - ORIENTATION: ORIENTATION: MID

## 2023-01-20 NOTE — ED PROVIDER NOTES
Emergency Department Provider Note                   PCP:                BERTO Samayoa CNP               Age: 78 y.o. Sex: female     Final diagnosis/impression:  1. NSTEMI (non-ST elevated myocardial infarction) [facility])         Disposition: Admit to cardiology     MDM/Clinical Course:  Patient seen by myself at the [facility] emergency department. History was collected from the patient. In consideration of patient problem this represents a new/acute problem with uncertain diagnosis/prognosis. Patient had signs symptoms and clinical history most consistent with chest pain symptoms as previously described, unclear etiology/significance. Factors complicating medical decision making or management more complex include history of AAA with patient reports of increased abdominal pain symptoms. Work-up considered but not performed includes CT angiography of the chest abdomen/pelvis. Overall do not suspect patient has dissection given description of symptoms, considered AAA rupture but patient is very hemodynamically stable, comfortable appearing. In both cases, patient D-dimer is negative making this provider feel less likely that some form of aortic tear or aortic rupture is present though understandably D-dimer does not necessarily exclude such events. Overall feel NSTEMI more likely given patient cardiac history, description of symptoms and laboratory work-up here in the emergency department. While under my care, patient received GI cocktail including famotidine, Maalox, also received IV morphine, IV Zofran, IV fluids. Patient started on IV heparin for suspected NSTEMI. She has received aspirin at home prior to evaluation here per patient report. Given NSTEMI, elevated troponin, did feel it appropriate to admit the patient for further evaluation, observation and management. I spoke with the cardiology team in detail about the patient agreed to see and admit the patient. Patient/family verbalized understanding agreement with ED course/plan in shared medical decision making. Medical Decision Making  Problems Addressed:  NSTEMI (non-ST elevated myocardial infarction) [facility]): acute illness or injury that poses a threat to life or bodily functions    Amount and/or Complexity of Data Reviewed  Independent Historian: parent  External Data Reviewed: radiology and notes. Details: Reviewed previous notes / documentation of AAA measuring 4.2cm at last US check  Labs: ordered. Decision-making details documented in ED Course. Radiology: ordered. Decision-making details documented in ED Course. ECG/medicine tests: ordered and independent interpretation performed. Decision-making details documented in ED Course. Risk  OTC drugs. Prescription drug management. Parenteral controlled substances. Drug therapy requiring intensive monitoring for toxicity. Decision regarding hospitalization. Critical Care  Total time providing critical care: 30-74 minutes          Orders Placed This Encounter   Procedures    XR CHEST PORTABLE    CBC    Comprehensive Metabolic Panel    Troponin    Cardiac Monitor    Pulse Oximetry    EKG 12 Lead    Saline lock IV        ED Meds Given:  Medications - No data to display    New Prescriptions    No medications on file        HPI: Radha Luna is a 78 y.o. female with past medical history of CAD status post stent placement, previous MI, hypertension, obesity, history of 4.2 cm abdominal aortic aneurysm, history of GERD, diabetes presenting for evaluation of chest pain and associated symptoms. Patient notes several days history of chest pain symptoms. Patient describes a chest pain that is in the center of her chest, nonradiating described as \"a burning fire\" primarily when she lays down to go to bed. This happened on 1/13 as well as 1/16 in addition to yesterday evening/this morning.   Patient denies any exertional component to her chest pain symptoms, denies any dyspnea symptoms. Patient does note associated nausea but no vomiting symptoms. Patient notes somewhat chronically loose stools since around Thanksgiving with increased flatus. Patient denies chest pain symptoms during the day, denies any hemoptysis, hematemesis. Patient was in contact with one of her outpatient care providers who recommended that she take aspirin and nitroglycerin and present to the emergency department for further evaluation. No palpitations, near-syncope or syncopal episodes. Patient does note increased abdominal pain symptoms similar to hernia experience many years ago, this increased abdominal pain has been occurring over the last several days, patient points over the central abdomen with reference location of pain. Abdominal pain is intermittent, none provoked or aggravated by any specific activities or factors. Patient/family denies any other evaluation for today's acute complaint. Patient/family denies any other aggravating or alleviating factors. Patient/family denies any other symptoms. ROS:   All review of systems negative except as noted above in the history of the present illness.     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)     does not check glucose    GERD (gastroesophageal reflux disease)     Hearing loss     History of bilateral cataract extraction     History of coronary artery disease 2006, 2007    MI x 2 after stents placed-- with in 1 week-- stents x3    History of gallbladder disease     History of hernia repair     History of seborrheic keratosis     Hyperlipidemia     Hypertension     Lacunar infarction ([facility] [address] 8/16/2018    Meniere's disease     Menopause     Morbid obesity ([facility] [address]     OA (osteoarthritis)     Orthostatic hypotension 1/14/2016    Osteopenia with high risk of fracture 2014    Osteopenia with significant interval density loss in the hips since 2014    Post-menopausal osteoporosis     Postmenopausal osteoporosis     Unspecified sleep apnea     does not use c pap, pt. states asymptomatic since weight loss    Unsteady gait 8/16/2018    Visit for dental examination     SEVERAL YEARS AGO       Surgical history:   Past Surgical History:   Procedure Laterality Date    BREAST SURGERY  mid 2000's    benign right breast biopsy    CATARACT REMOVAL  3/10    bilat and retina repair    CHOLECYSTECTOMY, OPEN  1979    HERNIA REPAIR      multiple surgery site- midline, umbilical    HYSTERECTOMY (CERVIX STATUS UNKNOWN)  1997    MASTOIDECTOMY Left     repair of leaking csf into middle ear    ORTHOPEDIC SURGERY      left  -- hammer toes    OVARY REMOVAL  1982     and cyst    DC CARDIAC SURG PROCEDURE UNLIST      heart cath x 4, last 2007, stents x 3, last 2006       Family history:   Family History   Problem Relation Age of Onset    Diabetes Brother     Stroke Paternal Aunt     Breast Cancer Neg Hx     Heart Disease Paternal Uncle     Heart Disease Father     Stroke Paternal Grandmother     Heart Disease Maternal Grandfather     Stroke Maternal Grandfather     Heart Disease Paternal Grandmother     Heart Attack Father 76        mi    Hypertension Mother        Social history:   Social History     Socioeconomic History    Marital status:     Tobacco Use    Smoking status: Never    Smokeless tobacco: Never   Substance and Sexual Activity    Alcohol use: No    Drug use: No     Types: OTC, Prescription     Social Determinants of Health     Physical Activity: Sufficiently Active    Days of Exercise per Week: 3 days    Minutes of Exercise per Session: 60 min        Medications:   Previous Medications    ALBUTEROL SULFATE  (90 BASE) MCG/ACT INHALER    Inhale 2 puffs into the lungs every 4 hours as needed for Shortness of Breath    ASPIRIN 81 MG EC TABLET    Take 81 mg by mouth daily    CHOLECALCIFEROL 50 MCG (2000 UT) CAPS    Take 2,000 Units by mouth daily    ERGOCALCIFEROL (ERGOCALCIFEROL) 1.25 MG (34638 UT) CAPSULE    Take 2,000 Units by mouth daily    FAMOTIDINE (PEPCID) 40 MG TABLET    Take 1 tablet by mouth daily For stomach reflux/protection    FLUTICASONE (FLONASE) 50 MCG/ACT NASAL SPRAY    2 sprays by Nasal route daily    HANDICAP PLACARD MISC    Disability placard please. Thank you    METOPROLOL SUCCINATE (TOPROL XL) 100 MG EXTENDED RELEASE TABLET    Take 100 mg by mouth daily    MONTELUKAST (SINGULAIR) 10 MG TABLET    Take 1 tablet by mouth daily For cough/postnasal drip    MUPIROCIN (BACTROBAN) 2 % OINTMENT        NITROGLYCERIN (NITROSTAT) 0.4 MG SL TABLET    Place 0.4 mg under the tongue every 5 minutes as needed for Chest pain MAX OF 3 DOSES    NYSTATIN (MYCOSTATIN) 775452 UNIT/GM CREAM    Apply topically 2 times daily to rash to face    PITAVASTATIN (LIVALO) 4 MG TABS TABLET    Take 1 tablet by mouth daily For cholesterol    SENNA-DOCUSATE (PERICOLACE) 8.6-50 MG PER TABLET    Take 2 tablets by mouth daily    TRIAMTERENE-HYDROCHLOROTHIAZIDE (DYAZIDE) 37.5-25 MG PER CAPSULE    Take 1 capsule by mouth daily For blood pressure        Allergies: Allergies   Allergen Reactions    Other Hives, Itching and Rash     Band aids cause contact rash after several hours. Brinzolamide Nausea Only    Ciprofloxacin      tendinitis    Hydralazine Other (See Comments)     Other reaction(s): Drowsiness/Fatigue    Adhesive Tape Rash     Band aids cause contact rash after several hours. Band aids cause contact rash after several hours. Statins Other (See Comments) and Rash     Other reaction(s):  Other (See Comments), Other- (not listed) - Allergy  Aching muscles  Aching muscles  Aching muscles  Aching muscles      Sulfa Antibiotics Nausea Only, Other (See Comments) and Nausea And Vomiting     High fever, \"exterme nausea\"  Other reaction(s): Fever  High fever, \"exterme nausea\"         Physical Exam     Vitals signs reviewed. Patient Vitals for the past 4 hrs:   Temp Pulse Resp BP SpO2   01/20/23 0900 97.6 °F (36.4 °C) 78 16 129/63 99 %       General: Alert and oriented ×4, no acute distress   Eyes: Anicteric, conjunctiva pink, PERRLA, EOMI  ENT: No nasal discharge, no gross nasal congestion present  Pulmonary: Clear to auscultation bilaterally with symmetric chest rise, no increased work of breathing, no accessory muscle use  Cardiovascular: Regular rate and rhythm, no rub or gallop appreciated on my exam  GI: Abdomen is soft, nondistended, minimal tenderness to palpation around the epigastrium/periumbilical region   musculoskeletal: No obvious joint deformity or joint effusion, normal joint range of motion  Neuro: Cranial nerves II through VII grossly intact, strength and sensation is grossly intact in the upper and lower extremities bilaterally  Skin: Skin is warm and dry, no rash or lesions    Critical Care  Performed by: Maty Arechiga MD  Authorized by: Maty Arechiga MD     Critical care provider statement:     Critical care time (minutes):  38    Critical care time was exclusive of:  Separately billable procedures and treating other patients and teaching time    Critical care was necessary to treat or prevent imminent or life-threatening deterioration of the following conditions: NSTEMI requiring heparinization and cardiology admission.     Critical care was time spent personally by me on the following activities:  Development of treatment plan with patient or surrogate, discussions with consultants, evaluation of patient's response to treatment, examination of patient, obtaining history from patient or surrogate, ordering and performing treatments and interventions, ordering and review of laboratory studies, ordering and review of radiographic studies, re-evaluation of patient's condition and review of old charts    I assumed direction of critical care for this patient from another provider in my specialty: no      Care discussed with: admitting provider      ED EKG Interpretation  EKG was interpreted in the absence of a cardiologist.  Interpretation:  EKG read on 1/20/2023 at 0919  Left bundle branch block, rate of 77, and no ST elevation MI or other gross signs of cardiac, qtc is in 510 and is borderline for, qrs is normal and 163 and is consistent with left bundle branch block, EKG morphology largely unchanged from previous. Results for orders placed or performed during the hospital encounter of 01/20/23   COVID-19, Rapid    Specimen: Nasopharyngeal   Result Value Ref Range    Source NASAL      SARS-CoV-2, Rapid Not detected NOTD     Influenza A/B, Molecular    Specimen: Not Specified   Result Value Ref Range    Influenza A, SERGO Not detected NOTD      Influenza B, SERGO Not detected NOTD     XR CHEST PORTABLE    Narrative    CHEST X-RAY, one view. INDICATION:  Chest pain    TECHNIQUE:  AP upright portable view. COMPARISON: October 2020    FINDINGS:   Lungs: are clear. Costophrenic angles: are sharp. Heart size: is normal.   Pulmonary vasculature: is unremarkable. Aorta: Arch calcifications. Included portion of the upper abdomen: is unremarkable. Bones: No gross bony lesions. Other: None. Impression    Negative for acute change.          CBC   Result Value Ref Range    WBC 6.9 4.3 - 11.1 K/uL    RBC 4.27 4.05 - 5.2 M/uL    Hemoglobin 12.4 11.7 - 15.4 g/dL    Hematocrit 39.3 35.8 - 46.3 %    MCV 92.0 82 - 102 FL    MCH 29.0 26.1 - 32.9 PG    MCHC 31.6 31.4 - 35.0 g/dL    RDW 13.6 11.9 - 14.6 %    Platelets 128 807 - 094 K/uL    MPV 11.5 9.4 - 12.3 FL    nRBC 0.00 0.0 - 0.2 K/uL   Comprehensive Metabolic Panel   Result Value Ref Range    Sodium 143 133 - 143 mmol/L    Potassium 4.4 3.5 - 5.1 mmol/L    Chloride 109 101 - 110 mmol/L    CO2 29 21 - 32 mmol/L    Anion Gap 5 2 - 11 mmol/L Glucose 153 (H) 65 - 100 mg/dL    BUN 30 (H) 8 - 23 MG/DL    Creatinine 1.30 (H) 0.6 - 1.0 MG/DL    Est, Glom Filt Rate 41 (L) >60 ml/min/1.73m2    Calcium 10.1 8.3 - 10.4 MG/DL    Total Bilirubin 0.4 0.2 - 1.1 MG/DL    ALT 28 12 - 65 U/L    AST 64 (H) 15 - 37 U/L    Alk Phosphatase 109 50 - 136 U/L    Total Protein 7.4 6.3 - 8.2 g/dL    Albumin 3.6 3.2 - 4.6 g/dL    Globulin 3.8 2.8 - 4.5 g/dL    Albumin/Globulin Ratio 0.9 0.4 - 1.6     Troponin   Result Value Ref Range    Troponin, High Sensitivity 10,997.8 (HH) 0 - 14 pg/mL   COVID-19    Specimen: Nasopharyngeal Swab   Result Value Ref Range    SARS-CoV-2 Please find results under separate order     D-Dimer, Quantitative   Result Value Ref Range    D-Dimer, Quant 0.55 <0.56 ug/ml(FEU)        XR CHEST PORTABLE   Final Result   Negative for acute change. Voice dictation software was used during the making of this note. This software is not perfect and grammatical and other typographical errors may be present. This note has not been completely proofread for errors.      Marcela Osorio MD  01/22/23 0001

## 2023-01-20 NOTE — PROGRESS NOTES
TRANSFER - OUT REPORT:    [facility] with Dr. Madrigal Records  Access: right radial   Lithotripsy & 3 stents to the RCA    Tr band applied to right with 14 in band  No bleeding or hematoma site soft    MAR  2 mg versed  25 mcg Fentanyl   Angiomax bolus & gtt; stopped at 419PM  600 mg plavix  30 ml mylanta     Verbal report given to Fermín Vega on Max Petryanagi  being transferred to [facility] for routine progression of patient care       Report consisted of patient's Situation, Background, Assessment and Recommendations(SBAR). Information from the following report(s) Nurse Handoff Report and MAR was reviewed with the receiving nurse. Opportunity for questions and clarification was provided.       Patient transported with:  Registered Nurse

## 2023-01-20 NOTE — ED TRIAGE NOTES
Pt arrived via EMS from home. Pt c/o CP X 3 days. Pt describes pain as burning pain. Pt has hx of 3 stents. Pt took 1 nitro and 324 MG ASA 15 min before EMS arrived and pain resolved. Pt complains of gas pain. /81, , HR 87. Pt reports coughing up blood last Friday.

## 2023-01-20 NOTE — Clinical Note
TRANSFER - OUT REPORT:     Verbal report given to: Sushma Degroot. Report consisted of patient's Situation, Background, Assessment and   Recommendations(SBAR). Opportunity for questions and clarification was provided. Patient transported with a Registered Nurse. Patient transported to: 319.

## 2023-01-20 NOTE — ED NOTES
TRANSFER - OUT REPORT:    Verbal report given to Chani Rosenthal RN on Severiano Chow  being transferred to 3rd floor for routine progression of patient care       Report consisted of patient's Situation, Background, Assessment and   Recommendations(SBAR). Information from the following report(s) Nurse Handoff Report was reviewed with the receiving nurse. [place] Assessment: No data recorded  Lines:   Peripheral IV 01/20/23 Left Antecubital (Active)       Peripheral IV 01/20/23 Right Antecubital (Active)        Opportunity for questions and clarification was provided.       Patient transported with:  Registered Nurse           Satnam Hills RN  01/20/23 [phone]

## 2023-01-20 NOTE — H&P
[facility] CARDIOLOGY History &Physical                 Primary Cardiologist: Dr Yemi Melchor    Primary Care Physician: Yashira Smalls, APRN - CNP    Admitting Physician: Dr Dawood Ivey:     Patient is a 78 y.o. female who presents with CP. She has a h/o CAD w [facility] 2-2014 w patent stents LAD, dRCA, R posterolateral; occluded PDA w collateralization, EF 45%, htn w orthostatic hypotension, and dyslipidemia. She has done well, compliant w all meds, until last Monday when she was going to be and noticed she had burning pain to the L of her sternum which lasted a few hours an resolved w nitro. Pain recurred three days ago and has come and gone, only at night, not worse w exertion, without nausea, diaphoresis or SOB. She has chronic dizziness w positional changes which is unchanged no syncope or LE edema. Pain lasted all last night, was 10/10, not worse eating a few crackers this morning at 7:30 AM. Not positional, no injury. 50 lb weight loss in past 3 years since  passed in 2019. Presented to the ER 1-20-23 w CP. In ER BMP wnl except cr 1.3 and glucose 153, HS trop 10,900,  LFTs and CBC wnl, d-dimer . 55, CXR no acute process, /63, EKG NSR w rate 77 w L BBB (old). Pt given ASA, morphine, a nitro and started on heparin and CP resolved. Past cardiac eval:   Remote coronary stents   Treatment limited by orthostatic hypotension   Medical therapy of an obstructed PDA, 40% LAD by cath 12/07. Stress nuclear perfusion scan with anterolateral scar, minimal per-scar ischemia with normal wall motion and EF 68% in August 2010. Not significantly changed 8/22/12.    Feb 2014 - patent stents LAD, dRCA, R posterolateral; occluded PDA w collateralization, EF 45% (50-55 by echo)   FEb 2015 - Lexiscan - fixed inferolateral defect, inferior HK, EF 56%   Jun 2016- EF 50-55%, mild to mod MR   Oct 2018- EF 49%, no significant valve disease   Nov 2018- infrarenal AAA 3.8 cm   Oct 2019- EF 45-50%, no significant valve disease. CT - AAA 3.5    Jul 2020- EF 50%, LBBB with septal wma slightly more marked at the apex (no significant change)   Lexiscan - inferolateral infarction, EF 39%   Mar 2021- Carotid doppler - < 50% TREVOR. LICA  EDV 43, ratio 2.2   Jul 2021- 3 day monitor - SR with IVCD. 3 brief episodes of SVT with aberrancy, longest 19 seconds. Only one diary entry occurs during NSR.   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    Cardiomyopathy, ischemic 1/14/2016    Chronic vertigo     DM2 (diabetes mellitus, type 2) ([address]     does not check glucose    GERD (gastroesophageal reflux disease)     Hearing loss     History of bilateral cataract extraction     History of coronary artery disease 2006, 2007    MI x 2 after stents placed-- with in 1 week-- stents x3    History of gallbladder disease     History of hernia repair     History of seborrheic keratosis     Hyperlipidemia     Hypertension     Lacunar infarction ([address] 8/16/2018    Meniere's disease     Menopause     Morbid obesity ([address]     OA (osteoarthritis)     Orthostatic hypotension 1/14/2016    Osteopenia with high risk of fracture 2014    Osteopenia with significant interval density loss in the hips since 2014    Post-menopausal osteoporosis     Postmenopausal osteoporosis     Unspecified sleep apnea     does not use c pap, pt. states asymptomatic since weight loss    Unsteady gait 8/16/2018    Visit for dental examination     SEVERAL YEARS AGO      Past Surgical History:   Procedure Laterality Date    BREAST SURGERY  mid 2000's    benign right breast biopsy    CATARACT REMOVAL  3/10    bilat and retina repair    CHOLECYSTECTOMY, OPEN  1979    HERNIA REPAIR multiple surgery site- midline, umbilical    HYSTERECTOMY (CERVIX STATUS UNKNOWN)  1997    MASTOIDECTOMY Left     repair of leaking csf into middle ear    ORTHOPEDIC SURGERY      left  -- hammer toes    OVARY REMOVAL  1982     and cyst    LA CARDIAC SURG PROCEDURE UNLIST      heart cath x 4, last 2007, stents x 3, last 2006      Allergies   Allergen Reactions    Other Hives, Itching and Rash     Band aids cause contact rash after several hours. Brinzolamide Nausea Only    Ciprofloxacin      tendinitis    Hydralazine Other (See Comments)     Other reaction(s): Drowsiness/Fatigue    Adhesive Tape Rash     Band aids cause contact rash after several hours. Band aids cause contact rash after several hours. Statins Other (See Comments) and Rash     Other reaction(s):  Other (See Comments), Other- (not listed) - Allergy  Aching muscles  Aching muscles  Aching muscles  Aching muscles      Sulfa Antibiotics Nausea Only, Other (See Comments) and Nausea And Vomiting     High fever, \"exterme nausea\"  Other reaction(s): Fever  High fever, \"exterme nausea\"       Social History     Tobacco Use    Smoking status: Never    Smokeless tobacco: Never   Substance Use Topics    Alcohol use: No      FH:   Family History   Problem Relation Age of Onset    Diabetes Brother     Stroke Paternal Aunt     Breast Cancer Neg Hx     Heart Disease Paternal Uncle     Heart Disease Father     Stroke Paternal Grandmother     Heart Disease Maternal Grandfather     Stroke Maternal Grandfather     Heart Disease Paternal Grandmother     Heart Attack Father 76        mi    Hypertension Mother         Review of Systems  General: + 50 lb weight loss, no weakness, fever or chills  Skin: no rashes, lumps, or other skin changes  HEENT: no headache, dizziness, lightheadedness, vision changes, hearing changes, tinnitus, vertigo, sinus pressure/pain, bleeding gums, sore throat, or hoarseness  Neck: no swollen glands, goiter, pain or stiffness  Respiratory: no cough, sputum, hemoptysis, no dyspnea, no wheezing  Cardiovascular: + as per HPI  Gastrointestinal: no reflux, constipation, diarrhea, liver problems, GI bleeding  Urinary: no frequency, urgency , hematuria, burning/pain with urination, recent flank pain, polyuria, nocturia, or difficulty urinating  Peripheral Vascular: no claudication, leg cramps, prior DVTs, swelling of calves, legs, or feet, color change, or swelling with redness or tenderness  Musculoskeletal: no muscle or joint pain/stiffness, joint swelling, erythema of joints, or back pain  Psychiatric: no depression or excessive stress  Neurological: no sensory or motor loss, seizures, syncope, tremors, numbness, tingling, no changes in mood, attention, or speech, no changes in orientation, memory, insight, or judgment. Hematologic: no anemia, easy bruising or bleeding  Endocrine: no thyroid problems, no heat or cold intolerance, excessive sweating, polyuria, polydipsia, + diabetes. Objective:       /73   Pulse 61   Temp 97.6 °F (36.4 °C) (Oral)   Resp 14   Ht 5' 6\" (1.676 m)   Wt 153 lb (69.4 kg)   SpO2 96%   BMI 24.69 kg/m²     No intake/output data recorded. No intake/output data recorded.     Physical Exam:  General: Well Developed, Well Nourished, No Acute Distress  Head: normocephalic atraumatic   ENT: pupils equal and round, no abnormalities noted  Neck: supple, no JVD, no carotid bruits  Heart: S1S2 with RRR   Lungs: Clear throughout auscultation bilaterally without adventitious sounds  Abd: soft, nontender, nondistended, with good bowel sounds  Ext: warm, no edema  Skin: warm and dry  Psychiatric: Normal mood and affect, A&O x 3  Neurologic: Normal muscle tone      EKG NSR w rate 77 w L BBB (old)    Data Review:      Recent Results (from the past 24 hour(s))   CBC    Collection Time: 01/20/23  9:32 AM   Result Value Ref Range    WBC 6.9 4.3 - 11.1 K/uL    RBC 4.27 4.05 - 5.2 M/uL    Hemoglobin 12.4 11.7 - 15.4 g/dL    Hematocrit 39.3 35.8 - 46.3 %    MCV 92.0 82 - 102 FL    MCH 29.0 26.1 - 32.9 PG    MCHC 31.6 31.4 - 35.0 g/dL    RDW 13.6 11.9 - 14.6 %    Platelets 029 816 - 897 K/uL    MPV 11.5 9.4 - 12.3 FL    nRBC 0.00 0.0 - 0.2 K/uL   Comprehensive Metabolic Panel    Collection Time: 01/20/23  9:35 AM   Result Value Ref Range    Sodium 143 133 - 143 mmol/L    Potassium 4.4 3.5 - 5.1 mmol/L    Chloride 109 101 - 110 mmol/L    CO2 29 21 - 32 mmol/L    Anion Gap 5 2 - 11 mmol/L    Glucose 153 (H) 65 - 100 mg/dL    BUN 30 (H) 8 - 23 MG/DL    Creatinine 1.30 (H) 0.6 - 1.0 MG/DL    Est, Glom Filt Rate 41 (L) >60 ml/min/1.73m2    Calcium 10.1 8.3 - 10.4 MG/DL    Total Bilirubin 0.4 0.2 - 1.1 MG/DL    ALT 28 12 - 65 U/L    AST 64 (H) 15 - 37 U/L    Alk Phosphatase 109 50 - 136 U/L    Total Protein 7.4 6.3 - 8.2 g/dL    Albumin 3.6 3.2 - 4.6 g/dL    Globulin 3.8 2.8 - 4.5 g/dL    Albumin/Globulin Ratio 0.9 0.4 - 1.6     Troponin    Collection Time: 01/20/23  9:35 AM   Result Value Ref Range    Troponin, High Sensitivity 10,997.8 (HH) 0 - 14 pg/mL   D-Dimer, Quantitative    Collection Time: 01/20/23  9:35 AM   Result Value Ref Range    D-Dimer, Quant 0.55 <0.56 ug/ml(FEU)   COVID-19    Collection Time: 01/20/23 10:05 AM    Specimen: Nasopharyngeal Swab   Result Value Ref Range    SARS-CoV-2 Please find results under separate order     COVID-19, Rapid    Collection Time: 01/20/23 10:05 AM    Specimen: Nasopharyngeal   Result Value Ref Range    Source NASAL      SARS-CoV-2, Rapid Not detected NOTD     Influenza A/B, Molecular    Collection Time: 01/20/23 10:05 AM    Specimen: Not Specified   Result Value Ref Range    Influenza A, SERGO Not detected NOTD      Influenza B, SERGO Not detected NOTD       Serum creatinine: 1.3 mg/dL (H) 01/20/23 0935  Estimated creatinine clearance: 30 mL/min (A)    CXR: no acute process     Assessment/Plan:   NSTEMI (non-ST elevated myocardial infarction) ([address]- h/o multiple PCI, now w recurrent CP x 3 days improved w nitro  - admit, ASA, heparin, nitro paste  - check echo, [facility]  - no statin due to intolerance of all statins, cont BB, no ACE/ARB due to CKD    Chronic renal disease, stage III ([facility] [799571]  - hydrate for [facility]      Type 2 diabetes mellitus with diabetic polyneuropathy, without long-term current use of insulin (HCC)  - SS insulin      Orthostatic hypotension  - stable      Essential hypertension  - toprol, hold dyazide      CAD (coronary artery disease)  - as above       Wendy Median PA-C  1/20/2023  11:25 AM    ATTENDING ADDENDUM:    In this split/shared evaluation I performed/reviewed the patients's H&P, available images, labs, non-invasive studies and discussed the case in detail with the ACP;  performed a medically appropriate history and exam, counseled and educated the patient and/or family members, ordered and reviewed medications, tests or procedures, documented information in EMR, and independently interpreted images and coordinated care. Personal Time:    36  minutes, which equates to greater than 50% of time involved in patient's consultation and care management. I agree with above note by physician extender. Key findings are: Pleasant 78-year-old female with a history of LAD and RCA stenting with chronically occluded RCA with collaterals, mild LV systolic dysfunction with ejection fraction historically around 45% with numerous other cardiac risk factors who presents with accelerating recurrent substernal chest discomfort for the past week. Symptoms were most severe this morning and last evening. Symptoms occur at rest and are worse when supine. They are relieved with nitroglycerin and her troponin in the emergency department is initially 10,000, rising to 12,000, with a chronic left bundle branch block pattern on ECG. After intravenous heparin and nitroglycerin her pain is resolved. ECG is uninterpretable given the chronic left bundle branch block.   We are hydrating and adding heparin, aspirin, oxygen, and nitrates prior to catheterization in a few hours. Allergies   Allergen Reactions    Other Hives, Itching and Rash     Band aids cause contact rash after several hours. Brinzolamide Nausea Only    Ciprofloxacin      tendinitis    Hydralazine Other (See Comments)     Other reaction(s): Drowsiness/Fatigue    Adhesive Tape Rash     Band aids cause contact rash after several hours. Band aids cause contact rash after several hours. Statins Other (See Comments) and Rash     Other reaction(s): Other (See Comments), Other- (not listed) - Allergy  Aching muscles  Aching muscles  Aching muscles  Aching muscles      Sulfa Antibiotics Nausea Only, Other (See Comments) and Nausea And Vomiting     High fever, \"exterme nausea\"  Other reaction(s): Fever  High fever, \"exterme nausea\"       Patient Active Problem List    Diagnosis Date Noted    NSTEMI (non-ST elevated myocardial infarction) ([facility] [address] 01/20/2023     Priority: High    LBBB (left bundle branch block) 01/20/2023     Priority: Medium    Sleep apnea 08/17/2022     Priority: Medium     Overview Note:     Formatting of this note might be different from the original.  does not use c pap, pt. states asymptomatic since weight loss      Chronic renal disease, stage III [facility]) [[id number]] 07/14/2022     Priority: Medium    Difficulty with speech 06/20/2019     Priority: Medium    Dysphagia 06/20/2019     Priority: Medium    Chronic mucoid otitis media of left ear 04/25/2019     Priority: Medium    Otorrhea of left ear 08/20/2018     Priority: Medium     Overview Note:     Last Assessment & Plan:   Formatting of this note might be different from the original.  I explained to the patient that the ear looks moist but I did not see significant otorrhea pouring out of the PE tube that I could really sample for culture and sensitivity nor obtain enough of a sample for beta-2 transferrin.   I suggested that she go ahead and use Ciprodex otic drops in that left ear 5 drops twice a day for the next 10 days. If the drainage persists then I may want to consider radiographic evaluation for CSF otorrhea. This may involve a very thin cut CT scan through her middle ear and mastoid but I am going to discuss the situation with a neuroradiologist and see if he has any other diagnostic tests that he would recommend. This could be nothing but a chronic otitis media that may improve with mastoidectomy but I would want to make sure we're not dealing with a CSF leak before considering any surgery for that condition      Palpitations 07/09/2021     Overview Note:     Jul 2021- 3 day monitor - SR with IVCD. 3 brief episodes of SVT with   aberrancy, longest 19 seconds. Only one diary entry occurs during NSR. No afib         CAD (coronary artery disease)      Overview Note:     Medical therapy of an obstructed PDA, 40% LAD by cath 12/07. Stress   nuclear perfusion scan with anterolateral scar, minimal per-scar ischemia   with normal wall motion and EF 68% in August 2010. Not significantly   changed 8/22/12.   Feb 2014 - patent stents, EF 45% (50-55 by echo)  FEb 2015 - Lexiscan - fixed inferolateral defect, inferior HK, EF 56%  Jun 2016- EF 50-55%, mild to mod MR  Oct 2018- EF 49%, no significant valve disease  Oct 2019- EF 45-50%, RVSP ok  Jul 2020- EF 50%, LBBB with septal wma slightly more marked at the apex   (no significant change)   Lexiscan - inferolateral infarction, EF 39%        CSF leak 07/30/2019     Overview Note:     Formatting of this note might be different from the original.  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  Since the patient got no improvement with the antibiotics I suggested that   we place her on a tapering dose of prednisone over the next 2 weeks to see   if we can't help matters. If that fails to improve matters then insertion   of a myringotomy tube in the left ear would be an ultimate consideration. I can't completely eliminate the possibility of some type of ossicular   problems such as otosclerosis with today's test and so he if we end up   putting a tube in the ear and I would get a repeat audiogram. If she finds   that the prednisone helps her hearing returns to baseline then she'll plan   to return as needed. She will call if she has continued problems        Type 2 diabetes with nephropathy ([facility] [address] 03/27/2018    Osteoporosis 01/11/2017    Type 2 diabetes mellitus with diabetic polyneuropathy, without long-term current use of insulin ([facility] [address] 11/03/2016    Essential hypertension 03/08/2016    Orthostatic hypotension 01/14/2016     Overview Note:     2/8/13 Abnormal tilt test with drug challenge.   Diuretics discontinued,   conservative management, tolerate higher than normal resting pressure to   avoid syncope        AAA (abdominal aortic aneurysm) 01/14/2016     Overview Note:     Oct 2018 - 3.8 cm  Sep 2021- 4.2 cm  Oct 2022 - 4.2 cm        Cardiomyopathy, ischemic 01/14/2016     Overview Note:     Feb 2014 - EF 45%  Feb 2015 - Echo - EF 40-45%  Sep 2017 - 40-45% EF  Oct 2018 45-50%        Meniere's disease     Hypercholesterolemia     GERD (gastroesophageal reflux disease)      Overview Note:     no meds        Vertigo     Hearing loss      Past Surgical History:   Procedure Laterality Date    BREAST SURGERY  mid 2000's    benign right breast biopsy    CATARACT REMOVAL  3/10    bilat and retina repair    CHOLECYSTECTOMY, OPEN  1979    HERNIA REPAIR      multiple surgery site- midline, umbilical    HYSTERECTOMY (CERVIX STATUS UNKNOWN)  1997    MASTOIDECTOMY Left     repair of leaking csf into middle ear    ORTHOPEDIC SURGERY      left  -- hammer toes    OVARY REMOVAL  1982     and cyst    LA CARDIAC SURG PROCEDURE UNLIST      heart cath x 4, last 2007, stents x 3, last 2006     Social History     Tobacco Use    Smoking status: Never    Smokeless tobacco: Never   Substance Use Topics    Alcohol use: No       REVIEW OF SYSTEMS:    General: no recent weight loss/gain,+ weakness/fatigue, denies fever or chills   Skin: no rashes, lumps, or other skin changes   HEENT: no headache, dizziness, lightheadedness, vision changes, hearing changes   Neck: no swollen glands, goiter, pain or stiffness   Respiratory: no cough, sputum, hemoptysis, no shortness of breath, no dyspnea on exertion, wheezing   Cardiovascular: + cp, no palpitations, no orthopnea, paroxysmal nocturnal dyspnea or peripheral edema   Gastrointestinal:  heartburn, change of appetite, nausea, change in bowel habits  Urinary: no frequency, urgency , hematuria, burning/pain  Peripheral Vascular: no claudication, leg cramps, prior DVTs  Musculoskeletal: no muscle or joint pain/stiffness  Psychiatric: no depression, mental disorders, or excessive stress   Neurological: no history of CVA, vertigo.    Hematologic: no anemia, easy bruising or bleeding   Endocrine: no diabetes, thyroid problems, heat or cold intolerance, excessive sweating, polyuria, polydipsia        PHYSICAL EXAM:    Vitals:    01/20/23 0915 01/20/23 0930 01/20/23 0945 01/20/23 1015   BP: 129/63 132/69 120/71 128/73   Pulse: 80 77 79 61   Resp: 13 17 18 14   Temp:       TempSrc:       SpO2: 98% 98% 99% 96%   Weight:       Height:           General: Well Developed, Well Nourished, No Acute Distress  HEENT: pupils equal and round, no abnormalities noted  Neck: supple, no JVD, no carotid bruits  Heart: S1S2 with RRR without murmurs or gallops  Lungs: Clear throughout auscultation bilaterally  Abd: soft, nontender, nondistended  Ext: No edema distally  Skin: warm and dry  Psychiatric: Normal mood and affect  Neurologic: Alert and oriented X 3, cranial nerves II thru XII grossly intact and symmetric      ASSESSMENT AND PLAN:  Active Hospital Problems    *NSTEMI (non-ST elevated myocardial infarction) (HCC)-continue aspirin, heparin, beta-blockers, statin, nitrates, and hydrate precatheterization. Benefits and risks of left heart cath via radial attempt discussed. Patient and family wish to proceed. LBBB (left bundle branch block)-chronic, left heart cath today, echo tomorrow      Chronic renal disease, stage III ([facility] [address] [[id number]]-rehydrating, minimize contrast, recheck BMP in the morning      CAD (coronary artery disease)-prior LAD and RCA stents with chronically occluded posterior descending branch with left-to-right collaterals. Type 2 diabetes mellitus with diabetic polyneuropathy, without long-term current use of insulin ([facility])-slight scale, A1c in the morning, titrate meds as needed, consider Jardiance if okay with other physicians      Essential hypertension-currently stable. Monitor and treat as needed      Orthostatic hypotension-no recent symptoms of presyncope or syncope. Follow clinically.        Lacey Perry MD  [facility] Cardiology  Pager [phone]

## 2023-01-20 NOTE — PROGRESS NOTES
Patient return to floor from cath lab; right radial site assessed, post cath fluids running, patient placed on tele monitor.

## 2023-01-20 NOTE — TELEPHONE ENCOUNTER
Patient's daughter, Claire Adorno, reports that patient has complaints of intermittent angina mainly at bedtime for the last 3 days. Spoke with patient on the phone and patient is currently reporting active chest pain, described as a burning to midsternal area, she is also reporting sweating and feels like she is going to faint if she stands up. Her other daughter, Wilman Frausto, is at her side. Patient has already taking 2 doses of nitro that provide no relief. Advised patient to take a 325 mg of aspirin at this time. Advised patient to go immediately to the ED via EMS for evaluation. Pt and pt's daughter report understanding and is agreeable to plan.

## 2023-01-20 NOTE — PROGRESS NOTES
TRANSFER - IN REPORT:    Verbal report received from ER, RN on Calvin Tai being received from ER for routine progression of patient care      Report consisted of patients Situation, Background, Assessment and Recommendations(SBAR). Information from the following report(s) SBAR, Kardex, ED Summary, Intake/Output, MAR, and Recent Results was reviewed with the receiving nurse. Opportunity for questions and clarification was provided. Assessment completed upon patients arrival to unit and care assumed. Patient placed on tele and eagle monitor, heparin drip verified with ER RN. Patient placed in gown and helped to bathroom. Skin: back and sacrum intact, folds noted on sacrum. Heels very dry and flaky.

## 2023-01-21 ENCOUNTER — APPOINTMENT (OUTPATIENT)
Dept: NON INVASIVE DIAGNOSTICS | Age: 83
DRG: 247 | End: 2023-01-21
Payer: MEDICARE

## 2023-01-21 PROBLEM — R07.2 PRECORDIAL PAIN: Status: ACTIVE | Noted: 2023-01-21

## 2023-01-21 LAB
ANION GAP SERPL CALC-SCNC: 5 MMOL/L (ref 2–11)
BUN SERPL-MCNC: 30 MG/DL (ref 8–23)
CALCIUM SERPL-MCNC: 9.1 MG/DL (ref 8.3–10.4)
CHLORIDE SERPL-SCNC: 109 MMOL/L (ref 101–110)
CO2 SERPL-SCNC: 29 MMOL/L (ref 21–32)
CREAT SERPL-MCNC: 1.1 MG/DL (ref 0.6–1)
ECHO AO ROOT DIAM: 3.7 CM
ECHO AO ROOT INDEX: 2.08 CM/M2
ECHO AV AREA PEAK VELOCITY: 2.3 CM2
ECHO AV AREA VTI: 2.2 CM2
ECHO AV AREA/BSA PEAK VELOCITY: 1.3 CM2/M2
ECHO AV AREA/BSA VTI: 1.2 CM2/M2
ECHO AV MEAN GRADIENT: 3 MMHG
ECHO AV MEAN GRADIENT: 3 MMHG
ECHO AV MEAN VELOCITY: 0.9 M/S
ECHO AV PEAK GRADIENT: 6 MMHG
ECHO AV PEAK VELOCITY: 1.2 M/S
ECHO AV VELOCITY RATIO: 0.75
ECHO AV VTI: 27.3 CM
ECHO BSA: 1.8 M2
ECHO EST RA PRESSURE: 3 MMHG
ECHO IVC PROX: 2 CM
ECHO LA AREA 2C: 23.5 CM2
ECHO LA AREA 4C: 18.8 CM2
ECHO LA DIAMETER INDEX: 2.02 CM/M2
ECHO LA DIAMETER: 3.6 CM
ECHO LA MAJOR AXIS: 5 CM
ECHO LA MINOR AXIS: 5.7 CM
ECHO LA TO AORTIC ROOT RATIO: 0.97
ECHO LA VOL 2C: 80 ML (ref 22–52)
ECHO LA VOL 4C: 54 ML (ref 22–52)
ECHO LA VOL BP: 70 ML (ref 22–52)
ECHO LA VOL/BSA BIPLANE: 39 ML/M2 (ref 16–34)
ECHO LA VOLUME INDEX A2C: 45 ML/M2 (ref 16–34)
ECHO LA VOLUME INDEX A4C: 30 ML/M2 (ref 16–34)
ECHO LV E' LATERAL VELOCITY: 9 CM/S
ECHO LV E' SEPTAL VELOCITY: 5 CM/S
ECHO LV EDV A2C: 137 ML
ECHO LV EDV A4C: 129 ML
ECHO LV EDV INDEX A4C: 72 ML/M2
ECHO LV EDV NDEX A2C: 77 ML/M2
ECHO LV EJECTION FRACTION A2C: 41 %
ECHO LV EJECTION FRACTION A4C: 40 %
ECHO LV EJECTION FRACTION BIPLANE: 41 % (ref 55–100)
ECHO LV ESV A2C: 81 ML
ECHO LV ESV A4C: 77 ML
ECHO LV ESV INDEX A2C: 46 ML/M2
ECHO LV ESV INDEX A4C: 43 ML/M2
ECHO LV FRACTIONAL SHORTENING: 27 % (ref 28–44)
ECHO LV INTERNAL DIMENSION DIASTOLE INDEX: 2.87 CM/M2
ECHO LV INTERNAL DIMENSION DIASTOLIC: 5.1 CM (ref 3.9–5.3)
ECHO LV INTERNAL DIMENSION SYSTOLIC INDEX: 2.08 CM/M2
ECHO LV INTERNAL DIMENSION SYSTOLIC: 3.7 CM
ECHO LV IVSD: 1.3 CM (ref 0.6–0.9)
ECHO LV MASS 2D: 227.4 G (ref 67–162)
ECHO LV MASS INDEX 2D: 127.7 G/M2 (ref 43–95)
ECHO LV POSTERIOR WALL DIASTOLIC: 1 CM (ref 0.6–0.9)
ECHO LV RELATIVE WALL THICKNESS RATIO: 0.39
ECHO LVOT AREA: 3.1 CM2
ECHO LVOT AV VTI INDEX: 0.69
ECHO LVOT DIAM: 2 CM
ECHO LVOT MEAN GRADIENT: 2 MMHG
ECHO LVOT PEAK GRADIENT: 3 MMHG
ECHO LVOT PEAK VELOCITY: 0.9 M/S
ECHO LVOT STROKE VOLUME INDEX: 33.2 ML/M2
ECHO LVOT SV: 59 ML
ECHO LVOT VTI: 18.8 CM
ECHO MV A VELOCITY: 0.88 M/S
ECHO MV E DECELERATION TIME (DT): 165 MS
ECHO MV E VELOCITY: 0.9 M/S
ECHO MV E/A RATIO: 1.02
ECHO MV E/E' LATERAL: 10
ECHO MV E/E' RATIO (AVERAGED): 14
ECHO MV E/E' SEPTAL: 18
ECHO MV REGURGITANT PEAK GRADIENT: 108 MMHG
ECHO MV REGURGITANT PEAK VELOCITY: 5.2 M/S
ECHO PV MAX VELOCITY: 0.7 M/S
ECHO PV PEAK GRADIENT: 2 MMHG
ECHO RIGHT VENTRICULAR SYSTOLIC PRESSURE (RVSP): 26 MMHG
ECHO RV BASAL DIMENSION: 3.5 CM
ECHO RV FREE WALL PEAK S': 11 CM/S
ECHO RV TAPSE: 1.9 CM (ref 1.7–?)
ECHO TV REGURGITANT MAX VELOCITY: 2.39 M/S
ECHO TV REGURGITANT PEAK GRADIENT: 23 MMHG
ERYTHROCYTE [DISTWIDTH] IN BLOOD BY AUTOMATED COUNT: 13.7 % (ref 11.9–14.6)
GLUCOSE SERPL-MCNC: 95 MG/DL (ref 65–100)
HCT VFR BLD AUTO: 33.9 % (ref 35.8–46.3)
HGB BLD-MCNC: 10.7 G/DL (ref 11.7–15.4)
LV EF: 33 %
LVEF MODALITY: ABNORMAL
MCH RBC QN AUTO: 29.2 PG (ref 26.1–32.9)
MCHC RBC AUTO-ENTMCNC: 31.6 G/DL (ref 31.4–35)
MCV RBC AUTO: 92.6 FL (ref 82–102)
NRBC # BLD: 0 K/UL (ref 0–0.2)
PLATELET # BLD AUTO: 165 K/UL (ref 150–450)
PMV BLD AUTO: 11.8 FL (ref 9.4–12.3)
POTASSIUM SERPL-SCNC: 4.5 MMOL/L (ref 3.5–5.1)
RBC # BLD AUTO: 3.66 M/UL (ref 4.05–5.2)
SODIUM SERPL-SCNC: 143 MMOL/L (ref 133–143)
WBC # BLD AUTO: 6.1 K/UL (ref 4.3–11.1)

## 2023-01-21 PROCEDURE — 1100000003 HC PRIVATE W/ TELEMETRY

## 2023-01-21 PROCEDURE — 2580000003 HC RX 258: Performed by: PHYSICIAN ASSISTANT

## 2023-01-21 PROCEDURE — C8929 TTE W OR WO FOL WCON,DOPPLER: HCPCS

## 2023-01-21 PROCEDURE — 6370000000 HC RX 637 (ALT 250 FOR IP): Performed by: PHYSICIAN ASSISTANT

## 2023-01-21 PROCEDURE — A4216 STERILE WATER/SALINE, 10 ML: HCPCS | Performed by: INTERNAL MEDICINE

## 2023-01-21 PROCEDURE — 2580000003 HC RX 258: Performed by: INTERNAL MEDICINE

## 2023-01-21 PROCEDURE — 6370000000 HC RX 637 (ALT 250 FOR IP): Performed by: INTERNAL MEDICINE

## 2023-01-21 PROCEDURE — 6360000004 HC RX CONTRAST MEDICATION: Performed by: INTERNAL MEDICINE

## 2023-01-21 PROCEDURE — 93306 TTE W/DOPPLER COMPLETE: CPT | Performed by: INTERNAL MEDICINE

## 2023-01-21 PROCEDURE — 99233 SBSQ HOSP IP/OBS HIGH 50: CPT | Performed by: INTERNAL MEDICINE

## 2023-01-21 PROCEDURE — 36415 COLL VENOUS BLD VENIPUNCTURE: CPT

## 2023-01-21 PROCEDURE — 80048 BASIC METABOLIC PNL TOTAL CA: CPT

## 2023-01-21 PROCEDURE — 85027 COMPLETE CBC AUTOMATED: CPT

## 2023-01-21 RX ADMIN — METOPROLOL SUCCINATE 100 MG: 100 TABLET, EXTENDED RELEASE ORAL at 08:46

## 2023-01-21 RX ADMIN — ASPIRIN 81 MG: 81 TABLET ORAL at 08:45

## 2023-01-21 RX ADMIN — FAMOTIDINE 40 MG: 20 TABLET, FILM COATED ORAL at 08:45

## 2023-01-21 RX ADMIN — SODIUM CHLORIDE, PRESERVATIVE FREE 5 ML: 5 INJECTION INTRAVENOUS at 19:53

## 2023-01-21 RX ADMIN — CLOPIDOGREL BISULFATE 75 MG: 75 TABLET ORAL at 08:45

## 2023-01-21 RX ADMIN — PERFLUTREN 0.6 ML: 6.52 INJECTION, SUSPENSION INTRAVENOUS at 08:30

## 2023-01-21 RX ADMIN — SODIUM CHLORIDE, PRESERVATIVE FREE 10 ML: 5 INJECTION INTRAVENOUS at 08:50

## 2023-01-21 RX ADMIN — SODIUM CHLORIDE, PRESERVATIVE FREE 5 ML: 5 INJECTION INTRAVENOUS at 08:52

## 2023-01-21 RX ADMIN — MONTELUKAST 10 MG: 10 TABLET, FILM COATED ORAL at 08:45

## 2023-01-21 ASSESSMENT — PAIN SCALES - GENERAL
PAINLEVEL_OUTOF10: 0

## 2023-01-21 NOTE — PROGRESS NOTES
[facility] CARDIOLOGY PROGRESS NOTE           1/21/2023 9:42 AM    Admit Date: 1/20/2023    Subjective:   No CP, was using walker at home before admission, lives home alone, family in town. No CP now, no SOB. Cath site with no swelling. ROS:  GEN:  No fever or chills  Cardiovascular:  As noted above  Pulmonary:  As noted above  Neuro:  No new focal motor or sensory loss    Objective:      Vitals:    01/21/23 0103 01/21/23 0425 01/21/23 0729 01/21/23 0846   BP: 114/64 125/69 106/61 106/61   Pulse: 68 76 77 79   Resp: 16 17 16    Temp: 97.5 °F (36.4 °C) 97.7 °F (36.5 °C) 97.8 °F (36.6 °C)    TempSrc:   Axillary    SpO2: 94% 99% 97%    Weight:       Height:           Physical Exam:  General-A and O x 3  Neck- supple, no JVD  CV- regular rate and rhythm no MRG  Lung- clear bilaterally  Abd- soft, nontender, nondistended  Ext- no edema bilaterally. Skin- warm and dry  Psychiatric:  Normal mood and affect. Neurologic:  Alert and oriented X 3      Data Review:   Recent Labs     01/20/23  0932 01/20/23  0935 01/20/23  1348 01/21/23  0426   NA  --  143  --  143   K  --  4.4  --  4.5   BUN  --  30*  --  30*   WBC 6.9  --   --  6.1   HGB 12.4  --   --  10.7*   HCT 39.3  --   --  33.9*     --   --  165   INR  --   --  1.2  --        TELEMETRY:  Sinus    Assessment/Plan:     NSTEMI:  remain on DAPT, check echo and EF. Follow on tele, mobilize today. Remain on meds. No statin due to intolerance of all statins, cont BB, no ACE/ARB due to CKD  Anemia:  follow AM labs, cath site ok, no bleeding issues seen. Check Am CBC  Mobility: consult PT, walker at home and lives alone. LBBB: known LBBB, EF 45% since 2014  HPL: as above, no statin  CKD:  follow AM labs  Dispo: monitor as above. NSTEMI, follow on tele. Check Am labs. Need to discuss with family as well.        MOUNIKA JAIN,   1/21/2023 9:42 AM

## 2023-01-21 NOTE — PROGRESS NOTES
Radial compression band removed at 2150 after slowly reducing air from 12 cc to zero as per hospital protocol. No bleeding or hematoma noted. 2 x 2 gauze with tegaderm placed over puncture site. The affected extremity is warm and dry to the touch. Patient instructed to call if any bleeding noted on gauze. Patient verbalized understanding the nursing instructions.

## 2023-01-21 NOTE — DISCHARGE SUMMARY
[address] [facility] 121 Cardiology Discharge Summary     Patient ID:  Marya Mcfadden  [id number]  41 y.o.  1940    Admit date: 1/20/2023    Discharge date:  1/21/2023    Admitting Physician: Jon Claire MD     Discharge Physician: Dr. Alex Wheeler    Admission Diagnoses: Chest pain [R07.9]  NSTEMI (non-ST elevated myocardial infarction) [facility]) [I21.4]    Discharge Diagnoses:   Patient Active Problem List    Diagnosis    NSTEMI (non-ST elevated myocardial infarction)     LBBB (left bundle branch block)    Sleep apnea    Chronic renal disease, stage III     CAD (coronary artery disease)    History of AAA (abdominal aortic aneurysm) repair    Axonal polyneuropathy    Type 2 diabetes with nephropathy     Osteoporosis    Type 2 diabetes mellitus with diabetic polyneuropathy, without long-term current use of insulin     Essential hypertension    Orthostatic hypotension    AAA (abdominal aortic aneurysm)    Cardiomyopathy, ischemic    Meniere's disease    Hypercholesterolemia    GERD (gastroesophageal reflux disease)       Cardiology Procedures:  Left heart catheterization with PCI  Consults: none    Hospital Course: Patient presented to the ED with c/o CP. Pt was found to have elevated hsTroponin and was admitted to the Telemetry floor. Pt was subsequently scheduled for a LHC at [facility] on 1/20/23. Patient underwent cardiac catheterization by Dr. Vincent Strauss. Patient was found to have a 99% stenosis of the ostial->dRCA (99% ostial stenosis 90% proximal stenosis 90% mid stenosis 90% distal stenosis) that was treated with shockwave lithotripsy (all 80 impulses were utilized and the ostium and proximal right coronary artery due to severe calcification) and then stented with a 3.5 x 38mm Bicknell CALOS followed by an additional 3.5 x 38mm Bicknell CALOS followed by an additional 3.5 x 22mm Jaylen CALOS with 0% residual stenosis. Patient tolerated the procedure well and was taken to the Telemetry floor for recovery. ECHO showed:     01/20/23    TRANSTHORACIC ECHOCARDIOGRAM (TTE) COMPLETE (CONTRAST/BUBBLE/3D PRN) 01/21/2023  8:47 PM (Final)    Interpretation Summary    Left Ventricle: Moderately reduced left ventricular systolic function with a visually estimated EF of 30 - 35%. Left ventricle is mildly dilated. Mildly increased wall thickness. Severe hypokinesis of the following segments: mid inferior. Abnormal diastolic function. Aortic Valve: Mild sclerosis of the aortic valve cusp. Mild regurgitation. Mitral Valve: Moderate regurgitation. Left Atrium: Left atrium is severely dilated. Signed by: Levi Busch DO on 1/21/2023  8:47 PM        The following morning patient was up feeling well without any complaints of chest pain or shortness of breath. Patient's RRA cath site was clean, dry and intact without hematoma or bruit. Patient's labs were stable. Patient was seen and examined by Dr. Brandon Shukla and determined stable and ready for discharge. Patient was instructed on the importance of medication compliance including taking Aspirin and Plavix everyday without missing a dose. After receiving drug eluting stents, the patient will remain on dual anti-platelet therapy for at least 1 year. For maximized medical therapy for CAD, patient will continue BB but no ACEi/ARB due to CKD and no statin due to allergy. The patient will follow up with [facility] Cardiology as directed and has been referred to cardiac rehab. [facility] Cardiology office has been informed you need a follow up appointment after discharge. You will be called on Monday with the follow up appointment. If you have NOT heard from our office by Tuesday, please contact our office for appointment at [phone]. DISPOSITION: The patient is being discharged home in stable condition on a low saturated fat, low cholesterol and low salt diet.  The patient is instructed to advance activities as tolerated to the limit of fatigue or shortness of breath. The patient is instructed to avoid all heavy lifting, straining, stooping or squatting for 3-5 days. The patient is instructed to watch the cath site for bleeding/oozing; if seen, the patient is instructed to apply firm pressure with a clean cloth and call [address] Cardiology at [phone]. The patient is instructed to watch for signs of infection which include: increasing area of redness, fever/hot to touch or purulent drainage at the catheterization site. The patient is instructed not to soak in a bathtub for 7-10 days, but is cleared to shower. The patient is instructed to call the office or return to the ER for immediate evaluation for any shortness of breath or chest pain not relieved by NTG. Discharge Exam: /73   Pulse 63   Temp 97.3 °F (36.3 °C) (Oral)   Resp 17   Ht 5' 6\" (1.676 m)   Wt 153 lb (69.4 kg)   SpO2 98%   BMI 24.69 kg/m²       Patient has been seen by Dr. Grey Grant: see his progress note for exam details.     Recent Results (from the past 24 hour(s))   Transthoracic echocardiogram (TTE) complete with contrast, bubble, strain, and 3D PRN    Collection Time: 01/21/23  9:05 AM   Result Value Ref Range    LV EDV A2C 137 mL    LV EDV A4C 129 mL    LV ESV A2C 81 mL    LV ESV A4C 77 mL    IVSd 1.3 (A) 0.6 - 0.9 cm    LVIDd 5.1 3.9 - 5.3 cm    LVIDs 3.7 cm    LVOT Diameter 2.0 cm    LVOT Mean Gradient 2 mmHg    LVOT VTI 18.8 cm    LVOT Peak Velocity 0.9 m/s    LVOT Peak Gradient 3 mmHg    LVPWd 1.0 (A) 0.6 - 0.9 cm    LV E' Lateral Velocity 9 cm/s    LV E' Septal Velocity 5 cm/s    LV Ejection Fraction A2C 41 %    LV Ejection Fraction A4C 40 %    EF BP 41 (A) 55 - 100 %    LVOT Area 3.1 cm2    LVOT SV 59.0 ml    LA Minor Axis 5.7 cm    LA Major Axis 5.0 cm    LA Area 2C 23.5 cm2    LA Area 4C 18.8 cm2    LA Volume 2C 80 (A) 22 - 52 mL    LA Volume 4C 54 (A) 22 - 52 mL    LA Volume BP 70 (A) 22 - 52 mL    LA Diameter 3.6 cm    AV Mean Velocity 0.9 m/s    AV Mean Gradient 3 mmHg    AV Mean Gradient 3 mmHg    AV VTI 27.3 cm    AV Peak Velocity 1.2 m/s    AV Peak Gradient 6 mmHg    AV Area by VTI 2.2 cm2    AV Area by Peak Velocity 2.3 cm2    Aortic Root 3.7 cm    IVC Proxmal 2.0 cm    MR Peak Velocity 5.2 m/s    MR Peak Gradient 108 mmHg    MV E Wave Deceleration Time 165.0 ms    MV A Velocity 0.88 m/s    MV E Velocity 0.90 m/s    PV Max Velocity 0.7 m/s    PV Peak Gradient 2 mmHg    Est. RA Pressure 3 mmHg    RV Basal Dimension 3.5 cm    RV Free Wall Peak S' 11 cm/s    TAPSE 1.9 1.7 cm    TR Max Velocity 2.39 m/s    TR Peak Gradient 23 mmHg    Body Surface Area 1.8 m2    Fractional Shortening 2D 27 28 - 44 %    LV ESV Index A4C 43 mL/m2    LV EDV Index A4C 72 mL/m2    LV ESV Index A2C 46 mL/m2    LV EDV Index A2C 77 mL/m2    LVIDd Index 2.87 cm/m2    LVIDs Index 2.08 cm/m2    LV RWT Ratio 0.39     LV Mass 2D 227.4 (A) 67 - 162 g    LV Mass 2D Index 127.7 (A) 43 - 95 g/m2    MV E/A 1.02     E/E' Ratio (Averaged) 14.00     E/E' Lateral 10.00     E/E' Septal 18.00     LA Volume Index BP 39 (A) 16 - 34 ml/m2    LVOT Stroke Volume Index 33.2 mL/m2    LA Volume Index 2C 45 (A) 16 - 34 mL/m2    LA Volume Index 4C 30 16 - 34 mL/m2    LA Size Index 2.02 cm/m2    LA/AO Root Ratio 0.97     Ao Root Index 2.08 cm/m2    AV Velocity Ratio 0.75     LVOT:AV VTI Index 0.69     CECIL/BSA VTI 1.2 cm2/m2    CECIL/BSA Peak Velocity 1.3 cm2/m2    RVSP 26 mmHg   Comprehensive Metabolic Panel    Collection Time: 01/22/23  6:22 AM   Result Value Ref Range    Sodium 141 133 - 143 mmol/L    Potassium 4.0 3.5 - 5.1 mmol/L    Chloride 104 101 - 110 mmol/L    CO2 31 21 - 32 mmol/L    Anion Gap 6 2 - 11 mmol/L    Glucose 110 (H) 65 - 100 mg/dL    BUN 24 (H) 8 - 23 MG/DL    Creatinine 1.10 (H) 0.6 - 1.0 MG/DL    Est, Glom Filt Rate 50 (L) >60 ml/min/1.73m2    Calcium 9.8 8.3 - 10.4 MG/DL    Total Bilirubin 0.3 0.2 - 1.1 MG/DL    ALT 31 12 - 65 U/L    AST 58 (H) 15 - 37 U/L    Alk Phosphatase 84 50 - 136 U/L    Total Protein 6.4 6.3 - 8.2 g/dL    Albumin 3.1 (L) 3.2 - 4.6 g/dL    Globulin 3.3 2.8 - 4.5 g/dL    Albumin/Globulin Ratio 0.9 0.4 - 1.6     CBC with Auto Differential    Collection Time: 01/22/23  6:22 AM   Result Value Ref Range    WBC 7.3 4.3 - 11.1 K/uL    RBC 3.90 (L) 4.05 - 5.2 M/uL    Hemoglobin 11.4 (L) 11.7 - 15.4 g/dL    Hematocrit 36.2 35.8 - 46.3 %    MCV 92.8 82 - 102 FL    MCH 29.2 26.1 - 32.9 PG    MCHC 31.5 31.4 - 35.0 g/dL    RDW 13.6 11.9 - 14.6 %    Platelets 105 557 - 497 K/uL    MPV 11.8 9.4 - 12.3 FL    nRBC 0.00 0.0 - 0.2 K/uL    Differential Type AUTOMATED      Seg Neutrophils 55 43 - 78 %    Lymphocytes 29 13 - 44 %    Monocytes 13 (H) 4.0 - 12.0 %    Eosinophils % 2 0.5 - 7.8 %    Basophils 0 0.0 - 2.0 %    Immature Granulocytes 1 0.0 - 5.0 %    Segs Absolute 4.1 1.7 - 8.2 K/UL    Absolute Lymph # 2.1 0.5 - 4.6 K/UL    Absolute Mono # 0.9 0.1 - 1.3 K/UL    Absolute Eos # 0.1 0.0 - 0.8 K/UL    Basophils Absolute 0.0 0.0 - 0.2 K/UL    Absolute Immature Granulocyte 0.0 0.0 - 0.5 K/UL   Magnesium    Collection Time: 01/22/23  6:22 AM   Result Value Ref Range    Magnesium 2.2 1.8 - 2.4 mg/dL         Patient Instructions:     Current Discharge Medication List        START taking these medications    Details   clopidogrel (PLAVIX) 75 MG tablet Take 1 tablet by mouth daily  Qty: 90 tablet, Refills: 3           CONTINUE these medications which have NOT CHANGED    Details   Handicap Placard MISC Disability placard please.  Thank you      ergocalciferol (ERGOCALCIFEROL) 1.25 MG (24291 UT) capsule Take 2,000 Units by mouth daily      triamterene-hydroCHLOROthiazide (DYAZIDE) 37.5-25 MG per capsule Take 1 capsule by mouth daily For blood pressure  Qty: 90 capsule, Refills: 3      famotidine (PEPCID) 40 MG tablet Take 1 tablet by mouth daily For stomach reflux/protection  Qty: 90 tablet, Refills: 3      montelukast (SINGULAIR) 10 MG tablet Take 1 tablet by mouth daily For cough/postnasal drip  Qty: 90 tablet, Refills: 3 pitavastatin (LIVALO) 4 MG TABS tablet Take 1 tablet by mouth daily For cholesterol  Qty: 90 tablet, Refills: 3      albuterol sulfate  (90 Base) MCG/ACT inhaler Inhale 2 puffs into the lungs every 4 hours as needed for Shortness of Breath      aspirin 81 MG EC tablet Take 81 mg by mouth daily      Cholecalciferol 50 MCG (2000 UT) CAPS Take 2,000 Units by mouth daily      fluticasone (FLONASE) 50 MCG/ACT nasal spray 2 sprays by Nasal route daily      metoprolol succinate (TOPROL XL) 100 MG extended release tablet Take 100 mg by mouth daily      nitroGLYCERIN (NITROSTAT) 0.4 MG SL tablet Place 0.4 mg under the tongue every 5 minutes as needed for Chest pain MAX OF 3 DOSES      senna-docusate (PERICOLACE) 8.6-50 MG per tablet Take 2 tablets by mouth daily           STOP taking these medications       nystatin (MYCOSTATIN) 777854 UNIT/GM cream Comments:   Reason for Stopping:         mupirocin (BACTROBAN) 2 % ointment Comments:   Reason for Stopping:           Note some or all of the above medications that were set to stop by the system but the pt was not taking in the outpatient setting. Limitations in the system make it appear that we have stopped the medications when we have not.           BERTO Cotton CNP  01/22/23  9:05 AM

## 2023-01-21 NOTE — PLAN OF CARE

## 2023-01-22 VITALS
BODY MASS INDEX: 24.59 KG/M2 | DIASTOLIC BLOOD PRESSURE: 71 MMHG | SYSTOLIC BLOOD PRESSURE: 141 MMHG | RESPIRATION RATE: 17 BRPM | OXYGEN SATURATION: 94 % | HEIGHT: 66 IN | HEART RATE: 68 BPM | WEIGHT: 153 LBS | TEMPERATURE: 97.4 F

## 2023-01-22 PROBLEM — R07.2 PRECORDIAL PAIN: Status: RESOLVED | Noted: 2023-01-21 | Resolved: 2023-01-22

## 2023-01-22 PROBLEM — I21.4 NSTEMI (NON-ST ELEVATED MYOCARDIAL INFARCTION) (HCC): Status: RESOLVED | Noted: 2023-01-20 | Resolved: 2023-01-22

## 2023-01-22 LAB
ALBUMIN SERPL-MCNC: 3.1 G/DL (ref 3.2–4.6)
ALBUMIN/GLOB SERPL: 0.9 (ref 0.4–1.6)
ALP SERPL-CCNC: 84 U/L (ref 50–136)
ALT SERPL-CCNC: 31 U/L (ref 12–65)
ANION GAP SERPL CALC-SCNC: 6 MMOL/L (ref 2–11)
AST SERPL-CCNC: 58 U/L (ref 15–37)
BASOPHILS # BLD: 0 K/UL (ref 0–0.2)
BASOPHILS NFR BLD: 0 % (ref 0–2)
BILIRUB SERPL-MCNC: 0.3 MG/DL (ref 0.2–1.1)
BUN SERPL-MCNC: 24 MG/DL (ref 8–23)
CALCIUM SERPL-MCNC: 9.8 MG/DL (ref 8.3–10.4)
CHLORIDE SERPL-SCNC: 104 MMOL/L (ref 101–110)
CO2 SERPL-SCNC: 31 MMOL/L (ref 21–32)
CREAT SERPL-MCNC: 1.1 MG/DL (ref 0.6–1)
DIFFERENTIAL METHOD BLD: ABNORMAL
EOSINOPHIL # BLD: 0.1 K/UL (ref 0–0.8)
EOSINOPHIL NFR BLD: 2 % (ref 0.5–7.8)
ERYTHROCYTE [DISTWIDTH] IN BLOOD BY AUTOMATED COUNT: 13.6 % (ref 11.9–14.6)
GLOBULIN SER CALC-MCNC: 3.3 G/DL (ref 2.8–4.5)
GLUCOSE SERPL-MCNC: 110 MG/DL (ref 65–100)
HCT VFR BLD AUTO: 36.2 % (ref 35.8–46.3)
HGB BLD-MCNC: 11.4 G/DL (ref 11.7–15.4)
IMM GRANULOCYTES # BLD AUTO: 0 K/UL (ref 0–0.5)
IMM GRANULOCYTES NFR BLD AUTO: 1 % (ref 0–5)
LYMPHOCYTES # BLD: 2.1 K/UL (ref 0.5–4.6)
LYMPHOCYTES NFR BLD: 29 % (ref 13–44)
MAGNESIUM SERPL-MCNC: 2.2 MG/DL (ref 1.8–2.4)
MCH RBC QN AUTO: 29.2 PG (ref 26.1–32.9)
MCHC RBC AUTO-ENTMCNC: 31.5 G/DL (ref 31.4–35)
MCV RBC AUTO: 92.8 FL (ref 82–102)
MONOCYTES # BLD: 0.9 K/UL (ref 0.1–1.3)
MONOCYTES NFR BLD: 13 % (ref 4–12)
NEUTS SEG # BLD: 4.1 K/UL (ref 1.7–8.2)
NEUTS SEG NFR BLD: 55 % (ref 43–78)
NRBC # BLD: 0 K/UL (ref 0–0.2)
PLATELET # BLD AUTO: 175 K/UL (ref 150–450)
PMV BLD AUTO: 11.8 FL (ref 9.4–12.3)
POTASSIUM SERPL-SCNC: 4 MMOL/L (ref 3.5–5.1)
PROT SERPL-MCNC: 6.4 G/DL (ref 6.3–8.2)
RBC # BLD AUTO: 3.9 M/UL (ref 4.05–5.2)
SODIUM SERPL-SCNC: 141 MMOL/L (ref 133–143)
WBC # BLD AUTO: 7.3 K/UL (ref 4.3–11.1)

## 2023-01-22 PROCEDURE — 99238 HOSP IP/OBS DSCHRG MGMT 30/<: CPT | Performed by: INTERNAL MEDICINE

## 2023-01-22 PROCEDURE — 83735 ASSAY OF MAGNESIUM: CPT

## 2023-01-22 PROCEDURE — 85025 COMPLETE CBC W/AUTO DIFF WBC: CPT

## 2023-01-22 PROCEDURE — 97162 PT EVAL MOD COMPLEX 30 MIN: CPT

## 2023-01-22 PROCEDURE — 80053 COMPREHEN METABOLIC PANEL: CPT

## 2023-01-22 PROCEDURE — 6370000000 HC RX 637 (ALT 250 FOR IP): Performed by: PHYSICIAN ASSISTANT

## 2023-01-22 PROCEDURE — 36415 COLL VENOUS BLD VENIPUNCTURE: CPT

## 2023-01-22 PROCEDURE — 97530 THERAPEUTIC ACTIVITIES: CPT

## 2023-01-22 PROCEDURE — 6370000000 HC RX 637 (ALT 250 FOR IP): Performed by: INTERNAL MEDICINE

## 2023-01-22 RX ORDER — CLOPIDOGREL BISULFATE 75 MG/1
75 TABLET ORAL DAILY
Qty: 90 TABLET | Refills: 3 | Status: SHIPPED | OUTPATIENT
Start: 2023-01-22

## 2023-01-22 RX ADMIN — ASPIRIN 81 MG: 81 TABLET ORAL at 09:02

## 2023-01-22 RX ADMIN — CLOPIDOGREL BISULFATE 75 MG: 75 TABLET ORAL at 09:02

## 2023-01-22 RX ADMIN — FAMOTIDINE 40 MG: 20 TABLET, FILM COATED ORAL at 09:02

## 2023-01-22 RX ADMIN — METOPROLOL SUCCINATE 100 MG: 100 TABLET, EXTENDED RELEASE ORAL at 09:02

## 2023-01-22 RX ADMIN — SENNOSIDES AND DOCUSATE SODIUM 2 TABLET: 8.6; 5 TABLET ORAL at 09:02

## 2023-01-22 RX ADMIN — MONTELUKAST 10 MG: 10 TABLET, FILM COATED ORAL at 09:02

## 2023-01-22 RX ADMIN — FLUTICASONE PROPIONATE 2 SPRAY: 50 SPRAY, METERED NASAL at 09:02

## 2023-01-22 ASSESSMENT — PAIN SCALES - GENERAL: PAINLEVEL_OUTOF10: 0

## 2023-01-22 NOTE — DISCHARGE INSTRUCTIONS
[address] [place] [address] 121 Cardiology office has been informed you need a follow up appointment after discharge. You will be called on Monday with the follow up appointment. If you have NOT heard from our office by Tuesday, please contact our office for appointment at [phone]. DISPOSITION: The patient is being discharged home in stable condition on a low saturated fat, low cholesterol and low salt diet. The patient is instructed to advance activities as tolerated to the limit of fatigue or shortness of breath. The patient is instructed to avoid all heavy lifting, straining, stooping or squatting for 3-5 days. The patient is instructed to watch the cath site for bleeding/oozing; if seen, the patient is instructed to apply firm pressure with a clean cloth and call [address] [place] [address] Cardiology at [phone]. The patient is instructed to watch for signs of infection which include: increasing area of redness, fever/hot to touch or purulent drainage at the catheterization site. The patient is instructed not to soak in a bathtub for 7-10 days, but is cleared to shower. The patient is instructed to call the office or return to the ER for immediate evaluation for any shortness of breath or chest pain not relieved by NTG.

## 2023-01-22 NOTE — PLAN OF CARE
Problem: Discharge Planning  Goal: Discharge to home or other facility with appropriate resources  Outcome: Adequate for Discharge     Problem: Chronic Conditions and Co-morbidities  Goal: Patient's chronic conditions and co-morbidity symptoms are monitored and maintained or improved  Outcome: Adequate for Discharge     Problem: Skin/Tissue Integrity  Goal: Absence of new skin breakdown  Description: 1. Monitor for areas of redness and/or skin breakdown  2. Assess vascular access sites hourly  3. Every 4-6 hours minimum:  Change oxygen saturation probe site  4. Every 4-6 hours:  If on nasal continuous positive airway pressure, respiratory therapy assess nares and determine need for appliance change or resting period.   Outcome: Adequate for Discharge     Problem: Pain  Goal: Verbalizes/displays adequate comfort level or baseline comfort level  Outcome: Adequate for Discharge  Flowsheets  Taken 1/22/2023 0023 by Zenon Rowland RN  Verbalizes/displays adequate comfort level or baseline comfort level:   Encourage patient to monitor pain and request assistance   Assess pain using appropriate pain scale   Administer analgesics based on type and severity of pain and evaluate response   Implement non-pharmacological measures as appropriate and evaluate response   Consider cultural and social influences on pain and pain management   Notify Licensed Independent Practitioner if interventions unsuccessful or patient reports new pain  Taken 1/21/2023 1955 by Zenon Rowland RN  Verbalizes/displays adequate comfort level or baseline comfort level:   Encourage patient to monitor pain and request assistance   Assess pain using appropriate pain scale   Administer analgesics based on type and severity of pain and evaluate response   Implement non-pharmacological measures as appropriate and evaluate response   Consider cultural and social influences on pain and pain management   Notify Licensed Independent Practitioner if interventions unsuccessful or patient reports new pain     Problem: Safety - Adult  Goal: Free from fall injury  Outcome: Adequate for Discharge  Flowsheets (Taken 1/21/2023 1955 by rBy Joiner RN)  Free From Fall Injury: Instruct family/caregiver on patient safety

## 2023-01-22 NOTE — PROGRESS NOTES
[facility] CARDIOLOGY PROGRESS NOTE           1/22/2023 9:07 AM    Admit Date: 1/20/2023    Subjective:   No CP, was using walker at home before admission, lives home alone, family in town. No CP now, no SOB. Wants to go home. Objective:      Vitals:    01/21/23 1941 01/21/23 2350 01/22/23 0336 01/22/23 0747   BP: 131/69 (!) 107/57 96/60 125/73   Pulse: 68 67 66 63   Resp: 17 17 18 17   Temp: 98.8 °F (37.1 °C) 97.5 °F (36.4 °C) 97.8 °F (36.6 °C) 97.3 °F (36.3 °C)   TempSrc: Oral Oral Oral Oral   SpO2: 94% 96% 92% 98%   Weight:       Height:           Physical Exam:  General-A and O x 3  Neck- supple, no JVD  CV- regular rate and rhythm no MRG  Lung- clear bilaterally  Abd- soft, nontender, nondistended  Ext- no edema bilaterally. Skin- warm and dry  Psychiatric:  Normal mood and affect. Neurologic:  Alert and oriented X 3      Data Review:   Recent Labs     01/20/23  1348 01/21/23  0426 01/22/23  0622   NA  --  143 141   K  --  4.5 4.0   MG  --   --  2.2   BUN  --  30* 24*   WBC  --  6.1 7.3   HGB  --  10.7* 11.4*   HCT  --  33.9* 36.2   PLT  --  165 175   INR 1.2  --   --          TELEMETRY:  Sinus    Assessment/Plan:     NSTEMI:  remain on DAPT, EF 30-35%  No statin due to intolerance of all statins, cont BB, no ACE/ARB due to CKD and low BP. SHF:  no Ace as above, on BB, follow as outpatient. Anemia:  better, follow  Mobility: HH and home PT.    LBBB: known LBBB, EF 45% since 2014  HPL: as above, no statin  CKD:  follow AM labs        MOUNIKA JAIN DO  1/22/2023 9:07 AM

## 2023-01-22 NOTE — CARE COORDINATION
Pt is an 79 yo female admitted due to an NSTEMI . Pt was medically cleared for dc to home today. CM consult received to arrange [facility] services. Pt was dc and left the hospital before CM could meet with her to complete CM assessment and discuss services. CM spoke with pt/dtr via phone re: the recommendation for [facility] services. Demographics, insurance and PCP confirmed. Pt is in agreement with [facility] services and expressed no preference of agency. Referral submitted for [facility] RN/PT services to [facility] since they have RN availability tomorrow. No other dc needs or concerns identified or reported. 01/22/23 7969   Service Assessment   Patient Orientation Alert and Oriented   Cognition Alert   History Provided By Patient; Child/Family;Medical Record   Primary Caregiver Self   Support Systems Children   PCP Verified by CM Yes   Last Visit to PCP Within last 6 months   Prior Functional Level Independent in ADLs/IADLs   Current Functional Level Independent in ADLs/IADLs   Can patient return to prior living arrangement Yes   Ability to make needs known: Good   Family able to assist with home care needs: Yes   Would you like for me to discuss the discharge plan with any other family members/significant others, and if so, who? No   Financial Resources [employer] None   CM/SW Referral   (No CM consult received)   Social/Functional History   Lives With Alone   Type of [address] [facility] One level   Home Access Stairs to enter with rails   Entrance Stairs - Number of Steps 3   Entrance Stairs - Rails Both   Bathroom Shower/Tub Walk-in shower; Shower chair with back   [address] [facility],[address], rolling;Rollator   [facility]   Transfer Assistance Independent   Occupation Retired   Discharge Planning   Type of [address] [place] [address] Prior To Admission None   Potential Assistance Needed Home Care   DME Ordered? No   Potential Assistance Purchasing Medications No   Type of Home Care Services PT;Nursing Services   Patient expects to be discharged to: House   One/Two Story Residence One story   Services At/After Discharge   Transition of Care Consult (CM Consult) [facility])   Reason Outside Agency Chosen Unable to staff case  (needs RN follow up within 24 hours of dc)   Services At/After Discharge PT;Nursing services   [address] Provided? No   Mode of Transport at Discharge Other (see comment)  (family)   Confirm Follow Up Transport Self   Condition of Participation: Discharge Planning   The Plan for Transition of Care is related to the following treatment goals: Home health nursing and therapy services to support pt's care and recovery in the home after dc   The Patient and/or Patient Representative was provided with a Choice of Provider? Patient   The Patient and/Or Patient Representative agree with the Discharge Plan? Yes   Freedom of Choice list was provided with basic dialogue that supports the patient's individualized plan of care/goals, treatment preferences, and shares the quality data associated with the providers?   Yes

## 2023-01-22 NOTE — THERAPY EVALUATION
ACUTE PHYSICAL THERAPY GOALS:   (Developed with and agreed upon by patient and/or caregiver.)  (1.) Tata Frederick  will move from supine to sit and sit to supine , scoot up and down, and roll side to side with MODIFIED INDEPENDENCE within 7 treatment day(s). (2.) Tata Frederick will transfer from bed to chair and chair to bed with MODIFIED INDEPENDENCE using the least restrictive device within 7 treatment day(s). (3.) Tata Frederick will ambulate with MODIFIED INDEPENDENCE for 300 feet with the least restrictive device within 7 treatment day(s). (4.) Tata Frederick will perform standing static and dynamic balance activities x 25 minutes with MODIFIED INDEPENDENCE to improve safety and activity tolerance within 7 treatment day(s). (5.) Tata Frederick will ascend and descend 3 stairs using bilateral hand rail(s) with MODIFIED INDEPENDENCE to improve functional mobility and safety within 7 treatment day(s). (6.) Tata Frederick will perform therapeutic exercises x 20 min for HEP with INDEPENDENCE to improve strength, endurance, and functional mobility within 7 treatment day(s).      PHYSICAL THERAPY Initial Assessment, Daily Note, and AM  (Link to Caseload Tracking: PT Visit Days : 1  Acknowledge Orders  Time In/Out  PT Charge Capture  Rehab Caseload Tracker    Tata Frederick is a 78 y.o. female   PRIMARY DIAGNOSIS: NSTEMI (non-ST elevated myocardial infarction) ([facility] [address]  Chest pain [R07.9]  NSTEMI (non-ST elevated myocardial infarction) ([facility] [address] [I21.4]  Procedure(s) (LRB):  LEFT HEART CATH / CORONARY ANGIOGRAPHY (N/A)  Percutaneous coronary intervention (N/A)  2 Days Post-Op  Reason for Referral: Generalized Muscle Weakness (M62.81)  Difficulty in walking, Not elsewhere classified (R26.2)  Other abnormalities of gait and mobility (R26.89)  Inpatient: Payor: MEDICARE / Plan: MEDICARE PART A AND B / Product Type: *No Product type* /     ASSESSMENT:     REHAB RECOMMENDATIONS:   Recommendation to date pending progress:  Setting:  Home Health Therapy    Equipment:    None (pt has RW and rollator which she uses; home set up accessible)     ASSESSMENT:  Ms. Bindu Quinones is an 78year old F who presented to hospital with chest pain; underwent L heart cath with PCI. PMH includes cardiac hx, orthostatic hypotension, neuropathy, and .other balance issues per pt report. This date pt performs mobility including bed mobility, sitting balance activities, sit <> stand transfers, sanding balance activities, and ambulation in room and hallway with PJ BECKETT support at [address] [place] [address]. Overall good balance control. Pt reports feeling slightly weaker than baseline, but feels she is moving well. Pt will benefit from skilled therapy services to promote return to highest level of function, independence, and safety. Will continue to follow. Recommend home with [facility] services.      [employer] [facility] [facility] 6 Clicks Basic Mobility Inpatient Short Form  -PAC Mobility Inpatient   How much difficulty turning over in bed?: None  How much difficulty sitting down on / standing up from a chair with arms?: A Little  How much difficulty moving from lying on back to sitting on side of bed?: None  How much help from another person moving to and from a bed to a chair?: None  How much help from another person needed to walk in hospital room?: A Little  How much help from another person for climbing 3-5 steps with a railing?: A Little  -formerly [facility] Inpatient Mobility Raw Score : 21  AM-PAC Inpatient T-Scale Score : 50.25  Mobility Inpatient CMS 0-100% Score: 28.97  Mobility Inpatient CMS G-Code Modifier : CJ    SUBJECTIVE:   Ms. Bindu Quinones states, \"I know to take my time\"     Social/Functional Lives With: Alone  Type of Home: House  Home Layout: One level  Home Access: Stairs to enter with rails  Entrance Stairs - Number of Steps: 3  Entrance Stairs - Rails: Both  Bathroom Shower/Tub: Walk-in shower, Shower chair with back  Home Equipment: Walker, rolling, Rollator  Has the patient had two or more falls in the past year or any fall with injury in the past year?: No    OBJECTIVE:     PAIN: Agustina Robel / O2: Julieth Justice / Jair Hopkins / Lianet Brown:   Pre Treatment: 0/10         Post Treatment: 0/10 Vitals        Oxygen      Telemetry     RESTRICTIONS/PRECAUTIONS:                    GROSS EVALUATION: Intact Impaired (Comments):   AROM [x]     PROM [x]    Strength []   Generalized weakness but functional   Balance [] Sitting - Static: Good  Sitting - Dynamic: Good  Standing - Static: Good, -  Standing - Dynamic: Fair, +   Posture [] Forward Head  Rounded Shoulders   Sensation []   Neuropathy BLE   Coordination [x]      Tone []     Edema []    Activity Tolerance []   Slightly below baseline; but functional    []      COGNITION/  PERCEPTION: Intact Impaired (Comments):   Orientation [x]     Vision [x]     Hearing [x]     Cognition  [x]       MOBILITY: I Mod I S SBA CGA Min Mod Max Total  NT x2 Comments:   Bed Mobility    Rolling [] [] [] [x] [] [] [] [] [] [] []    Supine to Sit [] [] [] [x] [] [] [] [] [] [] []    Scooting [] [] [] [x] [] [] [] [] [] [] []    Sit to Supine [] [] [] [x] [] [] [] [] [] [] []    Transfers    Sit to Stand [] [] [] [x] [] [] [] [] [] [] []    Bed to Chair [] [] [] [x] [] [] [] [] [] [] []    Stand to Sit [] [] [] [x] [] [] [] [] [] [] []     [] [] [] [] [] [] [] [] [] [] []    I=Independent, Mod I=Modified Independent, S=Supervision, SBA=Standby Assistance, CGA=Contact Guard Assistance,   Min=Minimal Assistance, Mod=Moderate Assistance, Max=Maximal Assistance, Total=Total Assistance, NT=Not Tested    GAIT: I Mod I S SBA CGA Min Mod Max Total  NT x2 Comments:   Level of Assistance [] [] [] [x] [] [] [] [] [] [] []    Distance 100 feet    DME Rolling Walker    Gait Quality Shuffling , Trunk flexion, Trunk sway increased, and Wide base of support    Weightbearing Status      Stairs      I=Independent, Mod I=Modified Independent, S=Supervision, SBA=Standby Assistance, CGA=Contact Guard Assistance,   Min=Minimal Assistance, Mod=Moderate Assistance, Max=Maximal Assistance, Total=Total Assistance, NT=Not Tested    PLAN:   [address]: 3 times/week for duration of hospital stay or until stated goals are met, whichever comes first.    THERAPY PROGNOSIS: Good    PROBLEM LIST:   (Skilled intervention is medically necessary to address:)  Decreased Activity Tolerance  Decreased Balance  Decreased Coordination  Decreased Gait Ability  Decreased Strength  Decreased Transfer Abilities INTERVENTIONS PLANNED:   (Benefits and precautions of physical therapy have been discussed with the patient.)  Self Care Training  Therapeutic Activity  Therapeutic Exercise/HEP  Neuromuscular Re-education  Gait Training  Education       TREATMENT:   EVALUATION: MODERATE COMPLEXITY: (Untimed Charge)    TREATMENT:   Therapeutic Activity (10 Minutes): Therapeutic activity included Supine to Sit, Transfer Training, Ambulation on level ground, Sitting balance , and Standing balance to improve functional Activity tolerance, Balance, Coordination, Mobility, and Strength. TREATMENT GRID:  N/A    AFTER TREATMENT PRECAUTIONS: Bed/Chair Locked, Call light within reach, Chair, Needs within reach, and RN notified    INTERDISCIPLINARY COLLABORATION:  RN/ PCT and PT/ PTA    EDUCATION: Education Given To: Patient  Education Provided: Role of Therapy;Plan of Care; Fall Prevention Strategies;Transfer Training  Education Outcome: Verbalized understanding;Continued education needed    TIME IN/OUT:  Time In: 1120  Time Out: [address]  Minutes: [address], PT

## 2023-01-23 ENCOUNTER — OFFICE VISIT (OUTPATIENT)
Dept: FAMILY MEDICINE CLINIC | Facility: CLINIC | Age: 83
End: 2023-01-23
Payer: MEDICARE

## 2023-01-23 ENCOUNTER — CARE COORDINATION (OUTPATIENT)
Dept: OTHER | Facility: CLINIC | Age: 83
End: 2023-01-23

## 2023-01-23 VITALS — OXYGEN SATURATION: 96 % | SYSTOLIC BLOOD PRESSURE: 112 MMHG | HEART RATE: 86 BPM | DIASTOLIC BLOOD PRESSURE: 70 MMHG

## 2023-01-23 DIAGNOSIS — I25.119 ATHEROSCLEROSIS OF NATIVE CORONARY ARTERY OF NATIVE HEART WITH ANGINA PECTORIS (HCC): ICD-10-CM

## 2023-01-23 DIAGNOSIS — E11.42 TYPE 2 DIABETES MELLITUS WITH DIABETIC POLYNEUROPATHY, WITHOUT LONG-TERM CURRENT USE OF INSULIN (HCC): ICD-10-CM

## 2023-01-23 DIAGNOSIS — E03.9 HYPOTHYROIDISM, UNSPECIFIED TYPE: ICD-10-CM

## 2023-01-23 DIAGNOSIS — K21.9 GASTROESOPHAGEAL REFLUX DISEASE WITHOUT ESOPHAGITIS: ICD-10-CM

## 2023-01-23 DIAGNOSIS — B37.9 CANDIDIASIS: ICD-10-CM

## 2023-01-23 DIAGNOSIS — I10 ESSENTIAL HYPERTENSION: ICD-10-CM

## 2023-01-23 DIAGNOSIS — I50.22 CHRONIC SYSTOLIC (CONGESTIVE) HEART FAILURE (HCC): ICD-10-CM

## 2023-01-23 DIAGNOSIS — I20.9 ANGINA PECTORIS, UNSPECIFIED (HCC): ICD-10-CM

## 2023-01-23 DIAGNOSIS — R19.7 DIARRHEA, UNSPECIFIED TYPE: ICD-10-CM

## 2023-01-23 DIAGNOSIS — Z09 HOSPITAL DISCHARGE FOLLOW-UP: Primary | ICD-10-CM

## 2023-01-23 PROCEDURE — G8399 PT W/DXA RESULTS DOCUMENT: HCPCS | Performed by: NURSE PRACTITIONER

## 2023-01-23 PROCEDURE — 1111F DSCHRG MED/CURRENT MED MERGE: CPT | Performed by: NURSE PRACTITIONER

## 2023-01-23 PROCEDURE — 3074F SYST BP LT 130 MM HG: CPT | Performed by: NURSE PRACTITIONER

## 2023-01-23 PROCEDURE — G8427 DOCREV CUR MEDS BY ELIG CLIN: HCPCS | Performed by: NURSE PRACTITIONER

## 2023-01-23 PROCEDURE — 1036F TOBACCO NON-USER: CPT | Performed by: NURSE PRACTITIONER

## 2023-01-23 PROCEDURE — G8484 FLU IMMUNIZE NO ADMIN: HCPCS | Performed by: NURSE PRACTITIONER

## 2023-01-23 PROCEDURE — 3044F HG A1C LEVEL LT 7.0%: CPT | Performed by: NURSE PRACTITIONER

## 2023-01-23 PROCEDURE — G8420 CALC BMI NORM PARAMETERS: HCPCS | Performed by: NURSE PRACTITIONER

## 2023-01-23 PROCEDURE — 1123F ACP DISCUSS/DSCN MKR DOCD: CPT | Performed by: NURSE PRACTITIONER

## 2023-01-23 PROCEDURE — 3078F DIAST BP <80 MM HG: CPT | Performed by: NURSE PRACTITIONER

## 2023-01-23 PROCEDURE — 99214 OFFICE O/P EST MOD 30 MIN: CPT | Performed by: NURSE PRACTITIONER

## 2023-01-23 PROCEDURE — 1090F PRES/ABSN URINE INCON ASSESS: CPT | Performed by: NURSE PRACTITIONER

## 2023-01-23 RX ORDER — NYSTATIN 100000 U/G
CREAM TOPICAL
Qty: 60 G | Refills: 0 | Status: SHIPPED | OUTPATIENT
Start: 2023-01-23

## 2023-01-23 RX ORDER — FAMOTIDINE 40 MG/1
40 TABLET, FILM COATED ORAL DAILY
Qty: 90 TABLET | Refills: 3 | Status: SHIPPED | OUTPATIENT
Start: 2023-01-23

## 2023-01-23 RX ORDER — NITROGLYCERIN 0.4 MG/1
0.4 TABLET SUBLINGUAL EVERY 5 MIN PRN
Qty: 25 TABLET | Refills: 5 | Status: SHIPPED | OUTPATIENT
Start: 2023-01-23

## 2023-01-23 RX ORDER — GREEN TEA/HOODIA GORDONII 315-12.5MG
1 CAPSULE ORAL DAILY
Qty: 90 TABLET | Refills: 0 | Status: SHIPPED | OUTPATIENT
Start: 2023-01-23 | End: 2023-02-22

## 2023-01-23 RX ORDER — PANTOPRAZOLE SODIUM 20 MG/1
20 TABLET, DELAYED RELEASE ORAL
Qty: 90 TABLET | Refills: 1 | Status: SHIPPED | OUTPATIENT
Start: 2023-01-23

## 2023-01-23 ASSESSMENT — PATIENT HEALTH QUESTIONNAIRE - PHQ9
2. FEELING DOWN, DEPRESSED OR HOPELESS: 0
SUM OF ALL RESPONSES TO PHQ QUESTIONS 1-9: 0
1. LITTLE INTEREST OR PLEASURE IN DOING THINGS: 0
SUM OF ALL RESPONSES TO PHQ QUESTIONS 1-9: 0
SUM OF ALL RESPONSES TO PHQ9 QUESTIONS 1 & 2: 0

## 2023-01-23 NOTE — CARE COORDINATION
[facility] Care Transitions Initial Follow Up Call    Call within 2 business days of discharge: Yes    Care Transition Nurse contacted the patient by telephone to perform post hospital discharge assessment. Verified name and  with patient as identifiers. Provided introduction to self, and explanation of the Care Transition Nurse role. Patient: Liv Valverde Patient : 1940   MRN: [id number]  Reason for Admission: NSTEMI (non-ST elevated myocardial infarction)   Discharge Date: 23 RARS: Readmission Risk Score: 15.4      Last Discharge  Street       Date Complaint Diagnosis Description Type Department Provider    23 Chest Pain NSTEMI (non-ST elevated myocardial infarction) ([facility] [address] . .. ED to Hosp-Admission (Discharged) (ADMITTED) Manuel Norman MD; Ade Hutchison. .. Was this an external facility discharge? No Discharge Facility: [facility]    Challenges to be reviewed by the provider   Additional needs identified to be addressed with provider: No  none               Method of communication with provider: none. Care Transition Nurse reviewed discharge instructions, medical action plan, and red flags with patient who verbalized understanding. The patient was given an opportunity to ask questions and does not have any further questions or concerns at this time. Were discharge instructions available to patient? Yes. Reviewed appropriate site of care based on symptoms and resources available to patient including: PCP  Specialist  Home health  When to call [address]  Condition related references. The patient agrees to contact the PCP office for questions related to their healthcare. Advance Care Planning:   Does patient have an Advance Directive: Has ACP docs on file . Medication reconciliation was performed with patient, who verbalizes understanding of administration of home medications.  Medications reviewed, 1111F entered: yes  Patient states they are picking up Plavix today. Was patient discharged with a pulse oximeter? no    Non-face-to-face services provided:  Scheduled appointment with PCP-Erika Cheatham on 1/23/23 at 2:30 pm as scheduled  Scheduled appointment with Specialist-Cardiology Dr. Sumi Wagoner on 2/1/23   Obtained and reviewed discharge summary and/or continuity of care documents  Education of patient/family/caregiver/guardian to support self-management-post op LHC care, activity restrictions, safety precautions, use of assistive device, medication management, self monitoring and when to seek care, and follow up appointments. CTN spoke with [facility] care staff, Anita Gooden who states that [facility] is scheduled for 1/23/23. Offered patient enrollment in the Remote Patient Monitoring (RPM) program for in-home monitoring: NA.    Care Transitions 24 Hour Call    Schedule Follow Up Appointment with PCP: Completed  Do you have a copy of your discharge instructions?: Yes  Do you have all of your prescriptions and are they filled?: No  Have you been contacted by a [facility] Pharmacist?: No  Have you scheduled your follow up appointment?: Yes  How are you going to get to your appointment?: Car - family or friend to transport  Do you have support at home?: Alone  Do you feel like you have everything you need to keep you well at home?: Yes  Are you an active caregiver in your home?: No  Care Transitions Interventions       Follow Up  Future Appointments   Date Time Provider Cristo Bauman   1/23/2023  2:30 PM BERTO Matute - CNP PST [facility]   2/1/2023 11:45 AM Sumi Wagoner MD [place] [facility]   4/10/2023 10:45 AM Sumi Wagoner MD [place] [facility]     Care Transition Nurse provided contact information. Plan for follow-up call in 5-7 days based on severity of symptoms and risk factors.   Plan for next call: self management-of medical conditions, post op LHC care, and medication management, follow up    Bran Rees RN

## 2023-01-24 ASSESSMENT — ENCOUNTER SYMPTOMS
CONSTIPATION: 0
SORE THROAT: 0
BACK PAIN: 0
ALLERGIC/IMMUNOLOGIC NEGATIVE: 1
EYE PAIN: 0
ANAL BLEEDING: 0
STRIDOR: 0
SINUS PRESSURE: 0
NAUSEA: 0
TROUBLE SWALLOWING: 0
EYE DISCHARGE: 0
EYES NEGATIVE: 1
SHORTNESS OF BREATH: 0
RESPIRATORY NEGATIVE: 1
CHEST TIGHTNESS: 0
EYE REDNESS: 0
FACIAL SWELLING: 0
COLOR CHANGE: 0
DIARRHEA: 1
VOICE CHANGE: 0
APNEA: 0
ABDOMINAL DISTENTION: 0
CHOKING: 0
VOMITING: 0
WHEEZING: 0
RECTAL PAIN: 0
COUGH: 0
RHINORRHEA: 1
ABDOMINAL PAIN: 0
SINUS PAIN: 0
PHOTOPHOBIA: 0
EYE ITCHING: 0
BLOOD IN STOOL: 0

## 2023-01-24 NOTE — PROGRESS NOTES
PROGRESS NOTE    Chief Complaint   Patient presents with    Follow-Up from Hospital     Chest pain--pt states no chest pain today but short episodes on Saturday and Sunday. Denies SOB. SUBJECTIVE:     Mira Browne is a very pleasant 78 y.o. female with hx of hypertension, hyperlipidemia, CAD-currently following cardiology, asthma-currently following pulmonology, squamous cell carcinoma of left ear, CSF leak-currently following ENT, and arthritis, seen today in office for ED/admission follow-up from 1/20-1/21. Patient initially contacted our office with complaints of intermittent chest pain, burning sensation, bloating, gas and epigastric tenderness that has been present for 3 days. Patient reports that she has taken nitro but no significant improvement. While she was on the phone with me, her chest pain worsened and she was advised to take a 325 mg aspirin and to go immediately to the ED via EMS. EKG at the ED showed no significant change in comparison to prior EKG but her troponin level was 10,997.8. She subsequently under went cardiac catheterization and was found to have a 99% stenosis of the ostial, 90% proximal stenosis and 90% mid stenosis and 90% distal stenosis. She was treated with shockwave lithotripsy and stented x3. Patient reports 2 days prior she still had intermittent mild shorter episodes of chest discomfort but none today. She does have complaints of loose stools since Thanksgiving holiday when she initially started having vomiting episode and diarrhea. Since then, she continues to have loose stools but they are improving in comparison to initial presentation. She does have gas and bloating. She reports no fever or chills, no nausea, no vomiting, no diarrhea, no shortness of breath, no palpitation, no unilateral or focal weakness, no melena, no hematochezia, no hematemesis, no hemoptysis, no hematuria.       Past Medical History, Past Surgical History, Family history, Social History, and Medications were all reviewed with the patient today and updated as necessary. Current Outpatient Medications   Medication Sig Dispense Refill    nitroGLYCERIN (NITROSTAT) 0.4 MG SL tablet Place 1 tablet under the tongue every 5 minutes as needed for Chest pain MAX OF 3 DOSES 25 tablet 5    famotidine (PEPCID) 40 MG tablet Take 1 tablet by mouth daily For stomach reflux/protection 90 tablet 3    Probiotic Acidophilus (FLORANEX) TABS Take 1 tablet by mouth daily With food for good bacteria supplement 90 tablet 0    pantoprazole (PROTONIX) 20 MG tablet Take 1 tablet by mouth every morning (before breakfast) For stomach reflux 90 tablet 1    psyllium (METAMUCIL SMOOTH TEXTURE) 28 % packet Take 1 packet by mouth daily Take with full glass of h20 or juice 30 packet 2    nystatin (MYCOSTATIN) 189253 UNIT/GM cream Apply topically 2 times daily. 60 g 0    clopidogrel (PLAVIX) 75 MG tablet Take 1 tablet by mouth daily 90 tablet 3    Handicap Placard MISC Disability placard please.  Thank you      ergocalciferol (ERGOCALCIFEROL) 1.25 MG (85850 UT) capsule Take 2,000 Units by mouth daily      triamterene-hydroCHLOROthiazide (DYAZIDE) 37.5-25 MG per capsule Take 1 capsule by mouth daily For blood pressure 90 capsule 3    montelukast (SINGULAIR) 10 MG tablet Take 1 tablet by mouth daily For cough/postnasal drip 90 tablet 3    pitavastatin (LIVALO) 4 MG TABS tablet Take 1 tablet by mouth daily For cholesterol 90 tablet 3    albuterol sulfate  (90 Base) MCG/ACT inhaler Inhale 2 puffs into the lungs every 4 hours as needed for Shortness of Breath      aspirin 81 MG EC tablet Take 81 mg by mouth daily      Cholecalciferol 50 MCG (2000 UT) CAPS Take 2,000 Units by mouth daily      fluticasone (FLONASE) 50 MCG/ACT nasal spray 2 sprays by Nasal route daily      metoprolol succinate (TOPROL XL) 100 MG extended release tablet Take 100 mg by mouth daily       No current facility-administered medications for this visit. Allergies   Allergen Reactions    Other Hives, Itching and Rash     Band aids cause contact rash after several hours. Brinzolamide Nausea Only    Ciprofloxacin      tendinitis    Eggs Or Egg-Derived Products     Hydralazine Other (See Comments)     Other reaction(s): Drowsiness/Fatigue    Yeast     Adhesive Tape Rash     Band aids cause contact rash after several hours. Band aids cause contact rash after several hours. Statins Other (See Comments) and Rash     Other reaction(s):  Other (See Comments), Other- (not listed) - Allergy  Aching muscles  Aching muscles  Aching muscles  Aching muscles      Sulfa Antibiotics Nausea Only, Other (See Comments) and Nausea And Vomiting     High fever, \"exterme nausea\"  Other reaction(s): Fever  High fever, \"exterme nausea\"       Patient Active Problem List   Diagnosis    Axonal polyneuropathy    Meniere's disease    Type 2 diabetes mellitus with diabetic polyneuropathy, without long-term current use of insulin (formerly [facility])    Orthostatic hypotension    Essential hypertension    Palpitations    CSF leak    Mixed conductive and sensorineural hearing loss of left ear with restricted hearing of right ear    CAD (coronary artery disease)    Lacunar infarction ([address]    Unsteady gait    History of AAA (abdominal aortic aneurysm) repair    Hypercholesterolemia    GERD (gastroesophageal reflux disease)    AAA (abdominal aortic aneurysm)    Osteoporosis    Type 2 diabetes with nephropathy (formerly [facility])    Vertigo    Cardiomyopathy, ischemic    Hearing loss    Chronic renal disease, stage III (formerly [facility]) [415455]    Chronic mucoid otitis media of left ear    Difficulty with speech    Dysphagia    Otorrhea of left ear    Sleep apnea    LBBB (left bundle branch block)    Angina pectoris, unspecified    Atherosclerotic heart disease of native coronary artery with unspecified angina pectoris    Chronic systolic (congestive) heart failure     Past Medical History:   Diagnosis Date Asthma     exercise induced?-- has chronic cough-- prn inhaler    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    Cardiomyopathy, ischemic 1/14/2016    Chronic vertigo     DM2 (diabetes mellitus, type 2) ([facility])     does not check glucose    GERD (gastroesophageal reflux disease)     Hearing loss     History of bilateral cataract extraction     History of coronary artery disease 2006, 2007    MI x 2 after stents placed-- with in 1 week-- stents x3    History of gallbladder disease     History of hernia repair     History of seborrheic keratosis     Hyperlipidemia     Hypertension     Lacunar infarction ([address] 8/16/2018    Meniere's disease     Menopause     Morbid obesity ([address]     OA (osteoarthritis)     Orthostatic hypotension 1/14/2016    Osteopenia with high risk of fracture 2014    Osteopenia with significant interval density loss in the hips since 2014    Post-menopausal osteoporosis     Postmenopausal osteoporosis     Unspecified sleep apnea     does not use c pap, pt. states asymptomatic since weight loss    Unsteady gait 8/16/2018    Visit for dental examination     SEVERAL YEARS AGO     Past Surgical History:   Procedure Laterality Date    BREAST SURGERY  mid 2000's    benign right breast biopsy    CARDIAC PROCEDURE N/A 1/20/2023    LEFT HEART CATH / CORONARY ANGIOGRAPHY performed by Jose Rafael Jackson MD at [address] [place] [address] N/A 1/20/2023    Percutaneous coronary intervention performed by Jose Rafael Jackson MD at [facility]  3/10    bilat and retina repair    CHOLECYSTECTOMY, OPEN  1979    HERNIA REPAIR      multiple surgery site- midline, umbilical    HYSTERECTOMY (CERVIX STATUS UNKNOWN)  1997    MASTOIDECTOMY Left     repair of leaking csf into middle ear    ORTHOPEDIC SURGERY      left  -- hammer toes    OVARY REMOVAL  1982     and cyst    DC UNLISTED PROCEDURE CARDIAC SURGERY      heart cath x 4, last 2007, stents x 3, last 2006     Family History   Problem Relation Age of Onset    Diabetes Brother     Stroke Paternal Aunt     Breast Cancer Neg Hx     Heart Disease Paternal Uncle     Heart Disease Father     Stroke Paternal Grandmother     Heart Disease Maternal Grandfather     Stroke Maternal Grandfather     Heart Disease Paternal Grandmother     Heart Attack Father 76        mi    Hypertension Mother      Social History     Tobacco Use    Smoking status: Never    Smokeless tobacco: Never   Substance Use Topics    Alcohol use: No         REVIEW OF SYSTEM    Review of Systems   Constitutional: Negative. Negative for activity change, appetite change, chills, diaphoresis, fatigue, fever and unexpected weight change. HENT:  Positive for congestion (Slight congestion) and rhinorrhea. Negative for dental problem, drooling, ear discharge, ear pain, facial swelling, hearing loss, mouth sores, nosebleeds, postnasal drip, sinus pressure, sinus pain, sneezing, sore throat, tinnitus, trouble swallowing and voice change. Eyes: Negative. Negative for photophobia, pain, discharge, redness, itching and visual disturbance. Respiratory: Negative. Negative for apnea, cough, choking, chest tightness, shortness of breath, wheezing and stridor. Cardiovascular: Negative. Negative for chest pain, palpitations and leg swelling. Gastrointestinal:  Positive for diarrhea. Negative for abdominal distention, abdominal pain, anal bleeding, blood in stool, constipation, nausea, rectal pain and vomiting. Bloating and reflux symptom   Endocrine: Negative. Negative for cold intolerance, heat intolerance, polydipsia, polyphagia and polyuria. Genitourinary: Negative. Negative for decreased urine volume, difficulty urinating, dysuria, enuresis, flank pain, frequency, genital sores, hematuria and urgency. Musculoskeletal: Negative. Negative for arthralgias, back pain, gait problem, joint swelling, myalgias, neck pain and neck stiffness. Skin: Negative.   Negative for color change, pallor, rash and wound.   Allergic/Immunologic: Negative.  Negative for environmental allergies, food allergies and immunocompromised state.   Neurological:  Positive for dizziness (Intermittent dizziness associated with sinus pressure and ear fullness). Negative for tremors, seizures, syncope, facial asymmetry, speech difficulty, weakness, light-headedness, numbness and headaches.   Hematological: Negative.  Negative for adenopathy. Does not bruise/bleed easily.   Psychiatric/Behavioral: Negative.  Negative for agitation, behavioral problems, confusion, decreased concentration, dysphoric mood, hallucinations, self-injury, sleep disturbance and suicidal ideas. The patient is not nervous/anxious and is not hyperactive.      OBJECTIVE:  /70   Pulse 86   SpO2 96%      Physical Exam  Constitutional:       General: She is not in acute distress.     Appearance: Normal appearance. She is not ill-appearing, toxic-appearing or diaphoretic.      Comments: Arrives to office via wheelchair   HENT:      Head: Normocephalic and atraumatic.      Right Ear: Tympanic membrane, ear canal and external ear normal. There is no impacted cerumen.      Left Ear: Tympanic membrane, ear canal and external ear normal. There is no impacted cerumen.      Nose: Rhinorrhea present.      Mouth/Throat:      Mouth: Mucous membranes are moist.      Pharynx: Oropharynx is clear. No oropharyngeal exudate or posterior oropharyngeal erythema.   Eyes:      Extraocular Movements: Extraocular movements intact.      Conjunctiva/sclera: Conjunctivae normal.      Pupils: Pupils are equal, round, and reactive to light.   Neck:      Vascular: No carotid bruit.   Cardiovascular:      Rate and Rhythm: Normal rate and regular rhythm.      Pulses: Normal pulses.      Heart sounds: Normal heart sounds.   Pulmonary:      Effort: Pulmonary effort is normal. No respiratory distress.      Breath sounds: Normal breath sounds. No stridor. No wheezing,  rhonchi or rales. Chest:      Chest wall: No tenderness. Abdominal:      General: Abdomen is flat. Bowel sounds are normal. There is no distension. Palpations: Abdomen is soft. There is no shifting dullness, fluid wave, hepatomegaly, splenomegaly, mass or pulsatile mass. Tenderness: There is no abdominal tenderness. There is no right CVA tenderness, left CVA tenderness, guarding or rebound. Negative signs include Corbin's sign, Rovsing's sign, McBurney's sign, psoas sign and obturator sign. Hernia: No hernia is present. Musculoskeletal:         General: Normal range of motion. Cervical back: Normal range of motion and neck supple. No rigidity or tenderness. Lymphadenopathy:      Cervical: No cervical adenopathy. Skin:     General: Skin is warm and dry. Capillary Refill: Capillary refill takes less than 2 seconds. Findings: Rash present. Comments: Skin to face with scattered hypertrophic slightly yellowish candidal appearing rash to nose and to left lateral cheek and to forehead   Neurological:      General: No focal deficit present. Mental Status: She is alert and oriented to person, place, and time. Psychiatric:         Mood and Affect: Mood normal.         Behavior: Behavior normal.         Thought Content: Thought content normal.         Judgment: Judgment normal.         Medical problems and test results were reviewed with the patient today.      Lab Results   Component Value Date    WBC 7.3 01/22/2023    HGB 11.4 (L) 01/22/2023    HCT 36.2 01/22/2023    MCV 92.8 01/22/2023     01/22/2023     Lab Results   Component Value Date     01/22/2023    K 4.0 01/22/2023     01/22/2023    CO2 31 01/22/2023    BUN 24 (H) 01/22/2023    CREATININE 1.10 (H) 01/22/2023    GLUCOSE 110 (H) 01/22/2023    CALCIUM 9.8 01/22/2023    PROT 6.4 01/22/2023    LABALBU 3.1 (L) 01/22/2023    BILITOT 0.3 01/22/2023    ALKPHOS 84 01/22/2023    AST 58 (H) 01/22/2023    ALT 31 01/22/2023    LABGLOM 50 (L) 01/22/2023    GFRAA 50 (L) 07/24/2022    AGRATIO 1.8 03/04/2022    GLOB 3.3 01/22/2023       Hemoglobin A1C   Date Value Ref Range Status   01/16/2023 6.2 (H) 4.8 - 5.6 % Final     Lab Results   Component Value Date    CHOL 170 01/16/2023    CHOL 133 10/10/2022    CHOL 156 03/04/2022     Lab Results   Component Value Date    TRIG 149 01/16/2023    TRIG 179 (H) 10/10/2022    TRIG 185 (H) 03/04/2022     Lab Results   Component Value Date    HDL 54 01/16/2023    HDL 40 10/10/2022    HDL 40 03/04/2022     Lab Results   Component Value Date    LDLCALC 86.2 01/16/2023    LDLCALC 57.2 10/10/2022    LDLCALC 84 03/04/2022     Lab Results   Component Value Date    LABVLDL 29.8 (H) 01/16/2023    LABVLDL 35.8 (H) 10/10/2022    LABVLDL 35 10/10/2019    VLDL 32 03/04/2022    VLDL 33 11/29/2021    VLDL 37 08/18/2021     Lab Results   Component Value Date    CHOLHDLRATIO 3.1 01/16/2023    CHOLHDLRATIO 3.3 10/10/2022     Lab Results   Component Value Date/Time    MG 2.2 01/22/2023 06:22 AM           ASSESSMENT and PLAN    1. Hospital discharge follow-up  -     OR DISCHARGE MEDS RECONCILED W/ CURRENT OUTPATIENT MED LIST    2. Angina pectoris, unspecified  -     nitroGLYCERIN (NITROSTAT) 0.4 MG SL tablet; Place 1 tablet under the tongue every 5 minutes as needed for Chest pain MAX OF 3 DOSES, Disp-25 tablet, R-5Normal    Significantly improved since hospitalization. Now resolved. Patient to keep her follow-up appointment with cardiology for monitoring and treatment recommendation. Patient to return back to the ED with any persistent chest pain or shortness of breath or palpitation. 3. Atherosclerosis of native coronary artery of native heart with angina pectoris (HCC)  -     nitroGLYCERIN (NITROSTAT) 0.4 MG SL tablet; Place 1 tablet under the tongue every 5 minutes as needed for Chest pain MAX OF 3 DOSES, Disp-25 tablet, R-5Normal    As above.     4. Chronic systolic (congestive) heart failure   As above.    5. Diarrhea, unspecified type  -     Probiotic Acidophilus (FLORANEX) TABS; Take 1 tablet by mouth daily With food for good bacteria supplement, Disp-90 tablet, R-0Normal  -     psyllium (METAMUCIL SMOOTH TEXTURE) 28 % packet; Take 1 packet by mouth daily Take with full glass of h20 or juice, Disp-30 packet, R-2Normal  -     Culture, Stool; Future  -     Ova and Parasite Examination; Future  -     Clostridium Difficile Toxin/Antigen; Future  -     Culture, Yersinia, Stool; Future  -     Fecal lactoferrin; Future    Patient with initial presentation of nausea, vomiting, no diarrhea back in Thanksgiving holiday. Since then, she has had intermittent loose stools and bloating along with gas. She reports no fever or chills. She continues to take famotidine with minimal relief. Due to length of timeframe for symptom presentation, concerning for infectious of nature for loose stools. We will complete stool studies as above. Meanwhile, we will have patient start probiotic supplementation and fiber supplementation to bulk up stools. We will hold antidiarrheals at this time due to concern for infection. CBC is stable with last hemoglobin around 11.4. Patient reports no melena or other signs or symptoms of bleeding. Advised patient to stay hydrated. 6. Candidiasis  -     nystatin (MYCOSTATIN) 878668 UNIT/GM cream; Apply topically 2 times daily. , Disp-60 g, R-0, Normal    Patient with a scattered rash to face that appears consistent with fungal infection. Will prescribe nystatin topical for treatment. 7. Hypothyroidism, unspecified type   Last TSH is 4.68 with a normal free T4. No changes in medication regimen at this time. 8. Type 2 diabetes mellitus with diabetic polyneuropathy, without long-term current use of insulin ([facility])   Last A1c was improved at 6.2. No new medication at this time. We will have patient continue current therapy.     9. Essential hypertension   Blood pressure is stable at 112/70. No new meds at this time. Continue with current regimen    10. Gastroesophageal reflux disease without esophagitis  -     famotidine (PEPCID) 40 MG tablet; Take 1 tablet by mouth daily For stomach reflux/protection, Disp-90 tablet, R-3Normal  -     pantoprazole (PROTONIX) 20 MG tablet; Take 1 tablet by mouth every morning (before breakfast) For stomach reflux, Disp-90 tablet, R-1Normal    Pt has been on famotidine 40 mg at bedtime but reports no significant improvement in symptoms. Will add low dose protonix for management. Orders Placed This Encounter   Procedures    Culture, Stool     Standing Status:   Future     Standing Expiration Date:   1/23/2024    Ova and Parasite Examination     Standing Status:   Future     Standing Expiration Date:   1/23/2024    Clostridium Difficile Toxin/Antigen     Standing Status:   Future     Standing Expiration Date:   1/23/2024    Culture, Yersinia, Stool     Standing Status:   Future     Standing Expiration Date:   1/23/2024    Fecal lactoferrin     Standing Status:   Future     Standing Expiration Date:   1/23/2024    ID DISCHARGE MEDS RECONCILED W/ CURRENT OUTPATIENT MED LIST         Elements of this note have been dictated using speech recognition software. As a result, errors of speech recognition may have occurred. On this date 1/23/2023 I have spent 30 minutes reviewing previous notes, test results and face to face with the patient discussing the diagnosis and importance of compliance with the treatment plan as well as documenting on the day of the visit. Greater than 50% of this visit was spent counseling the patient about test results, prognosis, importance of compliance, education about disease process, benefits of medications, instructions for management of acute symptoms, and follow up plans. Return in about 3 months (around 4/23/2023) for Follow up with fasting labs prior.      Dale Hart, APRN - CNP

## 2023-01-26 NOTE — PROGRESS NOTES
[facility] CARDIOLOGY  [address], [address]  PHONE: [phone]      23    NAME:  Fabian Pemberton  : 1940  MRN: [id number]         SUBJECTIVE:   Fabian Pemberton is a 78 y.o. female seen for a follow up visit regarding the following:     Chief Complaint   Patient presents with    Coronary Artery Disease    Hypertension    Hyperlipidemia            HPI:  Follow up  Coronary Artery Disease, Hypertension, and Hyperlipidemia   . CAD,  chronic LBBB, statin intolerance,orthostatic hypotension with resting hypertension, dyslipidemia, NICM with concomitant CAD,  AAA, CSF leak, returns today post NSTEMI. Patient was found to have a 99% stenosis of the ostial->dRCA (99% ostial stenosis 90% proximal stenosis 90% mid stenosis 90% distal stenosis) that was treated with shockwave lithotripsy (all 80 impulses were utilized and the ostium and proximal right coronary artery due to severe calcification) and then stented with a 3.5 x 38mm Jaylen CALOS followed by an additional 3.5 x 38mm Jaylen CALOS followed by an additional 3.5 x 22mm [place]wald CALOS . EF 30-35% with inferior HK, mod MR      She felt ok yesterday, the day prior she continued with the chronic ear and head fullness, continues off and on. No further anginal discomfort. Has a cough though also has sinus congestion, her weight at home this morning was up between 2-3 lbs. She's not had fever but has had flushing intermittently. Cough is non productive. No peripheral edema or abdominal bloating. Appetite is so-so but she says that's no different than previous. Past cardiac history:   Remote coronary stents   Treatment limited by orthostatic hypotension   Medical therapy of an obstructed PDA, 40% LAD by cath . Stress nuclear perfusion scan with anterolateral scar, minimal per-scar ischemia with normal wall motion and EF 68% in 2010. Not significantly changed 12.    2014 - patent stents, EF 45% (50-55 by echo)   FEb 2015 - Soy Skyler - fixed inferolateral defect, inferior HK, EF 56%   Jun 2016- EF 50-55%, mild to mod MR   Oct 2018- EF 49%, no significant valve disease   Nov 2018- infrarenal AAA 3.8 cm   Oct 2019- EF 45-50%, no significant valve disease. CT - AAA 3.5    Jul 2020- EF 50%, LBBB with septal wma slightly more marked at the apex (no significant change)   Lexiscan - inferolateral infarction, EF 39%   Mar 2021- Carotid doppler - < 50% TREVOR. LICA  EDV 43, ratio 2.2   Jul 2021- 3 day monitor - SR with IVCD. 3 brief episodes of SVT with aberrancy, longest 19 seconds. Only one diary entry occurs during NSR. No afib    Sep 2021- 4.2 cm   Carotid < 50% bilaterally    Jan 2022 - COVID infection  Jan 2023       NSTEMI - severe diffuse RCA disease, shockwave lithotripsy with 3.5 x 38 [place] x 2 and a 3rd 3.5 x 22 Jaylen. Echo - EF 30-35%, inferior wma, mod MR             [place] CAD CHF Meds            metoprolol succinate (TOPROL XL) 100 MG extended release tablet (Taking)    Sig - Route: Take 1 tablet by mouth daily - Oral    furosemide (LASIX) 20 MG tablet (Taking)    Sig - Route: Take 1 tablet by mouth daily as needed (weight gain of 2 lbs in 24 hours or 5 lbs in a week-if you do this more than 2 days in a row, call cardiologist) - Oral    triamterene-hydroCHLOROthiazide (DYAZIDE) 37.5-25 MG per capsule (Taking)    Sig - Route: Take 1 capsule by mouth daily For blood pressure - Oral    pitavastatin (LIVALO) 4 MG TABS tablet (Taking)    Sig - Route: Take 1 tablet by mouth daily For cholesterol - Oral          Key Antihyperglycemic Medications       Patient is on no antihyperglycemic meds. Past Medical History, Past Surgical History, Family history, Social History, and Medications were all reviewed with the patient today and updated as necessary. Prior to Admission medications    Medication Sig Start Date End Date Taking?  Authorizing Provider   metoprolol succinate (TOPROL XL) 100 MG extended release tablet Take 1 tablet by mouth daily 1/27/23  Yes Ryan Cantu MD   furosemide (LASIX) 20 MG tablet Take 1 tablet by mouth daily as needed (weight gain of 2 lbs in 24 hours or 5 lbs in a week-if you do this more than 2 days in a row, call cardiologist) 1/27/23  Yes Ryan Cantu MD   nitroGLYCERIN (NITROSTAT) 0.4 MG SL tablet Place 1 tablet under the tongue every 5 minutes as needed for Chest pain MAX OF 3 DOSES 1/23/23  Yes BERTO Lopez CNP   Probiotic Acidophilus [facility]) TABS Take 1 tablet by mouth daily With food for good bacteria supplement 1/23/23 2/22/23 Yes BERTO Lopez CNP   pantoprazole (PROTONIX) 20 MG tablet Take 1 tablet by mouth every morning (before breakfast) For stomach reflux 1/23/23  Yes BERTO Lopez CNP   psyllium (METAMUCIL SMOOTH TEXTURE) 28 % packet Take 1 packet by mouth daily Take with full glass of h20 or juice 1/23/23  Yes BERTO Lopez CNP   nystatin (MYCOSTATIN) 774958 UNIT/GM cream Apply topically 2 times daily. 1/23/23  Yes BERTO Lopez CNP   clopidogrel (PLAVIX) 75 MG tablet Take 1 tablet by mouth daily 1/22/23  Yes BERTO Gerard CNP   Handicap Placard MISC Disability placard please.  Thank you 10/18/21  Yes Historical Provider, MD   Ergocalciferol 50 MCG (2000 UT) CAPS Take 2,000 Units by mouth daily   Yes Historical Provider, MD   triamterene-hydroCHLOROthiazide (DYAZIDE) 37.5-25 MG per capsule Take 1 capsule by mouth daily For blood pressure 7/13/22  Yes BERTO Lopez CNP   montelukast (SINGULAIR) 10 MG tablet Take 1 tablet by mouth daily For cough/postnasal drip 7/13/22  Yes BERTO Lopez CNP   pitavastatin (LIVALO) 4 MG TABS tablet Take 1 tablet by mouth daily For cholesterol 7/13/22  Yes BERTO Lopez CNP   albuterol sulfate  (90 Base) MCG/ACT inhaler Inhale 2 puffs into the lungs every 4 hours as needed for Shortness of Breath 10/29/20  Yes Ar Automatic Reconciliation   aspirin 81 MG EC tablet Take 81 mg by mouth daily   Yes Ar Automatic Reconciliation   Cholecalciferol 50 MCG (2000 UT) CAPS Take 2,000 Units by mouth daily   Yes Ar Automatic Reconciliation   fluticasone (FLONASE) 50 MCG/ACT nasal spray 2 sprays by Nasal route daily 3/4/22  Yes Ar Automatic Reconciliation   famotidine (PEPCID) 40 MG tablet Take 1 tablet by mouth daily For stomach reflux/protection 1/23/23   Radhaher Espinal, APRN - CNP     Allergies   Allergen Reactions    Other Hives, Itching and Rash     Band aids cause contact rash after several hours. Brinzolamide Nausea Only    Ciprofloxacin      tendinitis    Eggs Or Egg-Derived Products     Hydralazine Other (See Comments)     Other reaction(s): Drowsiness/Fatigue    Yeast     Adhesive Tape Rash     Band aids cause contact rash after several hours. Band aids cause contact rash after several hours. 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    Cardiomyopathy, ischemic 1/14/2016    Chronic vertigo     DM2 (diabetes mellitus, type 2) ([facility]     does not check glucose    GERD (gastroesophageal reflux disease)     Hearing loss     History of bilateral cataract extraction     History of coronary artery disease 2006, 2007    MI x 2 after stents placed-- with in 1 week-- stents x3    History of gallbladder disease     History of hernia repair     History of seborrheic keratosis     Hyperlipidemia     Hypertension     Lacunar infarction ([facility] [address] 8/16/2018    Meniere's disease     Menopause Morbid obesity ([address]     OA (osteoarthritis)     Orthostatic hypotension 1/14/2016    Osteopenia with high risk of fracture 2014    Osteopenia with significant interval density loss in the hips since 2014    Post-menopausal osteoporosis     Postmenopausal osteoporosis     Unspecified sleep apnea     does not use c pap, pt. states asymptomatic since weight loss    Unsteady gait 8/16/2018    Visit for dental examination     SEVERAL YEARS AGO     Past Surgical History:   Procedure Laterality Date    BREAST SURGERY  mid 2000's    benign right breast biopsy    CARDIAC PROCEDURE N/A 1/20/2023    LEFT HEART CATH / CORONARY ANGIOGRAPHY performed by Herb Napoles MD at [address] [place] [address] N/A 1/20/2023    Percutaneous coronary intervention performed by Herb [facility]oles MD at [facility]  3/10    bilat and retina repair    CHOLECYSTECTOMY, OPEN  1979    HERNIA REPAIR      multiple surgery site- midline, umbilical    HYSTERECTOMY (CERVIX STATUS UNKNOWN)  1997    MASTOIDECTOMY Left     repair of leaking csf into middle ear    ORTHOPEDIC SURGERY      left  -- hammer toes    OVARY REMOVAL  1982     and cyst    IN UNLISTED PROCEDURE CARDIAC SURGERY      heart cath x 4, last 2007, stents x 3, last 2006     Family History   Problem Relation Age of Onset    Diabetes Brother     Stroke Paternal Aunt     Breast Cancer Neg Hx     Heart Disease Paternal Uncle     Heart Disease Father     Stroke Paternal Grandmother     Heart Disease Maternal Grandfather     Stroke Maternal Grandfather     Heart Disease Paternal Grandmother     Heart Attack Father 76        mi    Hypertension Mother      Social History     Tobacco Use    Smoking status: Never    Smokeless tobacco: Never   Substance Use Topics    Alcohol use: No       ROS:    Review of Systems   Constitutional: Positive for malaise/fatigue and weight gain. HENT:  Positive for congestion. Neurological:  Positive for vertigo. PHYSICAL EXAM:   BP (!) 158/78   Pulse 54   Ht 5' 6\" (1.676 m)   Wt 158 lb (71.7 kg)   BMI 25.50 kg/m²      Wt Readings from Last 3 Encounters:   01/27/23 158 lb (71.7 kg)   01/20/23 153 lb (69.4 kg)   11/02/22 154 lb (69.9 kg)     BP Readings from Last 3 Encounters:   01/27/23 (!) 158/78   01/23/23 112/70   01/22/23 (!) 141/71     Pulse Readings from Last 3 Encounters:   01/27/23 54   01/23/23 86   01/22/23 68           Physical Exam    Medical problems and test results were reviewed with the patient today.      DATA REVIEW    LIPID PANEL     Lab Results   Component Value Date    CHOL 170 01/16/2023    CHOL 133 10/10/2022    CHOL 156 03/04/2022     Lab Results   Component Value Date    TRIG 149 01/16/2023    TRIG 179 (H) 10/10/2022    TRIG 185 (H) 03/04/2022     Lab Results   Component Value Date    HDL 54 01/16/2023    HDL 40 10/10/2022    HDL 40 03/04/2022     Lab Results   Component Value Date    LDLCALC 86.2 01/16/2023    LDLCALC 57.2 10/10/2022    LDLCALC 84 03/04/2022     Lab Results   Component Value Date    LABVLDL 29.8 (H) 01/16/2023    LABVLDL 35.8 (H) 10/10/2022    LABVLDL 35 10/10/2019    VLDL 32 03/04/2022    VLDL 33 11/29/2021    VLDL 37 08/18/2021     Lab Results   Component Value Date    CHOLHDLRATIO 3.1 01/16/2023    CHOLHDLRATIO 3.3 10/10/2022     Lab Results   Component Value Date    WBC 7.3 01/22/2023    HGB 11.4 (L) 01/22/2023    HCT 36.2 01/22/2023    MCV 92.8 01/22/2023     01/22/2023     Hemoglobin A1C   Date Value Ref Range Status   01/16/2023 6.2 (H) 4.8 - 5.6 % Final     BMP  Lab Results   Component Value Date/Time     01/22/2023 06:22 AM    K 4.0 01/22/2023 06:22 AM     01/22/2023 06:22 AM    CO2 31 01/22/2023 06:22 AM    BUN 24 01/22/2023 06:22 AM    CREATININE 1.10 01/22/2023 06:22 AM    GLUCOSE 110 01/22/2023 06:22 AM    CALCIUM 9.8 01/22/2023 06:22 AM          EKG        CXR/IMAGING        DEVICE INTERROGATION        OUTSIDE RECORDS REVIEW    Records from outside providers have been reviewed and summarized as noted in the HPI, past history and data review sections of this note, and reviewed with patient. .       ASSESSMENT and PLAN    Germania Rodgers was seen today for coronary artery disease, hypertension and hyperlipidemia. Diagnoses and all orders for this visit:    NSTEMI (non-ST elevated myocardial infarction) [facility])  -     External Referral To Cardiac Rehab    Coronary artery disease of native heart with stable angina pectoris, unspecified vessel or lesion type ([address]    Essential hypertension    Cardiomyopathy, ischemic    LBBB (left bundle branch block)    Orthostatic hypotension    History of AAA (abdominal aortic aneurysm) repair    Acute on chronic HFrEF (heart failure with reduced ejection fraction) ([facility])  -     Transthoracic echocardiogram (TTE) limited with contrast, bubble, strain, and 3D PRN; Future    Statin intolerance    Other orders  -     metoprolol succinate (TOPROL XL) 100 MG extended release tablet; Take 1 tablet by mouth daily  -     furosemide (LASIX) 20 MG tablet; Take 1 tablet by mouth daily as needed (weight gain of 2 lbs in 24 hours or 5 lbs in a week-if you do this more than 2 days in a row, call cardiologist)        IMPRESSION:    Improving post NSTEMI with severe diffuse RCA disease now heavily stented and acute on chronic hfref. Tolerates only minimal statin therapy with pitavastatin. LDL near goal on that. Better use of her resources at this point would be SGLT2i therapy once we determine how she heals with limited echo in 45 days    2+ lb weight gain in 24 hours. Lasix x 1 dose then stop. Must call me if she takes it more than 2 days in a row or in a week. Getting HH with PT for now for other issues, once done should begin cardiac rehab, referred to [facility]    Reviewed fluid goals, restrictions    Longstanding CSF leak with vertigo, congestion.  Suspect cough is more congestion related but with weight gain will treat with single dose lasix as noted    Repeat limited echo 45 days for consideration of device therapy        Return for AFTER PROCEDURE TO REVIEW RESULTS. Thank you for allowing me to participate in this patient's care. Please call or contact me if there are any questions or concerns regarding the above.       Iraj Mcallister MD  01/27/23  10:50 AM

## 2023-01-27 ENCOUNTER — OFFICE VISIT (OUTPATIENT)
Dept: CARDIOLOGY CLINIC | Age: 83
End: 2023-01-27
Payer: MEDICARE

## 2023-01-27 VITALS
BODY MASS INDEX: 25.39 KG/M2 | HEIGHT: 66 IN | DIASTOLIC BLOOD PRESSURE: 78 MMHG | SYSTOLIC BLOOD PRESSURE: 158 MMHG | WEIGHT: 158 LBS | HEART RATE: 54 BPM

## 2023-01-27 DIAGNOSIS — Z78.9 STATIN INTOLERANCE: ICD-10-CM

## 2023-01-27 DIAGNOSIS — I95.1 ORTHOSTATIC HYPOTENSION: ICD-10-CM

## 2023-01-27 DIAGNOSIS — Z98.890 HISTORY OF AAA (ABDOMINAL AORTIC ANEURYSM) REPAIR: ICD-10-CM

## 2023-01-27 DIAGNOSIS — I25.118 CORONARY ARTERY DISEASE OF NATIVE HEART WITH STABLE ANGINA PECTORIS, UNSPECIFIED VESSEL OR LESION TYPE (HCC): ICD-10-CM

## 2023-01-27 DIAGNOSIS — I10 ESSENTIAL HYPERTENSION: ICD-10-CM

## 2023-01-27 DIAGNOSIS — I44.7 LBBB (LEFT BUNDLE BRANCH BLOCK): ICD-10-CM

## 2023-01-27 DIAGNOSIS — I50.23 ACUTE ON CHRONIC HFREF (HEART FAILURE WITH REDUCED EJECTION FRACTION) (HCC): ICD-10-CM

## 2023-01-27 DIAGNOSIS — I21.4 NSTEMI (NON-ST ELEVATED MYOCARDIAL INFARCTION) (HCC): Primary | ICD-10-CM

## 2023-01-27 DIAGNOSIS — I25.5 CARDIOMYOPATHY, ISCHEMIC: ICD-10-CM

## 2023-01-27 PROCEDURE — G8417 CALC BMI ABV UP PARAM F/U: HCPCS | Performed by: INTERNAL MEDICINE

## 2023-01-27 PROCEDURE — G8427 DOCREV CUR MEDS BY ELIG CLIN: HCPCS | Performed by: INTERNAL MEDICINE

## 2023-01-27 PROCEDURE — 1090F PRES/ABSN URINE INCON ASSESS: CPT | Performed by: INTERNAL MEDICINE

## 2023-01-27 PROCEDURE — 1123F ACP DISCUSS/DSCN MKR DOCD: CPT | Performed by: INTERNAL MEDICINE

## 2023-01-27 PROCEDURE — G8399 PT W/DXA RESULTS DOCUMENT: HCPCS | Performed by: INTERNAL MEDICINE

## 2023-01-27 PROCEDURE — 3078F DIAST BP <80 MM HG: CPT | Performed by: INTERNAL MEDICINE

## 2023-01-27 PROCEDURE — 1036F TOBACCO NON-USER: CPT | Performed by: INTERNAL MEDICINE

## 2023-01-27 PROCEDURE — 99214 OFFICE O/P EST MOD 30 MIN: CPT | Performed by: INTERNAL MEDICINE

## 2023-01-27 PROCEDURE — 3077F SYST BP >= 140 MM HG: CPT | Performed by: INTERNAL MEDICINE

## 2023-01-27 PROCEDURE — 1111F DSCHRG MED/CURRENT MED MERGE: CPT | Performed by: INTERNAL MEDICINE

## 2023-01-27 PROCEDURE — G8484 FLU IMMUNIZE NO ADMIN: HCPCS | Performed by: INTERNAL MEDICINE

## 2023-01-27 RX ORDER — FUROSEMIDE 20 MG/1
20 TABLET ORAL DAILY PRN
Qty: 90 TABLET | Refills: 0 | Status: SHIPPED | OUTPATIENT
Start: 2023-01-27

## 2023-01-27 RX ORDER — METOPROLOL SUCCINATE 100 MG/1
100 TABLET, EXTENDED RELEASE ORAL DAILY
Qty: 90 TABLET | Refills: 3 | Status: SHIPPED | OUTPATIENT
Start: 2023-01-27

## 2023-01-30 ENCOUNTER — CARE COORDINATION (OUTPATIENT)
Dept: OTHER | Facility: CLINIC | Age: 83
End: 2023-01-30

## 2023-01-30 NOTE — CARE COORDINATION
[facility] Care Transitions Follow Up Call    Care Transition Nurse contacted the patient by telephone to follow up after admission on 23. Verified name and  with patient as identifiers. Patient: Robby Arauz  Patient : 1940   MRN: [id number]  Reason for Admission: NSTEMI (non-ST elevated myocardial infarction)  Discharge Date: 23 RARS: Readmission Risk Score: 15.4    Needs to be reviewed by the provider   Additional needs identified to be addressed with provider: Yes  none             Method of communication with provider: none. Addressed changes since last contact:   Patient completed follow up with PCP. Patient was started on Probiotic, Metamucil, Protonix, and Nystatin. Patient reports she is doing better. Patient reports diarrhea has resolved and she is having formed stools. Patient denies any new or worsening symptoms. Patient states she is eating okay and voiding with out problems. Patient endorses compliance with medications. Patient completed follow up with Cardiology and reports cath site is healed. Patient is currently engaged with [facility] and plans for Cardiac Rehab once [facility] is completed. Discussed follow-up appointments. If no appointment was previously scheduled, appointment scheduling offered: Yes. Is follow up appointment scheduled within 7 days of discharge? Yes. Follow Up  Future Appointments   Date Time Provider Cristo Bauman   3/16/2023 10:30 AM CORINNA FREEMAN ECHO [address] [facility] AMB   3/23/2023 10:45 AM MD BHAVYA Mistry [facility] AMB   2023  2:30 PM PST LAB PST [facility] AMB   2023 10:30 AM BERTO Henry - CNP PST [facility] AMB     Non-[facility] follow up appointment(s):   ENT follow up on 2/10/23 per patient. Care Transition Nurse reviewed medical action plan and red flags with patient and discussed any barriers to care and/or understanding of plan of care after discharge.  Discussed appropriate site of care based on symptoms and resources available to patient including: PCP  Specialist  Home health  When to call [address] The patient agrees to contact the PCP office for questions related to their healthcare. Advance Care Planning:   Has ACP docs . Patients top risk factors for readmission: medical condition-NSTEMI, CP, LBBB S/P LHC with PCI, CRD, Sleep Apnea, CAD, Hx. Of AAA repai, DM, HTN, CSF leak with Vertigo, Cardiomyopathy, HLD, GERD, CHF, Hypothyroidism   Interventions to address risk factors: Obtained and reviewed discharge summary and/or continuity of care documents and Education of patient/family/caregiver/guardian to support self-management-post op care, management of medical conditions, medication management and adherence, safety precautions, self monitoring, daily weights, and when to seek care. Patient continues to monitor her daily weights. Patient is engaged with [facility] home care. Patient given an opportunity to ask questions and does not have any further questions or concerns at this time. The patient is able to contact the PCP/Specialist office for questions related to their healthcare, or if her condition worsens, changes or fails to improve as anticipated.      Offered patient enrollment in the Remote Patient Monitoring (RPM) program for in-home monitoring: NA.     Care Transitions Subsequent and Final Call    Schedule Follow Up Appointment with PCP: Completed  Subsequent and Final Calls  Do you have any ongoing symptoms?: No  Have your medications changed?: Yes  Patient Reports: Probiotic, metamucil, mystatin, Protonix were added by PCP  Do you have any questions related to your medications?: No  Do you currently have any active services?: Yes  Are you currently active with any services?: Home Health  Do you have any needs or concerns that I can assist you with?: No  Identified Barriers: None  Care Transitions Interventions  Other Interventions:           Care Transition Nurse provided contact information for future needs. Plan for follow up  based on severity of symptoms and risk factors.   Plan for next call: self management-of medical conditions, medications, self monitoring, and follow up    Jelena Baxter RN

## 2023-02-14 ENCOUNTER — CARE COORDINATION (OUTPATIENT)
Dept: CARE COORDINATION | Facility: CLINIC | Age: 83
End: 2023-02-14

## 2023-02-14 NOTE — CARE COORDINATION
[facility] Care Transitions Follow Up Call    Patient Current Location:  Home: [place] [address].    Care Transition Nurse contacted the patient by telephone to follow up after admission on 2023. Verified name and  with patient as identifiers. Patient: Angie Parker  Patient : 1940   MRN: [id number]  Reason for Admission: NSTEMI (non-ST elevated myocardial infarction)  Discharge Date: 23 RARS: Readmission Risk Score: 15.4    Needs to be reviewed by the provider   Additional needs identified to be addressed with provider: No  none             Method of communication with provider: none. Addressed changes since last contact:  none  Discussed follow-up appointments. If no appointment was previously scheduled, appointment scheduling offered: Yes. Is follow up appointment scheduled within 7 days of discharge? Yes. Follow Up  Future Appointments   Date Time Provider Cristo Bauman   3/16/2023 10:30 AM CORINNA FREEMAN ECHO [address] [facility] AMB   3/23/2023 10:45 AM Delberta Rinne, MD [facility] AMB   2023  2:30 PM PST LAB PST [facility] AMB   2023 10:30 AM Ibis Lynch, APRN - CNP PST [facility] AMB     Non-[facility] follow up appointment(s): no    Care Transition Nurse reviewed medical action plan and red flags with patient and discussed any barriers to care and/or understanding of plan of care after discharge. Discussed appropriate site of care based on symptoms and resources available to patient including: PCP  Specialist  Home health  When to call [address] The patient agrees to contact the PCP office for questions related to their healthcare. Advance Care Planning:   Has ACP docs . Patients top risk factors for readmission: medical condition-NSTEMI, CP, LBBB S/P LHC with PCI, CRD, Sleep Apnea, CAD, Hx.  Of AAA repai, DM, HTN, CSF leak with Vertigo, Cardiomyopathy, HLD, GERD, CHF, Hypothyroidism  Interventions to address risk factors: Obtained and reviewed discharge summary and/or continuity of care documents and Education of patient/family/caregiver/guardian to support self-management-of  medical conditions, medication management and adherence, safety precautions, self monitoring, daily weights, and when to seek care. Patient continues to monitor her daily weights. Patient is engaged with [facility]. Patient given an opportunity to ask questions and does not have any further questions or concerns at this time. The patient is able to contact the PCP/Specialist office for questions related to their healthcare, or if her condition worsens, changes or fails to improve as anticipated. Offered patient enrollment in the Remote Patient Monitoring (RPM) program for in-home monitoring: NA.     Care Transitions Subsequent and Final Call    Schedule Follow Up Appointment with PCP: Completed  Subsequent and Final Calls  Have your medications changed?: No  Do you have any questions related to your medications?: No  Do you currently have any active services?: Yes  Are you currently active with any services?: Home Health  Do you have any needs or concerns that I can assist you with?: No  Identified Barriers: None  Care Transitions Interventions  Other Interventions:           Care Transition Nurse provided contact information for future needs. Plan for follow up  based on severity of symptoms and risk factors.   Plan for next call: self management-of medical conditions    Sun Paez RN

## 2023-02-22 ENCOUNTER — CARE COORDINATION (OUTPATIENT)
Dept: CARE COORDINATION | Facility: CLINIC | Age: 83
End: 2023-02-22

## 2023-03-08 ENCOUNTER — APPOINTMENT (OUTPATIENT)
Dept: GENERAL RADIOLOGY | Age: 83
End: 2023-03-08
Payer: MEDICARE

## 2023-03-08 ENCOUNTER — HOSPITAL ENCOUNTER (EMERGENCY)
Age: 83
Discharge: HOME | End: 2023-03-08
Attending: EMERGENCY MEDICINE
Payer: MEDICARE

## 2023-03-08 VITALS
WEIGHT: 152.3 LBS | HEIGHT: 66 IN | TEMPERATURE: 97.5 F | DIASTOLIC BLOOD PRESSURE: 58 MMHG | HEART RATE: 58 BPM | OXYGEN SATURATION: 99 % | RESPIRATION RATE: 16 BRPM | BODY MASS INDEX: 24.48 KG/M2 | SYSTOLIC BLOOD PRESSURE: 148 MMHG

## 2023-03-08 DIAGNOSIS — N30.00 ACUTE CYSTITIS WITHOUT HEMATURIA: ICD-10-CM

## 2023-03-08 DIAGNOSIS — R07.89 ATYPICAL CHEST PAIN: Primary | ICD-10-CM

## 2023-03-08 LAB
ALBUMIN SERPL-MCNC: 3.4 G/DL (ref 3.2–4.6)
ALBUMIN/GLOB SERPL: 0.9 (ref 0.4–1.6)
ALP SERPL-CCNC: 114 U/L (ref 50–136)
ALT SERPL-CCNC: 22 U/L (ref 12–65)
ANION GAP SERPL CALC-SCNC: 4 MMOL/L (ref 2–11)
APPEARANCE UR: CLEAR
AST SERPL-CCNC: 22 U/L (ref 15–37)
BACTERIA URNS QL MICRO: ABNORMAL /HPF
BILIRUB SERPL-MCNC: 0.2 MG/DL (ref 0.2–1.1)
BILIRUB UR QL: NEGATIVE
BUN SERPL-MCNC: 32 MG/DL (ref 8–23)
CALCIUM SERPL-MCNC: 9.7 MG/DL (ref 8.3–10.4)
CHLORIDE SERPL-SCNC: 103 MMOL/L (ref 101–110)
CO2 SERPL-SCNC: 31 MMOL/L (ref 21–32)
COLOR UR: ABNORMAL
CREAT SERPL-MCNC: 1.3 MG/DL (ref 0.6–1)
EKG ATRIAL RATE: 62 BPM
EKG DIAGNOSIS: NORMAL
EKG P AXIS: 35 DEGREES
EKG P-R INTERVAL: 183 MS
EKG Q-T INTERVAL: 489 MS
EKG QRS DURATION: 168 MS
EKG QTC CALCULATION (BAZETT): 497 MS
EKG R AXIS: -57 DEGREES
EKG T AXIS: -43 DEGREES
EKG VENTRICULAR RATE: 62 BPM
ERYTHROCYTE [DISTWIDTH] IN BLOOD BY AUTOMATED COUNT: 13 % (ref 11.9–14.6)
GLOBULIN SER CALC-MCNC: 3.8 G/DL (ref 2.8–4.5)
GLUCOSE SERPL-MCNC: 128 MG/DL (ref 65–100)
GLUCOSE UR STRIP.AUTO-MCNC: NEGATIVE MG/DL
HCT VFR BLD AUTO: 37.2 % (ref 35.8–46.3)
HGB BLD-MCNC: 12 G/DL (ref 11.7–15.4)
HGB UR QL STRIP: NEGATIVE
KETONES UR QL STRIP.AUTO: NEGATIVE MG/DL
LEUKOCYTE ESTERASE UR QL STRIP.AUTO: ABNORMAL
LIPASE SERPL-CCNC: 186 U/L (ref 73–393)
MAGNESIUM SERPL-MCNC: 1.8 MG/DL (ref 1.8–2.4)
MCH RBC QN AUTO: 29.1 PG (ref 26.1–32.9)
MCHC RBC AUTO-ENTMCNC: 32.3 G/DL (ref 31.4–35)
MCV RBC AUTO: 90.3 FL (ref 82–102)
NITRITE UR QL STRIP.AUTO: NEGATIVE
NRBC # BLD: 0 K/UL (ref 0–0.2)
OTHER OBSERVATIONS: ABNORMAL
PH UR STRIP: 5 (ref 5–9)
PLATELET # BLD AUTO: 240 K/UL (ref 150–450)
PMV BLD AUTO: 11.2 FL (ref 9.4–12.3)
POTASSIUM SERPL-SCNC: 3.9 MMOL/L (ref 3.5–5.1)
PROT SERPL-MCNC: 7.2 G/DL (ref 6.3–8.2)
PROT UR STRIP-MCNC: NEGATIVE MG/DL
RBC # BLD AUTO: 4.12 M/UL (ref 4.05–5.2)
SODIUM SERPL-SCNC: 138 MMOL/L (ref 133–143)
SP GR UR REFRACTOMETRY: 1.01 (ref 1–1.02)
TROPONIN I SERPL HS-MCNC: 33.5 PG/ML (ref 0–14)
TROPONIN I SERPL HS-MCNC: 36.9 PG/ML (ref 0–14)
UROBILINOGEN UR QL STRIP.AUTO: 0.2 EU/DL (ref 0.2–1)
WBC # BLD AUTO: 6.8 K/UL (ref 4.3–11.1)
WBC URNS QL MICRO: ABNORMAL /HPF

## 2023-03-08 PROCEDURE — 87088 URINE BACTERIA CULTURE: CPT

## 2023-03-08 PROCEDURE — 87186 SC STD MICRODIL/AGAR DIL: CPT

## 2023-03-08 PROCEDURE — 96361 HYDRATE IV INFUSION ADD-ON: CPT

## 2023-03-08 PROCEDURE — 99285 EMERGENCY DEPT VISIT HI MDM: CPT

## 2023-03-08 PROCEDURE — 85027 COMPLETE CBC AUTOMATED: CPT

## 2023-03-08 PROCEDURE — 87086 URINE CULTURE/COLONY COUNT: CPT

## 2023-03-08 PROCEDURE — 83735 ASSAY OF MAGNESIUM: CPT

## 2023-03-08 PROCEDURE — 83690 ASSAY OF LIPASE: CPT

## 2023-03-08 PROCEDURE — 6360000002 HC RX W HCPCS: Performed by: EMERGENCY MEDICINE

## 2023-03-08 PROCEDURE — 96365 THER/PROPH/DIAG IV INF INIT: CPT

## 2023-03-08 PROCEDURE — 84484 ASSAY OF TROPONIN QUANT: CPT

## 2023-03-08 PROCEDURE — 93005 ELECTROCARDIOGRAM TRACING: CPT | Performed by: EMERGENCY MEDICINE

## 2023-03-08 PROCEDURE — 80053 COMPREHEN METABOLIC PANEL: CPT

## 2023-03-08 PROCEDURE — 81001 URINALYSIS AUTO W/SCOPE: CPT

## 2023-03-08 PROCEDURE — 71045 X-RAY EXAM CHEST 1 VIEW: CPT

## 2023-03-08 PROCEDURE — 2580000003 HC RX 258: Performed by: EMERGENCY MEDICINE

## 2023-03-08 RX ORDER — CEFDINIR 300 MG/1
300 CAPSULE ORAL 2 TIMES DAILY
Qty: 14 CAPSULE | Refills: 0 | Status: SHIPPED | OUTPATIENT
Start: 2023-03-08 | End: 2023-03-15

## 2023-03-08 RX ORDER — CEFDINIR 300 MG/1
300 CAPSULE ORAL 2 TIMES DAILY
Qty: 14 CAPSULE | Refills: 0 | Status: SHIPPED | OUTPATIENT
Start: 2023-03-08 | End: 2023-03-08 | Stop reason: SDUPTHER

## 2023-03-08 RX ORDER — 0.9 % SODIUM CHLORIDE 0.9 %
1000 INTRAVENOUS SOLUTION INTRAVENOUS
Status: COMPLETED | OUTPATIENT
Start: 2023-03-08 | End: 2023-03-08

## 2023-03-08 RX ADMIN — CEFTRIAXONE 1000 MG: 1 INJECTION, POWDER, FOR SOLUTION INTRAMUSCULAR; INTRAVENOUS at 12:20

## 2023-03-08 RX ADMIN — SODIUM CHLORIDE 1000 ML: 9 INJECTION, SOLUTION INTRAVENOUS at 09:22

## 2023-03-08 ASSESSMENT — ENCOUNTER SYMPTOMS
EYE REDNESS: 0
COUGH: 0
RECTAL PAIN: 0
CHEST TIGHTNESS: 1
COLOR CHANGE: 0
VOICE CHANGE: 0
TROUBLE SWALLOWING: 0
PHOTOPHOBIA: 0
DIARRHEA: 0
NAUSEA: 0
SHORTNESS OF BREATH: 0
ABDOMINAL PAIN: 0
BACK PAIN: 0
STRIDOR: 0

## 2023-03-08 ASSESSMENT — LIFESTYLE VARIABLES
HOW OFTEN DO YOU HAVE A DRINK CONTAINING ALCOHOL: NEVER
HOW MANY STANDARD DRINKS CONTAINING ALCOHOL DO YOU HAVE ON A TYPICAL DAY: PATIENT DOES NOT DRINK

## 2023-03-08 ASSESSMENT — PAIN - FUNCTIONAL ASSESSMENT: PAIN_FUNCTIONAL_ASSESSMENT: NONE - DENIES PAIN

## 2023-03-08 NOTE — ED NOTES
I have reviewed discharge instructions with the patient. The patient verbalized understanding. Patient left ED via Discharge Method: wheelchair to Home with family. Opportunity for questions and clarification provided. Patient given 1 scripts. To continue your aftercare when you leave the hospital, you may receive an automated call from our care team to check in on how you are doing. This is a free service and part of our promise to provide the best care and service to meet your aftercare needs.  If you have questions, or wish to unsubscribe from this service please call [phone]. Thank you for Choosing our [facility] Emergency Department.         Sreedhar Ortiz RN  03/08/23 0150

## 2023-03-08 NOTE — ED TRIAGE NOTES
Patient arrives to ED via EMS from home. Patient reports chest pain that woke her up out of her sleep. Denies SOB. Denies n/v. Patient took Aspirin and 1 nitro. Patient reports the nitro relieved her pain. Patient has a cardiac history.

## 2023-03-08 NOTE — ED PROVIDER NOTES
Emergency Department Provider Note                   PCP:                BERTO López CNP               Age: 78 y.o. Sex: female     DISPOSITION Decision To Discharge 03/08/2023 02:16:53 PM       ICD-10-CM    1. Atypical chest pain  R07.89       2. Acute cystitis without hematuria  N30.00           MEDICAL DECISION MAKING  Complexity of Problems Addressed:  1 or more acute illnesses that pose a threat to life or bodily function. 1 acute illness, minor  Differential diagnosis is broad in this patient. Given her age concern for possibly developing acute coronary syndrome. She is high risk for multiple cardiovascular abnormalities  Plan for screening labs, EKG, serial cardiac enzymes and monitoring symptoms. 1:29 PM  Initial laboratory data here stable including stable H&H. Creatinine mildly increased to 1.3-1.1. We will start some IV fluids. Chest x-ray is clear. EKG shows no acute ischemic changes. Will await second troponin. We will continue to monitor symptoms. 2:20 PM  Second troponin is negative. Urinalysis consistent with UTI. Patient treated here with IV Rocephin. Tolerating p.o. I watched patient ambulate without difficulty. Shared decision making with family. Data Reviewed and Analyzed:  Category 1:   I reviewed records from an external source: ED records from outside this hospital.  I reviewed records from an external source: provider visit notes from PCP. I reviewed records from an external source: previous old EKG's reviewed. I reviewed records from an external source: previous lab results from outside ED. I ordered each unique test.  I reviewed the results of each unique test.    The patients assessment required an independent historian: EMS, transport symptoms    Category 2:   I independently ordered and reviewed the EKG. I independently ordered and reviewed the X-rays.   Chest x-ray shows no acute abnormalities  I independently ordered and reviewed the laboratory data which shows stable initial troponin and stable chemistry panels other than a mild LUCY. Awaiting second troponin    EKG interpretation: Normal sinus rhythm, rate of 62, left axis deviation with left bundle branch block, no acute ST segment changes noted. Comparison EKG performed January 20, 2023    Category 3: Discussion of management or test interpretation. Shared decision making with family       Risk of Complications and/or Morbidity of Patient Management:  Prescription drug management performed and Patient was discharged risks and benefits of hospitalization were considered     Roma Chua is a 78 y.o. female who presents to the Emergency Department with chief complaint of    Chief Complaint   Patient presents with    Chest Pain      Patient presents the ER with complaints of chest discomfort. Patient states around 430 this morning she awoke with pain in the center part of her chest.  Describes it as a squeezing sensation. Does report some associated diaphoresis but denies any nausea or vomiting. Does report history of cardiac disease. States she did take her aspirin as well as nitroglycerin. States pain eventually resolved. He has no pain at this time. Denies any significant cough. Denies any fevers or vomiting. The history is provided by the patient.    Chest Pain  Pain location:  Substernal area  Pain quality: aching and pressure    Pain radiates to:  Does not radiate  Duration:  1 hour  Progression:  Resolved  Chronicity:  New  Context: not breathing, not drug use, not intercourse, not lifting and not raising an arm    Relieved by:  Nothing  Worsened by:  Nothing  Associated symptoms: diaphoresis    Associated symptoms: no abdominal pain, no AICD problem, no altered mental status, no back pain, no cough, no dysphagia, no fatigue, no fever, no headache, no nausea, no palpitations, no PND, no shortness of breath and no weakness    Risk factors: coronary artery disease and hypertension    Risk factors: no birth control       Review of Systems   Constitutional:  Positive for diaphoresis. Negative for fatigue and fever. HENT:  Negative for congestion, dental problem, trouble swallowing and voice change. Eyes:  Negative for photophobia and redness. Respiratory:  Positive for chest tightness. Negative for cough, shortness of breath and stridor. Cardiovascular:  Positive for chest pain. Negative for palpitations and PND. Gastrointestinal:  Negative for abdominal pain, diarrhea, nausea and rectal pain. Endocrine: Negative for polydipsia, polyphagia and polyuria. Genitourinary:  Negative for urgency. Musculoskeletal:  Negative for back pain and gait problem. Skin:  Negative for color change and pallor. Allergic/Immunologic: Negative for food allergies and immunocompromised state. Neurological:  Negative for tremors, facial asymmetry, weakness and headaches. Hematological:  Negative for adenopathy. Does not bruise/bleed easily. Psychiatric/Behavioral:  Negative for behavioral problems and confusion. All other systems reviewed and are negative. Vitals signs and nursing note reviewed. Patient Vitals for the past 4 hrs:   Pulse Resp BP SpO2   03/08/23 1345 60 12 (!) 130/95 94 %   03/08/23 1330 57 14 (!) 153/63 100 %   03/08/23 1312 58 12 113/78 98 %   03/08/23 1242 57 16 125/67 99 %   03/08/23 1200 55 14 (!) 135/54 98 %   03/08/23 1145 56 14 -- 97 %   03/08/23 1130 60 11 -- 98 %   03/08/23 1115 63 13 -- 99 %   03/08/23 1030 54 12 (!) 150/59 99 %          Physical Exam  Vitals and nursing note reviewed. Constitutional:       General: She is not in acute distress. Appearance: Normal appearance. She is not ill-appearing. HENT:      Head: Normocephalic and atraumatic. Right Ear: External ear normal.      Left Ear: External ear normal.      Nose: Nose normal. No congestion or rhinorrhea. Eyes:      General:         Right eye: No discharge. Left eye: No discharge. Extraocular Movements: Extraocular movements intact. Pupils: Pupils are equal, round, and reactive to light. Cardiovascular:      Rate and Rhythm: Normal rate and regular rhythm. Pulses: Normal pulses. Heart sounds: Normal heart sounds. Pulmonary:      Effort: Pulmonary effort is normal. No respiratory distress. Breath sounds: Normal breath sounds. No stridor. Abdominal:      General: Abdomen is flat. There is no distension. Palpations: Abdomen is soft. There is no mass. Tenderness: There is no abdominal tenderness. Hernia: No hernia is present. Musculoskeletal:         General: No swelling, tenderness, deformity or signs of injury. Normal range of motion. Cervical back: Normal range of motion and neck supple. Neurological:      General: No focal deficit present. Mental Status: She is alert and oriented to person, place, and time. Cranial Nerves: No cranial nerve deficit. Sensory: No sensory deficit.    Psychiatric:         Mood and Affect: Mood normal.         Behavior: Behavior normal.        Procedures     Orders Placed This Encounter   Procedures    Culture, Urine    XR CHEST PORTABLE    CBC    Comprehensive Metabolic Panel    Troponin    Magnesium    Lipase    Urinalysis w rflx microscopic    Cardiac Monitor    Pulse Oximetry    EKG 12 Lead    Saline lock IV        Medications   0.9 % sodium chloride bolus (0 mLs IntraVENous Stopped 3/8/23 1202)   cefTRIAXone (ROCEPHIN) 1,000 mg in sodium chloride 0.9 % 50 mL IVPB (mini-bag) (0 mg IntraVENous Stopped 3/8/23 1332)       Current Discharge Medication List        START taking these medications    Details   cefdinir (OMNICEF) 300 MG capsule Take 1 capsule by mouth 2 times daily for 7 days  Qty: 14 capsule, Refills: 0              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 Cardiomyopathy, ischemic 1/14/2016    Chronic vertigo     DM2 (diabetes mellitus, type 2) ([facility])     does not check glucose    GERD (gastroesophageal reflux disease)     Hearing loss     History of bilateral cataract extraction     History of coronary artery disease 2006, 2007    MI x 2 after stents placed-- with in 1 week-- stents x3    History of gallbladder disease     History of hernia repair     History of seborrheic keratosis     Hyperlipidemia     Hypertension     Lacunar infarction ([address] 8/16/2018    Meniere's disease     Menopause     Morbid obesity ([address]     OA (osteoarthritis)     Orthostatic hypotension 1/14/2016    Osteopenia with high risk of fracture 2014    Osteopenia with significant interval density loss in the hips since 2014    Post-menopausal osteoporosis     Postmenopausal osteoporosis     Unspecified sleep apnea     does not use c pap, pt. states asymptomatic since weight loss    Unsteady gait 8/16/2018    Visit for dental examination     SEVERAL YEARS AGO        Past Surgical History:   Procedure Laterality Date    BREAST SURGERY  mid 2000's    benign right breast biopsy    CARDIAC PROCEDURE N/A 1/20/2023    LEFT HEART CATH / CORONARY ANGIOGRAPHY performed by Haleigh Vargas MD at [address] [place] [address] N/A 1/20/2023    Percutaneous coronary intervention performed by Hale[facility]as [facility] at [facility]  3/10    bilat and retina repair    CHOLECYSTECTOMY, OPEN  1979    HERNIA REPAIR      multiple surgery site- midline, umbilical    HYSTERECTOMY (CERVIX STATUS UNKNOWN)  1997    MASTOIDECTOMY Left     repair of leaking csf into middle ear    ORTHOPEDIC SURGERY      left  -- hammer toes    OVARY REMOVAL  1982     and cyst    MS UNLISTED PROCEDURE CARDIAC SURGERY      heart cath x 4, last 2007, stents x 3, last 2006        Family History   Problem Relation Age of Onset    Diabetes Brother     Stroke Paternal Aunt     Breast Cancer Neg Hx     Heart Disease Paternal Uncle     Heart Disease Father     Stroke Paternal Grandmother     Heart Disease Maternal Grandfather     Stroke Maternal Grandfather     Heart Disease Paternal Grandmother     Heart Attack Father 76        mi    Hypertension Mother         Social History     Socioeconomic History    Marital status:    Tobacco Use    Smoking status: Never    Smokeless tobacco: Never   Substance and Sexual Activity    Alcohol use: No    Drug use: No     Types: OTC, Prescription     Social Determinants of Health     Physical Activity: Sufficiently Active    Days of Exercise per Week: 3 days    Minutes of Exercise per Session: 60 min        Allergies: Other, Brinzolamide, Ciprofloxacin, Eggs or egg-derived products, Hydralazine, Yeast, Adhesive tape, Statins, and Sulfa antibiotics    Current Discharge Medication List        CONTINUE these medications which have NOT CHANGED    Details   metoprolol succinate (TOPROL XL) 100 MG extended release tablet Take 1 tablet by mouth daily  Qty: 90 tablet, Refills: 3      furosemide (LASIX) 20 MG tablet Take 1 tablet by mouth daily as needed (weight gain of 2 lbs in 24 hours or 5 lbs in a week-if you do this more than 2 days in a row, call cardiologist)  Qty: 90 tablet, Refills: 0      nitroGLYCERIN (NITROSTAT) 0.4 MG SL tablet Place 1 tablet under the tongue every 5 minutes as needed for Chest pain MAX OF 3 DOSES  Qty: 25 tablet, Refills: 5    Associated Diagnoses: Angina pectoris, unspecified ([address]  Atherosclerosis of native coronary artery of native heart with angina pectoris (HCC)      famotidine (PEPCID) 40 MG tablet Take 1 tablet by mouth daily For stomach reflux/protection  Qty: 90 tablet, Refills: 3    Associated Diagnoses: Gastroesophageal reflux disease without esophagitis      pantoprazole (PROTONIX) 20 MG tablet Take 1 tablet by mouth every morning (before breakfast) For stomach reflux  Qty: 90 tablet, Refills: 1    Associated Diagnoses: Gastroesophageal reflux disease without esophagitis      psyllium (METAMUCIL SMOOTH TEXTURE) 28 % packet Take 1 packet by mouth daily Take with full glass of h20 or juice  Qty: 30 packet, Refills: 2    Associated Diagnoses: Diarrhea, unspecified type      nystatin (MYCOSTATIN) 247416 UNIT/GM cream Apply topically 2 times daily. Qty: 60 g, Refills: 0    Associated Diagnoses: Candidiasis      clopidogrel (PLAVIX) 75 MG tablet Take 1 tablet by mouth daily  Qty: 90 tablet, Refills: 3      Handicap Placard MISC Disability placard please. Thank you      Ergocalciferol 50 MCG (2000 UT) CAPS Take 2,000 Units by mouth daily      triamterene-hydroCHLOROthiazide (DYAZIDE) 37.5-25 MG per capsule Take 1 capsule by mouth daily For blood pressure  Qty: 90 capsule, Refills: 3      montelukast (SINGULAIR) 10 MG tablet Take 1 tablet by mouth daily For cough/postnasal drip  Qty: 90 tablet, Refills: 3      pitavastatin (LIVALO) 4 MG TABS tablet Take 1 tablet by mouth daily For cholesterol  Qty: 90 tablet, Refills: 3      albuterol sulfate  (90 Base) MCG/ACT inhaler Inhale 2 puffs into the lungs every 4 hours as needed for Shortness of Breath      aspirin 81 MG EC tablet Take 81 mg by mouth daily      Cholecalciferol 50 MCG (2000 UT) CAPS Take 2,000 Units by mouth daily      fluticasone (FLONASE) 50 MCG/ACT nasal spray 2 sprays by Nasal route daily              Results for orders placed or performed during the hospital encounter of 03/08/23   XR CHEST PORTABLE    Narrative    Portable chest x-ray    Clinical indication: Acute chest pain    FINDINGS: Single AP view the chest compared to a similar exam dated 1/20/2023  show the lungs are expanded and clear. No pleural effusion or pneumothorax. The  cardiac silhouette and mediastinum are unremarkable. The bones are normal.      Impression    Normal portable chest x-ray.    CBC   Result Value Ref Range    WBC 6.8 4.3 - 11.1 K/uL    RBC 4.12 4.05 - 5.2 M/uL    Hemoglobin 12.0 11.7 - 15.4 g/dL    Hematocrit 37.2 35.8 - 46.3 %    MCV 90.3 82 - 102 FL    MCH 29.1 26.1 - 32.9 PG    MCHC 32.3 31.4 - 35.0 g/dL    RDW 13.0 11.9 - 14.6 %    Platelets 029 476 - 730 K/uL    MPV 11.2 9.4 - 12.3 FL    nRBC 0.00 0.0 - 0.2 K/uL   Comprehensive Metabolic Panel   Result Value Ref Range    Sodium 138 133 - 143 mmol/L    Potassium 3.9 3.5 - 5.1 mmol/L    Chloride 103 101 - 110 mmol/L    CO2 31 21 - 32 mmol/L    Anion Gap 4 2 - 11 mmol/L    Glucose 128 (H) 65 - 100 mg/dL    BUN 32 (H) 8 - 23 MG/DL    Creatinine 1.30 (H) 0.6 - 1.0 MG/DL    Est, Glom Filt Rate 41 (L) >60 ml/min/1.73m2    Calcium 9.7 8.3 - 10.4 MG/DL    Total Bilirubin 0.2 0.2 - 1.1 MG/DL    ALT 22 12 - 65 U/L    AST 22 15 - 37 U/L    Alk Phosphatase 114 50 - 136 U/L    Total Protein 7.2 6.3 - 8.2 g/dL    Albumin 3.4 3.2 - 4.6 g/dL    Globulin 3.8 2.8 - 4.5 g/dL    Albumin/Globulin Ratio 0.9 0.4 - 1.6     Troponin   Result Value Ref Range    Troponin, High Sensitivity 36.9 (H) 0 - 14 pg/mL   Troponin   Result Value Ref Range    Troponin, High Sensitivity 33.5 (H) 0 - 14 pg/mL   Magnesium   Result Value Ref Range    Magnesium 1.8 1.8 - 2.4 mg/dL   Lipase   Result Value Ref Range    Lipase 186 73 - 393 U/L   Urinalysis w rflx microscopic   Result Value Ref Range    Color, UA YELLOW/STRAW      Appearance CLEAR      Specific Gravity, UA 1.008 1.001 - 1.023      pH, Urine 5.0 5.0 - 9.0      Protein, UA Negative NEG mg/dL    Glucose, UA Negative mg/dL    Ketones, Urine Negative NEG mg/dL    Bilirubin Urine Negative NEG      Blood, Urine Negative NEG      Urobilinogen, Urine 0.2 0.2 - 1.0 EU/dL    Nitrite, Urine Negative NEG      Leukocyte Esterase, Urine TRACE (A) NEG      WBC, UA 0-3 0 /hpf    BACTERIA, URINE 4+ (H) 0 /hpf    Other observations RESULTS VERIFIED MANUALLY     EKG 12 Lead   Result Value Ref Range    Ventricular Rate 62 BPM    Atrial Rate 62 BPM    P-R Interval 183 ms    QRS Duration 168 ms    Q-T Interval 489 ms    QTc Calculation (Bazett) 497 ms    P Axis 35 degrees    R Axis -57 degrees    T Axis -43 degrees    Diagnosis Sinus rhythm         XR CHEST PORTABLE   Final Result   Normal portable chest x-ray. Voice dictation software was used during the making of this note. This software is not perfect and grammatical and other typographical errors may be present. This note has not been completely proofread for errors.      Glendia Runner, MD  03/08/23 [id number]

## 2023-03-08 NOTE — DISCHARGE INSTRUCTIONS
Take medications as prescribed  Try To eat before taking medications  Drink plenty of fluids  Return to the ER for any new, worsening or life-threatening symptoms

## 2023-03-12 LAB
BACTERIA SPEC CULT: ABNORMAL
BACTERIA SPEC CULT: ABNORMAL
SERVICE CMNT-IMP: ABNORMAL

## 2023-03-22 NOTE — PROGRESS NOTES
daily For cholesterol 7/13/22  Yes BERTO Meeks CNP   albuterol sulfate  (90 Base) MCG/ACT inhaler Inhale 2 puffs into the lungs every 4 hours as needed for Shortness of Breath 10/29/20  Yes Ar Automatic Reconciliation   aspirin 81 MG EC tablet Take 81 mg by mouth daily   Yes Ar Automatic Reconciliation   Cholecalciferol 50 MCG (2000 UT) CAPS Take 2,000 Units by mouth daily   Yes Ar Automatic Reconciliation   fluticasone (FLONASE) 50 MCG/ACT nasal spray 2 sprays by Nasal route daily 3/4/22  Yes Ar Automatic Reconciliation   famotidine (PEPCID) 40 MG tablet Take 1 tablet by mouth daily For stomach reflux/protection  Patient not taking: Reported on 3/23/2023 1/23/23   BERTO Samaniego CNP   pantoprazole (PROTONIX) 20 MG tablet Take 1 tablet by mouth every morning (before breakfast) For stomach reflux  Patient not taking: Reported on 3/23/2023 1/23/23   BERTO Samaniego CNP     Allergies   Allergen Reactions    Other Hives, Itching and Rash     Band aids cause contact rash after several hours. Brinzolamide Nausea Only    Ciprofloxacin      tendinitis    Eggs Or Egg-Derived Products     Hydralazine Other (See Comments)     Other reaction(s): Drowsiness/Fatigue    Yeast     Adhesive Tape Rash     Band aids cause contact rash after several hours. Band aids cause contact rash after several hours. 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

## 2023-03-23 ENCOUNTER — OFFICE VISIT (OUTPATIENT)
Dept: CARDIOLOGY CLINIC | Age: 83
End: 2023-03-23

## 2023-03-23 VITALS
DIASTOLIC BLOOD PRESSURE: 70 MMHG | HEIGHT: 66 IN | SYSTOLIC BLOOD PRESSURE: 138 MMHG | WEIGHT: 155.4 LBS | BODY MASS INDEX: 24.98 KG/M2 | HEART RATE: 78 BPM

## 2023-03-23 DIAGNOSIS — I25.118 CORONARY ARTERY DISEASE OF NATIVE HEART WITH STABLE ANGINA PECTORIS, UNSPECIFIED VESSEL OR LESION TYPE (HCC): ICD-10-CM

## 2023-03-23 DIAGNOSIS — E78.5 DYSLIPIDEMIA: ICD-10-CM

## 2023-03-23 DIAGNOSIS — I71.43 INFRARENAL ABDOMINAL AORTIC ANEURYSM (AAA) WITHOUT RUPTURE (HCC): ICD-10-CM

## 2023-03-23 DIAGNOSIS — I50.22 CHRONIC SYSTOLIC (CONGESTIVE) HEART FAILURE (HCC): Primary | ICD-10-CM

## 2023-03-23 DIAGNOSIS — I44.7 LBBB (LEFT BUNDLE BRANCH BLOCK): ICD-10-CM

## 2023-03-23 DIAGNOSIS — E11.21 TYPE 2 DIABETES WITH NEPHROPATHY (HCC): ICD-10-CM

## 2023-03-23 DIAGNOSIS — I95.1 ORTHOSTATIC HYPOTENSION: ICD-10-CM

## 2023-03-23 DIAGNOSIS — I10 ESSENTIAL HYPERTENSION: ICD-10-CM

## 2023-03-23 ASSESSMENT — ENCOUNTER SYMPTOMS: SHORTNESS OF BREATH: 1

## 2023-03-23 NOTE — PATIENT INSTRUCTIONS
Please send me your BP diary in 1-2 weeks, checking both at home and at cardiac rehab.  If BP is staying > 135/85 we will want to add valsartan

## 2023-03-27 DIAGNOSIS — N30.90 CYSTITIS: Primary | ICD-10-CM

## 2023-03-31 ENCOUNTER — HOSPITAL ENCOUNTER (OUTPATIENT)
Dept: GENERAL RADIOLOGY | Age: 83
Discharge: HOME | End: 2023-04-03

## 2023-03-31 ENCOUNTER — OFFICE VISIT (OUTPATIENT)
Dept: FAMILY MEDICINE CLINIC | Facility: CLINIC | Age: 83
End: 2023-03-31
Payer: MEDICARE

## 2023-03-31 VITALS
BODY MASS INDEX: 25.1 KG/M2 | SYSTOLIC BLOOD PRESSURE: 132 MMHG | OXYGEN SATURATION: 98 % | WEIGHT: 156.2 LBS | HEART RATE: 66 BPM | RESPIRATION RATE: 18 BRPM | DIASTOLIC BLOOD PRESSURE: 72 MMHG | HEIGHT: 66 IN

## 2023-03-31 DIAGNOSIS — M25.571 ACUTE RIGHT ANKLE PAIN: ICD-10-CM

## 2023-03-31 DIAGNOSIS — M25.50 MULTIPLE JOINT PAIN: ICD-10-CM

## 2023-03-31 DIAGNOSIS — Z09 HOSPITAL DISCHARGE FOLLOW-UP: Primary | ICD-10-CM

## 2023-03-31 DIAGNOSIS — M54.2 NECK PAIN: ICD-10-CM

## 2023-03-31 DIAGNOSIS — N30.90 CYSTITIS: ICD-10-CM

## 2023-03-31 LAB
BILIRUBIN, URINE, POC: NEGATIVE
BLOOD URINE, POC: NEGATIVE
GLUCOSE URINE, POC: NEGATIVE
KETONES, URINE, POC: NEGATIVE
LEUKOCYTE ESTERASE, URINE, POC: ABNORMAL
NITRITE, URINE, POC: NEGATIVE
PH, URINE, POC: 6 (ref 4.6–8)
PROTEIN,URINE, POC: NEGATIVE
SPECIFIC GRAVITY, URINE, POC: 1.01 (ref 1–1.03)
URINALYSIS CLARITY, POC: CLEAR
URINALYSIS COLOR, POC: YELLOW
UROBILINOGEN, POC: ABNORMAL

## 2023-03-31 PROCEDURE — 1111F DSCHRG MED/CURRENT MED MERGE: CPT | Performed by: NURSE PRACTITIONER

## 2023-03-31 PROCEDURE — G8399 PT W/DXA RESULTS DOCUMENT: HCPCS | Performed by: NURSE PRACTITIONER

## 2023-03-31 PROCEDURE — 99214 OFFICE O/P EST MOD 30 MIN: CPT | Performed by: NURSE PRACTITIONER

## 2023-03-31 PROCEDURE — 3074F SYST BP LT 130 MM HG: CPT | Performed by: NURSE PRACTITIONER

## 2023-03-31 PROCEDURE — 81003 URINALYSIS AUTO W/O SCOPE: CPT | Performed by: NURSE PRACTITIONER

## 2023-03-31 PROCEDURE — 1036F TOBACCO NON-USER: CPT | Performed by: NURSE PRACTITIONER

## 2023-03-31 PROCEDURE — 1123F ACP DISCUSS/DSCN MKR DOCD: CPT | Performed by: NURSE PRACTITIONER

## 2023-03-31 PROCEDURE — 1090F PRES/ABSN URINE INCON ASSESS: CPT | Performed by: NURSE PRACTITIONER

## 2023-03-31 PROCEDURE — G8484 FLU IMMUNIZE NO ADMIN: HCPCS | Performed by: NURSE PRACTITIONER

## 2023-03-31 PROCEDURE — G8427 DOCREV CUR MEDS BY ELIG CLIN: HCPCS | Performed by: NURSE PRACTITIONER

## 2023-03-31 PROCEDURE — G8417 CALC BMI ABV UP PARAM F/U: HCPCS | Performed by: NURSE PRACTITIONER

## 2023-03-31 PROCEDURE — 3078F DIAST BP <80 MM HG: CPT | Performed by: NURSE PRACTITIONER

## 2023-03-31 RX ORDER — PREDNISONE 10 MG/1
TABLET ORAL
Qty: 8 TABLET | Refills: 0 | Status: SHIPPED | OUTPATIENT
Start: 2023-03-31

## 2023-03-31 ASSESSMENT — PATIENT HEALTH QUESTIONNAIRE - PHQ9
1. LITTLE INTEREST OR PLEASURE IN DOING THINGS: 0
SUM OF ALL RESPONSES TO PHQ QUESTIONS 1-9: 0
SUM OF ALL RESPONSES TO PHQ QUESTIONS 1-9: 0
2. FEELING DOWN, DEPRESSED OR HOPELESS: 0
SUM OF ALL RESPONSES TO PHQ9 QUESTIONS 1 & 2: 0
SUM OF ALL RESPONSES TO PHQ QUESTIONS 1-9: 0
SUM OF ALL RESPONSES TO PHQ QUESTIONS 1-9: 0

## 2023-03-31 NOTE — PROGRESS NOTES
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    Cardiomyopathy, ischemic 1/14/2016    Chronic vertigo     DM2 (diabetes mellitus, type 2) ([facility])     does not check glucose    GERD (gastroesophageal reflux disease)     Hearing loss     History of bilateral cataract extraction     History of coronary artery disease 2006, 2007    MI x 2 after stents placed-- with in 1 week-- stents x3    History of gallbladder disease     History of hernia repair     History of seborrheic keratosis     Hyperlipidemia     Hypertension     Lacunar infarction ([address] 8/16/2018    Meniere's disease     Menopause     Morbid obesity ([address]     OA (osteoarthritis)     Orthostatic hypotension 1/14/2016    Osteopenia with high risk of fracture 2014    Osteopenia with significant interval density loss in the hips since 2014    Post-menopausal osteoporosis     Postmenopausal osteoporosis     Unspecified sleep apnea     does not use c pap, pt. states asymptomatic since weight loss    Unsteady gait 8/16/2018    Visit for dental examination     SEVERAL YEARS AGO     Past Surgical History:   Procedure Laterality Date    BREAST SURGERY  mid 2000's    benign right breast biopsy    CARDIAC PROCEDURE N/A 1/20/2023    LEFT HEART CATH / CORONARY ANGIOGRAPHY performed by Leisa Viramontes MD at [address] [place] [address] CATH LAB    CARDIAC PROCEDURE N/A 1/20/2023    Percutaneous coronary intervention performed by Leisa [facility]amontes MD at [facility]  3/10    bilat and retina repair    CHOLECYSTECTOMY, OPEN  1979    HERNIA REPAIR      multiple surgery site- midline, umbilical    HYSTERECTOMY (CERVIX STATUS UNKNOWN)  1997    MASTOIDECTOMY Left     repair of leaking csf into middle

## 2023-04-03 DIAGNOSIS — N30.90 CYSTITIS: Primary | ICD-10-CM

## 2023-04-03 LAB
BACTERIA SPEC CULT: ABNORMAL
SERVICE CMNT-IMP: ABNORMAL

## 2023-04-03 RX ORDER — NITROFURANTOIN 25; 75 MG/1; MG/1
100 CAPSULE ORAL 2 TIMES DAILY
Qty: 20 CAPSULE | Refills: 0 | Status: SHIPPED | OUTPATIENT
Start: 2023-04-03 | End: 2023-04-13

## 2023-04-03 ASSESSMENT — ENCOUNTER SYMPTOMS
EYE DISCHARGE: 0
DIARRHEA: 0
BACK PAIN: 0
ABDOMINAL PAIN: 0
CONSTIPATION: 0
GASTROINTESTINAL NEGATIVE: 1
TROUBLE SWALLOWING: 0
EYE REDNESS: 0
COLOR CHANGE: 0
ALLERGIC/IMMUNOLOGIC NEGATIVE: 1
NAUSEA: 0
CHOKING: 0
CHEST TIGHTNESS: 0
STRIDOR: 0
RHINORRHEA: 0
SHORTNESS OF BREATH: 0
ABDOMINAL DISTENTION: 0
SORE THROAT: 0
RECTAL PAIN: 0
VOMITING: 0
APNEA: 0
RESPIRATORY NEGATIVE: 1
EYE ITCHING: 0
VOICE CHANGE: 0
ANAL BLEEDING: 0
SINUS PRESSURE: 0
COUGH: 0
WHEEZING: 0
PHOTOPHOBIA: 0
FACIAL SWELLING: 0
EYES NEGATIVE: 1
SINUS PAIN: 0
EYE PAIN: 0
BLOOD IN STOOL: 0

## 2023-04-06 RX ORDER — METOPROLOL SUCCINATE 100 MG/1
100 TABLET, EXTENDED RELEASE ORAL DAILY
Qty: 90 TABLET | Refills: 3 | OUTPATIENT
Start: 2023-04-06

## 2023-04-06 NOTE — TELEPHONE ENCOUNTER
Requested Prescriptions     Refused Prescriptions Disp Refills    metoprolol succinate (TOPROL XL) 100 MG extended release tablet 90 tablet 3     Sig: Take 1 tablet by mouth daily     Refused By: Antonio Gupta     Reason for Refusal: Request already responded to by other means (e.g. phone or fax)

## 2023-04-18 ENCOUNTER — NURSE ONLY (OUTPATIENT)
Dept: FAMILY MEDICINE CLINIC | Facility: CLINIC | Age: 83
End: 2023-04-18
Payer: MEDICARE

## 2023-04-18 DIAGNOSIS — E11.42 TYPE 2 DIABETES MELLITUS WITH DIABETIC POLYNEUROPATHY, WITHOUT LONG-TERM CURRENT USE OF INSULIN (HCC): Primary | ICD-10-CM

## 2023-04-18 DIAGNOSIS — N30.90 CYSTITIS: ICD-10-CM

## 2023-04-18 DIAGNOSIS — N39.0 URINARY TRACT INFECTION WITHOUT HEMATURIA, SITE UNSPECIFIED: Primary | ICD-10-CM

## 2023-04-18 DIAGNOSIS — N30.90 CYSTITIS: Primary | ICD-10-CM

## 2023-04-18 DIAGNOSIS — E55.9 VITAMIN D DEFICIENCY: ICD-10-CM

## 2023-04-18 LAB
BACTERIA URINE, POC: ABNORMAL
BILIRUBIN, URINE, POC: NEGATIVE
BLOOD URINE, POC: NEGATIVE
CASTS URINE, POC: ABNORMAL
EPI CELLS URINE, POC: ABNORMAL
GLUCOSE URINE, POC: NEGATIVE
KETONES, URINE, POC: NEGATIVE
LEUKOCYTE ESTERASE, URINE, POC: ABNORMAL
NITRITE, URINE, POC: NEGATIVE
PH, URINE, POC: 5.5 (ref 4.6–8)
PROTEIN,URINE, POC: NEGATIVE
RBC, URINE, POC: ABNORMAL
SPECIFIC GRAVITY, URINE, POC: 1.01 (ref 1–1.03)
TRICHOMONAS URINE, POC: ABNORMAL
URINALYSIS CLARITY, POC: ABNORMAL
URINALYSIS COLOR, POC: YELLOW
UROBILINOGEN, POC: NORMAL
WBC, URINE, POC: ABNORMAL
YEAST, URINE, POC: ABNORMAL

## 2023-04-18 PROCEDURE — 81000 URINALYSIS NONAUTO W/SCOPE: CPT | Performed by: NURSE PRACTITIONER

## 2023-04-20 LAB
BACTERIA SPEC CULT: ABNORMAL
SERVICE CMNT-IMP: ABNORMAL

## 2023-04-21 DIAGNOSIS — N30.90 RECURRENT CYSTITIS: Primary | ICD-10-CM

## 2023-04-21 RX ORDER — CEPHALEXIN 500 MG/1
500 CAPSULE ORAL DAILY
Qty: 30 CAPSULE | Refills: 0 | Status: SHIPPED | OUTPATIENT
Start: 2023-05-02 | End: 2023-06-01

## 2023-04-21 RX ORDER — CEPHALEXIN 500 MG/1
500 CAPSULE ORAL 3 TIMES DAILY
Qty: 30 CAPSULE | Refills: 0 | Status: SHIPPED | OUTPATIENT
Start: 2023-04-21 | End: 2023-05-01

## 2023-04-21 NOTE — PROGRESS NOTES
Persistent cystitis with Kleibseila pneumoniae . Discussed case with supervising physician, Dr. Matheus Crenshaw. Will treat with keflex TID for 10 days then will start a 500 mg once daily afterwards for preventative therapy.  Referral placed to urology

## 2023-05-11 ENCOUNTER — NURSE ONLY (OUTPATIENT)
Dept: FAMILY MEDICINE CLINIC | Facility: CLINIC | Age: 83
End: 2023-05-11
Payer: MEDICARE

## 2023-05-11 DIAGNOSIS — N39.0 URINARY TRACT INFECTION WITHOUT HEMATURIA, SITE UNSPECIFIED: Primary | ICD-10-CM

## 2023-05-11 LAB
BILIRUBIN, URINE, POC: NEGATIVE
BLOOD URINE, POC: NEGATIVE
GLUCOSE URINE, POC: NEGATIVE
KETONES, URINE, POC: NEGATIVE
LEUKOCYTE ESTERASE, URINE, POC: NEGATIVE
NITRITE, URINE, POC: NEGATIVE
PH, URINE, POC: 5.5 (ref 4.6–8)
PROTEIN,URINE, POC: NEGATIVE
SPECIFIC GRAVITY, URINE, POC: 1.01 (ref 1–1.03)
URINALYSIS CLARITY, POC: CLEAR
URINALYSIS COLOR, POC: YELLOW
UROBILINOGEN, POC: NORMAL

## 2023-05-11 PROCEDURE — 81002 URINALYSIS NONAUTO W/O SCOPE: CPT | Performed by: NURSE PRACTITIONER

## 2023-05-17 ENCOUNTER — OFFICE VISIT (OUTPATIENT)
Dept: UROLOGY | Age: 83
End: 2023-05-17
Payer: MEDICARE

## 2023-05-17 DIAGNOSIS — N39.0 RECURRENT UTI: Primary | ICD-10-CM

## 2023-05-17 DIAGNOSIS — R82.71 ASYMPTOMATIC BACTERIURIA: ICD-10-CM

## 2023-05-17 PROCEDURE — 99204 OFFICE O/P NEW MOD 45 MIN: CPT | Performed by: NURSE PRACTITIONER

## 2023-05-17 PROCEDURE — 1036F TOBACCO NON-USER: CPT | Performed by: NURSE PRACTITIONER

## 2023-05-17 PROCEDURE — 1090F PRES/ABSN URINE INCON ASSESS: CPT | Performed by: NURSE PRACTITIONER

## 2023-05-17 PROCEDURE — G8427 DOCREV CUR MEDS BY ELIG CLIN: HCPCS | Performed by: NURSE PRACTITIONER

## 2023-05-17 PROCEDURE — G8417 CALC BMI ABV UP PARAM F/U: HCPCS | Performed by: NURSE PRACTITIONER

## 2023-05-17 PROCEDURE — 1123F ACP DISCUSS/DSCN MKR DOCD: CPT | Performed by: NURSE PRACTITIONER

## 2023-05-17 PROCEDURE — G8399 PT W/DXA RESULTS DOCUMENT: HCPCS | Performed by: NURSE PRACTITIONER

## 2023-05-17 ASSESSMENT — ENCOUNTER SYMPTOMS
BACK PAIN: 0
VOMITING: 1
COUGH: 1
ABDOMINAL PAIN: 1
BLOOD IN STOOL: 0
EYE PAIN: 0
SKIN LESIONS: 0
WHEEZING: 0
EYE DISCHARGE: 0
HEARTBURN: 0
SHORTNESS OF BREATH: 0
NAUSEA: 1
DIARRHEA: 0
INDIGESTION: 0
CONSTIPATION: 1

## 2023-05-17 NOTE — PROGRESS NOTES
discharge. ENT:  Negative for difficulty articulating words, pain swallowing, high frequency hearing loss and dry mouth. Respiratory: Positive for cough. Negative for blood in sputum, shortness of breath and wheezing. Cardiovascular: Positive for chest pain, hypertension, leg pain and leg swelling. Negative for irregular heartbeat, regular rate and rhythm and varicose veins. GI: Positive for nausea, vomiting, abdominal pain and constipation. Negative for blood in stool, diarrhea, indigestion and heartburn. Genitourinary: Positive for recurrent UTIs and hysterectomy. Negative for urinary burning, hematuria, flank pain, history of urolithiasis, nocturia, slower stream, straining, urgency, leakage w/ urge, frequent urination, incomplete emptying, erectile dysfunction, testicular pain, sexually transmitted disease, discharge, urethral stricture, menstrual problem, endometriosis and vaginal pain. Musculoskeletal: Positive for arthralgias, muscle weakness and neck pain. Negative for back pain, bone pain and tenderness. Neurological: Positive for dizziness, focal weakness and numbness. Negative for seizures and tremors. Psychological:  Negative for depression and psychiatric problem. Endocrine: Positive for cold intolerance and fatigue. Negative for thirst, excessive urination and heat intolerance. Hem/Lymphatic: Positive for easy bleeding and easy bruising. Negative for frequent infections.     Urinalysis  UA - Dipstick  Results for orders placed or performed in visit on 03/31/23   AMB POC URINALYSIS DIP STICK AUTO W/O MICRO   Result Value Ref Range    Color, Urine, POC Yellow     Clarity, Urine, POC Clear     Glucose, Urine, POC Negative Negative    Bilirubin, Urine, POC Negative Negative    Ketones, Urine, POC Negative Negative    Specific Gravity, Urine, POC 1.015 1.001 - 1.035    Blood, Urine, POC Negative Negative    pH, Urine, POC 6.0 4.6 - 8.0    Protein, Urine, POC Negative Negative    Urobilinogen, POC

## 2023-06-28 ENCOUNTER — OFFICE VISIT (OUTPATIENT)
Age: 83
End: 2023-06-28
Payer: MEDICARE

## 2023-06-28 VITALS
WEIGHT: 156 LBS | SYSTOLIC BLOOD PRESSURE: 158 MMHG | BODY MASS INDEX: 25.07 KG/M2 | HEIGHT: 66 IN | HEART RATE: 84 BPM | DIASTOLIC BLOOD PRESSURE: 76 MMHG

## 2023-06-28 DIAGNOSIS — I25.118 CORONARY ARTERY DISEASE OF NATIVE HEART WITH STABLE ANGINA PECTORIS, UNSPECIFIED VESSEL OR LESION TYPE (HCC): ICD-10-CM

## 2023-06-28 DIAGNOSIS — I25.5 CARDIOMYOPATHY, ISCHEMIC: Primary | ICD-10-CM

## 2023-06-28 DIAGNOSIS — I95.1 ORTHOSTATIC HYPOTENSION: ICD-10-CM

## 2023-06-28 DIAGNOSIS — I10 ESSENTIAL HYPERTENSION: ICD-10-CM

## 2023-06-28 PROCEDURE — G8399 PT W/DXA RESULTS DOCUMENT: HCPCS | Performed by: INTERNAL MEDICINE

## 2023-06-28 PROCEDURE — 1036F TOBACCO NON-USER: CPT | Performed by: INTERNAL MEDICINE

## 2023-06-28 PROCEDURE — G8417 CALC BMI ABV UP PARAM F/U: HCPCS | Performed by: INTERNAL MEDICINE

## 2023-06-28 PROCEDURE — 3077F SYST BP >= 140 MM HG: CPT | Performed by: INTERNAL MEDICINE

## 2023-06-28 PROCEDURE — G8427 DOCREV CUR MEDS BY ELIG CLIN: HCPCS | Performed by: INTERNAL MEDICINE

## 2023-06-28 PROCEDURE — 3078F DIAST BP <80 MM HG: CPT | Performed by: INTERNAL MEDICINE

## 2023-06-28 PROCEDURE — 99214 OFFICE O/P EST MOD 30 MIN: CPT | Performed by: INTERNAL MEDICINE

## 2023-06-28 PROCEDURE — 1090F PRES/ABSN URINE INCON ASSESS: CPT | Performed by: INTERNAL MEDICINE

## 2023-06-28 PROCEDURE — 1123F ACP DISCUSS/DSCN MKR DOCD: CPT | Performed by: INTERNAL MEDICINE

## 2023-06-28 RX ORDER — VALSARTAN 80 MG/1
80 TABLET ORAL DAILY
Qty: 90 TABLET | Refills: 3 | Status: SHIPPED | OUTPATIENT
Start: 2023-06-28

## 2023-06-28 ASSESSMENT — ENCOUNTER SYMPTOMS
COUGH: 1
SHORTNESS OF BREATH: 1

## 2023-07-11 ENCOUNTER — NURSE ONLY (OUTPATIENT)
Dept: FAMILY MEDICINE CLINIC | Facility: CLINIC | Age: 83
End: 2023-07-11

## 2023-07-11 DIAGNOSIS — E11.42 TYPE 2 DIABETES MELLITUS WITH DIABETIC POLYNEUROPATHY, WITHOUT LONG-TERM CURRENT USE OF INSULIN (HCC): ICD-10-CM

## 2023-07-11 DIAGNOSIS — E03.9 HYPOTHYROIDISM, UNSPECIFIED TYPE: Primary | ICD-10-CM

## 2023-07-11 DIAGNOSIS — E55.9 VITAMIN D DEFICIENCY: ICD-10-CM

## 2023-07-11 DIAGNOSIS — K21.9 GASTROESOPHAGEAL REFLUX DISEASE WITHOUT ESOPHAGITIS: ICD-10-CM

## 2023-07-11 DIAGNOSIS — I10 ESSENTIAL HYPERTENSION: ICD-10-CM

## 2023-07-11 DIAGNOSIS — E03.9 HYPOTHYROIDISM, UNSPECIFIED TYPE: ICD-10-CM

## 2023-07-11 LAB
25(OH)D3 SERPL-MCNC: 44.8 NG/ML (ref 30–100)
ALBUMIN SERPL-MCNC: 3.9 G/DL (ref 3.2–4.6)
ALBUMIN/GLOB SERPL: 1.3 (ref 0.4–1.6)
ALP SERPL-CCNC: 89 U/L (ref 50–136)
ALT SERPL-CCNC: 22 U/L (ref 12–65)
ANION GAP SERPL CALC-SCNC: 6 MMOL/L (ref 2–11)
AST SERPL-CCNC: 17 U/L (ref 15–37)
BASOPHILS # BLD: 0.1 K/UL (ref 0–0.2)
BASOPHILS NFR BLD: 1 % (ref 0–2)
BILIRUB SERPL-MCNC: 0.4 MG/DL (ref 0.2–1.1)
BUN SERPL-MCNC: 35 MG/DL (ref 8–23)
CALCIUM SERPL-MCNC: 9.6 MG/DL (ref 8.3–10.4)
CHLORIDE SERPL-SCNC: 107 MMOL/L (ref 101–110)
CHOLEST SERPL-MCNC: 119 MG/DL
CO2 SERPL-SCNC: 27 MMOL/L (ref 21–32)
CREAT SERPL-MCNC: 1.6 MG/DL (ref 0.6–1)
DIFFERENTIAL METHOD BLD: NORMAL
EOSINOPHIL # BLD: 0.1 K/UL (ref 0–0.8)
EOSINOPHIL NFR BLD: 2 % (ref 0.5–7.8)
ERYTHROCYTE [DISTWIDTH] IN BLOOD BY AUTOMATED COUNT: 13.4 % (ref 11.9–14.6)
GLOBULIN SER CALC-MCNC: 3.1 G/DL (ref 2.8–4.5)
GLUCOSE SERPL-MCNC: 114 MG/DL (ref 65–100)
HCT VFR BLD AUTO: 38.7 % (ref 35.8–46.3)
HDLC SERPL-MCNC: 41 MG/DL (ref 40–60)
HDLC SERPL: 2.9
HGB BLD-MCNC: 12.2 G/DL (ref 11.7–15.4)
IMM GRANULOCYTES # BLD AUTO: 0 K/UL (ref 0–0.5)
IMM GRANULOCYTES NFR BLD AUTO: 1 % (ref 0–5)
LDLC SERPL CALC-MCNC: 42.6 MG/DL
LYMPHOCYTES # BLD: 2.7 K/UL (ref 0.5–4.6)
LYMPHOCYTES NFR BLD: 33 % (ref 13–44)
MCH RBC QN AUTO: 29 PG (ref 26.1–32.9)
MCHC RBC AUTO-ENTMCNC: 31.5 G/DL (ref 31.4–35)
MCV RBC AUTO: 92.1 FL (ref 82–102)
MONOCYTES # BLD: 0.8 K/UL (ref 0.1–1.3)
MONOCYTES NFR BLD: 9 % (ref 4–12)
NEUTS SEG # BLD: 4.6 K/UL (ref 1.7–8.2)
NEUTS SEG NFR BLD: 54 % (ref 43–78)
NRBC # BLD: 0 K/UL (ref 0–0.2)
PLATELET # BLD AUTO: 200 K/UL (ref 150–450)
PMV BLD AUTO: 11.3 FL (ref 9.4–12.3)
POTASSIUM SERPL-SCNC: 4 MMOL/L (ref 3.5–5.1)
PROT SERPL-MCNC: 7 G/DL (ref 6.3–8.2)
RBC # BLD AUTO: 4.2 M/UL (ref 4.05–5.2)
SODIUM SERPL-SCNC: 140 MMOL/L (ref 133–143)
TRIGL SERPL-MCNC: 177 MG/DL (ref 35–150)
TSH W FREE THYROID IF ABNORMAL: 2.83 UIU/ML (ref 0.36–3.74)
VLDLC SERPL CALC-MCNC: 35.4 MG/DL (ref 6–23)
WBC # BLD AUTO: 8.3 K/UL (ref 4.3–11.1)

## 2023-07-12 ENCOUNTER — TELEPHONE (OUTPATIENT)
Age: 83
End: 2023-07-12

## 2023-07-12 DIAGNOSIS — I10 ESSENTIAL HYPERTENSION: Primary | ICD-10-CM

## 2023-07-12 LAB
EST. AVERAGE GLUCOSE BLD GHB EST-MCNC: 140 MG/DL
HBA1C MFR BLD: 6.5 % (ref 4.8–5.6)

## 2023-07-12 NOTE — TELEPHONE ENCOUNTER
----- Message from Lizette Garibay MD sent at 7/12/2023 12:07 PM EDT -----  Thank you for the information! Onesimo, if you would let Ms Susy Trevizo know she is showing a little worsening of her kidney function but could be the price we have to pay to keep her BP controlled. I would recommend we repeat that BMP in a couple of weeks. She should hydrate adequately and avoid use of NSAIDs as much as possible in the interim.   Thanks

## 2023-08-02 ENCOUNTER — OFFICE VISIT (OUTPATIENT)
Dept: FAMILY MEDICINE CLINIC | Facility: CLINIC | Age: 83
End: 2023-08-02
Payer: MEDICARE

## 2023-08-02 VITALS
SYSTOLIC BLOOD PRESSURE: 124 MMHG | WEIGHT: 150.4 LBS | DIASTOLIC BLOOD PRESSURE: 60 MMHG | HEART RATE: 58 BPM | BODY MASS INDEX: 24.17 KG/M2 | OXYGEN SATURATION: 99 % | HEIGHT: 66 IN

## 2023-08-02 DIAGNOSIS — E03.9 HYPOTHYROIDISM, UNSPECIFIED TYPE: ICD-10-CM

## 2023-08-02 DIAGNOSIS — E55.9 VITAMIN D DEFICIENCY: ICD-10-CM

## 2023-08-02 DIAGNOSIS — E11.42 TYPE 2 DIABETES MELLITUS WITH DIABETIC POLYNEUROPATHY, WITHOUT LONG-TERM CURRENT USE OF INSULIN (HCC): Primary | ICD-10-CM

## 2023-08-02 DIAGNOSIS — I10 ESSENTIAL HYPERTENSION: ICD-10-CM

## 2023-08-02 DIAGNOSIS — I25.119 ATHEROSCLEROSIS OF NATIVE CORONARY ARTERY OF NATIVE HEART WITH ANGINA PECTORIS (HCC): ICD-10-CM

## 2023-08-02 LAB
ALBUMIN SERPL-MCNC: 3.9 G/DL (ref 3.2–4.6)
ALBUMIN/GLOB SERPL: 1.2 (ref 0.4–1.6)
ALP SERPL-CCNC: 98 U/L (ref 50–136)
ALT SERPL-CCNC: 23 U/L (ref 12–65)
ANION GAP SERPL CALC-SCNC: 4 MMOL/L (ref 2–11)
AST SERPL-CCNC: 17 U/L (ref 15–37)
BILIRUB SERPL-MCNC: 0.3 MG/DL (ref 0.2–1.1)
BUN SERPL-MCNC: 38 MG/DL (ref 8–23)
CALCIUM SERPL-MCNC: 9.7 MG/DL (ref 8.3–10.4)
CHLORIDE SERPL-SCNC: 104 MMOL/L (ref 101–110)
CO2 SERPL-SCNC: 30 MMOL/L (ref 21–32)
CREAT SERPL-MCNC: 1.4 MG/DL (ref 0.6–1)
GLOBULIN SER CALC-MCNC: 3.3 G/DL (ref 2.8–4.5)
GLUCOSE SERPL-MCNC: 109 MG/DL (ref 65–100)
POTASSIUM SERPL-SCNC: 4.2 MMOL/L (ref 3.5–5.1)
PROT SERPL-MCNC: 7.2 G/DL (ref 6.3–8.2)
SODIUM SERPL-SCNC: 138 MMOL/L (ref 133–143)

## 2023-08-02 PROCEDURE — G8399 PT W/DXA RESULTS DOCUMENT: HCPCS | Performed by: NURSE PRACTITIONER

## 2023-08-02 PROCEDURE — 36415 COLL VENOUS BLD VENIPUNCTURE: CPT | Performed by: NURSE PRACTITIONER

## 2023-08-02 PROCEDURE — 99214 OFFICE O/P EST MOD 30 MIN: CPT | Performed by: NURSE PRACTITIONER

## 2023-08-02 PROCEDURE — 1090F PRES/ABSN URINE INCON ASSESS: CPT | Performed by: NURSE PRACTITIONER

## 2023-08-02 PROCEDURE — 3074F SYST BP LT 130 MM HG: CPT | Performed by: NURSE PRACTITIONER

## 2023-08-02 PROCEDURE — G8420 CALC BMI NORM PARAMETERS: HCPCS | Performed by: NURSE PRACTITIONER

## 2023-08-02 PROCEDURE — 3078F DIAST BP <80 MM HG: CPT | Performed by: NURSE PRACTITIONER

## 2023-08-02 PROCEDURE — 3044F HG A1C LEVEL LT 7.0%: CPT | Performed by: NURSE PRACTITIONER

## 2023-08-02 PROCEDURE — 1036F TOBACCO NON-USER: CPT | Performed by: NURSE PRACTITIONER

## 2023-08-02 PROCEDURE — G8427 DOCREV CUR MEDS BY ELIG CLIN: HCPCS | Performed by: NURSE PRACTITIONER

## 2023-08-02 PROCEDURE — 1123F ACP DISCUSS/DSCN MKR DOCD: CPT | Performed by: NURSE PRACTITIONER

## 2023-08-02 RX ORDER — FLUTICASONE PROPIONATE 50 MCG
2 SPRAY, SUSPENSION (ML) NASAL DAILY
Qty: 16 G | Refills: 5 | Status: SHIPPED | OUTPATIENT
Start: 2023-08-02

## 2023-08-02 RX ORDER — TRIAMTERENE AND HYDROCHLOROTHIAZIDE 37.5; 25 MG/1; MG/1
1 CAPSULE ORAL DAILY
Qty: 90 CAPSULE | Refills: 3 | Status: SHIPPED | OUTPATIENT
Start: 2023-08-02

## 2023-08-02 RX ORDER — MONTELUKAST SODIUM 10 MG/1
10 TABLET ORAL DAILY
Qty: 90 TABLET | Refills: 3 | Status: SHIPPED | OUTPATIENT
Start: 2023-08-02

## 2023-08-02 SDOH — ECONOMIC STABILITY: HOUSING INSECURITY
IN THE LAST 12 MONTHS, WAS THERE A TIME WHEN YOU DID NOT HAVE A STEADY PLACE TO SLEEP OR SLEPT IN A SHELTER (INCLUDING NOW)?: NO

## 2023-08-02 SDOH — ECONOMIC STABILITY: FOOD INSECURITY: WITHIN THE PAST 12 MONTHS, YOU WORRIED THAT YOUR FOOD WOULD RUN OUT BEFORE YOU GOT MONEY TO BUY MORE.: NEVER TRUE

## 2023-08-02 SDOH — ECONOMIC STABILITY: INCOME INSECURITY: HOW HARD IS IT FOR YOU TO PAY FOR THE VERY BASICS LIKE FOOD, HOUSING, MEDICAL CARE, AND HEATING?: NOT HARD AT ALL

## 2023-08-02 SDOH — ECONOMIC STABILITY: FOOD INSECURITY: WITHIN THE PAST 12 MONTHS, THE FOOD YOU BOUGHT JUST DIDN'T LAST AND YOU DIDN'T HAVE MONEY TO GET MORE.: NEVER TRUE

## 2023-08-02 ASSESSMENT — ENCOUNTER SYMPTOMS
COLOR CHANGE: 0
DIARRHEA: 0
PHOTOPHOBIA: 0
GASTROINTESTINAL NEGATIVE: 1
STRIDOR: 0
EYE DISCHARGE: 0
NAUSEA: 0
RECTAL PAIN: 0
CHOKING: 0
VOMITING: 0
ABDOMINAL DISTENTION: 0
SORE THROAT: 0
COUGH: 0
EYE ITCHING: 0
EYES NEGATIVE: 1
BLOOD IN STOOL: 0
SINUS PAIN: 0
EYE REDNESS: 0
VOICE CHANGE: 0
FACIAL SWELLING: 0
CONSTIPATION: 0
SHORTNESS OF BREATH: 0
BACK PAIN: 0
SINUS PRESSURE: 0
ALLERGIC/IMMUNOLOGIC NEGATIVE: 1
ANAL BLEEDING: 0
EYE PAIN: 0
RHINORRHEA: 0
ABDOMINAL PAIN: 0
WHEEZING: 0
RESPIRATORY NEGATIVE: 1
TROUBLE SWALLOWING: 0
APNEA: 0
CHEST TIGHTNESS: 0

## 2023-08-02 NOTE — PROGRESS NOTES
every 5 minutes as needed for Chest pain MAX OF 3 DOSES 25 tablet 5    psyllium (METAMUCIL SMOOTH TEXTURE) 28 % packet Take 1 packet by mouth daily Take with full glass of h20 or juice 30 packet 2    nystatin (MYCOSTATIN) 003037 UNIT/GM cream Apply topically 2 times daily. 60 g 0    clopidogrel (PLAVIX) 75 MG tablet Take 1 tablet by mouth daily 90 tablet 3    Handicap Placard MISC Disability placard please. Thank you      albuterol sulfate  (90 Base) MCG/ACT inhaler Inhale 2 puffs into the lungs every 4 hours as needed for Shortness of Breath      aspirin 81 MG EC tablet Take 1 tablet by mouth daily      Cholecalciferol 50 MCG (2000 UT) CAPS Take 2,000 Units by mouth daily      famotidine (PEPCID) 40 MG tablet Take 1 tablet by mouth daily For stomach reflux/protection (Patient taking differently: Take 1 tablet by mouth daily For stomach reflux/protection  Takes generic) 90 tablet 3     No current facility-administered medications for this visit. Allergies   Allergen Reactions    Other Hives, Itching and Rash     Band aids cause contact rash after several hours. Brinzolamide Nausea Only    Ciprofloxacin      tendinitis    Eggs Or Egg-Derived Products     Hydralazine Other (See Comments)     Other reaction(s): Drowsiness/Fatigue    Yeast     Adhesive Tape Rash     Band aids cause contact rash after several hours. Band aids cause contact rash after several hours. Statins Other (See Comments) and Rash     Other reaction(s):  Other (See Comments), Other- (not listed) - Allergy  Aching muscles  Aching muscles  Aching muscles  Aching muscles      Sulfa Antibiotics Nausea Only, Other (See Comments) and Nausea And Vomiting     High fever, \"exterme nausea\"  Other reaction(s): Fever  High fever, \"exterme nausea\"       Patient Active Problem List   Diagnosis    Axonal polyneuropathy    Meniere's disease    Type 2 diabetes mellitus with diabetic polyneuropathy, without long-term current use of insulin ([address])

## 2023-08-23 ENCOUNTER — TELEPHONE (OUTPATIENT)
Age: 83
End: 2023-08-23

## 2023-08-23 DIAGNOSIS — I25.5 CARDIOMYOPATHY, ISCHEMIC: ICD-10-CM

## 2023-08-23 DIAGNOSIS — I50.22 CHRONIC SYSTOLIC (CONGESTIVE) HEART FAILURE (HCC): Primary | ICD-10-CM

## 2023-08-23 NOTE — TELEPHONE ENCOUNTER
Pt states her BP has no been \"satisfactory. \" States her diastolic number was 94 today and she \"can feel it. \" Also states she has experienced some blurred vision in her left eye. Denies CP and SOB.

## 2023-08-23 NOTE — TELEPHONE ENCOUNTER
MD Harmony Peck, RN  Caller: Unspecified (Today, 11:57 AM)  Given her prior history of profound orthostatic hypotension, I recommend staying the course, tolerate permissive hypertension 150-160/70-90           Reviewed Dr. Isabelle Van response with pt. Verb understanding.

## 2023-08-23 NOTE — TELEPHONE ENCOUNTER
Pt states recently her BP has been somewhat elevated. 157/94 today, 8/21 141/85, 8/11 129/73, 8/10 144/90. Feeling a little bit jittery, having intermittent numbness in L. Hand. Pt taking valsartan 80 mg daily, triamterene-HCTZ 37.5-25 mg daily, metoprolol 100 mg qHS. Pt also asking if she was supposed to have an echo prior to f/u appt in late Sept. Please advise.

## 2023-09-19 NOTE — PROGRESS NOTES
1/14/2016    Osteopenia with high risk of fracture 2014    Osteopenia with significant interval density loss in the hips since 2014    Post-menopausal osteoporosis     Postmenopausal osteoporosis     Unspecified sleep apnea     does not use c pap, pt. states asymptomatic since weight loss    Unsteady gait 8/16/2018    Visit for dental examination     SEVERAL YEARS AGO     Past Surgical History:   Procedure Laterality Date    BREAST SURGERY  mid 2000's    benign right breast biopsy    CARDIAC PROCEDURE N/A 1/20/2023    LEFT HEART CATH / CORONARY ANGIOGRAPHY performed by Brian Chiang MD at [address] [place] [address] N/A 1/20/2023    Percutaneous coronary intervention performed by Brian Chiang MD at [facility]  3/10    bilat and retina repair    CHOLECYSTECTOMY, OPEN  1979    HERNIA REPAIR      multiple surgery site- midline, umbilical    HYSTERECTOMY (CERVIX STATUS UNKNOWN)  1997    MASTOIDECTOMY Left     repair of leaking csf into middle ear    ORTHOPEDIC SURGERY      left  -- hammer toes    OVARY REMOVAL  1982     and cyst    MS UNLISTED PROCEDURE CARDIAC SURGERY      heart cath x 4, last 2007, stents x 3, last 2006     Family History   Problem Relation Age of Onset    Diabetes Brother     Stroke Paternal Aunt     Breast Cancer Neg Hx     Heart Disease Paternal Uncle     Heart Disease Father     Stroke Paternal Grandmother     Heart Disease Maternal Grandfather     Stroke Maternal Grandfather     Heart Disease Paternal Grandmother     Heart Attack Father 76        mi    Hypertension Mother      Social History     Tobacco Use    Smoking status: Never    Smokeless tobacco: Never   Substance Use Topics    Alcohol use: No       ROS:    Review of Systems   Constitutional: Positive for malaise/fatigue. Cardiovascular:  Negative for chest pain and leg swelling. Respiratory:  Positive for shortness of breath (mild).            PHYSICAL EXAM:   /68   Pulse 60

## 2023-09-20 ENCOUNTER — OFFICE VISIT (OUTPATIENT)
Age: 83
End: 2023-09-20
Payer: MEDICARE

## 2023-09-20 VITALS
WEIGHT: 151 LBS | SYSTOLIC BLOOD PRESSURE: 104 MMHG | HEIGHT: 66 IN | HEART RATE: 60 BPM | DIASTOLIC BLOOD PRESSURE: 68 MMHG | BODY MASS INDEX: 24.27 KG/M2

## 2023-09-20 DIAGNOSIS — I10 ESSENTIAL HYPERTENSION: ICD-10-CM

## 2023-09-20 DIAGNOSIS — I50.22 CHRONIC SYSTOLIC (CONGESTIVE) HEART FAILURE (HCC): Primary | ICD-10-CM

## 2023-09-20 DIAGNOSIS — E11.42 TYPE 2 DIABETES MELLITUS WITH DIABETIC POLYNEUROPATHY, WITHOUT LONG-TERM CURRENT USE OF INSULIN (HCC): ICD-10-CM

## 2023-09-20 DIAGNOSIS — I44.7 LBBB (LEFT BUNDLE BRANCH BLOCK): ICD-10-CM

## 2023-09-20 DIAGNOSIS — I95.1 ORTHOSTATIC HYPOTENSION: ICD-10-CM

## 2023-09-20 DIAGNOSIS — I25.118 CORONARY ARTERY DISEASE OF NATIVE HEART WITH STABLE ANGINA PECTORIS, UNSPECIFIED VESSEL OR LESION TYPE (HCC): ICD-10-CM

## 2023-09-20 PROCEDURE — 3074F SYST BP LT 130 MM HG: CPT | Performed by: INTERNAL MEDICINE

## 2023-09-20 PROCEDURE — 1123F ACP DISCUSS/DSCN MKR DOCD: CPT | Performed by: INTERNAL MEDICINE

## 2023-09-20 PROCEDURE — G8427 DOCREV CUR MEDS BY ELIG CLIN: HCPCS | Performed by: INTERNAL MEDICINE

## 2023-09-20 PROCEDURE — 3078F DIAST BP <80 MM HG: CPT | Performed by: INTERNAL MEDICINE

## 2023-09-20 PROCEDURE — 3044F HG A1C LEVEL LT 7.0%: CPT | Performed by: INTERNAL MEDICINE

## 2023-09-20 PROCEDURE — 1090F PRES/ABSN URINE INCON ASSESS: CPT | Performed by: INTERNAL MEDICINE

## 2023-09-20 PROCEDURE — G8399 PT W/DXA RESULTS DOCUMENT: HCPCS | Performed by: INTERNAL MEDICINE

## 2023-09-20 PROCEDURE — G8420 CALC BMI NORM PARAMETERS: HCPCS | Performed by: INTERNAL MEDICINE

## 2023-09-20 PROCEDURE — 1036F TOBACCO NON-USER: CPT | Performed by: INTERNAL MEDICINE

## 2023-09-20 PROCEDURE — 99214 OFFICE O/P EST MOD 30 MIN: CPT | Performed by: INTERNAL MEDICINE

## 2023-09-20 RX ORDER — METOPROLOL SUCCINATE 100 MG/1
100 TABLET, EXTENDED RELEASE ORAL DAILY
Qty: 90 TABLET | Refills: 3 | Status: SHIPPED | OUTPATIENT
Start: 2023-09-20

## 2023-09-20 RX ORDER — VALSARTAN 80 MG/1
80 TABLET ORAL DAILY
Qty: 90 TABLET | Refills: 3 | Status: SHIPPED | OUTPATIENT
Start: 2023-09-20

## 2023-09-20 RX ORDER — CLOPIDOGREL BISULFATE 75 MG/1
75 TABLET ORAL DAILY
Qty: 90 TABLET | Refills: 3 | Status: SHIPPED | OUTPATIENT
Start: 2023-09-20

## 2023-09-20 ASSESSMENT — ENCOUNTER SYMPTOMS: SHORTNESS OF BREATH: 1

## 2023-10-05 ENCOUNTER — NURSE ONLY (OUTPATIENT)
Dept: FAMILY MEDICINE CLINIC | Facility: CLINIC | Age: 83
End: 2023-10-05
Payer: MEDICARE

## 2023-10-05 DIAGNOSIS — I50.22 CHRONIC SYSTOLIC (CONGESTIVE) HEART FAILURE (HCC): ICD-10-CM

## 2023-10-05 DIAGNOSIS — Z23 NEED FOR INFLUENZA VACCINATION: Primary | ICD-10-CM

## 2023-10-05 PROCEDURE — G0008 ADMIN INFLUENZA VIRUS VAC: HCPCS | Performed by: NURSE PRACTITIONER

## 2023-10-05 PROCEDURE — 36415 COLL VENOUS BLD VENIPUNCTURE: CPT | Performed by: NURSE PRACTITIONER

## 2023-10-05 PROCEDURE — 90694 VACC AIIV4 NO PRSRV 0.5ML IM: CPT | Performed by: NURSE PRACTITIONER

## 2023-10-06 ENCOUNTER — TELEPHONE (OUTPATIENT)
Age: 83
End: 2023-10-06

## 2023-10-06 DIAGNOSIS — I50.22 CHRONIC SYSTOLIC (CONGESTIVE) HEART FAILURE (HCC): Primary | ICD-10-CM

## 2023-10-06 LAB
ANION GAP SERPL CALC-SCNC: 8 MMOL/L (ref 2–11)
BUN SERPL-MCNC: 46 MG/DL (ref 8–23)
CALCIUM SERPL-MCNC: 9.7 MG/DL (ref 8.3–10.4)
CHLORIDE SERPL-SCNC: 103 MMOL/L (ref 101–110)
CO2 SERPL-SCNC: 28 MMOL/L (ref 21–32)
CREAT SERPL-MCNC: 1.9 MG/DL (ref 0.6–1)
GLUCOSE SERPL-MCNC: 111 MG/DL (ref 65–100)
POTASSIUM SERPL-SCNC: 3.9 MMOL/L (ref 3.5–5.1)
SODIUM SERPL-SCNC: 139 MMOL/L (ref 133–143)

## 2023-10-06 NOTE — TELEPHONE ENCOUNTER
----- Message from Riley Fernandez MD sent at 10/6/2023  3:49 PM EDT -----  If she's tolerating the farxiga, please have her hold lasix and repeat this bmp one week. If she's either not taking or tolerating the farxiga, reduce the lasix by 1/2 and repeat labs one week.

## 2023-10-06 NOTE — TELEPHONE ENCOUNTER
Pt states she has noticed some blurred vision, weakness, and fatigue since starting farxiga. Not sure if it's related. States furosemide is only prn and she has not had to use it. In fact she has been losing weight. Taking triamterene-HCTZ 37.5-25 daily. Please advise.

## 2023-10-06 NOTE — TELEPHONE ENCOUNTER
Per Dr. Bailey Xavier, stop farxiga, repeat BMP in 1 week. Pt made aware of plan. Verb understanding.

## 2023-10-17 ENCOUNTER — TELEPHONE (OUTPATIENT)
Age: 83
End: 2023-10-17

## 2023-10-17 NOTE — TELEPHONE ENCOUNTER
Patient would like to know if Gerardo Silverman has looked at her lap work, if so she would like to talk to Gerardo Silverman MA.

## 2023-10-20 ENCOUNTER — OFFICE VISIT (OUTPATIENT)
Dept: FAMILY MEDICINE CLINIC | Facility: CLINIC | Age: 83
End: 2023-10-20

## 2023-10-20 VITALS
WEIGHT: 149 LBS | SYSTOLIC BLOOD PRESSURE: 138 MMHG | BODY MASS INDEX: 26.4 KG/M2 | HEART RATE: 70 BPM | HEIGHT: 63 IN | OXYGEN SATURATION: 99 % | DIASTOLIC BLOOD PRESSURE: 80 MMHG

## 2023-10-20 DIAGNOSIS — L89.312 PRESSURE INJURY OF RIGHT BUTTOCK, STAGE 2 (HCC): ICD-10-CM

## 2023-10-20 DIAGNOSIS — R19.7 DIARRHEA, UNSPECIFIED TYPE: Primary | ICD-10-CM

## 2023-10-20 DIAGNOSIS — R09.81 SINUS CONGESTION: ICD-10-CM

## 2023-10-20 RX ORDER — TRIAMCINOLONE ACETONIDE 40 MG/ML
40 INJECTION, SUSPENSION INTRA-ARTICULAR; INTRAMUSCULAR ONCE
Status: COMPLETED | OUTPATIENT
Start: 2023-10-20 | End: 2023-10-20

## 2023-10-20 RX ORDER — METRONIDAZOLE 500 MG/1
500 TABLET ORAL 3 TIMES DAILY
Qty: 21 TABLET | Refills: 0 | Status: SHIPPED | OUTPATIENT
Start: 2023-10-20 | End: 2023-10-27

## 2023-10-20 RX ORDER — DICYCLOMINE HCL 20 MG
20 TABLET ORAL 3 TIMES DAILY PRN
Qty: 30 TABLET | Refills: 0 | Status: SHIPPED | OUTPATIENT
Start: 2023-10-20

## 2023-10-20 RX ADMIN — TRIAMCINOLONE ACETONIDE 40 MG: 40 INJECTION, SUSPENSION INTRA-ARTICULAR; INTRAMUSCULAR at 14:10

## 2023-10-20 ASSESSMENT — ENCOUNTER SYMPTOMS
ABDOMINAL DISTENTION: 0
NAUSEA: 0
COUGH: 1
APNEA: 0
RHINORRHEA: 1
EYES NEGATIVE: 1
SINUS PRESSURE: 1
SHORTNESS OF BREATH: 0
DIARRHEA: 1
SINUS PAIN: 1
ABDOMINAL PAIN: 0
VOICE CHANGE: 0
ANAL BLEEDING: 0
BLOOD IN STOOL: 0
COLOR CHANGE: 0
FACIAL SWELLING: 0
TROUBLE SWALLOWING: 0
WHEEZING: 0
CONSTIPATION: 0
SORE THROAT: 0
BACK PAIN: 1
CHEST TIGHTNESS: 0
CHOKING: 0
PHOTOPHOBIA: 0
RECTAL PAIN: 0
ALLERGIC/IMMUNOLOGIC NEGATIVE: 1
EYE PAIN: 0
EYE DISCHARGE: 0
VOMITING: 0
EYE ITCHING: 0
STRIDOR: 0
EYE REDNESS: 0

## 2023-10-20 NOTE — PROGRESS NOTES
PROGRESS NOTE    Chief Complaint   Patient presents with    Follow-up     Existing conditions    Diarrhea     Diarrhea approx. 4 weeks. Headache and neck pain approx. 4 days. SUBJECTIVE:     Shireen Grant is a very pleasant 78 y.o. female with hx of hypertension, hyperlipidemia, CAD-currently following cardiology, asthma-currently following pulmonology, squamous cell carcinoma of left ear, CSF leak-currently following ENT, and arthritis , seen today in office for multiple concerns that she would like to have addressed. She reports having diarrhea started about 4 weeks or longer. She reports initially starting with a \"mushy\" type stools that were quite large that progressively worsened into watery stools. She reports having around 10-12 stools per day at this time. She reports she has been trying to stay hydrated. She reports no nausea or vomiting, no melena, no hematochezia, no hematemesis, no hemoptysis, no hematuria. She reports having bowel movement at night as well. She has had an incontinence episode of bowel during the night a few times. She reports no fever or chills. She also has had a small open wound to her right buttock that has been present for many months since she had an episode of tendinitis and was not able to move around. Since then, she reports she has been treating with applying Neosporin. She reports wound would improve in healing but not completely resolved. She reports no drainage or discharge from wound. She is also having complaints of increased upper back and neck tightness as well as ear discomfort with increased runny nose. She reports having some sputum production with intermittent coughing or sinus drainage. She reports no fever or chills, no chest pain, no shortness of breath, no palpitation, no unilateral or focal weakness, no edema, no orthopnea, no PND.       Past Medical History, Past Surgical History, Family history, Social History, and Medications

## 2023-10-31 DIAGNOSIS — E03.9 HYPOTHYROIDISM, UNSPECIFIED TYPE: ICD-10-CM

## 2023-10-31 DIAGNOSIS — E11.42 TYPE 2 DIABETES MELLITUS WITH DIABETIC POLYNEUROPATHY, WITHOUT LONG-TERM CURRENT USE OF INSULIN (HCC): Primary | ICD-10-CM

## 2023-10-31 DIAGNOSIS — E55.9 VITAMIN D DEFICIENCY: ICD-10-CM

## 2023-11-02 ENCOUNTER — NURSE ONLY (OUTPATIENT)
Dept: FAMILY MEDICINE CLINIC | Facility: CLINIC | Age: 83
End: 2023-11-02
Payer: MEDICARE

## 2023-11-02 DIAGNOSIS — E11.42 TYPE 2 DIABETES MELLITUS WITH DIABETIC POLYNEUROPATHY, WITHOUT LONG-TERM CURRENT USE OF INSULIN (HCC): ICD-10-CM

## 2023-11-02 DIAGNOSIS — E55.9 VITAMIN D DEFICIENCY: ICD-10-CM

## 2023-11-02 DIAGNOSIS — E03.9 HYPOTHYROIDISM, UNSPECIFIED TYPE: ICD-10-CM

## 2023-11-02 LAB
ALBUMIN SERPL-MCNC: 3.8 G/DL (ref 3.2–4.6)
ALBUMIN/GLOB SERPL: 1.3 (ref 0.4–1.6)
ALP SERPL-CCNC: 94 U/L (ref 50–136)
ALT SERPL-CCNC: 36 U/L (ref 12–65)
ANION GAP SERPL CALC-SCNC: 8 MMOL/L (ref 2–11)
AST SERPL-CCNC: 23 U/L (ref 15–37)
BASOPHILS # BLD: 0.1 K/UL (ref 0–0.2)
BASOPHILS NFR BLD: 1 % (ref 0–2)
BILIRUB SERPL-MCNC: 0.3 MG/DL (ref 0.2–1.1)
BUN SERPL-MCNC: 46 MG/DL (ref 8–23)
CALCIUM SERPL-MCNC: 9.1 MG/DL (ref 8.3–10.4)
CHLORIDE SERPL-SCNC: 99 MMOL/L (ref 101–110)
CHOLEST SERPL-MCNC: 170 MG/DL
CO2 SERPL-SCNC: 27 MMOL/L (ref 21–32)
CREAT SERPL-MCNC: 1.5 MG/DL (ref 0.6–1)
DIFFERENTIAL METHOD BLD: ABNORMAL
EOSINOPHIL # BLD: 0.1 K/UL (ref 0–0.8)
EOSINOPHIL NFR BLD: 1 % (ref 0.5–7.8)
ERYTHROCYTE [DISTWIDTH] IN BLOOD BY AUTOMATED COUNT: 14 % (ref 11.9–14.6)
GLOBULIN SER CALC-MCNC: 3 G/DL (ref 2.8–4.5)
GLUCOSE SERPL-MCNC: 123 MG/DL (ref 65–100)
HCT VFR BLD AUTO: 35.3 % (ref 35.8–46.3)
HDLC SERPL-MCNC: 51 MG/DL (ref 40–60)
HDLC SERPL: 3.3
HGB BLD-MCNC: 11.3 G/DL (ref 11.7–15.4)
IMM GRANULOCYTES # BLD AUTO: 0 K/UL (ref 0–0.5)
IMM GRANULOCYTES NFR BLD AUTO: 1 % (ref 0–5)
LDLC SERPL CALC-MCNC: 84.6 MG/DL
LYMPHOCYTES # BLD: 1.9 K/UL (ref 0.5–4.6)
LYMPHOCYTES NFR BLD: 24 % (ref 13–44)
MCH RBC QN AUTO: 29.4 PG (ref 26.1–32.9)
MCHC RBC AUTO-ENTMCNC: 32 G/DL (ref 31.4–35)
MCV RBC AUTO: 91.7 FL (ref 82–102)
MONOCYTES # BLD: 0.7 K/UL (ref 0.1–1.3)
MONOCYTES NFR BLD: 9 % (ref 4–12)
NEUTS SEG # BLD: 5 K/UL (ref 1.7–8.2)
NEUTS SEG NFR BLD: 64 % (ref 43–78)
NRBC # BLD: 0 K/UL (ref 0–0.2)
PLATELET # BLD AUTO: 224 K/UL (ref 150–450)
PMV BLD AUTO: 11.3 FL (ref 9.4–12.3)
POTASSIUM SERPL-SCNC: 4.3 MMOL/L (ref 3.5–5.1)
PROT SERPL-MCNC: 6.8 G/DL (ref 6.3–8.2)
RBC # BLD AUTO: 3.85 M/UL (ref 4.05–5.2)
SODIUM SERPL-SCNC: 134 MMOL/L (ref 133–143)
TRIGL SERPL-MCNC: 172 MG/DL (ref 35–150)
TSH W FREE THYROID IF ABNORMAL: 3.3 UIU/ML (ref 0.36–3.74)
VLDLC SERPL CALC-MCNC: 34.4 MG/DL (ref 6–23)
WBC # BLD AUTO: 7.7 K/UL (ref 4.3–11.1)

## 2023-11-02 PROCEDURE — 36415 COLL VENOUS BLD VENIPUNCTURE: CPT | Performed by: NURSE PRACTITIONER

## 2023-11-03 LAB
25(OH)D3 SERPL-MCNC: 48.4 NG/ML (ref 30–100)
EST. AVERAGE GLUCOSE BLD GHB EST-MCNC: 143 MG/DL
HBA1C MFR BLD: 6.6 % (ref 4.8–5.6)

## 2023-11-07 ENCOUNTER — NURSE ONLY (OUTPATIENT)
Dept: FAMILY MEDICINE CLINIC | Facility: CLINIC | Age: 83
End: 2023-11-07

## 2023-11-07 DIAGNOSIS — R19.7 DIARRHEA, UNSPECIFIED TYPE: ICD-10-CM

## 2023-11-08 LAB
C DIFF GDH STL QL: NORMAL
C DIFF TOX A+B STL QL IA: NORMAL
C DIFF TOXIN INTERPRETATION: NORMAL
CLINICAL CONSIDERATION: NORMAL
REFLEX: NORMAL

## 2023-11-10 LAB
BACTERIA SPEC CULT: NORMAL
E COLI SXT STL QL IA: NEGATIVE
O+P SPEC MICRO: NORMAL
O+P STL CONC: NORMAL
SERVICE CMNT-IMP: NORMAL
SPECIMEN SOURCE: NORMAL
SPECIMEN SOURCE: NORMAL

## 2023-11-12 LAB
BACTERIA SPEC CULT: NORMAL
BACTERIA SPEC CULT: NORMAL
CAMPYLOBACTER STL CULT: NORMAL
SPECIMEN SOURCE: NORMAL
VIBRIO STL CULT: NORMAL
YERSINIA RESULT 1: NORMAL
YERSINIA SPEC CULT: NORMAL

## 2023-11-15 ENCOUNTER — OFFICE VISIT (OUTPATIENT)
Dept: FAMILY MEDICINE CLINIC | Facility: CLINIC | Age: 83
End: 2023-11-15

## 2023-11-15 VITALS
HEART RATE: 67 BPM | SYSTOLIC BLOOD PRESSURE: 132 MMHG | WEIGHT: 147 LBS | DIASTOLIC BLOOD PRESSURE: 78 MMHG | HEIGHT: 63 IN | BODY MASS INDEX: 26.05 KG/M2 | OXYGEN SATURATION: 98 %

## 2023-11-15 DIAGNOSIS — K21.9 GASTROESOPHAGEAL REFLUX DISEASE WITHOUT ESOPHAGITIS: ICD-10-CM

## 2023-11-15 DIAGNOSIS — R19.7 DIARRHEA, UNSPECIFIED TYPE: ICD-10-CM

## 2023-11-15 DIAGNOSIS — E03.9 HYPOTHYROIDISM, UNSPECIFIED TYPE: ICD-10-CM

## 2023-11-15 DIAGNOSIS — Z23 ENCOUNTER FOR IMMUNIZATION: ICD-10-CM

## 2023-11-15 DIAGNOSIS — E11.42 TYPE 2 DIABETES MELLITUS WITH DIABETIC POLYNEUROPATHY, WITHOUT LONG-TERM CURRENT USE OF INSULIN (HCC): ICD-10-CM

## 2023-11-15 DIAGNOSIS — Z00.00 ENCOUNTER FOR ANNUAL WELLNESS VISIT (AWV) IN MEDICARE PATIENT: Primary | ICD-10-CM

## 2023-11-15 DIAGNOSIS — E55.9 VITAMIN D DEFICIENCY: ICD-10-CM

## 2023-11-15 RX ORDER — FAMOTIDINE 40 MG/1
40 TABLET, FILM COATED ORAL DAILY
Qty: 90 TABLET | Refills: 3 | Status: SHIPPED | OUTPATIENT
Start: 2023-11-15

## 2023-11-15 RX ORDER — AMLODIPINE BESYLATE 2.5 MG/1
2.5 TABLET ORAL DAILY
Qty: 30 TABLET | Refills: 0 | Status: SHIPPED | OUTPATIENT
Start: 2023-11-15

## 2023-11-15 ASSESSMENT — ENCOUNTER SYMPTOMS
TROUBLE SWALLOWING: 0
NAUSEA: 0
DIARRHEA: 1
SHORTNESS OF BREATH: 0
VOMITING: 0
BACK PAIN: 0
ABDOMINAL PAIN: 0
RECTAL PAIN: 0
STRIDOR: 0
SINUS PRESSURE: 0
ALLERGIC/IMMUNOLOGIC NEGATIVE: 1
ABDOMINAL DISTENTION: 0
SORE THROAT: 0
FACIAL SWELLING: 0
COUGH: 0
CONSTIPATION: 0
VOICE CHANGE: 0
PHOTOPHOBIA: 0
ANAL BLEEDING: 0
EYES NEGATIVE: 1
BLOOD IN STOOL: 0
WHEEZING: 0
COLOR CHANGE: 0
EYE PAIN: 0
SINUS PAIN: 0
EYE DISCHARGE: 0
CHEST TIGHTNESS: 0
APNEA: 0
RHINORRHEA: 0
CHOKING: 0
EYE REDNESS: 0
EYE ITCHING: 0

## 2023-11-15 ASSESSMENT — PATIENT HEALTH QUESTIONNAIRE - PHQ9
10. IF YOU CHECKED OFF ANY PROBLEMS, HOW DIFFICULT HAVE THESE PROBLEMS MADE IT FOR YOU TO DO YOUR WORK, TAKE CARE OF THINGS AT HOME, OR GET ALONG WITH OTHER PEOPLE: 0
9. THOUGHTS THAT YOU WOULD BE BETTER OFF DEAD, OR OF HURTING YOURSELF: 0
SUM OF ALL RESPONSES TO PHQ QUESTIONS 1-9: 0
SUM OF ALL RESPONSES TO PHQ9 QUESTIONS 1 & 2: 0
2. FEELING DOWN, DEPRESSED OR HOPELESS: 0
1. LITTLE INTEREST OR PLEASURE IN DOING THINGS: 0
5. POOR APPETITE OR OVEREATING: 0
3. TROUBLE FALLING OR STAYING ASLEEP: 0
8. MOVING OR SPEAKING SO SLOWLY THAT OTHER PEOPLE COULD HAVE NOTICED. OR THE OPPOSITE, BEING SO FIGETY OR RESTLESS THAT YOU HAVE BEEN MOVING AROUND A LOT MORE THAN USUAL: 0
6. FEELING BAD ABOUT YOURSELF - OR THAT YOU ARE A FAILURE OR HAVE LET YOURSELF OR YOUR FAMILY DOWN: 0
SUM OF ALL RESPONSES TO PHQ QUESTIONS 1-9: 0
7. TROUBLE CONCENTRATING ON THINGS, SUCH AS READING THE NEWSPAPER OR WATCHING TELEVISION: 0
4. FEELING TIRED OR HAVING LITTLE ENERGY: 0
SUM OF ALL RESPONSES TO PHQ QUESTIONS 1-9: 0
SUM OF ALL RESPONSES TO PHQ QUESTIONS 1-9: 0

## 2023-11-15 ASSESSMENT — LIFESTYLE VARIABLES
HOW MANY STANDARD DRINKS CONTAINING ALCOHOL DO YOU HAVE ON A TYPICAL DAY: PATIENT DOES NOT DRINK
HOW OFTEN DO YOU HAVE A DRINK CONTAINING ALCOHOL: NEVER

## 2023-11-15 NOTE — PROGRESS NOTES
PROGRESS NOTE    Chief Complaint   Patient presents with    Medicare AWV    Diarrhea     Had gotten better but now it's back. Not as watery but still explosive and mushy. SUBJECTIVE:     Lacey Sumner is a very pleasant 78 y.o. female with hx of hypertension, hyperlipidemia, CAD-currently following cardiology, asthma-currently following pulmonology, squamous cell carcinoma of left ear, CSF leak-currently following ENT, and arthritis, seen today in office for lab results review. She is also due for her AWV. She was seen recently on 10/20 for complaints of diarrhea that started about 2 months prior. She reports having intermittent occasional episodes prior but persistently having loose watery stool started about 2 months prior. Last office visit, she was tested for stool studies which were all negative. She was given a prescription for Flagyl orally last office visit which she reports having some improvement with use for her diarrhea. Unfortunately, she reports the symptoms return after her Flagyl course is completed. She has taken dicyclomine about once a day and that has provided some relief for her diarrhea but they are still quite \"explosive\". She reports no fever or chills, no chest pain, no shortness of breath, no palpitation, no unilateral or focal weakness, no melena, hematochezia no hematemesis, no hemoptysis, no edema, no orthopnea, no PND. Past Medical History, Past Surgical History, Family history, Social History, and Medications were all reviewed with the patient today and updated as necessary.        Current Outpatient Medications   Medication Sig Dispense Refill    famotidine (PEPCID) 40 MG tablet Take 1 tablet by mouth daily For stomach reflux/protection 90 tablet 3    amLODIPine (NORVASC) 2.5 MG tablet Take 1 tablet by mouth daily For blood pressure 30 tablet 0    dicyclomine (BENTYL) 20 MG tablet Take 1 tablet by mouth 3 times daily as needed (diarrhea) 30 tablet 0

## 2023-11-20 DIAGNOSIS — R19.7 DIARRHEA, UNSPECIFIED TYPE: Primary | ICD-10-CM

## 2023-11-20 LAB — LACTOFERRIN, FECAL: 15.22 UG/ML(G) (ref 0–7.24)

## 2023-12-01 ENCOUNTER — TELEPHONE (OUTPATIENT)
Dept: FAMILY MEDICINE CLINIC | Facility: CLINIC | Age: 83
End: 2023-12-01

## 2023-12-01 ENCOUNTER — HOSPITAL ENCOUNTER (EMERGENCY)
Age: 83
Discharge: HOME | End: 2023-12-01
Attending: EMERGENCY MEDICINE
Payer: MEDICARE

## 2023-12-01 ENCOUNTER — APPOINTMENT (OUTPATIENT)
Dept: CT IMAGING | Age: 83
End: 2023-12-01
Payer: MEDICARE

## 2023-12-01 VITALS
HEART RATE: 46 BPM | HEIGHT: 63 IN | BODY MASS INDEX: 26.58 KG/M2 | OXYGEN SATURATION: 99 % | SYSTOLIC BLOOD PRESSURE: 154 MMHG | DIASTOLIC BLOOD PRESSURE: 60 MMHG | WEIGHT: 150 LBS | RESPIRATION RATE: 12 BRPM | TEMPERATURE: 97.4 F

## 2023-12-01 DIAGNOSIS — R19.7 DIARRHEA, UNSPECIFIED TYPE: Primary | ICD-10-CM

## 2023-12-01 DIAGNOSIS — A09 DIARRHEA OF INFECTIOUS ORIGIN: ICD-10-CM

## 2023-12-01 DIAGNOSIS — E86.0 DEHYDRATION: Primary | ICD-10-CM

## 2023-12-01 LAB
ALBUMIN SERPL-MCNC: 3.4 G/DL (ref 3.2–4.6)
ALBUMIN/GLOB SERPL: 1 (ref 0.4–1.6)
ALP SERPL-CCNC: 94 U/L (ref 50–136)
ALT SERPL-CCNC: 35 U/L (ref 12–65)
ANION GAP SERPL CALC-SCNC: 3 MMOL/L (ref 2–11)
AST SERPL-CCNC: 22 U/L (ref 15–37)
BASOPHILS # BLD: 0 K/UL (ref 0–0.2)
BASOPHILS NFR BLD: 1 % (ref 0–2)
BILIRUB SERPL-MCNC: 0.2 MG/DL (ref 0.2–1.1)
BILIRUB UR QL: NEGATIVE
BUN SERPL-MCNC: 28 MG/DL (ref 8–23)
CALCIUM SERPL-MCNC: 9.6 MG/DL (ref 8.3–10.4)
CHLORIDE SERPL-SCNC: 109 MMOL/L (ref 101–110)
CO2 SERPL-SCNC: 25 MMOL/L (ref 21–32)
CREAT SERPL-MCNC: 1.5 MG/DL (ref 0.6–1)
DIFFERENTIAL METHOD BLD: ABNORMAL
EOSINOPHIL # BLD: 0 K/UL (ref 0–0.8)
EOSINOPHIL NFR BLD: 0 % (ref 0.5–7.8)
ERYTHROCYTE [DISTWIDTH] IN BLOOD BY AUTOMATED COUNT: 14.1 % (ref 11.9–14.6)
GLOBULIN SER CALC-MCNC: 3.3 G/DL (ref 2.8–4.5)
GLUCOSE SERPL-MCNC: 154 MG/DL (ref 65–100)
GLUCOSE UR QL STRIP.AUTO: NEGATIVE MG/DL
HCT VFR BLD AUTO: 33.5 % (ref 35.8–46.3)
HGB BLD-MCNC: 10.7 G/DL (ref 11.7–15.4)
IMM GRANULOCYTES # BLD AUTO: 0 K/UL (ref 0–0.5)
IMM GRANULOCYTES NFR BLD AUTO: 0 % (ref 0–5)
KETONES UR-MCNC: NEGATIVE MG/DL
LEUKOCYTE ESTERASE UR QL STRIP: ABNORMAL
LIPASE SERPL-CCNC: 165 U/L (ref 73–393)
LYMPHOCYTES # BLD: 1.5 K/UL (ref 0.5–4.6)
LYMPHOCYTES NFR BLD: 27 % (ref 13–44)
MAGNESIUM SERPL-MCNC: 1.7 MG/DL (ref 1.8–2.4)
MCH RBC QN AUTO: 29.1 PG (ref 26.1–32.9)
MCHC RBC AUTO-ENTMCNC: 31.9 G/DL (ref 31.4–35)
MCV RBC AUTO: 91 FL (ref 82–102)
MONOCYTES # BLD: 0.4 K/UL (ref 0.1–1.3)
MONOCYTES NFR BLD: 8 % (ref 4–12)
NEUTS SEG # BLD: 3.5 K/UL (ref 1.7–8.2)
NEUTS SEG NFR BLD: 64 % (ref 43–78)
NITRITE UR QL: NEGATIVE
NRBC # BLD: 0 K/UL (ref 0–0.2)
PH UR: 5 (ref 5–9)
PLATELET # BLD AUTO: 177 K/UL (ref 150–450)
PMV BLD AUTO: 10.6 FL (ref 9.4–12.3)
POTASSIUM SERPL-SCNC: 4 MMOL/L (ref 3.5–5.1)
PROT SERPL-MCNC: 6.7 G/DL (ref 6.3–8.2)
PROT UR QL: NEGATIVE MG/DL
RBC # BLD AUTO: 3.68 M/UL (ref 4.05–5.2)
RBC # UR STRIP: NEGATIVE
SERVICE CMNT-IMP: ABNORMAL
SODIUM SERPL-SCNC: 137 MMOL/L (ref 133–143)
SP GR UR: 1.01 (ref 1–1.02)
UROBILINOGEN UR QL: 0.2 EU/DL (ref 0.2–1)
WBC # BLD AUTO: 5.4 K/UL (ref 4.3–11.1)

## 2023-12-01 PROCEDURE — 96360 HYDRATION IV INFUSION INIT: CPT

## 2023-12-01 PROCEDURE — 99285 EMERGENCY DEPT VISIT HI MDM: CPT

## 2023-12-01 PROCEDURE — 6360000004 HC RX CONTRAST MEDICATION: Performed by: EMERGENCY MEDICINE

## 2023-12-01 PROCEDURE — 96361 HYDRATE IV INFUSION ADD-ON: CPT

## 2023-12-01 PROCEDURE — 83690 ASSAY OF LIPASE: CPT

## 2023-12-01 PROCEDURE — 80053 COMPREHEN METABOLIC PANEL: CPT

## 2023-12-01 PROCEDURE — 83735 ASSAY OF MAGNESIUM: CPT

## 2023-12-01 PROCEDURE — 2580000003 HC RX 258: Performed by: EMERGENCY MEDICINE

## 2023-12-01 PROCEDURE — 74177 CT ABD & PELVIS W/CONTRAST: CPT

## 2023-12-01 PROCEDURE — 85025 COMPLETE CBC W/AUTO DIFF WBC: CPT

## 2023-12-01 PROCEDURE — 81003 URINALYSIS AUTO W/O SCOPE: CPT

## 2023-12-01 PROCEDURE — 87086 URINE CULTURE/COLONY COUNT: CPT

## 2023-12-01 RX ORDER — 0.9 % SODIUM CHLORIDE 0.9 %
1000 INTRAVENOUS SOLUTION INTRAVENOUS
Status: COMPLETED | OUTPATIENT
Start: 2023-12-01 | End: 2023-12-01

## 2023-12-01 RX ORDER — DIPHENOXYLATE HYDROCHLORIDE AND ATROPINE SULFATE 2.5; .025 MG/1; MG/1
1 TABLET ORAL EVERY 6 HOURS PRN
Qty: 30 TABLET | Refills: 0 | Status: SHIPPED | OUTPATIENT
Start: 2023-12-01 | End: 2023-12-11

## 2023-12-01 RX ORDER — ONDANSETRON 4 MG/1
TABLET, ORALLY DISINTEGRATING ORAL
Qty: 30 TABLET | Refills: 0 | Status: SHIPPED | OUTPATIENT
Start: 2023-12-01

## 2023-12-01 RX ORDER — CHOLESTYRAMINE 4 G/9G
1 POWDER, FOR SUSPENSION ORAL 2 TIMES DAILY
Qty: 180 PACKET | Refills: 0 | Status: SHIPPED | OUTPATIENT
Start: 2023-12-01

## 2023-12-01 RX ORDER — LANOLIN ALCOHOL/MO/W.PET/CERES
400 CREAM (GRAM) TOPICAL
Status: DISCONTINUED | OUTPATIENT
Start: 2023-12-01 | End: 2023-12-01 | Stop reason: HOSPADM

## 2023-12-01 RX ORDER — MAGNESIUM OXIDE 400 MG/1
400 TABLET ORAL DAILY
Qty: 7 TABLET | Refills: 0 | Status: SHIPPED | OUTPATIENT
Start: 2023-12-01 | End: 2023-12-08

## 2023-12-01 RX ADMIN — SODIUM CHLORIDE 1000 ML: 9 INJECTION, SOLUTION INTRAVENOUS at 09:18

## 2023-12-01 RX ADMIN — IOPAMIDOL 100 ML: 755 INJECTION, SOLUTION INTRAVENOUS at 10:20

## 2023-12-01 ASSESSMENT — PAIN - FUNCTIONAL ASSESSMENT: PAIN_FUNCTIONAL_ASSESSMENT: NONE - DENIES PAIN

## 2023-12-01 NOTE — ED PROVIDER NOTES
status and  similar to prior. SPLEEN: Punctate calcified granulomas. PANCREAS: No pancreatic mass or ductal dilation. ADRENALS: Normal.    KIDNEYS/BLADDER: The kidneys are symmetric in size. No renal calculus or  hydronephrosis. Unchanged left upper and interpolar renal cysts. The urinary  bladder is unremarkable. BOWEL: Extensive descending and sigmoid colon diverticulosis without evidence of  diverticulitis. No bowel wall thickening or dilatation. APPENDIX: The appendix is normal.    PERITONEUM/RETROPERITONEUM: No ascites or free air. No pelvic or retroperitoneal  lymphadenopathy. VESSELS: Increasing size of infrarenal abdominal aortic aneurysm measuring 4.5 x  4.5 cm diameter, previously 3.7 x 3.9 cm diameter. ABDOMINAL WALL: Prior hernia repair without evidence of soft tissue collection. REPRODUCTIVE: Status post hysterectomy. No adnexal mass. BONES: No suspicious osseous lesion. Impression    1. No acute abnormality within the abdomen and pelvis. Extensive descending and  sigmoid colon diverticulosis without evidence of diverticulitis. 2.  Increasing size of infrarenal abdominal aortic aneurysm measuring 4.5 x 4.5  cm diameter, previously 2.7 x 2.9 cm in diameter. Recommend nonurgent vascular  consultation.        CBC with Auto Differential   Result Value Ref Range    WBC 5.4 4.3 - 11.1 K/uL    RBC 3.68 (L) 4.05 - 5.2 M/uL    Hemoglobin 10.7 (L) 11.7 - 15.4 g/dL    Hematocrit 33.5 (L) 35.8 - 46.3 %    MCV 91.0 82 - 102 FL    MCH 29.1 26.1 - 32.9 PG    MCHC 31.9 31.4 - 35.0 g/dL    RDW 14.1 11.9 - 14.6 %    Platelets 382 948 - 982 K/uL    MPV 10.6 9.4 - 12.3 FL    nRBC 0.00 0.0 - 0.2 K/uL    Differential Type AUTOMATED      Neutrophils % 64 43 - 78 %    Lymphocytes % 27 13 - 44 %    Monocytes % 8 4.0 - 12.0 %    Eosinophils % 0 (L) 0.5 - 7.8 %    Basophils % 1 0.0 - 2.0 %    Immature Granulocytes 0 0.0 - 5.0 %    Neutrophils Absolute 3.5 1.7 - 8.2 K/UL    Lymphocytes Absolute

## 2023-12-01 NOTE — FLOWSHEET NOTE
orthos   12/01/23 0915 12/01/23 0918 12/01/23 0922   Vital Signs   Pulse 54 57 57   Respirations 16 16 16   BP (!) 165/67 (!) 161/67 134/70   MAP (Calculated) 100 98 91   Oxygen Therapy   SpO2 100 % 100 % 100 %

## 2023-12-01 NOTE — TELEPHONE ENCOUNTER
Pt called with complaints of persistent diarrhea despite taking the dicyclomine that has been prescribed. She reports she has been having 10 episodes of watery stools or so per day particularly yesterday. She also had to episodes of incontinence of bowels during the night. She admits that she has not been drinking as much fluids as she would like and that she is feeling weak this morning. She has been compliant with taking all her other medication including amlodipine, Toprol all, Dyazide and valsartan. She reports she is having some nausea this morning but has no chest pain, no shortness of breath, no syncope, no signs or symptoms of bleeding, no unilateral or focal weakness. Patient has been referred to GI and reports she does have an appointment with Dr. Chelsea [facility] on Monday 12/4 at 10:30 AM.    At this time, due to concerns for severe dehydration and hypotension, advised patient to go immediately to the ED for eval and treatment.

## 2023-12-01 NOTE — DISCHARGE INSTRUCTIONS
You have your abdomen pelvis is unremarkable. You received IV fluids for dehydration. Continue to hydrate orally aggressively. Return to the emergency department for any worsening symptoms including lightheadedness, dizziness with standing, fever, or any other concerns. Otherwise follow-up with your gastroenterology appointment on Monday.

## 2023-12-01 NOTE — ED NOTES
Pt arrives via [facility] coming from home c/o chronic diarrhea, nausea.       Levi Bain RN  12/01/23 [id number]

## 2023-12-03 LAB
BACTERIA SPEC CULT: ABNORMAL
SERVICE CMNT-IMP: ABNORMAL

## 2023-12-04 ENCOUNTER — CARE COORDINATION (OUTPATIENT)
Dept: CARE COORDINATION | Facility: CLINIC | Age: 83
End: 2023-12-04

## 2023-12-04 LAB
BACTERIA SPEC CULT: ABNORMAL
SERVICE CMNT-IMP: ABNORMAL

## 2023-12-04 NOTE — CARE COORDINATION
Ambulatory Care Management Outreach Attempt    This patient was received as a referral from  Daily assignment for case management of ed utilizer. Attempted to reach patient for ED follow up. Unable to reach patient, LM with my contact information and will reach out again tomorrow. Patient: Sony Edward Patient : 1940 MRN: [id number]    Last Discharge Facility       Date Complaint Diagnosis Description Type Department Provider    23 Diarrhea Dehydration . ..  ED (DISCHARGE) [facility] Krystin Villatoro MD                Noted following upcoming appointments from discharge chart review:   [facility] follow up appointment(s):   Future Appointments   Date Time Provider [address]   2023  1:30 PM [facility] PETE ECHO [address] [facility] AMB   2024  1:00 PM Edwardo Hernandez [place] [facility] [place]     [facility] follow up appointment(s):

## 2023-12-05 ENCOUNTER — TELEPHONE (OUTPATIENT)
Age: 83
End: 2023-12-05

## 2023-12-05 ENCOUNTER — CARE COORDINATION (OUTPATIENT)
Dept: CARE COORDINATION | Facility: CLINIC | Age: 83
End: 2023-12-05

## 2023-12-05 NOTE — TELEPHONE ENCOUNTER
Physician's response faxed to [phone]. \"Low risk for procedure and 5-day Plavix hold is acceptable as long as she has not received a new stent in the last 30 days. \"  Martin Byrd MD

## 2023-12-12 ENCOUNTER — CARE COORDINATION (OUTPATIENT)
Dept: CARE COORDINATION | Facility: CLINIC | Age: 83
End: 2023-12-12

## 2023-12-12 NOTE — CARE COORDINATION
3rd attempt to reach patient to offer ACM services. Pt has respectfully declined services at this time, my contact information has been shared with pt in the event there circumstances change. They are free to reach out at any time. ACM signing off.

## 2023-12-18 ENCOUNTER — HOSPITAL ENCOUNTER (EMERGENCY)
Age: 83
Discharge: HOME | DRG: 690 | End: 2023-12-18
Attending: EMERGENCY MEDICINE
Payer: MEDICARE

## 2023-12-18 ENCOUNTER — APPOINTMENT (OUTPATIENT)
Dept: CT IMAGING | Age: 83
DRG: 690 | End: 2023-12-18
Payer: MEDICARE

## 2023-12-18 ENCOUNTER — APPOINTMENT (OUTPATIENT)
Dept: GENERAL RADIOLOGY | Age: 83
DRG: 690 | End: 2023-12-18
Payer: MEDICARE

## 2023-12-18 VITALS
BODY MASS INDEX: 26.05 KG/M2 | SYSTOLIC BLOOD PRESSURE: 151 MMHG | DIASTOLIC BLOOD PRESSURE: 72 MMHG | TEMPERATURE: 97.5 F | HEIGHT: 63 IN | OXYGEN SATURATION: 98 % | RESPIRATION RATE: 13 BRPM | HEART RATE: 65 BPM | WEIGHT: 147 LBS

## 2023-12-18 DIAGNOSIS — S01.112A LACERATION OF LEFT EYEBROW, INITIAL ENCOUNTER: ICD-10-CM

## 2023-12-18 DIAGNOSIS — S09.90XA INJURY OF HEAD, INITIAL ENCOUNTER: Primary | ICD-10-CM

## 2023-12-18 DIAGNOSIS — S40.012A CONTUSION OF LEFT SHOULDER, INITIAL ENCOUNTER: ICD-10-CM

## 2023-12-18 LAB
ANION GAP SERPL CALC-SCNC: 5 MMOL/L (ref 2–11)
BUN SERPL-MCNC: 46 MG/DL (ref 8–23)
CALCIUM SERPL-MCNC: 10.2 MG/DL (ref 8.3–10.4)
CHLORIDE SERPL-SCNC: 106 MMOL/L (ref 103–113)
CO2 SERPL-SCNC: 27 MMOL/L (ref 21–32)
CREAT SERPL-MCNC: 1.3 MG/DL (ref 0.6–1)
ERYTHROCYTE [DISTWIDTH] IN BLOOD BY AUTOMATED COUNT: 13.9 % (ref 11.9–14.6)
GLUCOSE SERPL-MCNC: 115 MG/DL (ref 65–100)
HCT VFR BLD AUTO: 36.9 % (ref 35.8–46.3)
HGB BLD-MCNC: 11.8 G/DL (ref 11.7–15.4)
MCH RBC QN AUTO: 29.6 PG (ref 26.1–32.9)
MCHC RBC AUTO-ENTMCNC: 32 G/DL (ref 31.4–35)
MCV RBC AUTO: 92.5 FL (ref 82–102)
NRBC # BLD: 0 K/UL (ref 0–0.2)
PLATELET # BLD AUTO: 185 K/UL (ref 150–450)
PMV BLD AUTO: 11.3 FL (ref 9.4–12.3)
POTASSIUM SERPL-SCNC: 5.1 MMOL/L (ref 3.5–5.1)
RBC # BLD AUTO: 3.99 M/UL (ref 4.05–5.2)
SODIUM SERPL-SCNC: 138 MMOL/L (ref 136–146)
WBC # BLD AUTO: 9 K/UL (ref 4.3–11.1)

## 2023-12-18 PROCEDURE — 73030 X-RAY EXAM OF SHOULDER: CPT

## 2023-12-18 PROCEDURE — 6370000000 HC RX 637 (ALT 250 FOR IP): Performed by: EMERGENCY MEDICINE

## 2023-12-18 PROCEDURE — 72125 CT NECK SPINE W/O DYE: CPT

## 2023-12-18 PROCEDURE — 12011 RPR F/E/E/N/L/M 2.5 CM/<: CPT

## 2023-12-18 PROCEDURE — 99284 EMERGENCY DEPT VISIT MOD MDM: CPT

## 2023-12-18 PROCEDURE — 70450 CT HEAD/BRAIN W/O DYE: CPT

## 2023-12-18 PROCEDURE — 80048 BASIC METABOLIC PNL TOTAL CA: CPT

## 2023-12-18 PROCEDURE — 2500000003 HC RX 250 WO HCPCS: Performed by: EMERGENCY MEDICINE

## 2023-12-18 PROCEDURE — 85027 COMPLETE CBC AUTOMATED: CPT

## 2023-12-18 RX ORDER — ACETAMINOPHEN 325 MG/1
650 TABLET ORAL
Status: COMPLETED | OUTPATIENT
Start: 2023-12-18 | End: 2023-12-18

## 2023-12-18 RX ADMIN — LIDOCAINE HYDROCHLORIDE 10 ML: 10; .005 INJECTION, SOLUTION EPIDURAL; INFILTRATION; INTRACAUDAL; PERINEURAL at 22:52

## 2023-12-18 RX ADMIN — ACETAMINOPHEN 650 MG: 325 TABLET ORAL at 22:51

## 2023-12-18 ASSESSMENT — LIFESTYLE VARIABLES
HOW OFTEN DO YOU HAVE A DRINK CONTAINING ALCOHOL: NEVER
HOW MANY STANDARD DRINKS CONTAINING ALCOHOL DO YOU HAVE ON A TYPICAL DAY: PATIENT DOES NOT DRINK
HOW OFTEN DO YOU HAVE A DRINK CONTAINING ALCOHOL: NEVER
HOW MANY STANDARD DRINKS CONTAINING ALCOHOL DO YOU HAVE ON A TYPICAL DAY: PATIENT DOES NOT DRINK

## 2023-12-18 NOTE — ED TRIAGE NOTES
Pt had mechanical fall today with walker. Pt hit head and is on plavix and aspirin. Pt is hypertensive and difficult to get temperature on. Pt denies LOC.

## 2023-12-19 NOTE — ED PROVIDER NOTES
Emergency Department Provider Note       PCP: Ibis Bolton, APRN - CNP   Age: 83 y.o.   Sex: female     DISPOSITION Decision To Discharge 12/18/2023 10:26:50 PM       ICD-10-CM    1. Injury of head, initial encounter  S09.90XA       2. Laceration of left eyebrow, initial encounter  S01.112A       3. Contusion of left shoulder, initial encounter  S40.012A           Medical Decision Making     Complexity of Problems Addressed:  Acute injury    Data Reviewed and Analyzed:  I independently ordered and reviewed each unique test.     The patients assessment required an independent historian: Family member.  The reason they were needed is important historical information not provided by the patient.    I interpreted the X-rays no fracture or dislocation of the shoulder.  I interpreted the CT Scan no intracranial hemorrhage, skull fracture or C-spine fracture, retrolisthesis of C4 noted.    Discussion of management or test interpretation.  Patient fell and struck her head.  She is on aspirin and Plavix and is elderly at age 83.  A CT scan imaging felt indicated and was negative for skull fracture or intracranial hemorrhage or C-spine fracture.  Plain film imaging of the left shoulder area was negative as well.  2 sutures were placed in a small left eyebrow laceration.      Risk of Complications and/or Morbidity of Patient Management:  Shared medical decision making was utilized in creating the patients health plan today.         History       Mechanical fall heading down steps to get to her walker.  Fell 1 or 2 steps.  Lost balance as she reached for her walker.  Hit left eyebrow area but denies loss of consciousness.  On aspirin and Plavix.  Complains of neck pain and left shoulder plain as well           Review of Systems   Respiratory:  Negative for shortness of breath.    Cardiovascular:  Negative for chest pain.   Gastrointestinal:  Negative for abdominal pain, nausea and vomiting.   Musculoskeletal:  Negative  History of coronary artery disease 2006, 2007    MI x 2 after stents placed-- with in 1 week-- stents x3    History of gallbladder disease     History of hernia repair     History of seborrheic keratosis     Hyperlipidemia     Hypertension     Lacunar infarction ([address]) 8/16/2018    Meniere's disease     Menopause     Morbid obesity ([address])     OA (osteoarthritis)     Orthostatic hypotension 1/14/2016    Osteopenia with high risk of fracture 2014    Osteopenia with significant interval density loss in the hips since 2014    Post-menopausal osteoporosis     Postmenopausal osteoporosis     Unspecified sleep apnea     does not use c pap, pt. states asymptomatic since weight loss    Unsteady gait 8/16/2018    Visit for dental examination     SEVERAL YEARS AGO        Past Surgical History:   Procedure Laterality Date    APPENDECTOMY  1984?    during abdominal surgery    BREAST SURGERY  mid 2000's    benign right breast biopsy    CARDIAC PROCEDURE N/A 01/20/2023    LEFT HEART CATH / CORONARY ANGIOGRAPHY performed by Paulette Teixeira MD at [address] [place] [address] N/A 01/20/2023    Percutaneous coronary intervention performed by Paulette Teix[facility]ira MD at [facility]  3/10    bilat and retina repair    CHOLECYSTECTOMY, OPEN  1979    EYE SURGERY  several    cataract retina    HERNIA REPAIR      multiple surgery site- midline, umbilical    HYSTERECTOMY (CERVIX STATUS UNKNOWN)  [address] Left     repair of leaking csf into middle ear    ORTHOPEDIC SURGERY      left  -- hammer toes    OVARY REMOVAL  1982     and cyst    MA UNLISTED PROCEDURE CARDIAC SURGERY      heart cath x 4, last 2007, stents x 3, last 2006    UPPER GASTROINTESTINAL ENDOSCOPY  several        Social History     Socioeconomic History    Marital status:    Tobacco Use    Smoking status: Never    Smokeless tobacco: Never   Substance and Sexual Activity    Alcohol use:  No

## 2023-12-19 NOTE — DISCHARGE INSTRUCTIONS
Tylenol 500 mg every 6 hours as needed for pain. Ice to the sore swollen area for 15 minutes every 4 hours while awake for 3 days. Sutures out in 5 to 7 days here or with your primary care doctor. Return if signs of infection with increased pain, redness, warmth or pus from the wound. Return if severe worsening headache with repeated vomiting or severe worsening headache with altered mental status/change in mental status.   Return if any other new, worsening or concerning symptoms

## 2023-12-20 ENCOUNTER — APPOINTMENT (OUTPATIENT)
Dept: CT IMAGING | Age: 83
DRG: 690 | End: 2023-12-20
Payer: MEDICARE

## 2023-12-20 ENCOUNTER — HOSPITAL ENCOUNTER (INPATIENT)
Age: 83
LOS: 2 days | Discharge: NURSING, REHAB OR LONG-TERM CARE | DRG: 690 | End: 2023-12-22
Attending: EMERGENCY MEDICINE | Admitting: HOSPITALIST
Payer: MEDICARE

## 2023-12-20 DIAGNOSIS — N18.30 STAGE 3 CHRONIC KIDNEY DISEASE, UNSPECIFIED WHETHER STAGE 3A OR 3B CKD (HCC): ICD-10-CM

## 2023-12-20 DIAGNOSIS — K21.9 GASTROESOPHAGEAL REFLUX DISEASE WITHOUT ESOPHAGITIS: ICD-10-CM

## 2023-12-20 DIAGNOSIS — T68.XXXA HYPOTHERMIA, INITIAL ENCOUNTER: Primary | ICD-10-CM

## 2023-12-20 DIAGNOSIS — S00.202A SUPERFICIAL INJURY OF LEFT PERIOCULAR REGION, INITIAL ENCOUNTER: ICD-10-CM

## 2023-12-20 DIAGNOSIS — K52.9 CHRONIC DIARRHEA: ICD-10-CM

## 2023-12-20 DIAGNOSIS — K86.89 PANCREATIC INSUFFICIENCY: ICD-10-CM

## 2023-12-20 PROBLEM — N30.00 ACUTE CYSTITIS WITHOUT HEMATURIA: Status: ACTIVE | Noted: 2023-12-20

## 2023-12-20 LAB
ALBUMIN SERPL-MCNC: 3.8 G/DL (ref 3.2–4.6)
ALBUMIN/GLOB SERPL: 1.1 (ref 0.4–1.6)
ALP SERPL-CCNC: 95 U/L (ref 50–136)
ALT SERPL-CCNC: 34 U/L (ref 12–65)
ANION GAP SERPL CALC-SCNC: 3 MMOL/L (ref 2–11)
AST SERPL-CCNC: 25 U/L (ref 15–37)
BACTERIA URNS QL MICRO: ABNORMAL /HPF
BASOPHILS # BLD: 0 K/UL (ref 0–0.2)
BASOPHILS NFR BLD: 1 % (ref 0–2)
BILIRUB SERPL-MCNC: 0.2 MG/DL (ref 0.2–1.1)
BILIRUB UR QL: NEGATIVE
BUN SERPL-MCNC: 39 MG/DL (ref 8–23)
CALCIUM SERPL-MCNC: 9.8 MG/DL (ref 8.3–10.4)
CASTS URNS QL MICRO: 0 /LPF
CHLORIDE SERPL-SCNC: 107 MMOL/L (ref 103–113)
CO2 SERPL-SCNC: 25 MMOL/L (ref 21–32)
CREAT SERPL-MCNC: 1.1 MG/DL (ref 0.6–1)
CRYSTALS URNS QL MICRO: 0 /LPF
DIFFERENTIAL METHOD BLD: ABNORMAL
EOSINOPHIL # BLD: 0 K/UL (ref 0–0.8)
EOSINOPHIL NFR BLD: 1 % (ref 0.5–7.8)
EPI CELLS #/AREA URNS HPF: ABNORMAL /HPF
ERYTHROCYTE [DISTWIDTH] IN BLOOD BY AUTOMATED COUNT: 13.5 % (ref 11.9–14.6)
GLOBULIN SER CALC-MCNC: 3.4 G/DL (ref 2.8–4.5)
GLUCOSE SERPL-MCNC: 104 MG/DL (ref 65–100)
GLUCOSE UR QL STRIP.AUTO: NEGATIVE MG/DL
HCT VFR BLD AUTO: 36.1 % (ref 35.8–46.3)
HGB BLD-MCNC: 11.6 G/DL (ref 11.7–15.4)
IMM GRANULOCYTES # BLD AUTO: 0 K/UL (ref 0–0.5)
IMM GRANULOCYTES NFR BLD AUTO: 0 % (ref 0–5)
KETONES UR-MCNC: NEGATIVE MG/DL
LEUKOCYTE ESTERASE UR QL STRIP: ABNORMAL
LYMPHOCYTES # BLD: 1.1 K/UL (ref 0.5–4.6)
LYMPHOCYTES NFR BLD: 18 % (ref 13–44)
MAGNESIUM SERPL-MCNC: 1.9 MG/DL (ref 1.8–2.4)
MCH RBC QN AUTO: 29.8 PG (ref 26.1–32.9)
MCHC RBC AUTO-ENTMCNC: 32.1 G/DL (ref 31.4–35)
MCV RBC AUTO: 92.8 FL (ref 82–102)
MONOCYTES # BLD: 0.6 K/UL (ref 0.1–1.3)
MONOCYTES NFR BLD: 9 % (ref 4–12)
MUCOUS THREADS URNS QL MICRO: 0 /LPF
NEUTS SEG # BLD: 4.4 K/UL (ref 1.7–8.2)
NEUTS SEG NFR BLD: 71 % (ref 43–78)
NITRITE UR QL: POSITIVE
NRBC # BLD: 0 K/UL (ref 0–0.2)
OTHER OBSERVATIONS: ABNORMAL
PH UR: 5.5 (ref 5–9)
PLATELET # BLD AUTO: 160 K/UL (ref 150–450)
PMV BLD AUTO: 11.3 FL (ref 9.4–12.3)
POTASSIUM SERPL-SCNC: 4.8 MMOL/L (ref 3.5–5.1)
PROT SERPL-MCNC: 7.2 G/DL (ref 6.3–8.2)
PROT UR QL: NEGATIVE MG/DL
RBC # BLD AUTO: 3.89 M/UL (ref 4.05–5.2)
RBC # UR STRIP: ABNORMAL
RBC #/AREA URNS HPF: ABNORMAL /HPF
SERVICE CMNT-IMP: ABNORMAL
SODIUM SERPL-SCNC: 135 MMOL/L (ref 136–146)
SP GR UR: 1.01 (ref 1–1.02)
T4 FREE SERPL-MCNC: 1 NG/DL (ref 0.78–1.46)
TSH W FREE THYROID IF ABNORMAL: 7.04 UIU/ML (ref 0.36–3.74)
UROBILINOGEN UR QL: 0.2 EU/DL (ref 0.2–1)
WBC # BLD AUTO: 6.2 K/UL (ref 4.3–11.1)
WBC URNS QL MICRO: ABNORMAL /HPF

## 2023-12-20 PROCEDURE — 6360000002 HC RX W HCPCS: Performed by: HOSPITALIST

## 2023-12-20 PROCEDURE — 81015 MICROSCOPIC EXAM OF URINE: CPT

## 2023-12-20 PROCEDURE — 87086 URINE CULTURE/COLONY COUNT: CPT

## 2023-12-20 PROCEDURE — 85025 COMPLETE CBC W/AUTO DIFF WBC: CPT

## 2023-12-20 PROCEDURE — 6370000000 HC RX 637 (ALT 250 FOR IP): Performed by: HOSPITALIST

## 2023-12-20 PROCEDURE — 1100000003 HC PRIVATE W/ TELEMETRY

## 2023-12-20 PROCEDURE — 70450 CT HEAD/BRAIN W/O DYE: CPT

## 2023-12-20 PROCEDURE — 99285 EMERGENCY DEPT VISIT HI MDM: CPT

## 2023-12-20 PROCEDURE — 83735 ASSAY OF MAGNESIUM: CPT

## 2023-12-20 PROCEDURE — 80053 COMPREHEN METABOLIC PANEL: CPT

## 2023-12-20 PROCEDURE — 84439 ASSAY OF FREE THYROXINE: CPT

## 2023-12-20 PROCEDURE — 84443 ASSAY THYROID STIM HORMONE: CPT

## 2023-12-20 PROCEDURE — 81001 URINALYSIS AUTO W/SCOPE: CPT

## 2023-12-20 PROCEDURE — 87186 SC STD MICRODIL/AGAR DIL: CPT

## 2023-12-20 PROCEDURE — 87088 URINE BACTERIA CULTURE: CPT

## 2023-12-20 PROCEDURE — 2580000003 HC RX 258: Performed by: HOSPITALIST

## 2023-12-20 PROCEDURE — 81003 URINALYSIS AUTO W/O SCOPE: CPT

## 2023-12-20 PROCEDURE — 1100000000 HC RM PRIVATE

## 2023-12-20 RX ORDER — AMLODIPINE BESYLATE 5 MG/1
2.5 TABLET ORAL DAILY
Status: DISCONTINUED | OUTPATIENT
Start: 2023-12-21 | End: 2023-12-21

## 2023-12-20 RX ORDER — VALSARTAN 80 MG/1
80 TABLET ORAL DAILY
Status: DISCONTINUED | OUTPATIENT
Start: 2023-12-21 | End: 2023-12-22 | Stop reason: HOSPADM

## 2023-12-20 RX ORDER — SODIUM CHLORIDE 9 MG/ML
INJECTION, SOLUTION INTRAVENOUS PRN
Status: DISCONTINUED | OUTPATIENT
Start: 2023-12-20 | End: 2023-12-22 | Stop reason: HOSPADM

## 2023-12-20 RX ORDER — MONTELUKAST SODIUM 10 MG/1
10 TABLET ORAL NIGHTLY
Status: DISCONTINUED | OUTPATIENT
Start: 2023-12-20 | End: 2023-12-22 | Stop reason: HOSPADM

## 2023-12-20 RX ORDER — POTASSIUM CHLORIDE 20 MEQ/1
40 TABLET, EXTENDED RELEASE ORAL PRN
Status: DISCONTINUED | OUTPATIENT
Start: 2023-12-20 | End: 2023-12-22 | Stop reason: HOSPADM

## 2023-12-20 RX ORDER — FAMOTIDINE 20 MG/1
20 TABLET, FILM COATED ORAL DAILY
Status: DISCONTINUED | OUTPATIENT
Start: 2023-12-21 | End: 2023-12-22 | Stop reason: HOSPADM

## 2023-12-20 RX ORDER — POTASSIUM CHLORIDE 7.45 MG/ML
10 INJECTION INTRAVENOUS PRN
Status: DISCONTINUED | OUTPATIENT
Start: 2023-12-20 | End: 2023-12-22 | Stop reason: HOSPADM

## 2023-12-20 RX ORDER — CHOLESTYRAMINE LIGHT 4 G/5.7G
4 POWDER, FOR SUSPENSION ORAL 2 TIMES DAILY
Status: DISCONTINUED | OUTPATIENT
Start: 2023-12-20 | End: 2023-12-21

## 2023-12-20 RX ORDER — TRIAMTERENE AND HYDROCHLOROTHIAZIDE 37.5; 25 MG/1; MG/1
1 CAPSULE ORAL DAILY
Status: DISCONTINUED | OUTPATIENT
Start: 2023-12-20 | End: 2023-12-20

## 2023-12-20 RX ORDER — FLUTICASONE PROPIONATE 50 MCG
2 SPRAY, SUSPENSION (ML) NASAL DAILY
Status: DISCONTINUED | OUTPATIENT
Start: 2023-12-21 | End: 2023-12-21

## 2023-12-20 RX ORDER — TRIAMTERENE AND HYDROCHLOROTHIAZIDE 37.5; 25 MG/1; MG/1
1 TABLET ORAL DAILY
Status: DISCONTINUED | OUTPATIENT
Start: 2023-12-21 | End: 2023-12-21

## 2023-12-20 RX ORDER — METOPROLOL SUCCINATE 25 MG/1
25 TABLET, EXTENDED RELEASE ORAL DAILY
Status: DISCONTINUED | OUTPATIENT
Start: 2023-12-21 | End: 2023-12-22 | Stop reason: HOSPADM

## 2023-12-20 RX ORDER — PROMETHAZINE HYDROCHLORIDE 25 MG/1
12.5 TABLET ORAL EVERY 6 HOURS PRN
Status: DISCONTINUED | OUTPATIENT
Start: 2023-12-20 | End: 2023-12-22 | Stop reason: HOSPADM

## 2023-12-20 RX ORDER — SODIUM CHLORIDE 0.9 % (FLUSH) 0.9 %
5-40 SYRINGE (ML) INJECTION EVERY 12 HOURS SCHEDULED
Status: DISCONTINUED | OUTPATIENT
Start: 2023-12-20 | End: 2023-12-22 | Stop reason: HOSPADM

## 2023-12-20 RX ORDER — VITAMIN B COMPLEX
2000 TABLET ORAL DAILY
Status: DISCONTINUED | OUTPATIENT
Start: 2023-12-21 | End: 2023-12-22 | Stop reason: HOSPADM

## 2023-12-20 RX ORDER — LANOLIN ALCOHOL/MO/W.PET/CERES
3 CREAM (GRAM) TOPICAL NIGHTLY
Status: DISCONTINUED | OUTPATIENT
Start: 2023-12-20 | End: 2023-12-22 | Stop reason: HOSPADM

## 2023-12-20 RX ORDER — PITAVASTATIN 4 MG/1
4 TABLET, FILM COATED ORAL NIGHTLY
Status: DISCONTINUED | OUTPATIENT
Start: 2023-12-20 | End: 2023-12-22 | Stop reason: HOSPADM

## 2023-12-20 RX ORDER — LACTOBACILLUS RHAMNOSUS GG 10B CELL
1 CAPSULE ORAL
Status: DISCONTINUED | OUTPATIENT
Start: 2023-12-21 | End: 2023-12-22 | Stop reason: HOSPADM

## 2023-12-20 RX ORDER — ACETAMINOPHEN 650 MG/1
650 SUPPOSITORY RECTAL EVERY 6 HOURS PRN
Status: DISCONTINUED | OUTPATIENT
Start: 2023-12-20 | End: 2023-12-22 | Stop reason: HOSPADM

## 2023-12-20 RX ORDER — CHOLESTYRAMINE 4 G/9G
1 POWDER, FOR SUSPENSION ORAL 2 TIMES DAILY
Status: DISCONTINUED | OUTPATIENT
Start: 2023-12-20 | End: 2023-12-20

## 2023-12-20 RX ORDER — SODIUM CHLORIDE 0.9 % (FLUSH) 0.9 %
5-40 SYRINGE (ML) INJECTION PRN
Status: DISCONTINUED | OUTPATIENT
Start: 2023-12-20 | End: 2023-12-22 | Stop reason: HOSPADM

## 2023-12-20 RX ORDER — ACETAMINOPHEN 325 MG/1
650 TABLET ORAL EVERY 6 HOURS PRN
Status: DISCONTINUED | OUTPATIENT
Start: 2023-12-20 | End: 2023-12-22 | Stop reason: HOSPADM

## 2023-12-20 RX ORDER — ENOXAPARIN SODIUM 100 MG/ML
40 INJECTION SUBCUTANEOUS EVERY 24 HOURS
Status: DISCONTINUED | OUTPATIENT
Start: 2023-12-20 | End: 2023-12-21

## 2023-12-20 RX ORDER — ONDANSETRON 2 MG/ML
4 INJECTION INTRAMUSCULAR; INTRAVENOUS EVERY 4 HOURS PRN
Status: DISCONTINUED | OUTPATIENT
Start: 2023-12-20 | End: 2023-12-22 | Stop reason: HOSPADM

## 2023-12-20 RX ORDER — MAGNESIUM SULFATE IN WATER 40 MG/ML
2000 INJECTION, SOLUTION INTRAVENOUS PRN
Status: DISCONTINUED | OUTPATIENT
Start: 2023-12-20 | End: 2023-12-22 | Stop reason: HOSPADM

## 2023-12-20 RX ORDER — CLOPIDOGREL BISULFATE 75 MG/1
75 TABLET ORAL DAILY
Status: DISCONTINUED | OUTPATIENT
Start: 2023-12-21 | End: 2023-12-22 | Stop reason: HOSPADM

## 2023-12-20 RX ORDER — ASPIRIN 81 MG/1
81 TABLET ORAL DAILY
Status: DISCONTINUED | OUTPATIENT
Start: 2023-12-21 | End: 2023-12-22 | Stop reason: HOSPADM

## 2023-12-20 RX ORDER — POLYETHYLENE GLYCOL 3350 17 G/17G
17 POWDER, FOR SOLUTION ORAL DAILY PRN
Status: DISCONTINUED | OUTPATIENT
Start: 2023-12-20 | End: 2023-12-22 | Stop reason: HOSPADM

## 2023-12-20 RX ADMIN — WATER 1000 MG: 1 INJECTION INTRAMUSCULAR; INTRAVENOUS; SUBCUTANEOUS at 23:09

## 2023-12-20 RX ADMIN — SODIUM CHLORIDE, PRESERVATIVE FREE 10 ML: 5 INJECTION INTRAVENOUS at 23:10

## 2023-12-20 RX ADMIN — Medication 3 MG: at 23:09

## 2023-12-20 RX ADMIN — ENOXAPARIN SODIUM 40 MG: 100 INJECTION SUBCUTANEOUS at 23:10

## 2023-12-20 RX ADMIN — MONTELUKAST 10 MG: 10 TABLET, FILM COATED ORAL at 23:10

## 2023-12-20 ASSESSMENT — PAIN - FUNCTIONAL ASSESSMENT: PAIN_FUNCTIONAL_ASSESSMENT: NONE - DENIES PAIN

## 2023-12-20 NOTE — ED TRIAGE NOTES
Pt reports vision in right eye, which she injured from her fall Monday. Pt reports balance issues and weakness.

## 2023-12-20 NOTE — ED PROVIDER NOTES
Emergency Department Provider Note       PCP: BERTO Kerns CNP   Age: 78 y.o. Sex: female     DISPOSITION Decision To Admit 12/20/2023 05:33:52 PM       ICD-10-CM    1. Hypothermia, initial encounter  T68. XXXA       2. Superficial injury of left periocular region, initial encounter  S00.202A           Medical Decision Making     Complexity of Problems Addressed:  1 or more acute illnesses that pose a threat to life or bodily function. Patient may have an intraocular injury or injury to the retina. Unable to visualize posterior chamber in the emergency department. Will reassess with repeat CT to rule out subdural hematoma and check blood work. Data Reviewed and Analyzed:  I independently ordered and reviewed each unique test.             Discussion of management or test interpretation. Patient has a slightly elevated TSH at 7 but T4 is normal.  No other clinically significant abnormalities noted on testing and the patient remains significantly hypothermic despite being under warming blanket. Referred to hospitalist for admission and ophthalmology evaluation of the left eye. Risk of Complications and/or Morbidity of Patient Management:  Shared medical decision making was utilized in creating the patients health plan today. History       Patient returns to the emergency department for reevaluation after a fall 2 days ago. She was seen at that time and CT of the head and C-spine showed no acute pathology. She states today when she woke up she noted a roaring sensation in her ear she was having increasing difficulty ambulating with her walker and some nausea and dizziness. She also noted that when she was trying to read she had to close her left to be able to read. She does wear glasses to read but does not wear glasses for distance vision. She denies any epistaxis. She denies any foreign body sensation of the left eye or pain in the left.   Patient's daughter states that she

## 2023-12-21 LAB
ALBUMIN SERPL-MCNC: 3 G/DL (ref 3.2–4.6)
ALBUMIN/GLOB SERPL: 0.9 (ref 0.4–1.6)
ALP SERPL-CCNC: 83 U/L (ref 50–136)
ALT SERPL-CCNC: 28 U/L (ref 12–65)
ANION GAP SERPL CALC-SCNC: 8 MMOL/L (ref 2–11)
APPEARANCE UR: ABNORMAL
AST SERPL-CCNC: 19 U/L (ref 15–37)
BACTERIA URNS QL MICRO: ABNORMAL /HPF
BASOPHILS # BLD: 0 K/UL (ref 0–0.2)
BASOPHILS NFR BLD: 0 % (ref 0–2)
BILIRUB SERPL-MCNC: 0.2 MG/DL (ref 0.2–1.1)
BILIRUB UR QL: NEGATIVE
BUN SERPL-MCNC: 43 MG/DL (ref 8–23)
CALCIUM SERPL-MCNC: 9.6 MG/DL (ref 8.3–10.4)
CHLORIDE SERPL-SCNC: 108 MMOL/L (ref 103–113)
CO2 SERPL-SCNC: 22 MMOL/L (ref 21–32)
COLOR UR: ABNORMAL
CREAT SERPL-MCNC: 1.4 MG/DL (ref 0.6–1)
DIFFERENTIAL METHOD BLD: ABNORMAL
EOSINOPHIL # BLD: 0 K/UL (ref 0–0.8)
EOSINOPHIL NFR BLD: 1 % (ref 0.5–7.8)
EPI CELLS #/AREA URNS HPF: ABNORMAL /HPF
ERYTHROCYTE [DISTWIDTH] IN BLOOD BY AUTOMATED COUNT: 13.9 % (ref 11.9–14.6)
EST. AVERAGE GLUCOSE BLD GHB EST-MCNC: 123 MG/DL
GLOBULIN SER CALC-MCNC: 3.3 G/DL (ref 2.8–4.5)
GLUCOSE SERPL-MCNC: 86 MG/DL (ref 65–100)
GLUCOSE UR STRIP.AUTO-MCNC: NEGATIVE MG/DL
HBA1C MFR BLD: 5.9 % (ref 4.8–5.6)
HCT VFR BLD AUTO: 32.3 % (ref 35.8–46.3)
HGB BLD-MCNC: 10.5 G/DL (ref 11.7–15.4)
HGB UR QL STRIP: NEGATIVE
IMM GRANULOCYTES # BLD AUTO: 0 K/UL (ref 0–0.5)
IMM GRANULOCYTES NFR BLD AUTO: 0 % (ref 0–5)
KETONES UR QL STRIP.AUTO: NEGATIVE MG/DL
LEUKOCYTE ESTERASE UR QL STRIP.AUTO: ABNORMAL
LYMPHOCYTES # BLD: 1 K/UL (ref 0.5–4.6)
LYMPHOCYTES NFR BLD: 20 % (ref 13–44)
MCH RBC QN AUTO: 29.9 PG (ref 26.1–32.9)
MCHC RBC AUTO-ENTMCNC: 32.5 G/DL (ref 31.4–35)
MCV RBC AUTO: 92 FL (ref 82–102)
MONOCYTES # BLD: 0.7 K/UL (ref 0.1–1.3)
MONOCYTES NFR BLD: 15 % (ref 4–12)
NEUTS SEG # BLD: 3.1 K/UL (ref 1.7–8.2)
NEUTS SEG NFR BLD: 64 % (ref 43–78)
NITRITE UR QL STRIP.AUTO: POSITIVE
NRBC # BLD: 0 K/UL (ref 0–0.2)
OTHER OBSERVATIONS: ABNORMAL
PH UR STRIP: 6 (ref 5–9)
PLATELET # BLD AUTO: 157 K/UL (ref 150–450)
PMV BLD AUTO: 11.1 FL (ref 9.4–12.3)
POTASSIUM SERPL-SCNC: 4.8 MMOL/L (ref 3.5–5.1)
PROT SERPL-MCNC: 6.3 G/DL (ref 6.3–8.2)
PROT UR STRIP-MCNC: NEGATIVE MG/DL
RBC # BLD AUTO: 3.51 M/UL (ref 4.05–5.2)
SODIUM SERPL-SCNC: 138 MMOL/L (ref 136–146)
SP GR UR REFRACTOMETRY: 1.01 (ref 1–1.02)
UROBILINOGEN UR QL STRIP.AUTO: 0.2 EU/DL (ref 0.2–1)
WBC # BLD AUTO: 4.9 K/UL (ref 4.3–11.1)
WBC URNS QL MICRO: >100 /HPF

## 2023-12-21 PROCEDURE — 6360000002 HC RX W HCPCS: Performed by: HOSPITALIST

## 2023-12-21 PROCEDURE — 97535 SELF CARE MNGMENT TRAINING: CPT

## 2023-12-21 PROCEDURE — 83036 HEMOGLOBIN GLYCOSYLATED A1C: CPT

## 2023-12-21 PROCEDURE — 2580000003 HC RX 258: Performed by: HOSPITALIST

## 2023-12-21 PROCEDURE — 1100000003 HC PRIVATE W/ TELEMETRY

## 2023-12-21 PROCEDURE — 97161 PT EVAL LOW COMPLEX 20 MIN: CPT

## 2023-12-21 PROCEDURE — 87040 BLOOD CULTURE FOR BACTERIA: CPT

## 2023-12-21 PROCEDURE — 99222 1ST HOSP IP/OBS MODERATE 55: CPT | Performed by: INTERNAL MEDICINE

## 2023-12-21 PROCEDURE — 36415 COLL VENOUS BLD VENIPUNCTURE: CPT

## 2023-12-21 PROCEDURE — 2500000003 HC RX 250 WO HCPCS: Performed by: INTERNAL MEDICINE

## 2023-12-21 PROCEDURE — 51798 US URINE CAPACITY MEASURE: CPT

## 2023-12-21 PROCEDURE — 6370000000 HC RX 637 (ALT 250 FOR IP): Performed by: INTERNAL MEDICINE

## 2023-12-21 PROCEDURE — 6360000002 HC RX W HCPCS: Performed by: INTERNAL MEDICINE

## 2023-12-21 PROCEDURE — 97530 THERAPEUTIC ACTIVITIES: CPT

## 2023-12-21 PROCEDURE — 6370000000 HC RX 637 (ALT 250 FOR IP): Performed by: HOSPITALIST

## 2023-12-21 PROCEDURE — 85025 COMPLETE CBC W/AUTO DIFF WBC: CPT

## 2023-12-21 PROCEDURE — 80053 COMPREHEN METABOLIC PANEL: CPT

## 2023-12-21 PROCEDURE — 99222 1ST HOSP IP/OBS MODERATE 55: CPT | Performed by: STUDENT IN AN ORGANIZED HEALTH CARE EDUCATION/TRAINING PROGRAM

## 2023-12-21 PROCEDURE — 97165 OT EVAL LOW COMPLEX 30 MIN: CPT

## 2023-12-21 RX ORDER — DIPHENOXYLATE HYDROCHLORIDE AND ATROPINE SULFATE 2.5; .025 MG/1; MG/1
1 TABLET ORAL 4 TIMES DAILY PRN
Status: DISCONTINUED | OUTPATIENT
Start: 2023-12-21 | End: 2023-12-22 | Stop reason: HOSPADM

## 2023-12-21 RX ORDER — ENOXAPARIN SODIUM 100 MG/ML
30 INJECTION SUBCUTANEOUS EVERY 24 HOURS
Status: DISCONTINUED | OUTPATIENT
Start: 2023-12-21 | End: 2023-12-22 | Stop reason: HOSPADM

## 2023-12-21 RX ADMIN — METOPROLOL SUCCINATE 25 MG: 25 TABLET, EXTENDED RELEASE ORAL at 20:29

## 2023-12-21 RX ADMIN — TUBERCULIN PURIFIED PROTEIN DERIVATIVE 5 UNITS: 5 INJECTION, SOLUTION INTRADERMAL at 08:18

## 2023-12-21 RX ADMIN — WATER 1000 MG: 1 INJECTION INTRAMUSCULAR; INTRAVENOUS; SUBCUTANEOUS at 23:11

## 2023-12-21 RX ADMIN — Medication 1 CAPSULE: at 08:16

## 2023-12-21 RX ADMIN — ENOXAPARIN SODIUM 30 MG: 100 INJECTION SUBCUTANEOUS at 20:30

## 2023-12-21 RX ADMIN — TRIAMTERENE AND HYDROCHLOROTHIAZIDE 1 TABLET: 37.5; 25 TABLET ORAL at 08:16

## 2023-12-21 RX ADMIN — SODIUM CHLORIDE, PRESERVATIVE FREE 10 ML: 5 INJECTION INTRAVENOUS at 08:00

## 2023-12-21 RX ADMIN — Medication 3 MG: at 20:29

## 2023-12-21 RX ADMIN — SODIUM CHLORIDE, PRESERVATIVE FREE 10 ML: 5 INJECTION INTRAVENOUS at 20:30

## 2023-12-21 RX ADMIN — PANCRELIPASE LIPASE, PANCRELIPASE PROTEASE, PANCRELIPASE AMYLASE 50000 UNITS: 5000; 17000; 24000 CAPSULE, DELAYED RELEASE ORAL at 17:38

## 2023-12-21 RX ADMIN — FAMOTIDINE 20 MG: 20 TABLET, FILM COATED ORAL at 20:29

## 2023-12-21 RX ADMIN — MONTELUKAST 10 MG: 10 TABLET, FILM COATED ORAL at 20:30

## 2023-12-21 RX ADMIN — VITAMIN D, TAB 1000IU (100/BT) 2000 UNITS: 25 TAB at 08:16

## 2023-12-21 RX ADMIN — PANCRELIPASE LIPASE, PANCRELIPASE PROTEASE, PANCRELIPASE AMYLASE 50000 UNITS: 5000; 17000; 24000 CAPSULE, DELAYED RELEASE ORAL at 12:05

## 2023-12-21 RX ADMIN — VALSARTAN 80 MG: 80 TABLET ORAL at 08:15

## 2023-12-21 RX ADMIN — CLOPIDOGREL BISULFATE 75 MG: 75 TABLET ORAL at 08:16

## 2023-12-21 RX ADMIN — DIPHENOXYLATE HYDROCHLORIDE AND ATROPINE SULFATE 1 TABLET: 2.5; .025 TABLET ORAL at 17:48

## 2023-12-21 RX ADMIN — ASPIRIN 81 MG: 81 TABLET, COATED ORAL at 09:00

## 2023-12-21 NOTE — H&P
Hospitalist History and Physical   Admit Date:  2023  1:42 PM   Name:  Janine Gupta   Age:  83 y.o.  Sex:  female  :  1940   MRN:  [id number]   Room:  ER/    Presenting/Chief Complaint: Fatigue, Nausea, and Eye Problem     Reason(s) for Admission: Hypothermia, initial encounter [T68.XXXA]     History of Present Illness:   Janine Gupta is a 83 y.o. female with medical history of HTN, dyslipidemia, GERD, NSTEMI, cardiomyopathy with EF of 35 to 40%, asthma, squamous cell carcinoma of left ear, CSF leak from left ear status post surgical repair, presented to the emergency today with chief complaints of having generalized weakness, ringing sensation in left ear, dizziness and blurry vision in the left eye.  Patient had a fall 2 days ago resulting in trauma to the left eyebrow.  Patient was evaluated by ophthalmology 2 days ago prior to the fall and everything was at baseline.  Patient developed some blurry vision this morning and had to use glasses.  As per the family patient has had chronic diarrhea and with GI evaluation was found to have pancreatic insufficiency and was started on Questran yesterday.  Patient also reports of having recurrent UTIs/bladder infections.  She denies having any acute chest pain, palpitations, shortness of breath, nausea, vomiting, diplopia, fever or chills.  ER workup was remarkable for hypothermia with Tmax of 92.6, temperature on arrival was 90.3.  Heart rate was 45, /66.  Blood work was remarkable for creatinine 1.1, BUN 39, TSH 7.04, free T4 1.0.  CT head with no acute intracranial abnormality, showed chronic periventricular and deep white matter small vessel ischemic changes.      Assessment & Plan:     Principal Problem:    Acute cystitis without hematuria   Hypothermia, initial encounter  Plan: -  Follow-up with urine and blood cultures.  UA suggestive of UTI/cystitis.  Started on empiric IV Rocephin for 5 days.  Given history of  vessel ischemic changes. There is no CT evidence of acute hemorrhage or infarction. The ventricles are normal in size. There are no extra-axial fluid collections. No masses are seen. The sinuses are clear. There are no bony lesions. No significant changes compared to prior study with no CT evidence of acute intracranial abnormality. Chronic periventricular and deep white matter small vessel ischemic changes. Signed:  Tracie Montenegro MD    Part of this note may have been written by using a voice dictation software. The note has been proof read but may still contain some grammatical/other typographical errors.

## 2023-12-21 NOTE — PROGRESS NOTES
Hourly rounds completed this shift. All needs met at this time. Bed low/locked. Bed alarm active. Call light within reach. Pt had 2 BM's this shift.

## 2023-12-21 NOTE — PROGRESS NOTES
ACUTE OCCUPATIONAL THERAPY GOALS:   (Developed with and agreed upon by patient and/or caregiver.)  1. Patient will complete lower body bathing and dressing with modified independence and adaptive equipment as needed. 2. Patient will complete toileting with modified independence. 3. Patient will tolerate 30 minutes of OT treatment with 1-2 rest breaks to increase activity tolerance for ADLs. 4. Patient will complete functional transfers with modified independence and adaptive equipment as needed. 5. Patient will complete functional mobility for household distances with modified independence and good safety awareness. Timeframe: 7 visits       OCCUPATIONAL THERAPY Initial Assessment, Daily Note, and AM       OT Visit Days: 1  Acknowledge Orders  Time  OT Charge Capture  Rehab Caseload Tracker      Juana Dominguez is a 78 y.o. female   PRIMARY DIAGNOSIS: Acute cystitis without hematuria  Hypothermia, initial encounter Kemi Staples. XXXA]  Superficial injury of left periocular region, initial encounter [S00.202A]       Reason for Referral: Generalized Muscle Weakness (M62.81)  Other lack of cordination (R27.8)  History of falling (Z91.81)  Dizziness and Giddiness (R42)  Inpatient: Payor: MEDICARE / Plan: MEDICARE PART A AND B / Product Type: *No Product type* /     ASSESSMENT:     REHAB RECOMMENDATIONS:   Recommendation to date pending progress:  Setting:  Inpatient Rehab Facility     Equipment:    To Be Determined     ASSESSMENT:  Ms. Elza Christensen presents to the hospital with acute cystitis, hypothermia, and superficial injury of the L periocular region after falling at home. Pt lives alone and is typically modified independent with ADL and uses a walker for functional mobility. Pt has supportive daughter that lives nearby. Pt reports only 1 recent fall but does report a hx of \"orthostatic hypotension. \"Pt was dizzy upon standing this session and was only able to take a few steps initially.  Pt's BP did drop from

## 2023-12-21 NOTE — CONSULTS
Ophthalmology consult note: Attending requesting consult:  Dr. Danielle Dubois    CC:  Left eye irritation s/p fall     HPI:  79 yo WF who has fallen twice in the last week with injury to the left side of her head. She was complaining of blurry vision, but today states that it is better. There was concern given the bruising of the left face that there was some damage to the eye. She states that she does not have any pain in the eye.          Current Facility-Administered Medications   Medication Dose Route Frequency Provider Last Rate Last Admin    enoxaparin Sodium (LOVENOX) injection 30 mg  30 mg SubCUTAneous Q24H Asher Power MD        tuberculin injection 5 Units  5 Units IntraDERmal Once Asher Power MD   5 Units at 12/21/23 0818    lipase-protease-amylase (ZENPEP) delayed release capsule 50,000 Units  50,000 Units Oral TID WC Asher Power MD   50,000 Units at 12/21/23 1738    lipase-protease-amylase (ZENPEP) delayed release capsule 25,000 Units  25,000 Units Oral PRN Asher Power MD        diphenoxylate-atropine (LOMOTIL) 2.5-0.025 MG per tablet 1 tablet  1 tablet Oral 4x Daily PRN Asher Power MD   1 tablet at 12/21/23 1748    aspirin EC tablet 81 mg  81 mg Oral Daily Basim Roberto MD   81 mg at 12/21/23 0900    Vitamin D (CHOLECALCIFEROL) tablet 2,000 Units  2,000 Units Oral Daily Basim Roberto MD   2,000 Units at 12/21/23 0816    clopidogrel (PLAVIX) tablet 75 mg  75 mg Oral Daily Basim Roberto MD   75 mg at 12/21/23 0816    famotidine (PEPCID) tablet 20 mg  20 mg Oral Daily Basim Roberto MD        metoprolol succinate (TOPROL XL) extended release tablet 25 mg  25 mg Oral Daily Basim Roberto MD        montelukast (SINGULAIR) tablet 10 mg  10 mg Oral Nightly Basim Roberto MD   10 mg at 12/20/23 2310    valsartan (DIOVAN) tablet 80 mg  80 mg Oral Daily Basim Roberto MD   80 mg at 12/21/23 0815    pitavastatin (LIVALO) tablet 4 mg (Patient   1) Thank you for the consult.   2) Cool compresses for comfort for periocular ecchymoses.  3) Reassurance for subconjunctival hemorrhages.   4) Retinal scarring--appears old and stable.  No acute injury to retina today.   5) Recommended to patient and her family that she obtain formal visual field testing upon discharge given her constriction of visual fields on exam today.

## 2023-12-21 NOTE — CONSULTS
HPI:  Janine Gupta is a 83 y.o. female seen New    Chief Complaint   Patient presents with    Fatigue    Nausea    Eye Problem       83-year-old female seen as a ENT inpatient consultation request for history of left-sided CSF otorrhea now with ongoing dizziness/disequilibrium.  She underwent a repair of CSF leak by Dr. Bolaños otologist at [facility] in 2019 for which she describes was successful and improved her symptoms.  She admits that at times she has had intermittent otorrhea but significantly less as compared to what she had had prior.  She did eventually end up having long-term follow-up with [facility] and later on was diagnosed with a left-sided external ear squamous cell carcinoma which has not been a problem since excision in 2021.  She has had some increased exacerbation of disequilibrium this year specifically in the last couple months and she had a recent fall on Monday where she fell to her face striking the left area of her right eyebrow and forehead and had some visual changes leading her to go to the ER.  She is currently under admission for having ongoing disequilibrium and weakness following the recent fall.    Past Medical History, Past Surgical History, Family history, Social History, and Medications were all reviewed with the patient today and updated as necessary.     Allergies   Allergen Reactions    Other Hives, Itching and Rash     Band aids cause contact rash after several hours.     Brinzolamide Nausea Only    Ciprofloxacin      tendinitis    Eggs Or Egg-Derived Products     Hydralazine Other (See Comments)     Other reaction(s): Drowsiness/Fatigue    Yeast     Adhesive Tape Rash     Band aids cause contact rash after several hours.   Band aids cause contact rash after several hours.       Statins Other (See Comments) and Rash     Other reaction(s): Other (See Comments), Other- (not listed) - Allergy  Aching muscles  Aching muscles  Aching muscles  Aching muscles      Sulfa  Antibiotics Nausea Only, Other (See Comments) and Nausea And Vomiting     High fever, \"exterme nausea\"  Other reaction(s): Fever  High fever, \"exterme nausea\"         Patient Active Problem List   Diagnosis    Axonal polyneuropathy    Meniere's disease    Type 2 diabetes mellitus with diabetic polyneuropathy, without long-term current use of insulin ([facility])    Orthostatic hypotension    Essential hypertension    Palpitations    CSF leak    Mixed conductive and sensorineural hearing loss of left ear with restricted hearing of right ear    CAD (coronary artery disease)    Lacunar infarction ([address])    Unsteady gait    History of AAA (abdominal aortic aneurysm) repair    Hypercholesterolemia    GERD (gastroesophageal reflux disease)    AAA (abdominal aortic aneurysm) ([facility])    Osteoporosis    Type 2 diabetes with nephropathy ([facility])    Vertigo    Cardiomyopathy, ischemic    Hearing loss    Chronic renal disease, stage III ([facility]) [847310]    Chronic mucoid otitis media of left ear    Difficulty with speech    Dysphagia    Otorrhea of left ear    Sleep apnea    LBBB (left bundle branch block)    Angina pectoris, unspecified    Atherosclerotic heart disease of native coronary artery with unspecified angina pectoris    Chronic systolic (congestive) heart failure    Hypothermia, initial encounter    Acute cystitis without hematuria       Current Facility-Administered Medications   Medication Dose Route Frequency    enoxaparin Sodium (LOVENOX) injection 30 mg  30 mg SubCUTAneous Q24H    tuberculin injection 5 Units  5 Units IntraDERmal Once    lipase-protease-amylase (ZENPEP) delayed release capsule 50,000 Units  50,000 Units Oral TID WC    lipase-protease-amylase (ZENPEP) delayed release capsule 25,000 Units  25,000 Units Oral PRN    aspirin EC tablet 81 mg  81 mg Oral Daily    Vitamin D (CHOLECALCIFEROL) tablet 2,000 Units  2,000 Units Oral Daily    clopidogrel (PLAVIX) tablet 75 mg  75 mg Oral Daily    famotidine (PEPCID) tablet

## 2023-12-21 NOTE — PROGRESS NOTES
Nutrition Background:       PMH significant for HTN, GERD, NSTEMI, dyslipidemia, cardiomyopathy, asthma, squamous cell carcinoma of the left ear, and CSF leak. Pt admitted with acute cystitis without hematuria. Nutrition Interval:  Pt seen reclined in bed with daughter present. She reports history as above. Pt stated she ate a good amount of breakfast this morning however she only had bites of her lunch. She is agreeable to trial [employer] as an ONS that will not count towards her fluid restriction. Current Nutrition Therapies:  ADULT DIET; Regular; 3 carb choices (45 gm/meal); Low Fat/Low Chol/High Fiber/DIANNE; No Added Salt (3-4 gm); 1800 ml    Current Intake:   Average Meal Intake: 26-50% (per pt report) Average Supplements Intake: None Ordered      Anthropometric Measures:  Height: 160 cm (5' 3\")  Current Body Wt: 66.7 kg (147 lb) (12/20), Weight source: Stated  BMI: 26, Overweight (BMI 25.0-29. 9)  Admission Body Weight: 66.7 kg (147 lb) (12/20- stated)  Ideal Body Weight (Kg) (Calculated): 52 kg (115 lbs),    BMI Category Overweight (BMI 25.0-29. 9)  Estimated Daily Nutrient Needs:  Energy (kcal/day): 4540-9881 (20-25 kcal/kg) (Kcal/kg Weight used: 66.7 kg Current  Protein (g/day): 67-80 (1-1.2 g/kg) Weight Used: (Current) 66.7 kg  Fluid (ml/day):   (1 ml/kcal)    Nutrition Diagnosis:   Inadequate oral intake related to altered taste perception (poor appetite) as evidenced by weight loss (pt reports barriers to po as above)  Moderate malnutrition, In context of chronic illness related to inadequate protein-energy intake as evidenced by Criteria as identified in malnutrition assessment  Nutrition Interventions:   Food and/or Nutrient Delivery: Continue Current Diet, Start Oral Nutrition Supplement     Coordination of Nutrition Care: Continue to monitor while inpatient      Goals:       Active Goal: PO intake 50% or greater, by next RD assessment       Nutrition Monitoring and Evaluation:      Food/Nutrient Intake Outcomes: Supplement Intake, Food and Nutrient Intake  Physical Signs/Symptoms Outcomes: Weight, Meal Time Behavior    Discharge Planning:    Too soon to determine    Gerda Marcano RD

## 2023-12-21 NOTE — PROGRESS NOTES
acute cystitis without hematuria. Pt presents supine in bed with daughter in room, very supportive. Pt reports living alone, family needs close by, in two level home, pt lives on main level, with 4 DEJUAN with 1 rail, pt uses 4WRW and RW to aid with ambulation, was I with mob and ADLs PTA, pt reports fall coming down stairs, states she is not sure what caused it but states that she did not pass out. This date pt performs mobility including sup to sit and sit to sup with SBA for safety. Pt able to perform STS and amb with RW 5', 10', and 30' with CGA/Min A for balance and safety, pt became lightheaded on first stand, needing to sit back down, BP in sitting was 119/68 and standing was 92/57, pt reported feeling better on 2nd and 3rd stand attempts. Pt presents as functioning below her baseline, with deficits in mobility including transfers, gait, balance, and activity tolerance. Pt will benefit from skilled therapy services to address stated deficits to promote return to highest level of function, independence, and safety. Will continue to follow. .     [facility] AM-PAC™ “6 Clicks” Basic Mobility Inpatient Short Form  -PAC Basic Mobility - Inpatient   How much help is needed turning from your back to your side while in a flat bed without using bedrails?: None  How much help is needed moving from lying on your back to sitting on the side of a flat bed without using bedrails?: None  How much help is needed moving to and from a bed to a chair?: A Little  How much help is needed standing up from a chair using your arms?: A Little  How much help is needed walking in hospital room?: A Little  How much help is needed climbing 3-5 steps with a railing?: A Little  AM-[facility] Inpatient Mobility Raw Score : 20  AM-PAC Inpatient T-Scale Score : 47.67  Mobility Inpatient CMS 0-100% Score: 35.83  Mobility Inpatient CMS G-Code Modifier : CJ    SUBJECTIVE:   Ms. Gupta states, \"Good enough\"     Social/Functional Lives With:  Alone  Type of Home: House  Home Layout: Two level, Able to Live on Main level with bedroom/bathroom  Home Access: Stairs to enter with rails  Entrance Stairs - Number of Steps: 4  Entrance Stairs - Rails: Right  Bathroom Shower/Tub: Walk-in shower, Shower chair with back  Bathroom Toilet: Standard  Bathroom Equipment: Grab bars in shower, Grab bars around toilet  Home Equipment: Macy Iyertt, rolling, Macy Price, 4 wheeled    OBJECTIVE:     PAIN: Khris Maywood / O2: Torin Purl / Nancy Pulling / Safian Brazen:   Pre Treatment:   Pain Assessment: None - Denies Pain      Post Treatment: none stated Vitals    See assessment    Oxygen      None    RESTRICTIONS/PRECAUTIONS:  Restrictions/Precautions: Fall Risk                 GROSS EVALUATION:  Intact Impaired (Comments):   AROM []     PROM []    Strength []     Balance [] Sitting - Static: Good  Sitting - Dynamic: Good  Standing - Static: Fair, +  Standing - Dynamic: Fair, +   Posture [] Forward Head  Rounded Shoulders  Trunk Flexion   Sensation []     Coordination []      Tone []     Edema []    Activity Tolerance [] Patient limited by fatigue    []      COGNITION/  PERCEPTION: Intact Impaired (Comments):   Orientation []     Vision []     Hearing []     Cognition  []       MOBILITY: I Mod I S SBA CGA Min Mod Max Total  NT x2 Comments:   Bed Mobility    Rolling [] [] [] [] [] [] [] [] [] [] []    Supine to Sit [] [] [] [x] [] [] [] [] [] [] []    Scooting [] [] [] [] [] [] [] [] [] [] []    Sit to Supine [] [] [] [x] [] [] [] [] [] [] []    Transfers    Sit to Stand [] [] [] [] [x] [x] [] [] [] [] []    Bed to Chair [] [] [] [] [] [] [] [] [] [] []    Stand to Sit [] [] [] [] [x] [] [] [] [] [] []     [] [] [] [] [] [] [] [] [] [] []    I=Independent, Mod I=Modified Independent, S=Supervision, SBA=Standby Assistance, CGA=Contact Guard Assistance,   Min=Minimal Assistance, Mod=Moderate Assistance, Max=Maximal Assistance, Total=Total Assistance, NT=Not Tested    GAIT: I Mod I S SBA CGA Min Mod Max

## 2023-12-21 NOTE — PROGRESS NOTES
TRANSFER - IN REPORT:    Verbal report received from [address] on Liv Medellin  being received from ED for routine progression of patient care      Report consisted of patient's Situation, Background, Assessment and   Recommendations(SBAR). Information from the following report(s) Nurse Handoff Report was reviewed with the receiving nurse. Opportunity for questions and clarification was provided. Assessment completed upon patient's arrival to unit and care assumed.

## 2023-12-21 NOTE — ED NOTES
TRANSFER - OUT REPORT:    Verbal report given to [facility] EDUAR LUO on Toñito Ruby  being transferred to [facility] for routine progression of patient care       Report consisted of patient's Situation, Background, Assessment and   Recommendations(SBAR). Information from the following report(s) ED Encounter Summary and ED SBAR was reviewed with the receiving nurse. [place] Fall Assessment:    Presents to emergency department  because of falls (Syncope, seizure, or loss of consciousness): No  Age > 70: Yes  Altered Mental Status, Intoxication with alcohol or substance confusion (Disorientation, impaired judgment, poor safety awaremess, or inability to follow instructions): No  Impaired Mobility: Ambulates or transfers with assistive devices or assistance; Unable to ambulate or transer.: Yes             Lines:   Peripheral IV 12/20/23 Right Forearm (Active)        Opportunity for questions and clarification was provided.       Patient transported with:  Mihaela Jacobo RN  12/20/23 2001

## 2023-12-21 NOTE — CARE COORDINATION
CM spoke to Ms. Dylan Corbin and her daughter Kristina Madison in room 611 about discharge planning. Ms. Dylan Corbin is inpatient status for hypothermia and left periocular injury. Prior to admit, she was living alone in her 2 story house in [place], with 4 steps to enter. She typically does not go to the upstairs of her home. She uses a RW (has 2) or a rollator (has 2 of these, too) to help with ambulation. She also has 2 vehicles, and still drives them. Her son lives about 1000 feet away, her daughter Kristina Madison about 1/2 mile away, and daughter Yunior Doyle about 1 mile away. All of them are available to help her as needed. At discharge, Ms. Dylan Corbin plans to return home. She has had home health in the past (cannot remember if it was [facility]), and would be ok with home health after this discharge. Await OT and PT evaluations, then CM will re-assess and discuss with Ms. Dylan Corbin. 12/21/23 0864   Service Assessment   Patient Orientation Alert and Oriented   Cognition Alert   History Provided By Patient   Primary Caregiver Self   Accompanied By/Relationship daughter Kristina Madison is in the room, too   [address]   PCP Verified by CM Yes   Prior Functional Level Independent in ADLs/IADLs   Current Functional Level Other (see comment)  (TBD)   Can patient return to prior living arrangement Yes   Ability to make needs known: Good   Family able to assist with home care needs: Yes   Would you like for me to discuss the discharge plan with any other family members/significant others, and if so, who?  No   Condition of Participation: Discharge Planning   The Plan for Transition of Care is related to the following treatment goals: she would like to return home at discharge

## 2023-12-21 NOTE — PROGRESS NOTES
Hourly rounds performed throughout shift. Bed locked and lowered. Call light within reach. All needs met at this time.

## 2023-12-21 NOTE — PROGRESS NOTES
Hospitalist Progress Note   Admit Date:  2023  1:42 PM   Name:  Srinivas Machado   Age:  78 y.o. Sex:  female  :  1940   MRN:  [id number]   Room:  [facility]    Presenting/Chief Complaint: Fatigue, Nausea, and Eye Problem     Reason(s) for Admission: Hypothermia, initial encounter [T68. XXXA]  Superficial injury of left periocular region, initial encounter [facility] Course:       Srinivas Machado is a 78 y.o. female with medical history of left ear CSF leak, HTN, HLP, NSTEMI,  CAD, cardiomyopathy EF 35-40%. Asthma, squamous cell cancer, pancreatic insufficiency, admitted with sepsis due to UTI and recent fall with facial trauma.      CT head - and CT cspine - negative     ENT consulted and has no concern for current recurrent CSF leak      Ophthalmology consulted and I spoke with Dr. Kaelyn Collado who will evaluate       Has UTI  On course of rocephin  Was hypothermic and required hussein hugger   Culture pending  BP lower ranges         She did have some bradycardia and metoprolol dose decreased  Cardiology consulted       Discharge plans pending   Lives alone        Subjective & 24hr Events:       Daughter and grandddaughter at bedside  Aurther Yasmin with left eye trauma, was wearing sunglasses but not sure had direct injury to her eye  No eye pain, no vision loss, some blurry vision, no light sensitivity or floaters   Ate some        Assessment & Plan:     Principal Problem:    Acute cystitis without hematuria  Plan:   sepsis  D2 rocephin  Followup culture   Check blood cultures   Monitor hemodynamics         Active Problems:    Chronic renal disease, stage III ([address]) [300910]  Plan:   Trend BMP           Chronic mucoid otitis media of left ear  Plan:   CSF leak:  No current issue per ENT           Chronic systolic (congestive) heart failure  Plan:   Stable volume status           Essential hypertension  Plan:   Decreased metoprolol 25 mg daily  Diovan   Followup Bps IntraVENous PRN    0.9 % sodium chloride infusion   IntraVENous PRN    potassium chloride (KLOR-CON M) extended release tablet 40 mEq  40 mEq Oral PRN    Or    potassium bicarb-citric acid (EFFER-K) effervescent tablet 40 mEq  40 mEq Oral PRN    Or    potassium chloride 10 mEq/100 mL IVPB (Peripheral Line)  10 mEq IntraVENous PRN    magnesium sulfate 2000 mg in 50 mL IVPB premix  2,000 mg IntraVENous PRN    polyethylene glycol (GLYCOLAX) packet 17 g  17 g Oral Daily PRN    acetaminophen (TYLENOL) tablet 650 mg  650 mg Oral Q6H PRN    Or    acetaminophen (TYLENOL) suppository 650 mg  650 mg Rectal Q6H PRN    melatonin tablet 3 mg  3 mg Oral Nightly    promethazine (PHENERGAN) tablet 12.5 mg  12.5 mg Oral Q6H PRN    Or    ondansetron (ZOFRAN) injection 4 mg  4 mg IntraVENous Q4H PRN    cefTRIAXone (ROCEPHIN) 1,000 mg in sterile water 10 mL IV syringe  1,000 mg IntraVENous Q24H    lactobacillus (CULTURELLE) capsule 1 capsule  1 capsule Oral Daily with breakfast       Signed:  Pamela Rosario MD    Part of this note may have been written by using a voice dictation software.  The note has been proof read but may still contain some grammatical/other typographical errors.

## 2023-12-22 ENCOUNTER — HOSPITAL ENCOUNTER (INPATIENT)
Age: 83
LOS: 7 days | Discharge: HOME | DRG: 689 | End: 2023-12-29
Attending: PHYSICAL MEDICINE & REHABILITATION | Admitting: PHYSICAL MEDICINE & REHABILITATION
Payer: MEDICARE

## 2023-12-22 VITALS
HEART RATE: 76 BPM | SYSTOLIC BLOOD PRESSURE: 125 MMHG | OXYGEN SATURATION: 99 % | WEIGHT: 147 LBS | TEMPERATURE: 95.8 F | HEIGHT: 63 IN | DIASTOLIC BLOOD PRESSURE: 47 MMHG | BODY MASS INDEX: 26.05 KG/M2 | RESPIRATION RATE: 16 BRPM
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
## 2023-12-22 PROBLEM — R53.81 DEBILITY: Status: ACTIVE | Noted: 2023-12-22

## 2023-12-22 PROBLEM — A41.9 SEPSIS (HCC): Status: ACTIVE | Noted: 2023-12-22

## 2023-12-22 LAB
ALBUMIN SERPL-MCNC: 3 G/DL (ref 3.2–4.6)
ALBUMIN/GLOB SERPL: 0.9 (ref 0.4–1.6)
ALP SERPL-CCNC: 78 U/L (ref 50–136)
ALT SERPL-CCNC: 25 U/L (ref 12–65)
ANION GAP SERPL CALC-SCNC: 9 MMOL/L (ref 2–11)
AST SERPL-CCNC: 18 U/L (ref 15–37)
BASOPHILS # BLD: 0 K/UL (ref 0–0.2)
BASOPHILS NFR BLD: 1 % (ref 0–2)
BILIRUB SERPL-MCNC: 0.2 MG/DL (ref 0.2–1.1)
BUN SERPL-MCNC: 43 MG/DL (ref 8–23)
CALCIUM SERPL-MCNC: 9.4 MG/DL (ref 8.3–10.4)
CHLORIDE SERPL-SCNC: 106 MMOL/L (ref 103–113)
CO2 SERPL-SCNC: 22 MMOL/L (ref 21–32)
CREAT SERPL-MCNC: 1.7 MG/DL (ref 0.6–1)
DIFFERENTIAL METHOD BLD: ABNORMAL
EOSINOPHIL # BLD: 0.1 K/UL (ref 0–0.8)
EOSINOPHIL NFR BLD: 2 % (ref 0.5–7.8)
ERYTHROCYTE [DISTWIDTH] IN BLOOD BY AUTOMATED COUNT: 14 % (ref 11.9–14.6)
GLOBULIN SER CALC-MCNC: 3.4 G/DL (ref 2.8–4.5)
GLUCOSE SERPL-MCNC: 85 MG/DL (ref 65–100)
HCT VFR BLD AUTO: 32.3 % (ref 35.8–46.3)
HGB BLD-MCNC: 10.3 G/DL (ref 11.7–15.4)
IMM GRANULOCYTES # BLD AUTO: 0 K/UL (ref 0–0.5)
IMM GRANULOCYTES NFR BLD AUTO: 1 % (ref 0–5)
LYMPHOCYTES # BLD: 1.9 K/UL (ref 0.5–4.6)
LYMPHOCYTES NFR BLD: 44 % (ref 13–44)
MCH RBC QN AUTO: 29.6 PG (ref 26.1–32.9)
MCHC RBC AUTO-ENTMCNC: 31.9 G/DL (ref 31.4–35)
MCV RBC AUTO: 92.8 FL (ref 82–102)
MM INDURATION, POC: 0 MM (ref 0–5)
MONOCYTES # BLD: 0.6 K/UL (ref 0.1–1.3)
MONOCYTES NFR BLD: 16 % (ref 4–12)
NEUTS SEG # BLD: 1.5 K/UL (ref 1.7–8.2)
NEUTS SEG NFR BLD: 36 % (ref 43–78)
NRBC # BLD: 0 K/UL (ref 0–0.2)
PLATELET # BLD AUTO: 149 K/UL (ref 150–450)
PMV BLD AUTO: 11.2 FL (ref 9.4–12.3)
POTASSIUM SERPL-SCNC: 4.7 MMOL/L (ref 3.5–5.1)
PPD, POC: NEGATIVE
PROT SERPL-MCNC: 6.4 G/DL (ref 6.3–8.2)
RBC # BLD AUTO: 3.48 M/UL (ref 4.05–5.2)
SODIUM SERPL-SCNC: 137 MMOL/L (ref 136–146)
WBC # BLD AUTO: 4.1 K/UL (ref 4.3–11.1)

## 2023-12-22 PROCEDURE — 99223 1ST HOSP IP/OBS HIGH 75: CPT | Performed by: PHYSICAL MEDICINE & REHABILITATION

## 2023-12-22 PROCEDURE — 36415 COLL VENOUS BLD VENIPUNCTURE: CPT

## 2023-12-22 PROCEDURE — 6370000000 HC RX 637 (ALT 250 FOR IP): Performed by: INTERNAL MEDICINE

## 2023-12-22 PROCEDURE — 2580000003 HC RX 258: Performed by: HOSPITALIST

## 2023-12-22 PROCEDURE — 6370000000 HC RX 637 (ALT 250 FOR IP): Performed by: PHYSICAL MEDICINE & REHABILITATION

## 2023-12-22 PROCEDURE — 6370000000 HC RX 637 (ALT 250 FOR IP): Performed by: HOSPITALIST

## 2023-12-22 PROCEDURE — 1180000000 HC REHAB R&B

## 2023-12-22 PROCEDURE — 6360000002 HC RX W HCPCS: Performed by: PHYSICAL MEDICINE & REHABILITATION

## 2023-12-22 PROCEDURE — 85025 COMPLETE CBC W/AUTO DIFF WBC: CPT

## 2023-12-22 PROCEDURE — 2580000003 HC RX 258: Performed by: PHYSICIAN ASSISTANT

## 2023-12-22 PROCEDURE — 80053 COMPREHEN METABOLIC PANEL: CPT

## 2023-12-22 PROCEDURE — 2580000003 HC RX 258: Performed by: PHYSICAL MEDICINE & REHABILITATION

## 2023-12-22 RX ORDER — ACETAMINOPHEN 325 MG/1
650 TABLET ORAL EVERY 4 HOURS PRN
Status: DISCONTINUED | OUTPATIENT
Start: 2023-12-22 | End: 2023-12-27 | Stop reason: SDUPTHER

## 2023-12-22 RX ORDER — VITAMIN B COMPLEX
2000 TABLET ORAL DAILY
Status: CANCELLED | OUTPATIENT
Start: 2023-12-23

## 2023-12-22 RX ORDER — SODIUM CHLORIDE 0.9 % (FLUSH) 0.9 %
5-40 SYRINGE (ML) INJECTION EVERY 12 HOURS SCHEDULED
Status: CANCELLED | OUTPATIENT
Start: 2023-12-22

## 2023-12-22 RX ORDER — ACETAMINOPHEN 650 MG/1
650 SUPPOSITORY RECTAL EVERY 6 HOURS PRN
Status: CANCELLED | OUTPATIENT
Start: 2023-12-22

## 2023-12-22 RX ORDER — LACTOBACILLUS RHAMNOSUS GG 10B CELL
1 CAPSULE ORAL
Status: CANCELLED | OUTPATIENT
Start: 2023-12-23

## 2023-12-22 RX ORDER — CEFPODOXIME PROXETIL 200 MG/1
200 TABLET, FILM COATED ORAL DAILY
Status: DISCONTINUED | OUTPATIENT
Start: 2023-12-22 | End: 2023-12-22

## 2023-12-22 RX ORDER — VALSARTAN 40 MG/1
40 TABLET ORAL DAILY
Qty: 30 TABLET | Refills: 0 | Status: SHIPPED | OUTPATIENT
Start: 2023-12-22

## 2023-12-22 RX ORDER — LACTOBACILLUS RHAMNOSUS GG 10B CELL
1 CAPSULE ORAL
Status: DISCONTINUED | OUTPATIENT
Start: 2023-12-23 | End: 2023-12-29 | Stop reason: HOSPADM

## 2023-12-22 RX ORDER — DIPHENOXYLATE HYDROCHLORIDE AND ATROPINE SULFATE 2.5; .025 MG/1; MG/1
1 TABLET ORAL 4 TIMES DAILY PRN
Status: CANCELLED | OUTPATIENT
Start: 2023-12-22

## 2023-12-22 RX ORDER — FAMOTIDINE 20 MG/1
20 TABLET, FILM COATED ORAL DAILY
Qty: 60 TABLET | Refills: 3 | Status: SHIPPED | OUTPATIENT
Start: 2023-12-22

## 2023-12-22 RX ORDER — DIPHENOXYLATE HYDROCHLORIDE AND ATROPINE SULFATE 2.5; .025 MG/1; MG/1
1 TABLET ORAL 4 TIMES DAILY PRN
Status: DISCONTINUED | OUTPATIENT
Start: 2023-12-22 | End: 2023-12-29 | Stop reason: HOSPADM

## 2023-12-22 RX ORDER — METOPROLOL SUCCINATE 25 MG/1
25 TABLET, EXTENDED RELEASE ORAL DAILY
Status: DISCONTINUED | OUTPATIENT
Start: 2023-12-22 | End: 2023-12-29 | Stop reason: HOSPADM

## 2023-12-22 RX ORDER — VALSARTAN 80 MG/1
80 TABLET ORAL DAILY
Status: CANCELLED | OUTPATIENT
Start: 2023-12-23

## 2023-12-22 RX ORDER — ACETAMINOPHEN 325 MG/1
650 TABLET ORAL EVERY 6 HOURS PRN
Status: DISCONTINUED | OUTPATIENT
Start: 2023-12-22 | End: 2023-12-29 | Stop reason: HOSPADM

## 2023-12-22 RX ORDER — BISACODYL 10 MG
10 SUPPOSITORY, RECTAL RECTAL DAILY PRN
Status: CANCELLED | OUTPATIENT
Start: 2023-12-22

## 2023-12-22 RX ORDER — SODIUM CHLORIDE 0.9 % (FLUSH) 0.9 %
5-40 SYRINGE (ML) INJECTION PRN
Status: CANCELLED | OUTPATIENT
Start: 2023-12-22

## 2023-12-22 RX ORDER — DIPHENOXYLATE HYDROCHLORIDE AND ATROPINE SULFATE 2.5; .025 MG/1; MG/1
1 TABLET ORAL 4 TIMES DAILY PRN
Qty: 120 TABLET | Refills: 0 | Status: SHIPPED | OUTPATIENT
Start: 2023-12-22 | End: 2024-01-21

## 2023-12-22 RX ORDER — ENOXAPARIN SODIUM 100 MG/ML
30 INJECTION SUBCUTANEOUS EVERY 24 HOURS
Status: DISCONTINUED | OUTPATIENT
Start: 2023-12-22 | End: 2023-12-29 | Stop reason: HOSPADM

## 2023-12-22 RX ORDER — CEFDINIR 300 MG/1
300 CAPSULE ORAL DAILY
Status: COMPLETED | OUTPATIENT
Start: 2023-12-22 | End: 2023-12-28

## 2023-12-22 RX ORDER — PITAVASTATIN 4 MG/1
4 TABLET, FILM COATED ORAL NIGHTLY
Status: CANCELLED | OUTPATIENT
Start: 2023-12-22

## 2023-12-22 RX ORDER — LACTOBACILLUS RHAMNOSUS GG 10B CELL
1 CAPSULE ORAL
Qty: 30 CAPSULE | Refills: 0 | Status: SHIPPED | OUTPATIENT
Start: 2023-12-23

## 2023-12-22 RX ORDER — CLOPIDOGREL BISULFATE 75 MG/1
75 TABLET ORAL DAILY
Status: CANCELLED | OUTPATIENT
Start: 2023-12-23

## 2023-12-22 RX ORDER — ACETAMINOPHEN 325 MG/1
650 TABLET ORAL EVERY 4 HOURS PRN
Status: CANCELLED | OUTPATIENT
Start: 2023-12-22

## 2023-12-22 RX ORDER — FUROSEMIDE 20 MG/1
20 TABLET ORAL DAILY PRN
Status: DISCONTINUED | OUTPATIENT
Start: 2023-12-22 | End: 2023-12-29 | Stop reason: HOSPADM

## 2023-12-22 RX ORDER — METOPROLOL SUCCINATE 25 MG/1
25 TABLET, EXTENDED RELEASE ORAL DAILY
Status: CANCELLED | OUTPATIENT
Start: 2023-12-22

## 2023-12-22 RX ORDER — SODIUM CHLORIDE 9 MG/ML
INJECTION, SOLUTION INTRAVENOUS PRN
Status: CANCELLED | OUTPATIENT
Start: 2023-12-22

## 2023-12-22 RX ORDER — SENNOSIDES A AND B 8.6 MG/1
1 TABLET, FILM COATED ORAL DAILY PRN
Status: CANCELLED | OUTPATIENT
Start: 2023-12-22

## 2023-12-22 RX ORDER — ONDANSETRON 2 MG/ML
4 INJECTION INTRAMUSCULAR; INTRAVENOUS EVERY 4 HOURS PRN
Status: CANCELLED | OUTPATIENT
Start: 2023-12-22

## 2023-12-22 RX ORDER — ASPIRIN 81 MG/1
81 TABLET ORAL DAILY
Status: CANCELLED | OUTPATIENT
Start: 2023-12-23

## 2023-12-22 RX ORDER — MONTELUKAST SODIUM 10 MG/1
10 TABLET ORAL NIGHTLY
Status: CANCELLED | OUTPATIENT
Start: 2023-12-22

## 2023-12-22 RX ORDER — ACETAMINOPHEN 650 MG/1
650 SUPPOSITORY RECTAL EVERY 6 HOURS PRN
Status: DISCONTINUED | OUTPATIENT
Start: 2023-12-22 | End: 2023-12-29 | Stop reason: HOSPADM

## 2023-12-22 RX ORDER — SODIUM CHLORIDE 0.9 % (FLUSH) 0.9 %
5-40 SYRINGE (ML) INJECTION PRN
Status: DISCONTINUED | OUTPATIENT
Start: 2023-12-22 | End: 2023-12-26

## 2023-12-22 RX ORDER — MONTELUKAST SODIUM 10 MG/1
10 TABLET ORAL NIGHTLY
Qty: 90 TABLET | Refills: 3
Start: 2023-12-22

## 2023-12-22 RX ORDER — ONDANSETRON 2 MG/ML
4 INJECTION INTRAMUSCULAR; INTRAVENOUS EVERY 4 HOURS PRN
Status: DISCONTINUED | OUTPATIENT
Start: 2023-12-22 | End: 2023-12-29 | Stop reason: HOSPADM

## 2023-12-22 RX ORDER — LANOLIN ALCOHOL/MO/W.PET/CERES
3 CREAM (GRAM) TOPICAL NIGHTLY
Status: DISCONTINUED | OUTPATIENT
Start: 2023-12-22 | End: 2023-12-22

## 2023-12-22 RX ORDER — 0.9 % SODIUM CHLORIDE 0.9 %
250 INTRAVENOUS SOLUTION INTRAVENOUS ONCE
Status: COMPLETED | OUTPATIENT
Start: 2023-12-22 | End: 2023-12-22

## 2023-12-22 RX ORDER — SODIUM CHLORIDE 9 MG/ML
INJECTION, SOLUTION INTRAVENOUS PRN
Status: DISCONTINUED | OUTPATIENT
Start: 2023-12-22 | End: 2023-12-29 | Stop reason: HOSPADM

## 2023-12-22 RX ORDER — ACETAMINOPHEN 325 MG/1
650 TABLET ORAL EVERY 6 HOURS PRN
Status: CANCELLED | OUTPATIENT
Start: 2023-12-22

## 2023-12-22 RX ORDER — PITAVASTATIN 4 MG/1
4 TABLET, FILM COATED ORAL NIGHTLY
Status: DISCONTINUED | OUTPATIENT
Start: 2023-12-22 | End: 2023-12-24 | Stop reason: SDUPTHER

## 2023-12-22 RX ORDER — PROMETHAZINE HYDROCHLORIDE 12.5 MG/1
12.5 TABLET ORAL EVERY 6 HOURS PRN
Status: DISCONTINUED | OUTPATIENT
Start: 2023-12-22 | End: 2023-12-29 | Stop reason: HOSPADM

## 2023-12-22 RX ORDER — NITROGLYCERIN 0.4 MG/1
0.4 TABLET SUBLINGUAL EVERY 5 MIN PRN
Status: DISCONTINUED | OUTPATIENT
Start: 2023-12-22 | End: 2023-12-29 | Stop reason: HOSPADM

## 2023-12-22 RX ORDER — FAMOTIDINE 20 MG/1
20 TABLET, FILM COATED ORAL DAILY
Status: CANCELLED | OUTPATIENT
Start: 2023-12-22

## 2023-12-22 RX ORDER — METOPROLOL SUCCINATE 25 MG/1
25 TABLET, EXTENDED RELEASE ORAL DAILY
Qty: 30 TABLET | Refills: 3 | Status: SHIPPED | OUTPATIENT
Start: 2023-12-22

## 2023-12-22 RX ORDER — FLUTICASONE PROPIONATE 50 MCG
2 SPRAY, SUSPENSION (ML) NASAL DAILY
Status: DISCONTINUED | OUTPATIENT
Start: 2023-12-23 | End: 2023-12-22

## 2023-12-22 RX ORDER — DIPHENOXYLATE HYDROCHLORIDE AND ATROPINE SULFATE 2.5; .025 MG/1; MG/1
1 TABLET ORAL 2 TIMES DAILY
Status: DISCONTINUED | OUTPATIENT
Start: 2023-12-22 | End: 2023-12-26

## 2023-12-22 RX ORDER — VALSARTAN 40 MG/1
80 TABLET ORAL DAILY
Status: DISCONTINUED | OUTPATIENT
Start: 2023-12-23 | End: 2023-12-22

## 2023-12-22 RX ORDER — MONTELUKAST SODIUM 10 MG/1
10 TABLET ORAL NIGHTLY
Status: DISCONTINUED | OUTPATIENT
Start: 2023-12-22 | End: 2023-12-29 | Stop reason: HOSPADM

## 2023-12-22 RX ORDER — VALSARTAN 40 MG/1
40 TABLET ORAL DAILY
Status: DISCONTINUED | OUTPATIENT
Start: 2023-12-23 | End: 2023-12-29 | Stop reason: HOSPADM

## 2023-12-22 RX ORDER — SODIUM CHLORIDE 0.9 % (FLUSH) 0.9 %
5-40 SYRINGE (ML) INJECTION EVERY 12 HOURS SCHEDULED
Status: DISCONTINUED | OUTPATIENT
Start: 2023-12-22 | End: 2023-12-29 | Stop reason: HOSPADM

## 2023-12-22 RX ORDER — POLYETHYLENE GLYCOL 3350 17 G/17G
17 POWDER, FOR SOLUTION ORAL DAILY PRN
Status: CANCELLED | OUTPATIENT
Start: 2023-12-22

## 2023-12-22 RX ORDER — LANOLIN ALCOHOL/MO/W.PET/CERES
3 CREAM (GRAM) TOPICAL NIGHTLY PRN
Status: DISCONTINUED | OUTPATIENT
Start: 2023-12-22 | End: 2023-12-29 | Stop reason: HOSPADM

## 2023-12-22 RX ORDER — CEFPODOXIME PROXETIL 200 MG/1
200 TABLET, FILM COATED ORAL DAILY
Qty: 7 TABLET | Refills: 0 | Status: ON HOLD | OUTPATIENT
Start: 2023-12-22 | End: 2023-12-29 | Stop reason: HOSPADM

## 2023-12-22 RX ORDER — POLYETHYLENE GLYCOL 3350 17 G/17G
17 POWDER, FOR SOLUTION ORAL DAILY PRN
Status: DISCONTINUED | OUTPATIENT
Start: 2023-12-22 | End: 2023-12-29 | Stop reason: HOSPADM

## 2023-12-22 RX ORDER — SENNOSIDES A AND B 8.6 MG/1
1 TABLET, FILM COATED ORAL DAILY PRN
Status: DISCONTINUED | OUTPATIENT
Start: 2023-12-22 | End: 2023-12-29 | Stop reason: HOSPADM

## 2023-12-22 RX ORDER — ENOXAPARIN SODIUM 100 MG/ML
30 INJECTION SUBCUTANEOUS EVERY 24 HOURS
Status: CANCELLED | OUTPATIENT
Start: 2023-12-22

## 2023-12-22 RX ORDER — BISACODYL 10 MG
10 SUPPOSITORY, RECTAL RECTAL DAILY PRN
Status: DISCONTINUED | OUTPATIENT
Start: 2023-12-22 | End: 2023-12-29 | Stop reason: HOSPADM

## 2023-12-22 RX ORDER — CLOPIDOGREL BISULFATE 75 MG/1
75 TABLET ORAL DAILY
Status: DISCONTINUED | OUTPATIENT
Start: 2023-12-23 | End: 2023-12-29 | Stop reason: HOSPADM

## 2023-12-22 RX ORDER — VITAMIN B COMPLEX
2000 TABLET ORAL DAILY
Status: DISCONTINUED | OUTPATIENT
Start: 2023-12-23 | End: 2023-12-29 | Stop reason: HOSPADM

## 2023-12-22 RX ORDER — LANOLIN ALCOHOL/MO/W.PET/CERES
3 CREAM (GRAM) TOPICAL NIGHTLY
Status: CANCELLED | OUTPATIENT
Start: 2023-12-22

## 2023-12-22 RX ORDER — PROMETHAZINE HYDROCHLORIDE 25 MG/1
12.5 TABLET ORAL EVERY 6 HOURS PRN
Status: CANCELLED | OUTPATIENT
Start: 2023-12-22

## 2023-12-22 RX ORDER — ASPIRIN 81 MG/1
81 TABLET ORAL DAILY
Status: DISCONTINUED | OUTPATIENT
Start: 2023-12-23 | End: 2023-12-29 | Stop reason: HOSPADM

## 2023-12-22 RX ORDER — FLUTICASONE PROPIONATE 50 MCG
2 SPRAY, SUSPENSION (ML) NASAL DAILY PRN
Status: DISCONTINUED | OUTPATIENT
Start: 2023-12-22 | End: 2023-12-29 | Stop reason: HOSPADM

## 2023-12-22 RX ORDER — FAMOTIDINE 20 MG/1
20 TABLET, FILM COATED ORAL DAILY
Status: DISCONTINUED | OUTPATIENT
Start: 2023-12-22 | End: 2023-12-29 | Stop reason: HOSPADM

## 2023-12-22 RX ORDER — ASPIRIN 81 MG/1
81 TABLET ORAL DAILY
Status: DISCONTINUED | OUTPATIENT
Start: 2023-12-23 | End: 2023-12-22

## 2023-12-22 RX ADMIN — VITAMIN D, TAB 1000IU (100/BT) 2000 UNITS: 25 TAB at 08:38

## 2023-12-22 RX ADMIN — FAMOTIDINE 20 MG: 20 TABLET ORAL at 20:54

## 2023-12-22 RX ADMIN — DIPHENOXYLATE HYDROCHLORIDE AND ATROPINE SULFATE 1 TABLET: 2.5; .025 TABLET ORAL at 08:49

## 2023-12-22 RX ADMIN — ENOXAPARIN SODIUM 30 MG: 100 INJECTION SUBCUTANEOUS at 20:53

## 2023-12-22 RX ADMIN — CLOPIDOGREL BISULFATE 75 MG: 75 TABLET ORAL at 08:38

## 2023-12-22 RX ADMIN — SODIUM CHLORIDE, PRESERVATIVE FREE 10 ML: 5 INJECTION INTRAVENOUS at 08:38

## 2023-12-22 RX ADMIN — DIPHENOXYLATE HYDROCHLORIDE AND ATROPINE SULFATE 1 TABLET: 2.5; .025 TABLET ORAL at 20:54

## 2023-12-22 RX ADMIN — PANCRELIPASE LIPASE, PANCRELIPASE PROTEASE, PANCRELIPASE AMYLASE 50000 UNITS: 20000; 63000; 84000 CAPSULE, DELAYED RELEASE ORAL at 17:48

## 2023-12-22 RX ADMIN — SODIUM CHLORIDE, PRESERVATIVE FREE 10 ML: 5 INJECTION INTRAVENOUS at 20:54

## 2023-12-22 RX ADMIN — SODIUM CHLORIDE 250 ML: 9 INJECTION, SOLUTION INTRAVENOUS at 08:43

## 2023-12-22 RX ADMIN — PANCRELIPASE LIPASE, PANCRELIPASE PROTEASE, PANCRELIPASE AMYLASE 50000 UNITS: 5000; 17000; 24000 CAPSULE, DELAYED RELEASE ORAL at 11:16

## 2023-12-22 RX ADMIN — ASPIRIN 81 MG: 81 TABLET, COATED ORAL at 08:38

## 2023-12-22 RX ADMIN — PANCRELIPASE LIPASE, PANCRELIPASE PROTEASE, PANCRELIPASE AMYLASE 50000 UNITS: 5000; 17000; 24000 CAPSULE, DELAYED RELEASE ORAL at 08:38

## 2023-12-22 RX ADMIN — MONTELUKAST 10 MG: 10 TABLET, FILM COATED ORAL at 20:53

## 2023-12-22 RX ADMIN — CEFDINIR 300 MG: 300 CAPSULE ORAL at 20:53

## 2023-12-22 RX ADMIN — METOPROLOL SUCCINATE 25 MG: 25 TABLET, EXTENDED RELEASE ORAL at 20:53

## 2023-12-22 RX ADMIN — Medication 1 CAPSULE: at 08:39

## 2023-12-22 NOTE — H&P
[facility]  Inpatient Rehab Program      Admission History and Physical Exam  INPATIENT REHABILITATION CENTER       IRC Admission Date: 12/22/2023  Primary Care Provider: Ibis Bolton APRN - CNP  Specialty Group / Referring Service: Hospital Medicine  Transferred from: [facility]    Chief Complaint: Impaired ability to ambulate, transfer, and carryout selfcare tasks due to debility from sepsis from a UTI.    Admitting Diagnosis:   Debility [R53.81]    Principal Problem:    Debility  Resolved Problems:    * No resolved hospital problems. 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    CAD (coronary artery disease)     Cardiomyopathy, ischemic 1/14/2016    Chronic vertigo     DM2 (diabetes mellitus, type 2) ([facility])     does not check glucose    GERD (gastroesophageal reflux disease)     Hearing loss     History of bilateral cataract extraction     History of coronary artery disease 2006, 2007    MI x 2 after stents placed-- with in 1 week-- stents x3    History of gallbladder disease     History of hernia repair     History of seborrheic keratosis     Hyperlipidemia     Hypertension     Lacunar infarction (HCC) 8/16/2018    Meniere's disease     Menopause     Morbid obesity ([facility])     OA (osteoarthritis)     Orthostatic hypotension 1/14/2016    Osteopenia with high risk of fracture 2014    Osteopenia with significant interval density loss in the hips since 2014    Post-menopausal osteoporosis     Postmenopausal osteoporosis     Unspecified sleep apnea     does not use c pap, pt. states asymptomatic since  Culture NO GROWTH AFTER 13 HOURS     Culture, Blood 1    Collection Time: 12/21/23  5:32 PM    Specimen: Blood   Result Value Ref Range    Special Requests RIGHT  HAND        Culture NO GROWTH AFTER 14 HOURS     Comprehensive Metabolic Panel w/ Reflex to MG    Collection Time: 12/22/23  5:26 AM   Result Value Ref Range    Sodium 137 136 - 146 mmol/L    Potassium 4.7 3.5 - 5.1 mmol/L    Chloride 106 103 - 113 mmol/L    CO2 22 21 - 32 mmol/L    Anion Gap 9 2 - 11 mmol/L    Glucose 85 65 - 100 mg/dL    BUN 43 (H) 8 - 23 MG/DL    Creatinine 1.70 (H) 0.6 - 1.0 MG/DL    Est, Glom Filt Rate 29 (L) >60 ml/min/1.73m2    Calcium 9.4 8.3 - 10.4 MG/DL    Total Bilirubin 0.2 0.2 - 1.1 MG/DL    ALT 25 12 - 65 U/L    AST 18 15 - 37 U/L    Alk Phosphatase 78 50 - 136 U/L    Total Protein 6.4 6.3 - 8.2 g/dL    Albumin 3.0 (L) 3.2 - 4.6 g/dL    Globulin 3.4 2.8 - 4.5 g/dL    Albumin/Globulin Ratio 0.9 0.4 - 1.6     CBC with Auto Differential    Collection Time: 12/22/23  5:26 AM   Result Value Ref Range    WBC 4.1 (L) 4.3 - 11.1 K/uL    RBC 3.48 (L) 4.05 - 5.2 M/uL    Hemoglobin 10.3 (L) 11.7 - 15.4 g/dL    Hematocrit 32.3 (L) 35.8 - 46.3 %    MCV 92.8 82 - 102 FL    MCH 29.6 26.1 - 32.9 PG    MCHC 31.9 31.4 - 35.0 g/dL    RDW 14.0 11.9 - 14.6 %    Platelets 465 (L) 859 - 450 K/uL    MPV 11.2 9.4 - 12.3 FL    nRBC 0.00 0.0 - 0.2 K/uL    Differential Type AUTOMATED      Neutrophils % 36 (L) 43 - 78 %    Lymphocytes % 44 13 - 44 %    Monocytes % 16 (H) 4.0 - 12.0 %    Eosinophils % 2 0.5 - 7.8 %    Basophils % 1 0.0 - 2.0 %    Immature Granulocytes 1 0.0 - 5.0 %    Neutrophils Absolute 1.5 (L) 1.7 - 8.2 K/UL    Lymphocytes Absolute 1.9 0.5 - 4.6 K/UL    Monocytes Absolute 0.6 0.1 - 1.3 K/UL    Eosinophils Absolute 0.1 0.0 - 0.8 K/UL    Basophils Absolute 0.0 0.0 - 0.2 K/UL    Absolute Immature Granulocyte 0.0 0.0 - 0.5 K/UL   PLEASE READ & DOCUMENT PPD TEST IN 24 HRS    Collection Time: 12/22/23  8:00 AM   Result Value Ref Range

## 2023-12-22 NOTE — DISCHARGE SUMMARY
Hospitalist Discharge Summary   Admit Date:  2023  1:42 PM   DC Note date: 2023  Name:  Randi Sun   Age:  78 y.o. Sex:  female  :  1940   MRN:  [id number]   Room:  [facility]  PCP:  BERTO Dumont CNP    Presenting Complaint: Fatigue, Nausea, and Eye Problem     Initial Admission Diagnosis: Hypothermia, initial encounter [T68. 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 *      Hospital Course:  Randi Sun is a 78 y.o. female with medical history of HTN, dyslipidemia, GERD, NSTEMI, cardiomyopathy with EF of 35 to 40%, asthma, squamous cell carcinoma of left ear, CSF leak from left ear status post surgical repair, who presented to the emergency on  with chief complaints of having generalized weakness, ringing sensation in left ear, dizziness and blurry vision in the left eye. Patient had a fall 2 days PTA resulting in trauma to the left eyebrow. The morning of admit, she developed new blurry vision. As per the family patient has had chronic diarrhea and with GI evaluation was found to have pancreatic insufficiency and was started on Questran the day before admmission. Patient also reports of having recurrent UTIs/bladder infections. She denies having any acute chest pain, palpitations, shortness of breath, nausea, vomiting, diplopia, fever or chills.   ER workup was remarkable for hypothermia with Tmax of 92.6, temperature on arrival was (age 6w+), IM, 0.5mL 11/15/2023    TDaP, ADACEL (age 10y-64y), BOOSTRIX (age 10y+), IM, 0.5mL 10/06/2022    Td vaccine (adult) 07/20/2010    Zoster Live (Zostavax) 01/01/2021    Zoster Recombinant (Shingrix) 01/01/2021       Recent Vital Data:  Patient Vitals for the past 24 hrs:   Temp Pulse Resp BP SpO2   12/22/23 0819 (!) 95.8 °F (35.4 °C) 76 16 (!) 125/47 99 %   12/22/23 0621 -- 63 -- -- --   12/22/23 0328 97.7 °F (36.5 °C) 68 18 (!) 115/50 97 %   12/22/23 0150 -- 55 -- -- --   12/21/23 2029 -- 83 -- (!) 142/75 --   12/21/23 1937 97.7 °F (36.5 °C) 81 18 121/61 96 %   12/21/23 1552 97.3 °F (36.3 °C) 72 18 129/61 100 %       Oxygen Therapy  SpO2: 99 %  Pulse via Oximetry: 72 beats per minute  O2 Device: None (Room air)    Estimated body mass index is 26.04 kg/m² as calculated from the following:    Height as of this encounter: 1.6 m (5' 3\").    Weight as of this encounter: 66.7 kg (147 lb).    Intake/Output Summary (Last 24 hours) at 12/22/2023 1344  Last data filed at 12/22/2023 1014  Gross per 24 hour   Intake 180 ml   Output --   Net 180 ml         Physical Exam:    General:    Well nourished.  No overt distress  Head:  Normocephalic, atraumatic  Eyes:  Sclerae appear normal.  Pupils equally round.    HENT:  Nares appear normal, no drainage.  Moist mucous membranes  Neck:  No restricted ROM.  Trachea midline  CV:   RRR.  No m/r/g.  No JVD  Lungs:   CTAB.  No wheezing, rhonchi, or rales.  Respirations even, unlabored  Abdomen:   Soft, nontender, nondistended.    Extremities: Warm and dry.   No edema.    Skin:     Large area ecchymoses L orbit extending into L maxillaray area. Lateral subconjunctival hemorrhage noted L eye  Neuro:  CN II-XII grossly intact.  Psych:  Normal mood and affect.    Signed:  Soledad Montoya PA-C

## 2023-12-22 NOTE — CARE COORDINATION
Dispo update:  CM spoke to Ms. Jose Christensen and her 2 granddaughters (they are patient's son Bill's daughters) in room 65, and via phone to her daughter Ms. Shahid Herrera at [phone]. They agree with recommendation for inpatient rehab. They prefer 9th floor [facility] over [facility], but are ok with a referral to both. Referrals sent, await their review and decision.

## 2023-12-22 NOTE — PROGRESS NOTES
Patient arrived from 6th floor for admission to room 915. She has a 20G IV in her R AC. Bruising noted periorbital area and on her face on the Left eye/side. Vision has been slightly blurry since her fall PTA. Patient with bruises and some picked spots on her BUE that are scabbed over. Scars on her abd from several previous surgeries per pt. Heels and feet are c/d/I. Feet just flaky. No obvious wounds on her BLE. Sacrum does have a healing pressure injury, as admitted by patient. Its on her R buttock, about a dime-nickel size. Red but not open skin. In between buttocks is red but blanchable. New allevyn applied.

## 2023-12-22 NOTE — PROGRESS NOTES
Did not order Cdiff test even though patient has been having diarrhea as she says she has had over 2 tests from the [facility] and they attribute it to her pancreatic insufficiency. Will monitor frequency of stools if this diarrhea persists. However, pt is also getting lomotil too so test likely unable to be ran.

## 2023-12-22 NOTE — PROGRESS NOTES
Bedside and Verbal shift change report given to Bertha Miller RN (oncoming nurse) by self Vick packer). Report included the following information Nurse Handoff Report, Index, Intake/Output, and MAR.

## 2023-12-22 NOTE — CARE COORDINATION
Discharge note:  Ms. Kelsey Rosenberg was accepted to both 9th floor [facility], and to [facility] for inpatient rehab. She chose 9th floor [facility] (). Notified [facility] (Ms. Menard Angelo) that she declined them. Updated Ms. Kelsey Rosenberg and her family, including her daughter Ms. Marva Pacheco via phone [phone].       12/22/23 2005   Service Assessment   Patient Orientation Alert and Oriented   Cognition Alert   Condition of Participation: Discharge Planning   The Patient and/or Patient Representative was provided with a Choice of Provider? Patient   The Patient and/Or Patient Representative agree with the Discharge Plan? Yes   Freedom of Choice list was provided with basic dialogue that supports the patient's individualized plan of care/goals, treatment preferences, and shares the quality data associated with the providers?   Yes

## 2023-12-22 NOTE — PLAN OF CARE
Problem: Discharge Planning  Goal: Discharge to home or other facility with appropriate resources  12/22/2023 1029 by Gus JEONG RN  Outcome: Progressing  12/22/2023 0150 by Mingo Stubbs RN  Outcome: Progressing     Problem: Skin/Tissue Integrity  Goal: Absence of new skin breakdown  Description: 1. Monitor for areas of redness and/or skin breakdown  2. Assess vascular access sites hourly  3. Every 4-6 hours minimum:  Change oxygen saturation probe site  4. Every 4-6 hours:  If on nasal continuous positive airway pressure, respiratory therapy assess nares and determine need for appliance change or resting period.   12/22/2023 0150 by Mingo Stubbs RN  Outcome: Progressing

## 2023-12-22 NOTE — PROGRESS NOTES
Report given to Sebas Quintero on 9th floor. Patient alert and oriented times 4. Transport in place. IV in Right AC flushes well. NAD noted at this time.

## 2023-12-23 LAB
BACTERIA SPEC CULT: ABNORMAL
BACTERIA SPEC CULT: ABNORMAL
SERVICE CMNT-IMP: ABNORMAL

## 2023-12-23 PROCEDURE — 97535 SELF CARE MNGMENT TRAINING: CPT

## 2023-12-23 PROCEDURE — 2580000003 HC RX 258: Performed by: PHYSICAL MEDICINE & REHABILITATION

## 2023-12-23 PROCEDURE — 97110 THERAPEUTIC EXERCISES: CPT

## 2023-12-23 PROCEDURE — 97530 THERAPEUTIC ACTIVITIES: CPT

## 2023-12-23 PROCEDURE — 97161 PT EVAL LOW COMPLEX 20 MIN: CPT

## 2023-12-23 PROCEDURE — 97165 OT EVAL LOW COMPLEX 30 MIN: CPT

## 2023-12-23 PROCEDURE — 6370000000 HC RX 637 (ALT 250 FOR IP): Performed by: PHYSICAL MEDICINE & REHABILITATION

## 2023-12-23 PROCEDURE — 1180000000 HC REHAB R&B

## 2023-12-23 PROCEDURE — 6360000002 HC RX W HCPCS: Performed by: PHYSICAL MEDICINE & REHABILITATION

## 2023-12-23 PROCEDURE — 97116 GAIT TRAINING THERAPY: CPT

## 2023-12-23 RX ADMIN — MONTELUKAST 10 MG: 10 TABLET, FILM COATED ORAL at 20:34

## 2023-12-23 RX ADMIN — PANCRELIPASE LIPASE, PANCRELIPASE PROTEASE, PANCRELIPASE AMYLASE 50000 UNITS: 20000; 63000; 84000 CAPSULE, DELAYED RELEASE ORAL at 07:35

## 2023-12-23 RX ADMIN — ACETAMINOPHEN 650 MG: 325 TABLET ORAL at 16:46

## 2023-12-23 RX ADMIN — SODIUM CHLORIDE, PRESERVATIVE FREE 10 ML: 5 INJECTION INTRAVENOUS at 22:19

## 2023-12-23 RX ADMIN — DIPHENOXYLATE HYDROCHLORIDE AND ATROPINE SULFATE 1 TABLET: 2.5; .025 TABLET ORAL at 07:39

## 2023-12-23 RX ADMIN — PITAVASTATIN 4 MG: 4 TABLET, FILM COATED ORAL at 20:35

## 2023-12-23 RX ADMIN — PANCRELIPASE LIPASE, PANCRELIPASE PROTEASE, PANCRELIPASE AMYLASE 50000 UNITS: 20000; 63000; 84000 CAPSULE, DELAYED RELEASE ORAL at 12:06

## 2023-12-23 RX ADMIN — Medication 1 CAPSULE: at 07:35

## 2023-12-23 RX ADMIN — CEFDINIR 300 MG: 300 CAPSULE ORAL at 07:39

## 2023-12-23 RX ADMIN — ENOXAPARIN SODIUM 30 MG: 100 INJECTION SUBCUTANEOUS at 20:34

## 2023-12-23 RX ADMIN — PANCRELIPASE LIPASE, PANCRELIPASE PROTEASE, PANCRELIPASE AMYLASE 50000 UNITS: 20000; 63000; 84000 CAPSULE, DELAYED RELEASE ORAL at 16:47

## 2023-12-23 RX ADMIN — FAMOTIDINE 20 MG: 20 TABLET ORAL at 20:33

## 2023-12-23 RX ADMIN — ASPIRIN 81 MG: 81 TABLET ORAL at 07:39

## 2023-12-23 RX ADMIN — VITAMIN D, TAB 1000IU (100/BT) 2000 UNITS: 25 TAB at 07:39

## 2023-12-23 RX ADMIN — CLOPIDOGREL BISULFATE 75 MG: 75 TABLET ORAL at 07:39

## 2023-12-23 RX ADMIN — METOPROLOL SUCCINATE 25 MG: 25 TABLET, EXTENDED RELEASE ORAL at 20:33

## 2023-12-23 RX ADMIN — DIPHENOXYLATE HYDROCHLORIDE AND ATROPINE SULFATE 1 TABLET: 2.5; .025 TABLET ORAL at 20:33

## 2023-12-23 NOTE — PROGRESS NOTES
PHYSICAL THERAPY EXAMINATION    Time In: 1030  Time Out: 1204  Total Treatment Time:  94 Minutes         HPI: from MD report  Levi Omer is a 78 y.o. F pmh HTN, dyslipidemia, GERD, NSTEMI, cardiomyopathy with EF of 35 to 40%, asthma, squamous cell carcinoma of left ear, CSF leak from left ear status post surgical repair, who presented to the [facility] ER on 12/20 with chief complaints of having generalized weakness, ringing sensation in left ear, dizziness and blurry vision in the left eye. Patient had a fall 2 days PTA resulting in trauma to the left eyebrow. The morning of admit, she developed new blurry vision. As per the family patient has had chronic diarrhea and with GI evaluation was found to have pancreatic insufficiency and was started on Questran the day before admmission. Patient also reports of having recurrent UTIs/bladder infections. She denies having any acute chest pain, palpitations, shortness of breath, nausea, vomiting, diplopia, fever or chills. ER workup was remarkable for hypothermia with Tmax of 92.6, temperature on arrival was 90.3. Heart rate was 45, /66. Blood work was remarkable for creatinine 1.1, BUN 39, TSH 7.04, free T4 1.0. CT head with no acute intracranial abnormality, showed chronic periventricular and deep white matter small vessel ischemic changes.     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

## 2023-12-23 NOTE — PROGRESS NOTES
Occupational Therapy  OCCUPATIONAL THERAPY INITIAL EVALUATION    Time In 0754      Time Out 0935        HPI (per MD report): \"     Signed       Expand All Collapse All            [facility]  Inpatient Rehab Program        Admission History and Physical Exam  INPATIENT REHABILITATION CENTER         [facility] Admission Date: 12/22/2023  Primary Care Provider: Ibis Bolton APRN - CNP  Specialty Group / Referring Service: Hospital Medicine  Transferred from:      Chief Complaint: Impaired ability to ambulate, transfer, and carryout selfcare tasks due to debility from sepsis from a UTI.     Admitting Diagnosis:   Debility [R53.81]     Principal Problem:    Debility  Resolved Problems:    * No resolved hospital problems. 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    CAD (coronary artery disease)      Cardiomyopathy, ischemic 1/14/2016    Chronic vertigo      DM2 (diabetes mellitus, type 2) ([facility])       does not check glucose    GERD (gastroesophageal reflux disease)      Hearing loss      History of bilateral cataract extraction      History of coronary artery disease 2006, 2007     MI x 2 after stents placed-- with in 1 week-- stents x3    History of gallbladder disease      History of hernia repair      History of seborrheic keratosis      Hyperlipidemia      Hypertension      Lacunar infarction (HCC) 8/16/2018    Meniere's disease      Menopause      Morbid obesity (HCC)      OA (osteoarthritis)      Orthostatic hypotension 1/14/2016    Osteopenia with high risk of  No   Muscle Tone Normal Normal     STRENGTH: RUE LUE   Shoulder Flexion 4+/5 Completed full range of motion against gravity with moderate-maximum resistance  4+/5 Completed full range of motion against gravity with moderate-maximum resistance    Shoulder Abduction 4+/5 Completed full range of motion against gravity with moderate-maximum resistance  4+/5 Completed full range of motion against gravity with moderate-maximum resistance    Elbow Flexion 4+/5 Completed full range of motion against gravity with moderate-maximum resistance  4+/5 Completed full range of motion against gravity with moderate-maximum resistance    Elbow Extension  4+/5 Completed full range of motion against gravity with moderate-maximum resistance  4+/5 Completed full range of motion against gravity with moderate-maximum resistance     4+/5 Completed full range of motion against gravity with moderate-maximum resistance  4+/5 Completed full range of motion against gravity with moderate-maximum resistance      BALANCE:   Static Dynamic   Sitting Intact Intact   Standing Good: Able to maintain standing balance against moderate resistance Good-/Fair+: Stand independently unsupported, able to weight shift across midline minimally     FUNCTIONAL MOBILITY:       Bed Mobility Modified Independent    Sit to Stand CGA    Transfer  CGA  Transfer Type: SPT  Equipment: RW    Ambulation CGA  Equipment: RW      ACTIVITIES OF DAILY LIVING:   Score Comments   Eating Independent I   Oral Hygiene Setup or clean-up assistance Setup A, seated at sink   Bathing Supervision or touching assistance CGA while standing   Upper Body  Dressing Partial/moderate assistance adjusting bra straps   Lower Body Dressing Partial/moderate assistance minimal-moderate A with donning brief and pants, CGA and GB while standing    Donning/[place] Footwear Partial/moderate assistance minimal assistance with donning shoes     Toilet Transfer Supervision or touching assistance CGA with

## 2023-12-23 NOTE — PROGRESS NOTES
Inpatient [facility], [address] [place] [address]  Tel: [phone]     Physical Medicine and Rehabilitation Progress Note      Luiz Jo  Admit Date: 12/22/2023  Admit Diagnosis: Debility [R53.81]    Subjective     Patient seen face-to-face and evaluated. POONAM and she reports sleeping well. She is eating and drinking fine, and continent of bowel and bladder. No bowel accidents and she said the Lomotil is working well. No pain. Her therapies started out well. Pt requested her diet be changed to a regular diet.     Objective:     Current Facility-Administered Medications   Medication Dose Route Frequency    Vitamin D (CHOLECALCIFEROL) tablet 2,000 Units  2,000 Units Oral Daily    clopidogrel (PLAVIX) tablet 75 mg  75 mg Oral Daily    famotidine (PEPCID) tablet 20 mg  20 mg Oral Daily    metoprolol succinate (TOPROL XL) extended release tablet 25 mg  25 mg Oral Daily    montelukast (SINGULAIR) tablet 10 mg  10 mg Oral Nightly    pitavastatin (LIVALO) tablet 4 mg (Patient Supplied)  4 mg Oral Nightly    sodium chloride flush 0.9 % injection 5-40 mL  5-40 mL IntraVENous 2 times per day    sodium chloride flush 0.9 % injection 5-40 mL  5-40 mL IntraVENous PRN    0.9 % sodium chloride infusion   IntraVENous PRN    polyethylene glycol (GLYCOLAX) packet 17 g  17 g Oral Daily PRN    acetaminophen (TYLENOL) tablet 650 mg  650 mg Oral Q6H PRN    Or    acetaminophen (TYLENOL) suppository 650 mg  650 mg Rectal Q6H PRN    promethazine (PHENERGAN) tablet 12.5 mg  12.5 mg Oral Q6H PRN    Or    ondansetron (ZOFRAN) injection 4 mg  4 mg IntraVENous Q4H PRN    lactobacillus (CULTURELLE) capsule 1 capsule  1 capsule Oral Daily with breakfast    enoxaparin Sodium (LOVENOX) injection 30 mg  30 mg SubCUTAneous Q24H    lipase-protease-amylase (ZENPEP) delayed release capsule 50,000 Units  50,000 Units Oral TID WC    lipase-protease-amylase (ZENPEP) delayed release capsule 25,000 Units  25,000

## 2023-12-24 PROCEDURE — 1180000000 HC REHAB R&B

## 2023-12-24 PROCEDURE — 2580000003 HC RX 258: Performed by: PHYSICAL MEDICINE & REHABILITATION

## 2023-12-24 PROCEDURE — 6370000000 HC RX 637 (ALT 250 FOR IP): Performed by: PHYSICAL MEDICINE & REHABILITATION

## 2023-12-24 PROCEDURE — 6360000002 HC RX W HCPCS: Performed by: PHYSICAL MEDICINE & REHABILITATION

## 2023-12-24 PROCEDURE — 97530 THERAPEUTIC ACTIVITIES: CPT

## 2023-12-24 PROCEDURE — 97116 GAIT TRAINING THERAPY: CPT

## 2023-12-24 PROCEDURE — 97110 THERAPEUTIC EXERCISES: CPT

## 2023-12-24 RX ORDER — PITAVASTATIN 4 MG/1
4 TABLET, FILM COATED ORAL NIGHTLY
Status: DISCONTINUED | OUTPATIENT
Start: 2023-12-24 | End: 2023-12-29 | Stop reason: HOSPADM

## 2023-12-24 RX ADMIN — Medication 1 CAPSULE: at 08:29

## 2023-12-24 RX ADMIN — ENOXAPARIN SODIUM 30 MG: 100 INJECTION SUBCUTANEOUS at 20:22

## 2023-12-24 RX ADMIN — DIPHENOXYLATE HYDROCHLORIDE AND ATROPINE SULFATE 1 TABLET: 2.5; .025 TABLET ORAL at 21:06

## 2023-12-24 RX ADMIN — PANCRELIPASE LIPASE, PANCRELIPASE PROTEASE, PANCRELIPASE AMYLASE 50000 UNITS: 20000; 63000; 84000 CAPSULE, DELAYED RELEASE ORAL at 17:17

## 2023-12-24 RX ADMIN — PANCRELIPASE LIPASE, PANCRELIPASE PROTEASE, PANCRELIPASE AMYLASE 50000 UNITS: 20000; 63000; 84000 CAPSULE, DELAYED RELEASE ORAL at 12:32

## 2023-12-24 RX ADMIN — PITAVASTATIN 4 MG: 4 TABLET, FILM COATED ORAL at 21:06

## 2023-12-24 RX ADMIN — FAMOTIDINE 20 MG: 20 TABLET ORAL at 20:21

## 2023-12-24 RX ADMIN — CEFDINIR 300 MG: 300 CAPSULE ORAL at 08:29

## 2023-12-24 RX ADMIN — SODIUM CHLORIDE, PRESERVATIVE FREE 10 ML: 5 INJECTION INTRAVENOUS at 08:33

## 2023-12-24 RX ADMIN — PANCRELIPASE LIPASE, PANCRELIPASE PROTEASE, PANCRELIPASE AMYLASE 50000 UNITS: 20000; 63000; 84000 CAPSULE, DELAYED RELEASE ORAL at 08:29

## 2023-12-24 RX ADMIN — METOPROLOL SUCCINATE 25 MG: 25 TABLET, EXTENDED RELEASE ORAL at 20:22

## 2023-12-24 RX ADMIN — ASPIRIN 81 MG: 81 TABLET ORAL at 08:29

## 2023-12-24 RX ADMIN — SODIUM CHLORIDE, PRESERVATIVE FREE 10 ML: 5 INJECTION INTRAVENOUS at 21:23

## 2023-12-24 RX ADMIN — CLOPIDOGREL BISULFATE 75 MG: 75 TABLET ORAL at 08:29

## 2023-12-24 RX ADMIN — VITAMIN D, TAB 1000IU (100/BT) 2000 UNITS: 25 TAB at 08:29

## 2023-12-24 RX ADMIN — DIPHENOXYLATE HYDROCHLORIDE AND ATROPINE SULFATE 1 TABLET: 2.5; .025 TABLET ORAL at 08:29

## 2023-12-24 RX ADMIN — MONTELUKAST 10 MG: 10 TABLET, FILM COATED ORAL at 20:22

## 2023-12-24 NOTE — PROGRESS NOTES
PHYSICAL THERAPY DAILY NOTE  Time In:  1346  Time Out:  1431  Total Treatment Time:  39 Minutes  Pt. Seen for: PM, Gait Training, Patient Education, Therapeutic Exercise, Transfer Training, and Other     Subjective: Patient reporting she feels OK. Objective:  Precautions: Falls    GROSS ASSESSMENT Daily Assessment    NA       COGNITION Daily Assessment    Alert, able to follow commands,cooperating,participating, motivated,          BED/MAT MOBILITY Daily Assessment   Modified independent with sit to supine        TRANSFERS Daily Assessment   Increased time and effort to complete with cues for body mechanics   Sit to Stand: Supervision/Standby Assist  Stand to Sit: Supervision/Standby Assist  Transfer Type: Stand Pivot with RW  Transfer Assistance: Supervision/Standby Assist  Car Transfers: NT         GAIT Daily Assessment   Gait training with verbal cues for upright posture and improved step length Amount of Assistance: Supervision/Standby Assist  Distance (ft): 100ft x 2 making multiple turns  Assistive Device: RW and Gait Belt  Surface: Level Surface   Gait training x 20 ft x 1 with RW picking up 3 objects from the floor using reacher with SBA to CGA and cues    STEPS/STAIRS Daily Assessment   Single step at a time going up/down steps leading up with R LE and down with LLE.  Increased time and effort to complete with cues for managing RW   Steps Ambulated:  1  Level of Assistance:  min assist  Railing:RW  Assistive Device: Gait Belt       BALANCE Daily Assessment    Static Sitting: Normal:  Pt. able to maintain balance w/o UE support  Dynamic Sitting: Good - accepts moderate challenge;  can maintain balance while picking object off the floor  Static Standing: Good:  Pt. able to maintain balance w/o UE support;  exhibits some postural sway  Dynamic Standing: Fair - accepts minimal challenge;  can maintain balance while turning head/trunk       WHEELCHAIR MOBILITY Daily Assessment   NT        LOWER EXTREMITY EXERCISES Daily Assessment   Increased time and effort to complete with multiple and frequent rest breaks. Cues for correct form   SEATED EXERCISES Sets Reps Comments   Ankle PF 3 10    Mirella DF 3 10    Long Arc Quads 3 10  With yellow t-band   Hip Abduction with yellow Theraband 3 10 Set up  assist   Hamstring Curls with yellow Theraband 3 10 Min assist   Hip Extension with yellow Theraband 3 10 Min assist          Pain level: No c/o pain during treatment  Pain Location:  NA  Pain Interventions: NA    Vital Signs:  Visit Vitals  /61   Pulse 74   Temp 97.7 °F (36.5 °C) (Axillary)   Resp 18   Ht 1.651 m (5' 5\")   Wt 62.9 kg (138 lb 9.6 oz)   SpO2 98%   BMI 23.06 kg/m²         Education:  transfer training, gait training, balance training,fall precautions, body mechanics,activity pacing, Patient verbalizing understanding and demonstrating  understanding of patient education. Recommend follow up education.        Interdisciplinary Communication: NA    Patient returned to room at end of treatment. Patient supine in bed with head of bed elevated and bed rails up x 2. Needs placed in reach of patient.             Assessment: Progressing towards goals. Functional strength and endurance improving.         Plan of Care: Continue with POC and progress as tolerated.     Navi Tai, PT  12/24/2023

## 2023-12-25 PROCEDURE — 99231 SBSQ HOSP IP/OBS SF/LOW 25: CPT | Performed by: PHYSICAL MEDICINE & REHABILITATION

## 2023-12-25 PROCEDURE — 1180000000 HC REHAB R&B

## 2023-12-25 PROCEDURE — 6370000000 HC RX 637 (ALT 250 FOR IP): Performed by: PHYSICAL MEDICINE & REHABILITATION

## 2023-12-25 PROCEDURE — 6360000002 HC RX W HCPCS: Performed by: PHYSICAL MEDICINE & REHABILITATION

## 2023-12-25 RX ADMIN — PANCRELIPASE LIPASE, PANCRELIPASE PROTEASE, PANCRELIPASE AMYLASE 50000 UNITS: 20000; 63000; 84000 CAPSULE, DELAYED RELEASE ORAL at 11:45

## 2023-12-25 RX ADMIN — CEFDINIR 300 MG: 300 CAPSULE ORAL at 09:00

## 2023-12-25 RX ADMIN — CLOPIDOGREL BISULFATE 75 MG: 75 TABLET ORAL at 09:01

## 2023-12-25 RX ADMIN — ASPIRIN 81 MG: 81 TABLET ORAL at 09:01

## 2023-12-25 RX ADMIN — FAMOTIDINE 20 MG: 20 TABLET ORAL at 21:16

## 2023-12-25 RX ADMIN — METOPROLOL SUCCINATE 25 MG: 25 TABLET, EXTENDED RELEASE ORAL at 21:16

## 2023-12-25 RX ADMIN — DIPHENOXYLATE HYDROCHLORIDE AND ATROPINE SULFATE 1 TABLET: 2.5; .025 TABLET ORAL at 09:01

## 2023-12-25 RX ADMIN — VITAMIN D, TAB 1000IU (100/BT) 2000 UNITS: 25 TAB at 09:01

## 2023-12-25 RX ADMIN — ENOXAPARIN SODIUM 30 MG: 100 INJECTION SUBCUTANEOUS at 21:16

## 2023-12-25 RX ADMIN — MONTELUKAST 10 MG: 10 TABLET, FILM COATED ORAL at 21:15

## 2023-12-25 RX ADMIN — Medication 1 CAPSULE: at 09:01

## 2023-12-25 RX ADMIN — PANCRELIPASE LIPASE, PANCRELIPASE PROTEASE, PANCRELIPASE AMYLASE 50000 UNITS: 20000; 63000; 84000 CAPSULE, DELAYED RELEASE ORAL at 17:11

## 2023-12-25 RX ADMIN — PITAVASTATIN 4 MG: 4 TABLET, FILM COATED ORAL at 21:21

## 2023-12-25 RX ADMIN — DIPHENOXYLATE HYDROCHLORIDE AND ATROPINE SULFATE 1 TABLET: 2.5; .025 TABLET ORAL at 21:20

## 2023-12-25 RX ADMIN — PANCRELIPASE LIPASE, PANCRELIPASE PROTEASE, PANCRELIPASE AMYLASE 50000 UNITS: 20000; 63000; 84000 CAPSULE, DELAYED RELEASE ORAL at 09:02

## 2023-12-25 NOTE — PROGRESS NOTES
pre-admission screening. Ms. Janine Gupta is a good candidate for acute inpatient rehabilitation. Nothing since the pre-admission screen has changed that determination.      Rehabilitation Plan  The patient has shown the ability to tolerate and benefit from 3 hours of therapy daily and is being admitted to a comprehensive acute inpatient rehabilitation program consisting of at least 3 hours of combined physical and occupational therapies.  - Begin intensive Physical Therapy for a minimum of 1.5 hours a day, at least 5 out of 7 days per week to address bed mobility, transfers, ambulation, strengthening, balance, and endurance.   - Begin intensive Occupational Therapy for a minimum of 1.5 hours a day, at least 5 out of 7 days per week to address ADLs (bathing, LE dressing, toileting) and adaptive equipment as needed.  - Continue Speech Therapy prn for: dysarthria, impaired communication skills; therapy schedule may be adjusted by MD based on patient's needs.  Each of these therapies will be continued as above for the duration of the inpatient rehab stay.     The patient will also require 24-hour skilled rehabilitation nursing for bowel and bladder management, skin care for decubitus ulcer prevention, pain management and ongoing medication administration.     Plan / Recommendations / Medical Decision Making:      Daily physician / PA medical management:     Debility [R53.81] - d/t to sepsis from a UTI. Start interdisciplinary approach to rehabilitation including PT, OT, nursing and physiatry and disease specific education.      Principal Problem:    Acute cystitis without hematuria    Sepsis due to above  Plan:   Started on Rocephin with improvement.   Urine cx at time of discharge with > 100k GNR. Final barry pending at discharge, however, previous Ucx from this month was susceptible to cephalosporins, therefore will discharge on Omnicef 300mg daily x7 days to start evening 12/22  Blood cultures pending  Monitor  bed; АННА hose when out of bed. Lovenox 30mg subcutaneously daily. GI prophylaxis - resume Pepcid 20mg daily. At times may need additional antacids, Maalox PRN. -Lomotil BID and QID prn for chronic diarrhea     General skin care / wound prevention - monitor general skin  status daily. At risk for failure due to impaired mobility and advanced age. Offload <q2Hrs. Bladder program / urinary retention / neurogenic bladder - schedule voids q6-8h. Check post-void residual as needed; in-and-out catheter if post-void residual is more than 400ml. Pt reports normal bladder function on IPR admission without incontinence. Bowel program - at risk for constipation as a side effect of opioids, other medications, impaired mobility, etc. MiraLAX daily for regularity, Senokot-S for stool softener + laxative. PRN MOM, bisacodyl suppository or tablets for constipation. Pt reports normal bowel function on IPR admission with diarrhea and some incontinence wearing a brief. Disposition: The patient's prognosis for significant practical improvement within a reasonable period of time appears good and the estimated length of stay is 10 days; patient is expected to return home with intermittent family supervision and continued rehabilitation with home health therapy. Diet: ADULT DIET; Regular; 3 carb choices (45 gm/meal); Low Fat/Low Chol/High Fiber/DIANNE; No Added Salt (3-4 gm); 1800 ml  ADULT ORAL NUTRITION SUPPLEMENT; Breakfast, Lunch, Dinner; Frozen Oral Supplement  -Change to regular diet (12/23) per Pt's request.     Follow Ups:  Ophthalmology  BERTO Bee - CNP (PCP) 1-2 weeks post IPR    Time spent was 26 minutes with over 1/2 in direct patient care / examination, consultation with therapists / nursing and coordination of care.     Signed By: Carla Kathleen MD     December 25, 2023

## 2023-12-26 LAB
BACTERIA SPEC CULT: NORMAL
BACTERIA SPEC CULT: NORMAL
MM INDURATION, POC: 0 MM (ref 0–5)
PPD, POC: NEGATIVE
SERVICE CMNT-IMP: NORMAL
SERVICE CMNT-IMP: NORMAL

## 2023-12-26 PROCEDURE — 97116 GAIT TRAINING THERAPY: CPT

## 2023-12-26 PROCEDURE — 99231 SBSQ HOSP IP/OBS SF/LOW 25: CPT | Performed by: PHYSICAL MEDICINE & REHABILITATION

## 2023-12-26 PROCEDURE — 97530 THERAPEUTIC ACTIVITIES: CPT

## 2023-12-26 PROCEDURE — 97535 SELF CARE MNGMENT TRAINING: CPT

## 2023-12-26 PROCEDURE — 97110 THERAPEUTIC EXERCISES: CPT

## 2023-12-26 PROCEDURE — 6360000002 HC RX W HCPCS: Performed by: PHYSICAL MEDICINE & REHABILITATION

## 2023-12-26 PROCEDURE — 6370000000 HC RX 637 (ALT 250 FOR IP): Performed by: PHYSICAL MEDICINE & REHABILITATION

## 2023-12-26 PROCEDURE — 1180000000 HC REHAB R&B

## 2023-12-26 RX ADMIN — METOPROLOL SUCCINATE 25 MG: 25 TABLET, EXTENDED RELEASE ORAL at 20:38

## 2023-12-26 RX ADMIN — PITAVASTATIN 4 MG: 4 TABLET, FILM COATED ORAL at 20:44

## 2023-12-26 RX ADMIN — PANCRELIPASE LIPASE, PANCRELIPASE PROTEASE, PANCRELIPASE AMYLASE 50000 UNITS: 20000; 63000; 84000 CAPSULE, DELAYED RELEASE ORAL at 07:46

## 2023-12-26 RX ADMIN — FAMOTIDINE 20 MG: 20 TABLET ORAL at 20:38

## 2023-12-26 RX ADMIN — ASPIRIN 81 MG: 81 TABLET ORAL at 07:38

## 2023-12-26 RX ADMIN — CLOPIDOGREL BISULFATE 75 MG: 75 TABLET ORAL at 07:38

## 2023-12-26 RX ADMIN — PANCRELIPASE LIPASE, PANCRELIPASE PROTEASE, PANCRELIPASE AMYLASE 50000 UNITS: 20000; 63000; 84000 CAPSULE, DELAYED RELEASE ORAL at 17:06

## 2023-12-26 RX ADMIN — MONTELUKAST 10 MG: 10 TABLET, FILM COATED ORAL at 20:38

## 2023-12-26 RX ADMIN — ENOXAPARIN SODIUM 30 MG: 100 INJECTION SUBCUTANEOUS at 20:37

## 2023-12-26 RX ADMIN — PANCRELIPASE LIPASE, PANCRELIPASE PROTEASE, PANCRELIPASE AMYLASE 50000 UNITS: 20000; 63000; 84000 CAPSULE, DELAYED RELEASE ORAL at 12:04

## 2023-12-26 RX ADMIN — CEFDINIR 300 MG: 300 CAPSULE ORAL at 07:38

## 2023-12-26 RX ADMIN — Medication 1 CAPSULE: at 07:38

## 2023-12-26 RX ADMIN — VITAMIN D, TAB 1000IU (100/BT) 2000 UNITS: 25 TAB at 07:38

## 2023-12-26 RX ADMIN — DIPHENOXYLATE HYDROCHLORIDE AND ATROPINE SULFATE 1 TABLET: 2.5; .025 TABLET ORAL at 07:38

## 2023-12-26 NOTE — PROGRESS NOTES
OCCUPATIONAL THERAPY DAILY NOTE    Time In 0746      Time Out 0831        Subjective: Pt agreeable to treatment. Pt reports her facial bruising appears to have improved. Pain: No pain expressed. Interdisciplinary Communication: Collaborated with PT regarding pt performance. Precautions: Falls    FUNCTIONAL MOBILITY:       Bed Mobility NT    Sit to Stand SBA    Transfer  SBA and CGA  Transfer Type: SPT  Equipment: RW    Ambulation CGA  Equipment: RW      ACTIVITIES OF DAILY LIVING:       Eating Independent    Oral Hygiene Independent Seated in w/c at sink   Bathing SBA Partial sponge bath seated in w/c at sink. Assist to wash back. Pt declined washing LEs. Cues for hygiene thoroughness and sequencing when washing privates/bottom. Upper Body  Dressing CGA Assist to fasten bra   Lower Body Dressing SBA SBA in stance. Cues for safety/to sit to manage clothing over feet rather than complete in one leg stance. Donning/[place] Footwear Setup/Clean-up Assist Socks, tennis shoes   Toilet Transfer NT  Transfer Type: NA  Equipment: NA    Toileting Hygiene NT    Education Rolling Walker Management, Safety Awareness, and Hygiene sequencing for infection prevention      Assessment: Patient demonstrated good participation in OT treatment. Pt continues to benefit from skilled OT services to address remaining deficits and progress toward baseline level of independence and safety. Patient ended session seated in recliner on Roho cushion with call bell and needs within reach. Plan: Continue OT POC.      Ashley Stafford OT   12/26/2023

## 2023-12-26 NOTE — PROGRESS NOTES
PHYSICAL THERAPY DAILY NOTE  Time In:  1300  Time Out:  1347  100'  [place] Treatment Time:  52 Minutes  Pt. Seen for: PM, Gait Training, Patient Education, Therapeutic Exercise, Transfer Training, and Other     Subjective: Patient states she wants to strengthen her left leg and ankle. No pain. Objective:  Precautions: Falls    GROSS ASSESSMENT Daily Assessment   Needing assist to bathroom. Checked leg length - no real difference SBA with rolling walker       COGNITION Daily Assessment    Alert, able to follow commands,cooperating,participating, motivated         BED/MAT MOBILITY Daily Assessment   NT        TRANSFERS Daily Assessment      Sit to Stand: Supervision/Standby Assist  Stand to Sit: Supervision/Standby Assist  Transfer Type: Stand Pivot with RW  Transfer Assistance: Supervision/Standby Assist  Car Transfers: NT         GAIT Daily Assessment   Gait training with verbal cues for upright posture and improved step length, R trendelenburg noted.  Amount of Assistance: cga/min a  Distance (ft): 100ft x 2  Assistive Device: RW and Gait Belt  Surface: Level Surface     STEPS/STAIRS Daily Assessment      Steps Ambulated:  unable  Level of Assistance:    Railing:RW  Assistive Device: Gait Belt       BALANCE Daily Assessment    Static Sitting: Normal:  Pt. able to maintain balance w/o UE support  Dynamic Sitting: Good - accepts moderate challenge;  can maintain balance while picking object off the floor  Static Standing: Good:  Pt. able to maintain balance w/o UE support;  exhibits some postural sway  Dynamic Standing: Fair - accepts minimal challenge;  can maintain balance while turning head/trunk       WHEELCHAIR MOBILITY Daily Assessment   NT        LOWER EXTREMITY EXERCISES Daily Assessment      SUPINE  EXERCISES Sets Reps Comments   Ankle PF      Ankle eversion 1 20  Red band   Ankle inversion 1 20 Red band   Mirella DF 1 20 Red band   Long Arc Quads    With red band   Single knee to chest 1 15 - 20

## 2023-12-26 NOTE — PROGRESS NOTES
PHYSICAL THERAPY DAILY NOTE  Time In:  2941  Time Out:  7063  Total Treatment Time:  40 Minutes  Pt. Seen for: AM, Gait Training, Patient Education, Therapeutic Exercise, Transfer Training, and Other     Subjective: Patient reporting she feels OK. Objective:  Precautions: Falls    GROSS ASSESSMENT Daily Assessment    NA       COGNITION Daily Assessment    Alert, able to follow commands,cooperating,participating, motivated         BED/MAT MOBILITY Daily Assessment   NT        TRANSFERS Daily Assessment      Sit to Stand: Supervision/Standby Assist  Stand to Sit: Supervision/Standby Assist  Transfer Type: Stand Pivot with RW  Transfer Assistance: Supervision/Standby Assist  Car Transfers: NT         GAIT Daily Assessment   Gait training with verbal cues for upright posture and improved step length, R trendelenburg noted  Pt felt \"woozy\" with amb -sat to rest as knees appeared unstable. Orthostatics taken but neg Amount of Assistance: cga/min a  Distance (ft): 100ft, 50ft x 3  Assistive Device: RW and Gait Belt  Surface: Level Surface     STEPS/STAIRS Daily Assessment      Steps Ambulated:  unable  Level of Assistance:    Railing:RW  Assistive Device: Gait Belt       BALANCE Daily Assessment    Static Sitting: Normal:  Pt. able to maintain balance w/o UE support  Dynamic Sitting: Good - accepts moderate challenge;  can maintain balance while picking object off the floor  Static Standing: Good:  Pt. able to maintain balance w/o UE support;  exhibits some postural sway  Dynamic Standing: Fair - accepts minimal challenge;  can maintain balance while turning head/trunk       WHEELCHAIR MOBILITY Daily Assessment   NT        LOWER EXTREMITY EXERCISES Daily Assessment   Increased time and effort to complete with multiple and frequent rest breaks.  Cues for correct form    1 set of 12-15 each   SEATED EXERCISES Sets Reps Comments   Ankle PF   Red tband   Mirella DF      Long Arc Quads    With red t-band   Hip Abduction

## 2023-12-26 NOTE — PROGRESS NOTES
Inpatient [facility], [facility]  [facility]  Tel: [phone]     Physical Medicine and Rehabilitation Progress Note      Kathie Zhu  Admit Date: 12/22/2023  Admit Diagnosis: Debility [R53.81]    Subjective     Patient seen face-to-face and evaluated. NAEO and she reports sleeping well. She is eating and drinking fine, and continent of bowel and bladder with constipation. No bowel accidents and she said the Lomotil is working well. Will DC scheduled Lomotil with no BM for a few days despite not really feeling constipated. Pt reports she has a h/o alternating diarrhea and constipation. No pain. Her therapies started out well. Pt requested her diet be changed to a regular diet. Facial sutures 12/25.     Objective:     Current Facility-Administered Medications   Medication Dose Route Frequency    pitavastatin (LIVALO) tablet 4 mg (Patient Supplied)  4 mg Oral Nightly    Vitamin D (CHOLECALCIFEROL) tablet 2,000 Units  2,000 Units Oral Daily    clopidogrel (PLAVIX) tablet 75 mg  75 mg Oral Daily    famotidine (PEPCID) tablet 20 mg  20 mg Oral Daily    metoprolol succinate (TOPROL XL) extended release tablet 25 mg  25 mg Oral Daily    montelukast (SINGULAIR) tablet 10 mg  10 mg Oral Nightly    sodium chloride flush 0.9 % injection 5-40 mL  5-40 mL IntraVENous 2 times per day    0.9 % sodium chloride infusion   IntraVENous PRN    polyethylene glycol (GLYCOLAX) packet 17 g  17 g Oral Daily PRN    acetaminophen (TYLENOL) tablet 650 mg  650 mg Oral Q6H PRN    Or    acetaminophen (TYLENOL) suppository 650 mg  650 mg Rectal Q6H PRN    promethazine (PHENERGAN) tablet 12.5 mg  12.5 mg Oral Q6H PRN    Or    ondansetron (ZOFRAN) injection 4 mg  4 mg IntraVENous Q4H PRN    lactobacillus (CULTURELLE) capsule 1 capsule  1 capsule Oral Daily with breakfast    enoxaparin Sodium (LOVENOX) injection 30 mg  30 mg SubCUTAneous Q24H    lipase-protease-amylase (ZENPEP) delayed release capsule

## 2023-12-27 LAB
ANION GAP SERPL CALC-SCNC: 3 MMOL/L (ref 2–11)
BUN SERPL-MCNC: 36 MG/DL (ref 8–23)
CALCIUM SERPL-MCNC: 9.5 MG/DL (ref 8.3–10.4)
CHLORIDE SERPL-SCNC: 102 MMOL/L (ref 103–113)
CO2 SERPL-SCNC: 31 MMOL/L (ref 21–32)
CREAT SERPL-MCNC: 1.2 MG/DL (ref 0.6–1)
ERYTHROCYTE [DISTWIDTH] IN BLOOD BY AUTOMATED COUNT: 14.1 % (ref 11.9–14.6)
GLUCOSE SERPL-MCNC: 111 MG/DL (ref 65–100)
HCT VFR BLD AUTO: 34.4 % (ref 35.8–46.3)
HGB BLD-MCNC: 10.8 G/DL (ref 11.7–15.4)
MCH RBC QN AUTO: 29.8 PG (ref 26.1–32.9)
MCHC RBC AUTO-ENTMCNC: 31.4 G/DL (ref 31.4–35)
MCV RBC AUTO: 95 FL (ref 82–102)
NRBC # BLD: 0 K/UL (ref 0–0.2)
PLATELET # BLD AUTO: 214 K/UL (ref 150–450)
PMV BLD AUTO: 11.3 FL (ref 9.4–12.3)
POTASSIUM SERPL-SCNC: 3.9 MMOL/L (ref 3.5–5.1)
RBC # BLD AUTO: 3.62 M/UL (ref 4.05–5.2)
SODIUM SERPL-SCNC: 136 MMOL/L (ref 136–146)
WBC # BLD AUTO: 8 K/UL (ref 4.3–11.1)

## 2023-12-27 PROCEDURE — 97530 THERAPEUTIC ACTIVITIES: CPT

## 2023-12-27 PROCEDURE — 80048 BASIC METABOLIC PNL TOTAL CA: CPT

## 2023-12-27 PROCEDURE — 6370000000 HC RX 637 (ALT 250 FOR IP): Performed by: PHYSICAL MEDICINE & REHABILITATION

## 2023-12-27 PROCEDURE — 97110 THERAPEUTIC EXERCISES: CPT

## 2023-12-27 PROCEDURE — 6360000002 HC RX W HCPCS: Performed by: PHYSICAL MEDICINE & REHABILITATION

## 2023-12-27 PROCEDURE — 85027 COMPLETE CBC AUTOMATED: CPT

## 2023-12-27 PROCEDURE — 1180000000 HC REHAB R&B

## 2023-12-27 PROCEDURE — 97116 GAIT TRAINING THERAPY: CPT

## 2023-12-27 PROCEDURE — 2500000003 HC RX 250 WO HCPCS: Performed by: PHYSICAL MEDICINE & REHABILITATION

## 2023-12-27 PROCEDURE — 36415 COLL VENOUS BLD VENIPUNCTURE: CPT

## 2023-12-27 PROCEDURE — 97535 SELF CARE MNGMENT TRAINING: CPT

## 2023-12-27 RX ADMIN — CEFDINIR 300 MG: 300 CAPSULE ORAL at 07:20

## 2023-12-27 RX ADMIN — ASPIRIN 81 MG: 81 TABLET ORAL at 07:22

## 2023-12-27 RX ADMIN — ENOXAPARIN SODIUM 30 MG: 100 INJECTION SUBCUTANEOUS at 20:04

## 2023-12-27 RX ADMIN — FAMOTIDINE 20 MG: 20 TABLET ORAL at 20:03

## 2023-12-27 RX ADMIN — PANCRELIPASE LIPASE, PANCRELIPASE PROTEASE, PANCRELIPASE AMYLASE 50000 UNITS: 20000; 63000; 84000 CAPSULE, DELAYED RELEASE ORAL at 17:17

## 2023-12-27 RX ADMIN — ANTI-FUNGAL POWDER MICONAZOLE NITRATE TALC FREE: 1.42 POWDER TOPICAL at 08:29

## 2023-12-27 RX ADMIN — PANCRELIPASE LIPASE, PANCRELIPASE PROTEASE, PANCRELIPASE AMYLASE 50000 UNITS: 20000; 63000; 84000 CAPSULE, DELAYED RELEASE ORAL at 11:45

## 2023-12-27 RX ADMIN — PITAVASTATIN 4 MG: 4 TABLET, FILM COATED ORAL at 20:07

## 2023-12-27 RX ADMIN — METOPROLOL SUCCINATE 25 MG: 25 TABLET, EXTENDED RELEASE ORAL at 20:03

## 2023-12-27 RX ADMIN — MONTELUKAST 10 MG: 10 TABLET, FILM COATED ORAL at 20:03

## 2023-12-27 RX ADMIN — Medication 1 CAPSULE: at 07:22

## 2023-12-27 RX ADMIN — ANTI-FUNGAL POWDER MICONAZOLE NITRATE TALC FREE: 1.42 POWDER TOPICAL at 20:05

## 2023-12-27 RX ADMIN — PANCRELIPASE LIPASE, PANCRELIPASE PROTEASE, PANCRELIPASE AMYLASE 50000 UNITS: 20000; 63000; 84000 CAPSULE, DELAYED RELEASE ORAL at 07:22

## 2023-12-27 RX ADMIN — CLOPIDOGREL BISULFATE 75 MG: 75 TABLET ORAL at 07:22

## 2023-12-27 RX ADMIN — VITAMIN D, TAB 1000IU (100/BT) 2000 UNITS: 25 TAB at 07:20

## 2023-12-27 NOTE — PROGRESS NOTES
PHYSICAL THERAPY DAILY NOTE  Time In:  3139  Time Out:  0920  Total Treatment Time:  50 Minutes  Pt. Seen for: AM, Gait Training, Patient Education, Therapeutic Exercise, Transfer Training, and Other     Subjective: Patient reporting she feels OK. Would like to go home before the new year         Objective:  Precautions: Falls    GROSS ASSESSMENT Daily Assessment    Pt up in recliner at start of session. COGNITION Daily Assessment    Alert, able to follow commands,cooperating,participating, motivated         BED/MAT MOBILITY Daily Assessment    Mod ind with sit to sup       TRANSFERS Daily Assessment      Sit to Stand: Supervision/Standby Assist  Stand to Sit: Supervision/Standby Assist  Transfer Type: Stand Pivot with RW  Transfer Assistance: Supervision/Standby Assist  Car Transfers: sup         GAIT Daily Assessment   Gait training with verbal cues for upright posture    R trendelenburg noted   Amount of Assistance: sba  Distance (ft): 200ft, 50ft x 2  Assistive Device: RW and Gait Belt  Surface: Level Surface     STEPS/STAIRS Daily Assessment      Steps Ambulated:  unable  Level of Assistance:    Railing:RW  Assistive Device: Gait Belt       BALANCE Daily Assessment    Static Sitting: Normal:  Pt. able to maintain balance w/o UE support  Dynamic Sitting: Good - accepts moderate challenge;  can maintain balance while picking object off the floor  Static Standing: Good:  Pt. able to maintain balance w/o UE support;  exhibits some postural sway  Dynamic Standing: Fair - accepts minimal challenge;  can maintain balance while turning head/trunk       WHEELCHAIR MOBILITY Daily Assessment   NT        LOWER EXTREMITY EXERCISES Daily Assessment   Cues for correct form    Difficulty with sls on R due to hip weakness   Standing TE at rail x 12 ea:   Hip flex  Hip abd  Hip ext  Mini squats  Heel raises  Sidestepping L/R  Amb forward and back with single rail       Pain level: No c/o pain during treatment  Pain

## 2023-12-27 NOTE — PROGRESS NOTES
PHYSICAL THERAPY DAILY NOTE  Time In:  1430  Time Out:  1516  Total Treatment Time:  55 Minutes  Pt. Seen for: PM, Gait Training, Patient Education, Therapeutic Exercise, Transfer Training, and Other     Subjective: Patient reporting she feels OK. Would like to go home before the new year         Objective:  Precautions: Falls    GROSS ASSESSMENT Daily Assessment    Pt up in recliner at start of session.        COGNITION Daily Assessment    Alert, able to follow commands,cooperating,participating, motivated         BED/MAT MOBILITY Daily Assessment    Mod ind with sit to sup       TRANSFERS Daily Assessment     Impaired eccentric lowering into seat when fatigued Sit to Stand: Supervision/Standby Assist  Stand to Sit: Supervision/Standby Assist  Transfer Type: Stand Pivot with RW  Transfer Assistance: Supervision/Standby Assist  Car Transfers: sup with RW         GAIT Daily Assessment   Cues for speed with 4WW    Amb on 20ft ramp with sba + cues Amount of Assistance: sba  Distance (ft): 2 x 100ft, 200ft  Assistive Device: RW and Gait Belt, [id number]  Surface: Level Surface     STEPS/STAIRS Daily Assessment      Steps Ambulated:  NT  Level of Assistance:  cga  Railing: B rail  Assistive Device: Gait Belt       BALANCE Daily Assessment    Static Sitting: Normal:  Pt. able to maintain balance w/o UE support  Dynamic Sitting: Good - accepts moderate challenge;  can maintain balance while picking object off the floor  Static Standing: Good:  Pt. able to maintain balance w/o UE support;  exhibits some postural sway  Dynamic Standing: Fair - accepts minimal challenge;  can maintain balance while turning head/trunk       WHEELCHAIR MOBILITY Daily Assessment   NT        LOWER EXTREMITY EXERCISES Daily Assessment   Cues for correct form    Dec control with open chain L LE   Seated TE x 12 reps:  Hip abd with red theraband  Hip add with red theraband  Ham curls with red theraband  LAQ with 2lb  Heel/toe raises with 2lb  Hip flex with

## 2023-12-27 NOTE — PROGRESS NOTES
OCCUPATIONAL THERAPY DAILY NOTE    Time In 1031      Time Out 1116        Subjective: Pt agreeable to treatment. Pain: No pain expressed. Interdisciplinary Communication: Collaborated with care team during Team Conference regarding pt performance and d/c planning. Precautions: Falls    FUNCTIONAL MOBILITY:       Bed Mobility SBA    Sit to Stand SBA    Transfer  SBA and CGA  Transfer Type: SPT  Equipment: RW Cues prn for RW proximity   Ambulation SBA and CGA  Equipment: RW Cues prn for RW proximity       - Activity Tolerance - Range of Motion - Strengthening   Patient completed medication management simulation to sort \"pills\" (beads) from 6 medication bottles into pillboxes (AM and PM respectively) according to printed instructions on pill bottles. Patient completed 6/6 medications accurately, required one initial cues to problem solve opening pill bottle. Patient completed PJ Clayton Ralph task to replace beads into medication bottles according to bead color upon completion of task. Pt completed 5 minutes on the upper body ergometer with light resistance to increase upper body strength and activity tolerance for integration into functional tasks. Education   Benefits of OT     Assessment: Patient demonstrated good participation in OT treatment. Pt continues to benefit from skilled OT services to address remaining deficits and progress toward baseline level of independence and safety. Patient ended session seated in w/c  awaiting PT . Plan: Continue OT POC.      Rickie Mckenzie OT   12/27/2023

## 2023-12-27 NOTE — CARE COORDINATION
12/22/23 1600   Service Assessment   Patient Orientation Alert and Oriented;Person;Place;Self;Situation   Cognition Alert   History Provided By Patient;Medical Record   Primary Caregiver Self   Support Systems Children;Family Members;[employer];Friends/Neighbors   Patient's Healthcare Decision Maker is: Legal Next of Kin   PCP Verified by CM Yes   Prior Functional Level Independent in ADLs/IADLs   Current Functional Level Assistance with the following:;Bathing;Dressing; Toileting;Mobility   Can patient return to prior living arrangement Yes   Ability to make needs known: Good   Family able to assist with home care needs: Yes   Would you like for me to discuss the discharge plan with any other family members/significant others, and if so, who? No   Financial Resources Youth Noise Resources None   Social/Functional History   Lives With Alone   Type of [address] [place] [address] Two level; Able to Live on Main level with bedroom/bathroom   Home Access Stairs to enter with rails   Entrance Stairs - Number of Steps 4   Entrance Stairs - Rails Right   Bathroom Shower/Tub Walk-in shower; Shower chair with back   [address] bars in shower;Grab bars around toilet;Commode   [address], rolling;Walker, 4 wheeled   [address] Needs assistance   Toileting Needs assistance   [address] [facility] Needs assistance   Homemaking Responsibilities Yes   Ambulation Assistance Needs assistance   Transfer Assistance Needs assistance   Active  No   Occupation Retired   Discharge Planning   Type of [address] Prior To Admission None   [address] Medications No   Patient expects to be discharged to: House   One/Two Story Residence Two story, live on first floor

## 2023-12-27 NOTE — PROGRESS NOTES
OCCUPATIONAL THERAPY DAILY NOTE    Time In 0706      Time Out 0745        Subjective: Pt agreeable to treatment. Pain: No pain expressed. Interdisciplinary Communication: Collaborated with RN regarding  pt with redness/decreased skin integrity under panniculus . Precautions: Falls    FUNCTIONAL MOBILITY:       Bed Mobility Supervision    Sit to Stand SBA    Transfer  SBA and CGA  Transfer Type: SPT  Equipment: RW    Ambulation CGA  Equipment: RW Cues for RW management/proximity      ACTIVITIES OF DAILY LIVING:       Eating NT    Oral Hygiene Supervision and SBA In stance at sink with RW   Bathing SBA Shower seated on TTB, SBA-S in stance at grab bar   Upper Body  Dressing Setup/Clean-up Assist Pullover shirt, back fastening bra   Lower Body Dressing SBA SBA-S in stance   Donning/[place] Footwear Setup/Clean-up Assist Socks   Toilet Transfer SBA and CGA  Transfer Type: SPT  Equipment: RW Cues for RW management/proximity    Toileting Hygiene SBA    Education Rolling Walker Management and Safety Awareness     Assessment: Patient demonstrated good participation in OT treatment. Pt continues to benefit from skilled OT services to address remaining deficits and progress toward baseline level of independence and safety. Patient ended session seated in recliner with call bell and needs within reach. Plan: Continue OT POC.      Bradley Borrego OT   12/27/2023

## 2023-12-27 NOTE — PROGRESS NOTES
PHYSICAL THERAPY DAILY NOTE  Time In:  1121  Time Out:  1159  Total Treatment Time:  38 Minutes  Pt. Seen for: AM, Gait Training, Patient Education, Therapeutic Exercise, Transfer Training, and Other     Subjective: Patient reporting she feels OK. Would like to go home before the new year         Objective:  Precautions: Falls    GROSS ASSESSMENT Daily Assessment    Pt up in w/c in gym at start of session.        COGNITION Daily Assessment    Alert, able to follow commands,cooperating,participating, motivated         BED/MAT MOBILITY Daily Assessment    Mod ind with sit to sup       TRANSFERS Daily Assessment      Sit to Stand: Supervision/Standby Assist  Stand to Sit: Supervision/Standby Assist  Transfer Type: Stand Pivot with RW  Transfer Assistance: Supervision/Standby Assist  Car Transfers: sup with RW         GAIT Daily Assessment   Gait training with verbal cues for upright posture   R trendelenburg noted   Amount of Assistance: sba  Distance (ft): 2 x 100ft, 200ft  Assistive Device: RW and Gait Belt, [id number]  Surface: Level Surface     STEPS/STAIRS Daily Assessment     Pt holding R rail as if it were a grab bar Steps Ambulated:  4  Level of Assistance:  cga  Railing: B rail  Assistive Device: Gait Belt       BALANCE Daily Assessment    Static Sitting: Normal:  Pt. able to maintain balance w/o UE support  Dynamic Sitting: Good - accepts moderate challenge;  can maintain balance while picking object off the floor  Static Standing: Good:  Pt. able to maintain balance w/o UE support;  exhibits some postural sway  Dynamic Standing: Fair - accepts minimal challenge;  can maintain balance while turning head/trunk       WHEELCHAIR MOBILITY Daily Assessment   NT        LOWER EXTREMITY EXERCISES Daily Assessment   Cues for correct form    L hip pain with leg press   Supine on mat 2 x 12:  Hip abd with red theraband  LTR  Bridging  Leg press with red theraband     Pain level: mild L hip pain with TE  Pain Location:  NA  Pain

## 2023-12-28 PROCEDURE — 97535 SELF CARE MNGMENT TRAINING: CPT

## 2023-12-28 PROCEDURE — 1180000000 HC REHAB R&B

## 2023-12-28 PROCEDURE — 97110 THERAPEUTIC EXERCISES: CPT

## 2023-12-28 PROCEDURE — 6360000002 HC RX W HCPCS: Performed by: PHYSICAL MEDICINE & REHABILITATION

## 2023-12-28 PROCEDURE — 97116 GAIT TRAINING THERAPY: CPT

## 2023-12-28 PROCEDURE — 99231 SBSQ HOSP IP/OBS SF/LOW 25: CPT | Performed by: PHYSICAL MEDICINE & REHABILITATION

## 2023-12-28 PROCEDURE — 97530 THERAPEUTIC ACTIVITIES: CPT

## 2023-12-28 PROCEDURE — 6370000000 HC RX 637 (ALT 250 FOR IP): Performed by: PHYSICAL MEDICINE & REHABILITATION

## 2023-12-28 RX ADMIN — VITAMIN D, TAB 1000IU (100/BT) 2000 UNITS: 25 TAB at 08:27

## 2023-12-28 RX ADMIN — Medication 1 CAPSULE: at 08:27

## 2023-12-28 RX ADMIN — CEFDINIR 300 MG: 300 CAPSULE ORAL at 08:27

## 2023-12-28 RX ADMIN — ASPIRIN 81 MG: 81 TABLET ORAL at 08:26

## 2023-12-28 RX ADMIN — ANTI-FUNGAL POWDER MICONAZOLE NITRATE TALC FREE: 1.42 POWDER TOPICAL at 21:50

## 2023-12-28 RX ADMIN — METOPROLOL SUCCINATE 25 MG: 25 TABLET, EXTENDED RELEASE ORAL at 21:49

## 2023-12-28 RX ADMIN — PANCRELIPASE LIPASE, PANCRELIPASE PROTEASE, PANCRELIPASE AMYLASE 50000 UNITS: 20000; 63000; 84000 CAPSULE, DELAYED RELEASE ORAL at 12:18

## 2023-12-28 RX ADMIN — PANCRELIPASE LIPASE, PANCRELIPASE PROTEASE, PANCRELIPASE AMYLASE 50000 UNITS: 20000; 63000; 84000 CAPSULE, DELAYED RELEASE ORAL at 18:01

## 2023-12-28 RX ADMIN — ENOXAPARIN SODIUM 30 MG: 100 INJECTION SUBCUTANEOUS at 21:49

## 2023-12-28 RX ADMIN — MONTELUKAST 10 MG: 10 TABLET, FILM COATED ORAL at 21:49

## 2023-12-28 RX ADMIN — CLOPIDOGREL BISULFATE 75 MG: 75 TABLET ORAL at 08:27

## 2023-12-28 RX ADMIN — PANCRELIPASE LIPASE, PANCRELIPASE PROTEASE, PANCRELIPASE AMYLASE 50000 UNITS: 20000; 63000; 84000 CAPSULE, DELAYED RELEASE ORAL at 08:27

## 2023-12-28 RX ADMIN — FAMOTIDINE 20 MG: 20 TABLET ORAL at 21:49

## 2023-12-28 RX ADMIN — ANTI-FUNGAL POWDER MICONAZOLE NITRATE TALC FREE: 1.42 POWDER TOPICAL at 08:31

## 2023-12-28 NOTE — PROGRESS NOTES
demonstrate roll left & right & transition supine<>sit with Independent in 10 days --goal met  2. Pt. will transfer from bed to/from chair with Modified Independent using the least restrictive device in 10 days --goal met  3. Pt. will ambulate with 150 feet with RW or Rollator  and Modified Independent in 10 days --goal met  4. Pt. Will ambulate up/down 4 steps with single hand rail and Modified Independent in 10 days --goal not met  5. Pt. Will ambulate up/down 1 step with RW  and Modified Independent in 10 days -- goal not met        Pt would benefit from continued skilled physical therapy in order to improve independent functional mobility within the home with use of least restrictive device to include bed mobility, transfers, and gait (progressive ambulation program). Interventions may include range of motion (AROM/PROM of B LE/trunk), B LE/trunk strengthening and coordination activities, activity tolerance training (vitals, oxygen saturation levels). Pt education to continue. HEP handout: Provided seated HEP with red theraband. Pt to be discharged home with intermittent family support. Therapy Recommendations upon discharge:   OPPT   Equipment needs at discharge:  Pt has DME. Please see IRC; Interdisciplinary Eval, Care Plan, and Patient Education for further information regarding physical therapy discharge summary and plan of care.        JOSAFAT PEREZ, PT  12/28/2023

## 2023-12-28 NOTE — PROGRESS NOTES
[facility]patient [address]  [address] [facility], [address]  [facility]  Tel: [phone]     Physical Medicine and Rehabilitation Progress Note      Dan Sicard  Admit Date: 12/22/2023  Admit Diagnosis: Debility [R53.81]    Subjective     Patient seen face-to-face and evaluated. POONAM and she reports sleeping well. She is eating and drinking fine, and continent of bowel and bladder with 3 Bms over the past day. No bowel accidents and she said the Lomotil is working well. DC'd scheduled Lomotil with no BM for a few days despite not really feeling constipated. Pt reports she has a h/o alternating diarrhea and constipation. No pain. Her therapies are going well. Pt requested her diet be changed to a regular diet. Facial sutures removed 12/25.     Objective:     Current Facility-Administered Medications   Medication Dose Route Frequency    miconazole (MICOTIN) 2 % powder   Topical BID    pitavastatin (LIVALO) tablet 4 mg (Patient Supplied)  4 mg Oral Nightly    Vitamin D (CHOLECALCIFEROL) tablet 2,000 Units  2,000 Units Oral Daily    clopidogrel (PLAVIX) tablet 75 mg  75 mg Oral Daily    famotidine (PEPCID) tablet 20 mg  20 mg Oral Daily    metoprolol succinate (TOPROL XL) extended release tablet 25 mg  25 mg Oral Daily    montelukast (SINGULAIR) tablet 10 mg  10 mg Oral Nightly    sodium chloride flush 0.9 % injection 5-40 mL  5-40 mL IntraVENous 2 times per day    0.9 % sodium chloride infusion   IntraVENous PRN    polyethylene glycol (GLYCOLAX) packet 17 g  17 g Oral Daily PRN    acetaminophen (TYLENOL) tablet 650 mg  650 mg Oral Q6H PRN    Or    acetaminophen (TYLENOL) suppository 650 mg  650 mg Rectal Q6H PRN    promethazine (PHENERGAN) tablet 12.5 mg  12.5 mg Oral Q6H PRN    Or    ondansetron (ZOFRAN) injection 4 mg  4 mg IntraVENous Q4H PRN    lactobacillus (CULTURELLE) capsule 1 capsule  1 capsule Oral Daily with breakfast    enoxaparin Sodium (LOVENOX) injection 30 mg  30 mg SubCUTAneous during early hospital stay  Cr 1.7 (12/22) on [facility] admission (12/22)     Chronic mucoid otitis media of left ear  Previous CSF leak:  No current issue per ENT      Chronic systolic (congestive) heart failure  Plan:   Stable volume status   Toprol XL was decreased from 100mg to 25 mg due to bradycardia and hypotension     Essential hypertension  Plan:   Decreased metoprolol to 25 mg daily  Diovan decreased to 40mg daily (held 12/22)  Dyazide was discontinued  Cont to monitor     Bradycardia:  Cardiology evaluated  Metoprolol Succ decreased to 25mg daily      Pancreatic insufficiency:  Creon resumed AC and prn snacks      Anemia:  Trend HGB. H/H 10.3/2.3 on [facility] admission (12/22). Follow with surveillance labs.     Hypothyroidism:  - Follow-up TSH in 6-8 weeks   - Checked on admit due to hypothermia, was minimally elevated in setting of acute illness. Free t4 within normal limits     CAD:  ASA 81mg daily, plavix 75mg daily, livalo 4mg qhs      Left eye trauma:  Ophthalmology evaluated   Needs to follow-up after discharge with her ophthalmologist for visual field testing  Has stable subconjunctival hemorrhages and periocular ecchymoses     Hypothermia:   - Suspect due to bradycardia, sepsis and hypotension initially  - Improved with abx  - No evidence of myxedema     Pain management -  Will require regular pain assessment and management. No reported pain on [facility] admission. Cont Tylenol prn.     Pneumonia prophylaxis - incentive spirometer 10x every hour while awake.     Allergies - Singulair 10mg qhs and Flonase prn     DVT risk / DVT prophylaxis - mobilize as tolerated. SCDs when in bed; АННА hose when out of bed. Lovenox 30mg subcutaneously daily.      GI prophylaxis - resume Pepcid 20mg daily. At times may need additional antacids, Maalox PRN.  -Lomotil BID and QID prn for chronic diarrhea     General skin care / wound prevention - monitor general skin  status daily. At risk for failure due to impaired mobility and

## 2023-12-28 NOTE — PROGRESS NOTES
OCCUPATIONAL THERAPY DAILY NOTE    Time In 1348      Time Out 1432        Subjective: Pt agreeable to treatment. Pain: No pain expressed. Precautions: Falls    FUNCTIONAL MOBILITY:       Bed Mobility Modified Independent    Sit to Stand SBA and CGA    Transfer  SBA and CGA  Transfer Type: SPT  Equipment: Rollator Cues for rollator management/safety   Ambulation SBA and CGA  Equipment: Rollator Cues for rollator management/safety. Pt with poor rollator management in room, stepping outside of unlocked rollator to weight bear through LUE onto rolling tray table. Pt cued for safety and problem solving, pt resistant to cueing.       - Activity Tolerance - Balance   Patient completed two puzzles, 51 pieces and 40, with SBA in stance on balance pad to address standing balance for IADL tasks. Pt required cues throughout to remove UE support and to stand without leaning hips on table. Education   Benefits of OT     Assessment: Patient demonstrated good participation in OT treatment. Pt continues to benefit from skilled OT services to address remaining deficits and progress toward baseline level of independence and safety. Patient ended session supine in bed with call bell and needs within reach and with chair/bed alarm activated. Plan: Continue OT POC.      Gisela Warren OT   12/28/2023

## 2023-12-28 NOTE — PROGRESS NOTES
OCCUPATIONAL THERAPY DISCHARGE SUMMARY    Time In 0705      Time Out 0751        GOALS:  Long Term Goals:  Time Frame for Long Term Goals: 10 days   LTG 1: Patient will dress UB with [place] using AE/DME PRN. 12/28/23 Goal not met. LTG 2: Patient will dress LB with [place] using AE/DME PRN. 12/28/23 Goal not met. LTG 3: Patient will don footwear with [place] using AE/DME PRN. 12/28/23 Goal not met. LTG 4: Patient will bathe with [place] using AE/DME PRN. 12/28/23 Goal not met. LTG 5: Patient will toilet with [place] using AE/DME PRN. 12/28/23 Goal met. LTG 6: Patient/caregiver will verbalize understanding of OT recommendations regarding ADLs, functional transfers, home safety, AE/DME, energy conservation, safety awareness, activity tolerance, and follow-up therapy to increase safety with functional tasks upon discharge. 12/28/23 Goal not met. Subjective: \"One doctor says I wall surf. \" Pt agreeable to treatment. Pain: Patient denies pain. Interdisciplinary Communication: Collaborated with PT regarding pt performance. Precautions at Discharge: Falls    FUNCTIONAL MOBILITY:        Bed Mobility Modified Independent Use of bed rail   Sit to Stand Supervision Cues to lock rollator   Transfer  Supervision and SBA  Transfer Type: SPT  Equipment: Rollator Cues for safety and rollator management. Pt with poor rollator management in room, stepping outside of rollator, transferring with one hand on unlocked rollator and one on bed rail, and leaving rollator unlocked during transfer despite cues. Ambulation SBA  Equipment: Rollator Cues for safety and rollator management. Pt with poor rollator management in room, stepping outside of rollator, transferring with one hand on unlocked rollator and one on bed rail, and leaving rollator unlocked during transfer despite cues.      ACTIVITIES OF DAILY LIVING:    Initial Score Current Score   Eating Independent  I Independent  I   Oral

## 2023-12-29 VITALS
BODY MASS INDEX: 23.09 KG/M2 | DIASTOLIC BLOOD PRESSURE: 68 MMHG | SYSTOLIC BLOOD PRESSURE: 127 MMHG | OXYGEN SATURATION: 95 % | HEIGHT: 65 IN | TEMPERATURE: 97.5 F | WEIGHT: 138.6 LBS | RESPIRATION RATE: 18 BRPM | HEART RATE: 66 BPM

## 2023-12-29 PROCEDURE — 6370000000 HC RX 637 (ALT 250 FOR IP): Performed by: PHYSICAL MEDICINE & REHABILITATION

## 2023-12-29 PROCEDURE — 99239 HOSP IP/OBS DSCHRG MGMT >30: CPT | Performed by: PHYSICAL MEDICINE & REHABILITATION

## 2023-12-29 RX ADMIN — PANCRELIPASE LIPASE, PANCRELIPASE PROTEASE, PANCRELIPASE AMYLASE 50000 UNITS: 20000; 63000; 84000 CAPSULE, DELAYED RELEASE ORAL at 08:02

## 2023-12-29 RX ADMIN — VITAMIN D, TAB 1000IU (100/BT) 2000 UNITS: 25 TAB at 08:02

## 2023-12-29 RX ADMIN — CLOPIDOGREL BISULFATE 75 MG: 75 TABLET ORAL at 08:02

## 2023-12-29 RX ADMIN — ASPIRIN 81 MG: 81 TABLET ORAL at 08:02

## 2023-12-29 RX ADMIN — Medication 1 CAPSULE: at 08:02

## 2023-12-29 NOTE — DISCHARGE SUMMARY
[facility]  Inpatient Rehab Program      PHYSICAL MEDICINE & REHABILITATION DISCHARGE SUMMARY     Date: 12/29/2023  Admission Date: 12/22/2023  Discharge Date: 12/29/2023    Primary Care Provider: Ibis Bolton APRN - CNP    Admission Condition: stable  Discharged Condition: stable    Principal Problem:    Debility  Active Problems:    Sepsis (HCC)    Fall  Resolved Problems:    * No resolved hospital problems. *      Hospital Course: The [facility] Ean Salvador MD on 12/22/2023 due to debility from sepsis from a UTI.     HPI: From Pt and chart documents  Janine Gupta is a 83 y.o. F pmh HTN, dyslipidemia, GERD, NSTEMI, cardiomyopathy with EF of 35 to 40%, asthma, squamous cell carcinoma of left ear, CSF leak from left ear status post surgical repair, who presented to the [facility] ER on 12/20 with chief complaints of having generalized weakness, ringing sensation in left ear, dizziness and blurry vision in the left eye. Patient had a fall 2 days PTA resulting in trauma to the left eyebrow. The morning of admit, she developed new blurry vision.      As per the family patient has had chronic diarrhea and with GI evaluation was found to have pancreatic insufficiency and was started on Questran the day before admmission.     Patient also reports of having recurrent UTIs/bladder infections.  She denies having any acute chest pain, palpitations, shortness of breath, nausea, vomiting, diplopia, fever or chills.  ER workup was remarkable for hypothermia with Tmax of 92.6, temperature on arrival was 90.3.  Heart rate was 45, /66.  Blood work was remarkable for creatinine 1.1, BUN 39, TSH 7.04, free T4 1.0.  CT head with no acute intracranial abnormality, showed chronic periventricular and deep white matter small vessel ischemic changes.     Principal Problem:    Acute cystitis without hematuria    Sepsis due to above  Plan:   Started on  umbilical    HYSTERECTOMY (CERVIX STATUS UNKNOWN)  1997    HYSTERECTOMY, VAGINAL  1997    MASTOIDECTOMY Left     repair of leaking csf into middle ear    ORTHOPEDIC SURGERY      left  -- hammer toes    OVARY REMOVAL  1982     and cyst    DE UNLISTED PROCEDURE CARDIAC SURGERY      heart cath x 4, last 2007, stents x 3, last 2006    UPPER GASTROINTESTINAL ENDOSCOPY  several      Family History   Problem Relation Age of Onset    Diabetes Brother     Stroke Paternal Aunt     Heart Disease Paternal Uncle     Heart Disease Father     Heart Attack Father 76        mi    Coronary Art Dis Father     Gout Father     Hearing Loss Father     High Blood Pressure Father     Vision Loss Father         lost one eye retinal detachment    Stroke Paternal Grandmother     Heart Disease Paternal Grandmother     High Blood Pressure Paternal Grandmother     Heart Disease Maternal Grandfather     Stroke Maternal Grandfather     High Blood Pressure Maternal Grandfather     Hypertension Mother     Arthritis Mother     Colon Cancer Paternal Uncle     Heart Disease Paternal Cousin     High Blood Pressure Maternal Aunt     Breast Cancer Neg Hx       Social History     Tobacco Use    Smoking status: Never    Smokeless tobacco: Never   Substance Use Topics    Alcohol use: No     Allergies   Allergen Reactions    Other Hives, Itching and Rash     Band aids cause contact rash after several hours. Brinzolamide Nausea Only    Ciprofloxacin      tendinitis    Eggs Or Egg-Derived Products     Hydralazine Other (See Comments)     Other reaction(s): Drowsiness/Fatigue    Yeast     Adhesive Tape Rash     Band aids cause contact rash after several hours. Band aids cause contact rash after several hours. Statins Other (See Comments) and Rash     Other reaction(s):  Other (See Comments), Other- (not listed) - Allergy  Aching muscles  Aching muscles  Aching muscles  Aching muscles      Sulfa Antibiotics Nausea Only, Other (See Comments) and Nausea

## 2023-12-29 NOTE — CARE COORDINATION
Interdisciplinary Wednesday, Team Conference Meeting Notes    Interdisciplinary team conference meeting completed to discuss plan of care. Estimated D/C Date: 12/29/23    Recommended Follow-Up Therapy: Staff has recommended outpatient therapy. Communication with family/caregivers: CM reviewed / screen patient medical chart. Patient needs are continue to be followed by Dr. Savannah Odonnell. Patient has discharge date / plan at this time scheduled for 12/29/23  CM met with patient and made patient aware of the recommendations that were discussed in Team Conference. Patient was agreeable to outpatient therapy. Patient requested a referral be made to [facility] [place] for outpatient therapy. CM made several attempts to fax the referral.  Referral was emailed to [facility]. [facility] [place] will make contact with patient and schedule and appointment. CM will continue to follow / monitor for any needs, concerns or questions that may arise.        Name of Ins: Medicare Part A / B  Policy #: [id number]  Secondary Ins: [employer]  Policy #: [id number]  Auth #  Admission Date: 12/22/23  Approved Dates:  LOS: 5 days   Contact Name:  Contact #  Fax #:  Updated Clinicals: no updated clinicals needed

## 2023-12-29 NOTE — CARE COORDINATION
CM reviewed / screen patient medical chart. Patient needs are continue to be followed by Dr. Hussein Vazquez. Patient has no discharge date / plan at this time scheduled. Patient new to the floor. Patient was admitted on 12/22/23. Patient will be discussed in Team Conference. CM will continue to follow / monitor for any needs, concerns or questions that may arise.         Name of Ins: Medicare Part A / B  Policy #: [id number]  Secondary Ins: [facility]  Policy #: [id number]  Auth #  Admission Date: 12/22/23  Approved Dates:  LOS: 4 days   Contact Name:  Contact #  Fax #:  Updated Clinicals: no updated clinicals needed

## 2024-01-02 ENCOUNTER — CARE COORDINATION (OUTPATIENT)
Dept: CARE COORDINATION | Facility: CLINIC | Age: 84
End: 2024-01-02

## 2024-01-02 NOTE — CARE COORDINATION
Care Transitions Initial Follow Up Call    Call within 2 business days of discharge: Yes    Patient Current Location:  Home: [address] [place] [address]    Care Transition Nurse contacted the patient by telephone to perform post hospital discharge assessment. Verified name and  with patient as identifiers. Provided introduction to self, and explanation of the Care Transition Nurse role.     Patient: Janine Gupta Patient : 1940   MRN: [id number]  Reason for Admission: acute cystitis without hematuria  Discharge Date: 23 RARS: Readmission Risk Score: 17.7      Last Discharge Facility       Date Complaint Diagnosis Description Type Department Provider    23   Admission (Discharged) [id number] Ean Salvador MD            Was this an external facility discharge? No Discharge Facility: [facility]    Challenges to be reviewed by the provider   Additional needs identified to be addressed with provider: No  none               Method of communication with provider: none.    Patient home after recent d/c from [facility]. Patient indicated she was recovering well and does not have any questions/concerns at this time. Patient able to verbalize medication changes and has been setting up appts for providers appropriately. Patient appreciative of call and has CTN contact number for any acute needs that may arise.     Care Transition Nurse reviewed discharge instructions, medical action plan, and red flags with patient who verbalized understanding. The patient was given an opportunity to ask questions and does not have any further questions or concerns at this time. Were discharge instructions available to patient? Yes. Reviewed appropriate site of care based on symptoms and resources available to patient including: PCP  Specialist  When to call 911  CTN . The patient agrees to contact the PCP office for questions related to their healthcare.     Advance Care Planning:   Does patient have an

## 2024-01-02 NOTE — CARE COORDINATION
Care Transitions Outreach Attempt    Call within 2 business days of discharge: Yes   Attempted to reach patient for transitions of care follow up. Unable to reach patient.    Patient: Janine Gupta Patient : 1940 MRN: [id number]    Last Discharge Facility       Date Complaint Diagnosis Description Type Department Provider    23   Admission (Discharged) [id number] Ean Salvador MD              Was this an external facility discharge? yes Discharge Facility Name: [facility]    Noted following upcoming appointments from discharge chart review:   [facility] follow up appointment(s):   Future Appointments   Date Time Provider Department Center   1/3/2024  1:30 PM Ibis Bolton APRN - CNP PST [facility] AM[facility]   2024  1:00 PM Kianna Hernandez MD [facility]     Non-BS  follow up appointment(s): na

## 2024-01-03 ENCOUNTER — OFFICE VISIT (OUTPATIENT)
Dept: FAMILY MEDICINE CLINIC | Facility: CLINIC | Age: 84
End: 2024-01-03
Payer: MEDICARE

## 2024-01-03 VITALS
WEIGHT: 140 LBS | HEART RATE: 66 BPM | SYSTOLIC BLOOD PRESSURE: 128 MMHG | DIASTOLIC BLOOD PRESSURE: 75 MMHG | TEMPERATURE: 96.2 F | BODY MASS INDEX: 23.32 KG/M2 | HEIGHT: 65 IN | OXYGEN SATURATION: 99 %

## 2024-01-03 DIAGNOSIS — T68.XXXS HYPOTHERMIA, SEQUELA: ICD-10-CM

## 2024-01-03 DIAGNOSIS — I10 ESSENTIAL HYPERTENSION: ICD-10-CM

## 2024-01-03 DIAGNOSIS — N30.90 CYSTITIS: ICD-10-CM

## 2024-01-03 DIAGNOSIS — E83.42 HYPOMAGNESEMIA: ICD-10-CM

## 2024-01-03 DIAGNOSIS — Z09 HOSPITAL DISCHARGE FOLLOW-UP: Primary | ICD-10-CM

## 2024-01-03 DIAGNOSIS — E03.9 HYPOTHYROIDISM, UNSPECIFIED TYPE: ICD-10-CM

## 2024-01-03 LAB
BASOPHILS # BLD: 0 K/UL (ref 0–0.2)
BASOPHILS NFR BLD: 1 % (ref 0–2)
BILIRUBIN, URINE, POC: NEGATIVE
BLOOD URINE, POC: NEGATIVE
DIFFERENTIAL METHOD BLD: ABNORMAL
EOSINOPHIL # BLD: 0.1 K/UL (ref 0–0.8)
EOSINOPHIL NFR BLD: 1 % (ref 0.5–7.8)
ERYTHROCYTE [DISTWIDTH] IN BLOOD BY AUTOMATED COUNT: 13.7 % (ref 11.9–14.6)
GLUCOSE URINE, POC: NEGATIVE
HCT VFR BLD AUTO: 35.6 % (ref 35.8–46.3)
HGB BLD-MCNC: 11 G/DL (ref 11.7–15.4)
IMM GRANULOCYTES # BLD AUTO: 0 K/UL (ref 0–0.5)
IMM GRANULOCYTES NFR BLD AUTO: 0 % (ref 0–5)
KETONES, URINE, POC: NEGATIVE
LEUKOCYTE ESTERASE, URINE, POC: NEGATIVE
LYMPHOCYTES # BLD: 1.8 K/UL (ref 0.5–4.6)
LYMPHOCYTES NFR BLD: 34 % (ref 13–44)
MCH RBC QN AUTO: 29.7 PG (ref 26.1–32.9)
MCHC RBC AUTO-ENTMCNC: 30.9 G/DL (ref 31.4–35)
MCV RBC AUTO: 96.2 FL (ref 82–102)
MONOCYTES # BLD: 0.5 K/UL (ref 0.1–1.3)
MONOCYTES NFR BLD: 9 % (ref 4–12)
NEUTS SEG # BLD: 3 K/UL (ref 1.7–8.2)
NEUTS SEG NFR BLD: 55 % (ref 43–78)
NITRITE, URINE, POC: NEGATIVE
NRBC # BLD: 0 K/UL (ref 0–0.2)
PH, URINE, POC: 7 (ref 4.6–8)
PLATELET # BLD AUTO: 262 K/UL (ref 150–450)
PMV BLD AUTO: 11.2 FL (ref 9.4–12.3)
PROTEIN,URINE, POC: NEGATIVE
RBC # BLD AUTO: 3.7 M/UL (ref 4.05–5.2)
SPECIFIC GRAVITY, URINE, POC: 1.01 (ref 1–1.03)
URINALYSIS CLARITY, POC: CLEAR
URINALYSIS COLOR, POC: YELLOW
UROBILINOGEN, POC: NORMAL
WBC # BLD AUTO: 5.4 K/UL (ref 4.3–11.1)

## 2024-01-03 PROCEDURE — 3074F SYST BP LT 130 MM HG: CPT | Performed by: NURSE PRACTITIONER

## 2024-01-03 PROCEDURE — G8420 CALC BMI NORM PARAMETERS: HCPCS | Performed by: NURSE PRACTITIONER

## 2024-01-03 PROCEDURE — 1111F DSCHRG MED/CURRENT MED MERGE: CPT | Performed by: NURSE PRACTITIONER

## 2024-01-03 PROCEDURE — 99215 OFFICE O/P EST HI 40 MIN: CPT | Performed by: NURSE PRACTITIONER

## 2024-01-03 PROCEDURE — 3078F DIAST BP <80 MM HG: CPT | Performed by: NURSE PRACTITIONER

## 2024-01-03 PROCEDURE — 81003 URINALYSIS AUTO W/O SCOPE: CPT | Performed by: NURSE PRACTITIONER

## 2024-01-03 PROCEDURE — 36415 COLL VENOUS BLD VENIPUNCTURE: CPT | Performed by: NURSE PRACTITIONER

## 2024-01-03 PROCEDURE — G8427 DOCREV CUR MEDS BY ELIG CLIN: HCPCS | Performed by: NURSE PRACTITIONER

## 2024-01-03 PROCEDURE — 1090F PRES/ABSN URINE INCON ASSESS: CPT | Performed by: NURSE PRACTITIONER

## 2024-01-03 PROCEDURE — G8484 FLU IMMUNIZE NO ADMIN: HCPCS | Performed by: NURSE PRACTITIONER

## 2024-01-03 PROCEDURE — 1036F TOBACCO NON-USER: CPT | Performed by: NURSE PRACTITIONER

## 2024-01-03 PROCEDURE — G8399 PT W/DXA RESULTS DOCUMENT: HCPCS | Performed by: NURSE PRACTITIONER

## 2024-01-03 PROCEDURE — 1123F ACP DISCUSS/DSCN MKR DOCD: CPT | Performed by: NURSE PRACTITIONER

## 2024-01-03 RX ORDER — LACTOBACILLUS RHAMNOSUS GG 10B CELL
CAPSULE ORAL
COMMUNITY
Start: 2023-12-22

## 2024-01-03 RX ORDER — PANCRELIPASE 24000; 76000; 120000 [USP'U]/1; [USP'U]/1; [USP'U]/1
CAPSULE, DELAYED RELEASE PELLETS ORAL
COMMUNITY
Start: 2023-12-15

## 2024-01-03 RX ORDER — TRIAMTERENE AND HYDROCHLOROTHIAZIDE 37.5; 25 MG/1; MG/1
TABLET ORAL
COMMUNITY
Start: 2023-12-19 | End: 2024-01-03

## 2024-01-03 RX ORDER — CEFPODOXIME PROXETIL 100 MG/1
TABLET, FILM COATED ORAL
COMMUNITY
Start: 2023-12-04 | End: 2024-01-04 | Stop reason: ALTCHOICE

## 2024-01-03 ASSESSMENT — PATIENT HEALTH QUESTIONNAIRE - PHQ9
SUM OF ALL RESPONSES TO PHQ QUESTIONS 1-9: 0
SUM OF ALL RESPONSES TO PHQ9 QUESTIONS 1 & 2: 0
SUM OF ALL RESPONSES TO PHQ QUESTIONS 1-9: 0
2. FEELING DOWN, DEPRESSED OR HOPELESS: 0
1. LITTLE INTEREST OR PLEASURE IN DOING THINGS: 0
SUM OF ALL RESPONSES TO PHQ QUESTIONS 1-9: 0
SUM OF ALL RESPONSES TO PHQ QUESTIONS 1-9: 0

## 2024-01-04 DIAGNOSIS — T68.XXXS HYPOTHERMIA, SEQUELA: ICD-10-CM

## 2024-01-04 DIAGNOSIS — T68.XXXS HYPOTHERMIA, SEQUELA: Primary | ICD-10-CM

## 2024-01-04 DIAGNOSIS — S09.90XS: ICD-10-CM

## 2024-01-04 LAB
ALBUMIN SERPL-MCNC: 3.8 G/DL (ref 3.2–4.6)
ALBUMIN/GLOB SERPL: 1.3 (ref 0.4–1.6)
ALP SERPL-CCNC: 119 U/L (ref 50–136)
ALT SERPL-CCNC: 42 U/L (ref 12–65)
ANION GAP SERPL CALC-SCNC: 5 MMOL/L (ref 2–11)
AST SERPL-CCNC: 24 U/L (ref 15–37)
BILIRUB SERPL-MCNC: <0.1 MG/DL (ref 0.2–1.1)
BUN SERPL-MCNC: 36 MG/DL (ref 8–23)
CALCIUM SERPL-MCNC: 9.7 MG/DL (ref 8.3–10.4)
CHLORIDE SERPL-SCNC: 105 MMOL/L (ref 103–113)
CO2 SERPL-SCNC: 27 MMOL/L (ref 21–32)
CORTIS PM SERPL-MCNC: 16.1 UG/DL (ref 2–14)
CREAT SERPL-MCNC: 1.2 MG/DL (ref 0.6–1)
GLOBULIN SER CALC-MCNC: 2.9 G/DL (ref 2.8–4.5)
GLUCOSE SERPL-MCNC: 106 MG/DL (ref 65–100)
MAGNESIUM SERPL-MCNC: 2.2 MG/DL (ref 1.8–2.4)
POTASSIUM SERPL-SCNC: 4.4 MMOL/L (ref 3.5–5.1)
PROLACTIN SERPL-MCNC: 6.8 NG/ML
PROT SERPL-MCNC: 6.7 G/DL (ref 6.3–8.2)
SODIUM SERPL-SCNC: 137 MMOL/L (ref 136–146)
T4 FREE SERPL-MCNC: 1 NG/DL (ref 0.78–1.46)
TSH W FREE THYROID IF ABNORMAL: 10.1 UIU/ML (ref 0.36–3.74)

## 2024-01-04 RX ORDER — LEVOTHYROXINE SODIUM 0.03 MG/1
25 TABLET ORAL DAILY
Qty: 90 TABLET | Refills: 1 | Status: SHIPPED | OUTPATIENT
Start: 2024-01-04

## 2024-01-04 ASSESSMENT — ENCOUNTER SYMPTOMS
SHORTNESS OF BREATH: 0
STRIDOR: 0
RECTAL PAIN: 0
FACIAL SWELLING: 0
EYE DISCHARGE: 0
CONSTIPATION: 0
PHOTOPHOBIA: 0
SINUS PRESSURE: 0
RESPIRATORY NEGATIVE: 1
EYE PAIN: 0
WHEEZING: 0
EYES NEGATIVE: 1
DIARRHEA: 0
SORE THROAT: 0
NAUSEA: 0
EYE REDNESS: 0
TROUBLE SWALLOWING: 0
VOICE CHANGE: 0
ANAL BLEEDING: 0
SINUS PAIN: 0
VOMITING: 0
EYE ITCHING: 0
ABDOMINAL PAIN: 0
CHEST TIGHTNESS: 0
BLOOD IN STOOL: 0
RHINORRHEA: 0
COUGH: 0
GASTROINTESTINAL NEGATIVE: 1
BACK PAIN: 0
COLOR CHANGE: 0
APNEA: 0
ABDOMINAL DISTENTION: 0
CHOKING: 0
ALLERGIC/IMMUNOLOGIC NEGATIVE: 1

## 2024-01-04 NOTE — PROGRESS NOTES
PROGRESS NOTE    Chief Complaint   Patient presents with    Follow-up     Existing conditions       SUBJECTIVE:     Janine Gupta is a very pleasant 83 y.o. female with hx of hypertension, hyperlipidemia, CAD-currently following cardiology, asthma-currently following pulmonology, squamous cell carcinoma of left ear, CSF leak-currently following ENT, and arthritis,  seen today in office accompanied by her daughter Neda for hospital follow-up.  She recently sustained a fall on 12/18 and had a head injury.  Patient had no LOC and sustained a small laceration to her left lateral eyebrow as well as a hematoma.  She was sent to the ED for evaluation.  At that time, she had negative CT of the head and CT of the spine.  Laceration was sutured and patient was discharged.  Unfortunately, patient developed blurry vision to left eye in addition to speech changes observed by family members 2 days later.  She returned back to the ED for evaluation.  She subsequently was noted to be hypothermic but all other workup were negative except for an abnormal UA.  She was subsequently admitted from 12/20 to 12/22.  At that time, her temp returned to normal and she was diagnosed with hypothermia and likely induced by possible sepsis.  She continue with rehabilitation inpatient from 12/22 to 12/29 for generalized weakness.  She was discharged home and reports been doing well.  She has been able to ambulate with a walker and has had no further falls.  Her appetite has been well.  She reports having gained some weight during her hospital  admission and rehab.  She reports no edema and no orthopnea.  She does have a cough but has a history of a chronic cough previously seen and evaluated by pulmonology.  She reports no worsening in coughing.  Today, she reports feeling well aside for having some sensation of jitteriness but reports that she has had this intermittently in the past.  She reports no chest pain, no shortness of breath no

## 2024-01-06 LAB
BACTERIA SPEC CULT: NORMAL
SERVICE CMNT-IMP: NORMAL

## 2024-01-08 NOTE — PROGRESS NOTES
[facility] CARDIOLOGY  [address] [place] [address], [address]  [place] [address]  PHONE: [phone]      24    NAME:  Janine Gutpa  : 1940  MRN: [id number]         SUBJECTIVE:   Janine Gupta is a 83 y.o. female seen for a follow up visit regarding the following:     Chief Complaint   Patient presents with    Congestive Heart Failure            HPI:  Follow up  Congestive Heart Failure   .    CAD,  chronic LBBB, statin intolerance,orthostatic hypotension with resting hypertension, dyslipidemia, NICM with concomitant CAD,  AAA, CSF leak,  post NSTEMI with depressed EF. She did not tolerate Farxiga d/t intravascular volume depletion, subsequently diagnosed with pancreatic insufficiency with chronic diarrhea, and admitted with hypothermia d/t recurrent urosepsis, with a stay in acute inpatient rehab.  Fortunately, her follow up limited echo showed EF improved to 45-50%, thus avoiding device therapy.     She was discharged on 40 mg valsartan, 25 mg metoprolol succinate, plavix 75 mg, ASA 81 mg, livalo 4 mg, and lasix 20 mg once daily, as well as her non cardiac meds as noted. She has in her bag a bottle of 80 mg valsartan and is not sure what she is actually taking.  Also, she kept herself on maxzide though wasn't discharged with it, and had 100 mg metoprolol at home basically cutting in half and taking 50 mg because she couldn't make the 25 mg by cutting it.  Additionally, she was recently found to have TSH > 10 and recently started replacement.  She also continued her 80 mg valsartan.      I've gone through her med list with her and her daughter, with her bottles in tow, and have updated it to reflect what she is actually currently taking.              Past cardiac history:   Remote coronary stents   Treatment limited by orthostatic hypotension   Medical therapy of an obstructed PDA, 40% LAD by cath .  Stress nuclear perfusion scan with anterolateral scar, minimal per-scar ischemia

## 2024-01-09 ENCOUNTER — OFFICE VISIT (OUTPATIENT)
Age: 84
End: 2024-01-09
Payer: MEDICARE

## 2024-01-09 VITALS
WEIGHT: 149 LBS | SYSTOLIC BLOOD PRESSURE: 138 MMHG | HEART RATE: 80 BPM | HEIGHT: 65 IN | DIASTOLIC BLOOD PRESSURE: 86 MMHG | BODY MASS INDEX: 24.83 KG/M2

## 2024-01-09 DIAGNOSIS — I95.1 ORTHOSTATIC HYPOTENSION: Primary | ICD-10-CM

## 2024-01-09 DIAGNOSIS — I25.118 CORONARY ARTERY DISEASE OF NATIVE HEART WITH STABLE ANGINA PECTORIS, UNSPECIFIED VESSEL OR LESION TYPE (HCC): ICD-10-CM

## 2024-01-09 DIAGNOSIS — I50.22 HEART FAILURE WITH MID-RANGE EJECTION FRACTION (HFMEF) (HCC): ICD-10-CM

## 2024-01-09 PROCEDURE — 1123F ACP DISCUSS/DSCN MKR DOCD: CPT | Performed by: INTERNAL MEDICINE

## 2024-01-09 PROCEDURE — G8399 PT W/DXA RESULTS DOCUMENT: HCPCS | Performed by: INTERNAL MEDICINE

## 2024-01-09 PROCEDURE — 1036F TOBACCO NON-USER: CPT | Performed by: INTERNAL MEDICINE

## 2024-01-09 PROCEDURE — 99214 OFFICE O/P EST MOD 30 MIN: CPT | Performed by: INTERNAL MEDICINE

## 2024-01-09 PROCEDURE — 3075F SYST BP GE 130 - 139MM HG: CPT | Performed by: INTERNAL MEDICINE

## 2024-01-09 PROCEDURE — 1111F DSCHRG MED/CURRENT MED MERGE: CPT | Performed by: INTERNAL MEDICINE

## 2024-01-09 PROCEDURE — G8427 DOCREV CUR MEDS BY ELIG CLIN: HCPCS | Performed by: INTERNAL MEDICINE

## 2024-01-09 PROCEDURE — G8484 FLU IMMUNIZE NO ADMIN: HCPCS | Performed by: INTERNAL MEDICINE

## 2024-01-09 PROCEDURE — G8420 CALC BMI NORM PARAMETERS: HCPCS | Performed by: INTERNAL MEDICINE

## 2024-01-09 PROCEDURE — 3079F DIAST BP 80-89 MM HG: CPT | Performed by: INTERNAL MEDICINE

## 2024-01-09 PROCEDURE — 1090F PRES/ABSN URINE INCON ASSESS: CPT | Performed by: INTERNAL MEDICINE

## 2024-01-09 RX ORDER — METOPROLOL SUCCINATE 50 MG/1
50 TABLET, EXTENDED RELEASE ORAL DAILY
Qty: 90 TABLET | Refills: 3 | Status: SHIPPED | OUTPATIENT
Start: 2024-01-09

## 2024-01-09 RX ORDER — TRIAMTERENE AND HYDROCHLOROTHIAZIDE 37.5; 25 MG/1; MG/1
1 TABLET ORAL DAILY
COMMUNITY

## 2024-01-09 RX ORDER — METOPROLOL SUCCINATE 50 MG/1
50 TABLET, EXTENDED RELEASE ORAL DAILY
COMMUNITY
End: 2024-01-09 | Stop reason: SDUPTHER

## 2024-01-09 RX ORDER — VALSARTAN 80 MG/1
80 TABLET ORAL DAILY
COMMUNITY

## 2024-01-09 RX ORDER — FAMOTIDINE 40 MG/1
40 TABLET, FILM COATED ORAL DAILY
COMMUNITY

## 2024-01-10 ENCOUNTER — CARE COORDINATION (OUTPATIENT)
Dept: CARE COORDINATION | Facility: CLINIC | Age: 84
End: 2024-01-10

## 2024-01-10 NOTE — CARE COORDINATION
Care Transitions Follow Up Call    Patient Current Location:  Home: [address] [place] [address]    Care Transition Nurse contacted the patient by telephone to follow up after admission on .  Verified name and  with patient as identifiers.    Patient: Janine Gupta  Patient : 1940   MRN: [id number]  Reason for Admission: weakness, diziness  Discharge Date: 23 RARS: Readmission Risk Score: 17.7      Needs to be reviewed by the provider   Additional needs identified to be addressed with provider: No  none             Method of communication with provider: none.    CTN followed up with patient after recent MAYRA call. Patient is doing well and has been compliant with hospital follow up. Patient has not heard from OP therapy at [facility]. CTN to contact for more information.    Addressed changes since last contact:   No OP rehab follow up scheduled as of today.   Discussed follow-up appointments. If no appointment was previously scheduled, appointment scheduling offered: Yes.   Is follow up appointment scheduled within 7 days of discharge? Yes.    Follow Up  Future Appointments   Date Time Provider Department Center   2024  2:00 PM [facility] UNIT 1 [facility]   2024 12:45 PM Kianna Hernandez MD [facility]     External follow up appointment(s): n    Care Transition Nurse reviewed discharge instructions, medical action plan, and red flags with patient and discussed any barriers to care and/or understanding of plan of care after discharge. Discussed appropriate site of care based on symptoms and resources available to patient including: PCP  Specialist  When to call 911  CTN . The patient agrees to contact the PCP office for questions related to their healthcare.     Advance Care Planning:   reviewed and needs to be updated.     Patients top risk factors for readmission: medical condition-CKD, HF, recent cystitis.  Interventions to address risk factors: Establishment or

## 2024-01-11 ENCOUNTER — CARE COORDINATION (OUTPATIENT)
Dept: CARE COORDINATION | Facility: CLINIC | Age: 84
End: 2024-01-11

## 2024-01-11 DIAGNOSIS — M79.605 BILATERAL LEG PAIN: ICD-10-CM

## 2024-01-11 DIAGNOSIS — R53.81 DEBILITY: ICD-10-CM

## 2024-01-11 DIAGNOSIS — Z91.81 HISTORY OF RECENT FALL: ICD-10-CM

## 2024-01-11 DIAGNOSIS — M79.604 BILATERAL LEG PAIN: ICD-10-CM

## 2024-01-11 DIAGNOSIS — R26.81 UNSTEADY GAIT: Primary | ICD-10-CM

## 2024-01-18 ENCOUNTER — HOSPITAL ENCOUNTER (OUTPATIENT)
Dept: MRI IMAGING | Age: 84
Discharge: HOME | End: 2024-01-18
Payer: MEDICARE

## 2024-01-18 DIAGNOSIS — T68.XXXS HYPOTHERMIA, SEQUELA: ICD-10-CM

## 2024-01-18 DIAGNOSIS — S09.90XS: ICD-10-CM

## 2024-01-18 PROCEDURE — 70551 MRI BRAIN STEM W/O DYE: CPT

## 2024-01-20 ENCOUNTER — HOSPITAL ENCOUNTER (INPATIENT)
Age: 84
LOS: 11 days | Discharge: NURSING, REHAB OR LONG-TERM CARE | DRG: 438 | End: 2024-02-01
Attending: EMERGENCY MEDICINE | Admitting: FAMILY MEDICINE
Payer: MEDICARE

## 2024-01-20 ENCOUNTER — APPOINTMENT (OUTPATIENT)
Dept: CT IMAGING | Age: 84
DRG: 438 | End: 2024-01-20
Payer: MEDICARE

## 2024-01-20 DIAGNOSIS — K85.80 OTHER ACUTE PANCREATITIS, UNSPECIFIED COMPLICATION STATUS: ICD-10-CM

## 2024-01-20 DIAGNOSIS — I63.9 ACUTE CVA (CEREBROVASCULAR ACCIDENT) (HCC): Primary | ICD-10-CM

## 2024-01-20 LAB
ALBUMIN SERPL-MCNC: 3.8 G/DL (ref 3.2–4.6)
ALBUMIN/GLOB SERPL: 1.1 (ref 0.4–1.6)
ALP SERPL-CCNC: 115 U/L (ref 50–136)
ALT SERPL-CCNC: 39 U/L (ref 12–65)
ANION GAP SERPL CALC-SCNC: 4 MMOL/L (ref 2–11)
AST SERPL-CCNC: 29 U/L (ref 15–37)
BASOPHILS # BLD: 0 K/UL (ref 0–0.2)
BASOPHILS NFR BLD: 0 % (ref 0–2)
BILIRUB SERPL-MCNC: 0.1 MG/DL (ref 0.2–1.1)
BILIRUB UR QL: NEGATIVE
BUN SERPL-MCNC: 35 MG/DL (ref 8–23)
CALCIUM SERPL-MCNC: 9.8 MG/DL (ref 8.3–10.4)
CHLORIDE SERPL-SCNC: 108 MMOL/L (ref 103–113)
CO2 SERPL-SCNC: 25 MMOL/L (ref 21–32)
CREAT SERPL-MCNC: 1 MG/DL (ref 0.6–1)
CRP SERPL-MCNC: 6.5 MG/DL (ref 0–0.9)
DIFFERENTIAL METHOD BLD: ABNORMAL
EOSINOPHIL # BLD: 0 K/UL (ref 0–0.8)
EOSINOPHIL NFR BLD: 0 % (ref 0.5–7.8)
ERYTHROCYTE [DISTWIDTH] IN BLOOD BY AUTOMATED COUNT: 14.1 % (ref 11.9–14.6)
GLOBULIN SER CALC-MCNC: 3.5 G/DL (ref 2.8–4.5)
GLUCOSE SERPL-MCNC: 111 MG/DL (ref 65–100)
GLUCOSE UR QL STRIP.AUTO: NEGATIVE MG/DL
HCT VFR BLD AUTO: 37.4 % (ref 35.8–46.3)
HGB BLD-MCNC: 12.1 G/DL (ref 11.7–15.4)
IMM GRANULOCYTES # BLD AUTO: 0.1 K/UL (ref 0–0.5)
IMM GRANULOCYTES NFR BLD AUTO: 1 % (ref 0–5)
KETONES UR-MCNC: NEGATIVE MG/DL
LACTATE SERPL-SCNC: 1.1 MMOL/L (ref 0.4–2)
LEUKOCYTE ESTERASE UR QL STRIP: NEGATIVE
LIPASE SERPL-CCNC: 9620 U/L (ref 73–393)
LYMPHOCYTES # BLD: 1 K/UL (ref 0.5–4.6)
LYMPHOCYTES NFR BLD: 10 % (ref 13–44)
MCH RBC QN AUTO: 29.5 PG (ref 26.1–32.9)
MCHC RBC AUTO-ENTMCNC: 32.4 G/DL (ref 31.4–35)
MCV RBC AUTO: 91.2 FL (ref 82–102)
MONOCYTES # BLD: 0.7 K/UL (ref 0.1–1.3)
MONOCYTES NFR BLD: 7 % (ref 4–12)
NEUTS SEG # BLD: 8.9 K/UL (ref 1.7–8.2)
NEUTS SEG NFR BLD: 82 % (ref 43–78)
NITRITE UR QL: POSITIVE
NRBC # BLD: 0 K/UL (ref 0–0.2)
PH UR: 5.5 (ref 5–9)
PLATELET # BLD AUTO: 142 K/UL (ref 150–450)
PMV BLD AUTO: 11.2 FL (ref 9.4–12.3)
POTASSIUM SERPL-SCNC: 4.2 MMOL/L (ref 3.5–5.1)
PROT SERPL-MCNC: 7.3 G/DL (ref 6.3–8.2)
PROT UR QL: NEGATIVE MG/DL
RBC # BLD AUTO: 4.1 M/UL (ref 4.05–5.2)
RBC # UR STRIP: ABNORMAL
SERVICE CMNT-IMP: ABNORMAL
SODIUM SERPL-SCNC: 137 MMOL/L (ref 136–146)
SP GR UR: 1.01 (ref 1–1.02)
T4 FREE SERPL-MCNC: 1.1 NG/DL (ref 0.78–1.46)
TSH W FREE THYROID IF ABNORMAL: 4.42 UIU/ML (ref 0.36–3.74)
UROBILINOGEN UR QL: 0.2 EU/DL (ref 0.2–1)
WBC # BLD AUTO: 10.8 K/UL (ref 4.3–11.1)

## 2024-01-20 PROCEDURE — 83690 ASSAY OF LIPASE: CPT

## 2024-01-20 PROCEDURE — 84443 ASSAY THYROID STIM HORMONE: CPT

## 2024-01-20 PROCEDURE — 6360000004 HC RX CONTRAST MEDICATION: Performed by: EMERGENCY MEDICINE

## 2024-01-20 PROCEDURE — 83605 ASSAY OF LACTIC ACID: CPT

## 2024-01-20 PROCEDURE — 84439 ASSAY OF FREE THYROXINE: CPT

## 2024-01-20 PROCEDURE — 85025 COMPLETE CBC W/AUTO DIFF WBC: CPT

## 2024-01-20 PROCEDURE — 74177 CT ABD & PELVIS W/CONTRAST: CPT

## 2024-01-20 PROCEDURE — 99285 EMERGENCY DEPT VISIT HI MDM: CPT

## 2024-01-20 PROCEDURE — 80053 COMPREHEN METABOLIC PANEL: CPT

## 2024-01-20 PROCEDURE — 86140 C-REACTIVE PROTEIN: CPT

## 2024-01-20 PROCEDURE — 81003 URINALYSIS AUTO W/O SCOPE: CPT

## 2024-01-20 PROCEDURE — 84478 ASSAY OF TRIGLYCERIDES: CPT

## 2024-01-20 PROCEDURE — 81015 MICROSCOPIC EXAM OF URINE: CPT

## 2024-01-20 RX ADMIN — IOPAMIDOL 100 ML: 755 INJECTION, SOLUTION INTRAVENOUS at 21:58

## 2024-01-20 ASSESSMENT — PAIN - FUNCTIONAL ASSESSMENT: PAIN_FUNCTIONAL_ASSESSMENT: NONE - DENIES PAIN
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
## 2024-01-21 PROBLEM — K85.90 ACUTE PANCREATITIS: Status: ACTIVE | Noted: 2024-01-21

## 2024-01-21 PROBLEM — R33.9 URINARY RETENTION: Status: ACTIVE | Noted: 2024-01-21

## 2024-01-21 PROBLEM — K85.90 ACUTE PANCREATITIS, UNSPECIFIED COMPLICATION STATUS, UNSPECIFIED PANCREATITIS TYPE: Status: ACTIVE | Noted: 2024-01-21

## 2024-01-21 PROBLEM — E07.9 THYROID DISORDER: Status: ACTIVE | Noted: 2024-01-21

## 2024-01-21 LAB
APPEARANCE UR: CLEAR
BACTERIA URNS QL MICRO: ABNORMAL /HPF
BACTERIA URNS QL MICRO: ABNORMAL /HPF
BILIRUB UR QL: NEGATIVE
CASTS URNS QL MICRO: 0 /LPF
CASTS URNS QL MICRO: ABNORMAL /LPF
COLOR UR: ABNORMAL
CRYSTALS URNS QL MICRO: 0 /LPF
EPI CELLS #/AREA URNS HPF: ABNORMAL /HPF
EPI CELLS #/AREA URNS HPF: ABNORMAL /HPF
EST. AVERAGE GLUCOSE BLD GHB EST-MCNC: 117 MG/DL
GLUCOSE BLD STRIP.AUTO-MCNC: 77 MG/DL (ref 65–100)
GLUCOSE BLD STRIP.AUTO-MCNC: 79 MG/DL (ref 65–100)
GLUCOSE BLD STRIP.AUTO-MCNC: 81 MG/DL (ref 65–100)
GLUCOSE BLD STRIP.AUTO-MCNC: 86 MG/DL (ref 65–100)
GLUCOSE BLD STRIP.AUTO-MCNC: 90 MG/DL (ref 65–100)
GLUCOSE UR STRIP.AUTO-MCNC: NEGATIVE MG/DL
HBA1C MFR BLD: 5.7 % (ref 4.8–5.6)
HGB UR QL STRIP: ABNORMAL
KETONES UR QL STRIP.AUTO: NEGATIVE MG/DL
LEUKOCYTE ESTERASE UR QL STRIP.AUTO: NEGATIVE
MUCOUS THREADS URNS QL MICRO: 0 /LPF
MUCOUS THREADS URNS QL MICRO: 0 /LPF
NITRITE UR QL STRIP.AUTO: NEGATIVE
OTHER OBSERVATIONS: ABNORMAL
PH UR STRIP: 5 (ref 5–9)
PROT UR STRIP-MCNC: 30 MG/DL
RBC #/AREA URNS HPF: 0 /HPF
RBC #/AREA URNS HPF: ABNORMAL /HPF
SERVICE CMNT-IMP: NORMAL
SP GR UR REFRACTOMETRY: 1.03 (ref 1–1.02)
TRIGL SERPL-MCNC: 147 MG/DL (ref 35–150)
URINE CULTURE IF INDICATED: ABNORMAL
UROBILINOGEN UR QL STRIP.AUTO: 0.2 EU/DL (ref 0.2–1)
WBC URNS QL MICRO: 0 /HPF
WBC URNS QL MICRO: ABNORMAL /HPF

## 2024-01-21 PROCEDURE — 87186 SC STD MICRODIL/AGAR DIL: CPT

## 2024-01-21 PROCEDURE — 2500000003 HC RX 250 WO HCPCS: Performed by: FAMILY MEDICINE

## 2024-01-21 PROCEDURE — 83036 HEMOGLOBIN GLYCOSYLATED A1C: CPT

## 2024-01-21 PROCEDURE — 51798 US URINE CAPACITY MEASURE: CPT

## 2024-01-21 PROCEDURE — 6370000000 HC RX 637 (ALT 250 FOR IP): Performed by: FAMILY MEDICINE

## 2024-01-21 PROCEDURE — 2580000003 HC RX 258: Performed by: FAMILY MEDICINE

## 2024-01-21 PROCEDURE — 87086 URINE CULTURE/COLONY COUNT: CPT

## 2024-01-21 PROCEDURE — 87088 URINE BACTERIA CULTURE: CPT

## 2024-01-21 PROCEDURE — A4216 STERILE WATER/SALINE, 10 ML: HCPCS | Performed by: FAMILY MEDICINE

## 2024-01-21 PROCEDURE — 51702 INSERT TEMP BLADDER CATH: CPT

## 2024-01-21 PROCEDURE — 36415 COLL VENOUS BLD VENIPUNCTURE: CPT

## 2024-01-21 PROCEDURE — 82962 GLUCOSE BLOOD TEST: CPT

## 2024-01-21 PROCEDURE — 99221 1ST HOSP IP/OBS SF/LOW 40: CPT | Performed by: INTERNAL MEDICINE

## 2024-01-21 PROCEDURE — 1100000003 HC PRIVATE W/ TELEMETRY

## 2024-01-21 PROCEDURE — 6360000002 HC RX W HCPCS: Performed by: FAMILY MEDICINE

## 2024-01-21 PROCEDURE — 81001 URINALYSIS AUTO W/SCOPE: CPT

## 2024-01-21 RX ORDER — LACTOBACILLUS RHAMNOSUS GG 10B CELL
1 CAPSULE ORAL
Status: DISCONTINUED | OUTPATIENT
Start: 2024-01-21 | End: 2024-02-01 | Stop reason: HOSPADM

## 2024-01-21 RX ORDER — MORPHINE SULFATE 2 MG/ML
2 INJECTION, SOLUTION INTRAMUSCULAR; INTRAVENOUS
Status: DISCONTINUED | OUTPATIENT
Start: 2024-01-21 | End: 2024-02-01 | Stop reason: HOSPADM

## 2024-01-21 RX ORDER — MONTELUKAST SODIUM 10 MG/1
10 TABLET ORAL NIGHTLY
Status: DISCONTINUED | OUTPATIENT
Start: 2024-01-21 | End: 2024-02-01 | Stop reason: HOSPADM

## 2024-01-21 RX ORDER — SODIUM CHLORIDE 0.9 % (FLUSH) 0.9 %
5-40 SYRINGE (ML) INJECTION PRN
Status: DISCONTINUED | OUTPATIENT
Start: 2024-01-21 | End: 2024-02-01 | Stop reason: HOSPADM

## 2024-01-21 RX ORDER — INSULIN LISPRO 100 [IU]/ML
0-4 INJECTION, SOLUTION INTRAVENOUS; SUBCUTANEOUS
Status: DISCONTINUED | OUTPATIENT
Start: 2024-01-21 | End: 2024-02-01 | Stop reason: HOSPADM

## 2024-01-21 RX ORDER — IBUPROFEN 600 MG/1
1 TABLET ORAL PRN
Status: DISCONTINUED | OUTPATIENT
Start: 2024-01-21 | End: 2024-02-01 | Stop reason: HOSPADM

## 2024-01-21 RX ORDER — SODIUM CHLORIDE 9 MG/ML
INJECTION, SOLUTION INTRAVENOUS PRN
Status: DISCONTINUED | OUTPATIENT
Start: 2024-01-21 | End: 2024-02-01 | Stop reason: HOSPADM

## 2024-01-21 RX ORDER — DEXTROSE MONOHYDRATE 100 MG/ML
INJECTION, SOLUTION INTRAVENOUS CONTINUOUS PRN
Status: DISCONTINUED | OUTPATIENT
Start: 2024-01-21 | End: 2024-02-01 | Stop reason: HOSPADM

## 2024-01-21 RX ORDER — TRAMADOL HYDROCHLORIDE 50 MG/1
50 TABLET ORAL EVERY 6 HOURS PRN
Status: DISCONTINUED | OUTPATIENT
Start: 2024-01-21 | End: 2024-02-01 | Stop reason: HOSPADM

## 2024-01-21 RX ORDER — MORPHINE SULFATE 2 MG/ML
1 INJECTION, SOLUTION INTRAMUSCULAR; INTRAVENOUS
Status: DISCONTINUED | OUTPATIENT
Start: 2024-01-21 | End: 2024-02-01 | Stop reason: HOSPADM

## 2024-01-21 RX ORDER — INSULIN LISPRO 100 [IU]/ML
0-4 INJECTION, SOLUTION INTRAVENOUS; SUBCUTANEOUS NIGHTLY
Status: DISCONTINUED | OUTPATIENT
Start: 2024-01-21 | End: 2024-02-01 | Stop reason: HOSPADM

## 2024-01-21 RX ORDER — POTASSIUM CHLORIDE 20 MEQ/1
40 TABLET, EXTENDED RELEASE ORAL PRN
Status: DISCONTINUED | OUTPATIENT
Start: 2024-01-21 | End: 2024-02-01 | Stop reason: HOSPADM

## 2024-01-21 RX ORDER — FLUTICASONE PROPIONATE 50 MCG
2 SPRAY, SUSPENSION (ML) NASAL DAILY
Status: DISCONTINUED | OUTPATIENT
Start: 2024-01-21 | End: 2024-02-01 | Stop reason: HOSPADM

## 2024-01-21 RX ORDER — ENOXAPARIN SODIUM 100 MG/ML
40 INJECTION SUBCUTANEOUS DAILY
Status: DISCONTINUED | OUTPATIENT
Start: 2024-01-21 | End: 2024-01-25

## 2024-01-21 RX ORDER — NITROGLYCERIN 0.4 MG/1
0.4 TABLET SUBLINGUAL EVERY 5 MIN PRN
Status: DISCONTINUED | OUTPATIENT
Start: 2024-01-21 | End: 2024-02-01 | Stop reason: HOSPADM

## 2024-01-21 RX ORDER — VITAMIN B COMPLEX
2000 TABLET ORAL DAILY
Status: DISCONTINUED | OUTPATIENT
Start: 2024-01-21 | End: 2024-02-01 | Stop reason: HOSPADM

## 2024-01-21 RX ORDER — CLOPIDOGREL BISULFATE 75 MG/1
75 TABLET ORAL DAILY
Status: DISCONTINUED | OUTPATIENT
Start: 2024-01-21 | End: 2024-02-01 | Stop reason: HOSPADM

## 2024-01-21 RX ORDER — DEXTROSE AND SODIUM CHLORIDE 5; .9 G/100ML; G/100ML
INJECTION, SOLUTION INTRAVENOUS CONTINUOUS
Status: DISCONTINUED | OUTPATIENT
Start: 2024-01-21 | End: 2024-01-25

## 2024-01-21 RX ORDER — SODIUM CHLORIDE 0.9 % (FLUSH) 0.9 %
5-40 SYRINGE (ML) INJECTION EVERY 12 HOURS SCHEDULED
Status: DISCONTINUED | OUTPATIENT
Start: 2024-01-21 | End: 2024-02-01 | Stop reason: HOSPADM

## 2024-01-21 RX ORDER — LEVOTHYROXINE SODIUM 0.05 MG/1
25 TABLET ORAL DAILY
Status: DISCONTINUED | OUTPATIENT
Start: 2024-01-21 | End: 2024-02-01 | Stop reason: HOSPADM

## 2024-01-21 RX ORDER — POTASSIUM CHLORIDE 7.45 MG/ML
10 INJECTION INTRAVENOUS PRN
Status: DISCONTINUED | OUTPATIENT
Start: 2024-01-21 | End: 2024-02-01 | Stop reason: HOSPADM

## 2024-01-21 RX ORDER — ASPIRIN 81 MG/1
81 TABLET ORAL DAILY
Status: DISCONTINUED | OUTPATIENT
Start: 2024-01-21 | End: 2024-01-31

## 2024-01-21 RX ORDER — ONDANSETRON 4 MG/1
4 TABLET, ORALLY DISINTEGRATING ORAL EVERY 8 HOURS PRN
Status: DISCONTINUED | OUTPATIENT
Start: 2024-01-21 | End: 2024-02-01 | Stop reason: HOSPADM

## 2024-01-21 RX ORDER — MAGNESIUM SULFATE IN WATER 40 MG/ML
2000 INJECTION, SOLUTION INTRAVENOUS PRN
Status: DISCONTINUED | OUTPATIENT
Start: 2024-01-21 | End: 2024-02-01 | Stop reason: HOSPADM

## 2024-01-21 RX ORDER — SODIUM CHLORIDE, SODIUM LACTATE, POTASSIUM CHLORIDE, CALCIUM CHLORIDE 600; 310; 30; 20 MG/100ML; MG/100ML; MG/100ML; MG/100ML
INJECTION, SOLUTION INTRAVENOUS CONTINUOUS
Status: DISCONTINUED | OUTPATIENT
Start: 2024-01-21 | End: 2024-01-21

## 2024-01-21 RX ORDER — METOPROLOL SUCCINATE 50 MG/1
50 TABLET, EXTENDED RELEASE ORAL DAILY
Status: DISCONTINUED | OUTPATIENT
Start: 2024-01-21 | End: 2024-01-29

## 2024-01-21 RX ORDER — DIPHENOXYLATE HYDROCHLORIDE AND ATROPINE SULFATE 2.5; .025 MG/1; MG/1
1 TABLET ORAL 4 TIMES DAILY PRN
Status: DISCONTINUED | OUTPATIENT
Start: 2024-01-21 | End: 2024-02-01 | Stop reason: HOSPADM

## 2024-01-21 RX ORDER — ONDANSETRON 2 MG/ML
4 INJECTION INTRAMUSCULAR; INTRAVENOUS EVERY 6 HOURS PRN
Status: DISCONTINUED | OUTPATIENT
Start: 2024-01-21 | End: 2024-02-01 | Stop reason: HOSPADM

## 2024-01-21 RX ADMIN — FAMOTIDINE 20 MG: 10 INJECTION, SOLUTION INTRAVENOUS at 03:18

## 2024-01-21 RX ADMIN — METOPROLOL SUCCINATE 50 MG: 50 TABLET, EXTENDED RELEASE ORAL at 09:49

## 2024-01-21 RX ADMIN — TUBERCULIN PURIFIED PROTEIN DERIVATIVE 5 UNITS: 5 INJECTION, SOLUTION INTRADERMAL at 10:57

## 2024-01-21 RX ADMIN — ASPIRIN 81 MG: 81 TABLET, COATED ORAL at 09:49

## 2024-01-21 RX ADMIN — SODIUM CHLORIDE, PRESERVATIVE FREE 10 ML: 5 INJECTION INTRAVENOUS at 09:51

## 2024-01-21 RX ADMIN — FAMOTIDINE 20 MG: 10 INJECTION, SOLUTION INTRAVENOUS at 16:07

## 2024-01-21 RX ADMIN — ENOXAPARIN SODIUM 40 MG: 100 INJECTION SUBCUTANEOUS at 09:49

## 2024-01-21 RX ADMIN — VITAMIN D, TAB 1000IU (100/BT) 2000 UNITS: 25 TAB at 09:49

## 2024-01-21 RX ADMIN — MONTELUKAST 10 MG: 10 TABLET, FILM COATED ORAL at 20:52

## 2024-01-21 RX ADMIN — TRAMADOL HYDROCHLORIDE 50 MG: 50 TABLET ORAL at 20:52

## 2024-01-21 RX ADMIN — WATER 1000 MG: 1 INJECTION INTRAMUSCULAR; INTRAVENOUS; SUBCUTANEOUS at 16:06

## 2024-01-21 RX ADMIN — LEVOTHYROXINE SODIUM 25 MCG: 0.05 TABLET ORAL at 05:50

## 2024-01-21 RX ADMIN — CLOPIDOGREL BISULFATE 75 MG: 75 TABLET ORAL at 09:49

## 2024-01-21 RX ADMIN — SODIUM CHLORIDE, POTASSIUM CHLORIDE, SODIUM LACTATE AND CALCIUM CHLORIDE: 600; 310; 30; 20 INJECTION, SOLUTION INTRAVENOUS at 03:31

## 2024-01-21 RX ADMIN — DEXTROSE AND SODIUM CHLORIDE: 5; 900 INJECTION, SOLUTION INTRAVENOUS at 10:54

## 2024-01-21 RX ADMIN — ONDANSETRON 4 MG: 2 INJECTION INTRAMUSCULAR; INTRAVENOUS at 13:05

## 2024-01-21 ASSESSMENT — PAIN - FUNCTIONAL ASSESSMENT: PAIN_FUNCTIONAL_ASSESSMENT: 0-10

## 2024-01-21 ASSESSMENT — PAIN SCALES - GENERAL
PAINLEVEL_OUTOF10: 0
PAINLEVEL_OUTOF10: 0

## 2024-01-21 NOTE — ED TRIAGE NOTES
Pt arrives via EMS from home with generalized malaise, diarrhea and body aches. Denies sick exposure. Recent hospital admit with pancreatitis. Pt reports abd discomfort.        176/90. HR 76. 98% RA. Bgl 106. Unable to get temp.    No interventions.

## 2024-01-21 NOTE — ED NOTES
TRANSFER - OUT REPORT:    Verbal report given to Sabra on Janine Gupta  being transferred to [facility] for routine progression of patient care       Report consisted of patient's Situation, Background, Assessment and   Recommendations(SBAR).     Information from the following report(s) ED SBAR was reviewed with the receiving nurse.    [place] Assessment:    Presents to emergency department  because of falls (Syncope, seizure, or loss of consciousness): No  Age > 70: Yes  Altered Mental Status, Intoxication with alcohol or substance confusion (Disorientation, impaired judgment, poor safety awaremess, or inability to follow instructions): No  Impaired Mobility: Ambulates or transfers with assistive devices or assistance; Unable to ambulate or transer.: Yes  Nursing Judgement: Yes          Lines:       Opportunity for questions and clarification was provided.      Patient transported with:  Registered Nurse          Tasha Angelo, RN  01/21/24 8458

## 2024-01-21 NOTE — ACP (ADVANCE CARE PLANNING)
Advance Care Planning   Healthcare Decision Maker:    Primary Decision Maker: Neda Cast - Child - [phone]    Secondary Decision Maker: StephenLiat - Child - [phone]    Pt is a full code with her HCPOA on file.    Click here to complete Healthcare Decision Makers including selection of the Healthcare Decision Maker Relationship (ie \"Primary\").

## 2024-01-21 NOTE — ED PROVIDER NOTES
Emergency Department Provider Note       PCP: Ibis Bolton, APRN - CNP   Age: 83 y.o.   Sex: female     DISPOSITION Decision To Admit 01/20/2024 11:23:13 PM       ICD-10-CM    1. Other acute pancreatitis, unspecified complication status  K85.80           Medical Decision Making     Complexity of Problems Addressed:  1 or more chronic illnesses with a severe exacerbation or progression.    Data Reviewed and Analyzed:  I independently ordered and reviewed each unique test.             Discussion of management or test interpretation.  Patient has acute pancreatitis.  Lipase is greater than 9000 and CAT scan shows inflammation of the pancreas.  Patient will be referred to hospitalist for admission given age and comorbidities.  The patient was admitted and I have discussed patient management with the admitting provider.    Risk of Complications and/or Morbidity of Patient Management:  Parental controlled substances given in the ED.  Shared medical decision making was utilized in creating the patients health plan today.         History       Patient with past medical history seen for hypothyroidism and exocrine pancreatic insufficiency with chronic diarrhea presents with complaints of onset of upper abdominal pain that is moderate in intensity since about lunchtime today.  She is continue to have multiple episodes of diarrhea yesterday and today and states that this is chronic.  No blood noted in the stool.  She has been nauseated but has had no vomiting.    The history is provided by the patient and medical records.        Review of Systems   Constitutional:  Positive for chills and fatigue. Negative for diaphoresis and fever.   Gastrointestinal:  Positive for abdominal pain, diarrhea and nausea. Negative for blood in stool and vomiting.   Genitourinary:  Negative for dysuria.   All other systems reviewed and are negative.      Physical Exam     Vitals signs and nursing note reviewed:  Vitals:    01/20/24 2215  detailed above.      No other significant interval change compared to prior study on 12/1/2023.      If there are any questions about this report, I can be reached on [employer]   or at [phone]                           Voice dictation software was used during the making of this note.  This software is not perfect and grammatical and other typographical errors may be present.  This note has not been completely proofread for errors.     Walker Nava, DO  01/20/24 0302

## 2024-01-21 NOTE — CARE COORDINATION
Pt chart reviewed for discharge planning. CM met with pt and daughter at bedside, verified demographic information/health insurance. Pt normally lives alone and has supportive family.  Recent discharge from [facility] with an appt at  Out Pt therapy on 1/24/2024.  PCP was confirmed, last seen 1/13/2024. CM will follow pt plan of care and assist with supportive care referrals pending pt clinical progress.  Please consult case management if specific needs arise.      01/21/24 3319   Service Assessment   Patient Orientation Alert and Oriented   Cognition Alert   History Provided By Patient;Medical Record;Child/Family   Primary Caregiver Self   Accompanied By/Relationship daughter Neda Cast [phone]   Support Systems Children   Patient's Healthcare Decision Maker is: Legal Next of Kin   PCP Verified by CM Yes   Last Visit to PCP Within last 3 months  (1/3/2024)   Prior Functional Level Independent in ADLs/IADLs   Current Functional Level Assistance with the following:;Mobility   Can patient return to prior living arrangement Yes   Ability to make needs known: Good   Family able to assist with home care needs: Yes   Would you like for me to discuss the discharge plan with any other family members/significant others, and if so, who? No   Financial Resources Medicare Community Resources Other (Comment)  (Pt has an Out Pt therapy appt on 1/24/2024)   CM/SW Referral Other (see comment)  (none)   Social/Functional History   Lives With Alone   Type of Home House   Home Layout Two level;Able to Live on Main level with bedroom/bathroom   Home Access Stairs to enter with rails   Entrance Stairs - Number of Steps 4   Entrance Stairs - Rails Right   Bathroom Shower/Tub Walk-in shower;Shower chair with back   Bathroom Toilet Standard   Bathroom Equipment Grab bars in shower;Grab bars around toilet;Commode   Bathroom Accessibility Accessible   Home Equipment Walker, rolling;Walker, 4 wheeled   Receives Help From Family   ADL

## 2024-01-21 NOTE — PROGRESS NOTES
Performed by: Sruthi Rodriguez    Urinalysis, Micro    Collection Time: 01/20/24 11:59 PM   Result Value Ref Range    WBC, UA 0 0 /hpf    RBC, UA 0 0 /hpf    Epithelial Cells UA 0-3 0 /hpf    BACTERIA, URINE 4+ (H) 0 /hpf    Casts 0 0 /lpf    Crystals 0 0 /LPF    Mucus, UA 0 0 /lpf    Other observations RESULTS VERIFIED MANUALLY     POCT Glucose    Collection Time: 01/21/24  5:53 AM   Result Value Ref Range    POC Glucose 77 65 - 100 mg/dL    Performed by: Frederic    Hemoglobin A1c    Collection Time: 01/21/24  7:07 AM   Result Value Ref Range    Hemoglobin A1C 5.7 (H) 4.8 - 5.6 %    eAG 117 mg/dL   POCT Glucose    Collection Time: 01/21/24  7:32 AM   Result Value Ref Range    POC Glucose 79 65 - 100 mg/dL    Performed by: Xin    POCT Glucose    Collection Time: 01/21/24 11:53 AM   Result Value Ref Range    POC Glucose 90 65 - 100 mg/dL    Performed by: Bernard    Urinalysis with Reflex to Culture    Collection Time: 01/21/24  1:44 PM    Specimen: Urine   Result Value Ref Range    Color, UA YELLOW/STRAW      Appearance CLEAR      Specific Gravity, UA 1.031 (H) 1.001 - 1.023      pH, Urine 5.0 5.0 - 9.0      Protein, UA 30 (A) NEG mg/dL    Glucose, UA Negative mg/dL    Ketones, Urine Negative NEG mg/dL    Bilirubin Urine Negative NEG      Blood, Urine MODERATE (A) NEG      Urobilinogen, Urine 0.2 0.2 - 1.0 EU/dL    Nitrite, Urine Negative NEG      Leukocyte Esterase, Urine Negative NEG      Urine Culture if Indicated CULTURE NOT INDICATED BY UA RESULT      WBC, UA 0-4 U4 /hpf    RBC, UA 10-20 (A) U5 /hpf    BACTERIA, URINE TOO NUMEROUS TO COUNT (A) NEG /hpf    Epithelial Cells UA 0-5 U5 /hpf    Casts 2-5 (A) U2 /lpf    Mucus, UA 0 0 /lpf       Current Meds:  Current Facility-Administered Medications   Medication Dose Route Frequency    aspirin EC tablet 81 mg  81 mg Oral Daily    Vitamin D (CHOLECALCIFEROL) tablet 2,000 Units  2,000 Units Oral Daily    clopidogrel (PLAVIX) tablet  SubCUTAneous TID WC    insulin lispro (HUMALOG) injection vial 0-4 Units  0-4 Units SubCUTAneous Nightly    tuberculin injection 5 Units  5 Units IntraDERmal Once    dextrose 5 % and 0.9 % sodium chloride infusion   IntraVENous Continuous    cefTRIAXone (ROCEPHIN) 1,000 mg in sterile water 10 mL IV syringe  1,000 mg IntraVENous Q24H    traMADol (ULTRAM) tablet 50 mg  50 mg Oral Q6H PRN       Signed:  NAGA MISTRY MD

## 2024-01-21 NOTE — H&P
Hospitalist History and Physical   Admit Date:  2024  8:58 PM   Name:  Janine Gupta   Age:  83 y.o.  Sex:  female  :  1940   MRN:  [id number]   Room:  03/    Presenting/Chief Complaint: Abdominal pain, & nausea x 1 day and chronic Diarrhea     Reason(s) for Admission: Acute pancreatitis, unspecified complication status, unspecified pancreatitis type [K85.90]     History of Present Illness:   Janine Gupta is a 83 y.o. female with a past medical history of hypothyroidism, exocrine pancreatic insufficiency, Asthma, polyneuropathy, coronary artery disease, ischemic cardiomyopathy, type 2 diabetes mellitus, GERD, hyperlipidemia, hypertension, history of obesity, history of sleep apnea who presented with upper abdominal pain of moderate intensity that is achy in nature nonradiating and began around lunchtime today.  This has been associated with nausea but no vomiting.  Patient has not noted any change in her appetite or her p.o. intake but remarks that she has just finally gotten her sense of taste back after suffering from COVID so she really cannot say whether her appetite has been good.  She reports continued trouble with multiple episodes of loose stools daily although they do not contain blood or mucus and that that has been going on for several months now.  Patient did feel as though she was chilled earlier today and has been very tired recently.  She denied any vomiting she denied any constipation she denied any dysuria or UTI symptoms, she denied any cough runny nose sore throat, she denied any chest pain palpitations or other cardiac symptoms.  Evaluation in the emergency room has revealed patient to have an apparent acute pancreatitis.  She has a lipase of 9620 a CRP of 6.5 mild thrombocytopenia 142,000 but otherwise unremarkable lab workup with a stable to slightly improved chronic renal insufficiency at 35 and 1.0 BUN and creatinine respectively her CT scan is read as  or at [phone]         Signed:  Ayana Carpio MD    Part of this note may have been written by using a voice dictation software.  The note has been proof read but may still contain some grammatical/other typographical errors.

## 2024-01-21 NOTE — CONSULTS
Consult Note            Date:1/21/2024        Patient Name:Janine Gupta     YOB: 1940     Age:83 y.o.    Reason for consult: Acute pancreatitis     History of Present Illness   82 yo with history of EPI, GERD, CHF, and obesity, presents to the ED with complaints of non radiating, acute upper abdominal pain. States that she started to experience issues with upper abd pain which was moderate in intensity with some nausea. No c/o vomiting, ,weight loss, overt bleeding, dysphagia, but has had noticed issues with diarrhea over past few months. She is currently on pancreatic enzymes as she carries a dx of EPI.   During her ED evaluation she was found to have an elevated lipase as well as signs for acute interstitial pancreatitis on imaging. She is currently on IV LR and Gi contacted to assist in management. States that [facility].      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    CAD (coronary artery disease)     Cardiomyopathy, ischemic 1/14/2016    Chronic vertigo     DM2 (diabetes mellitus, type 2) ([facility])     does not check glucose    GERD (gastroesophageal reflux disease)     Hearing loss     History of bilateral cataract extraction     History of coronary artery disease 2006, 2007    MI x 2 after stents placed-- with in 1 week-- stents x3    History of gallbladder disease     History of hernia repair     History of seborrheic keratosis     Hyperlipidemia     Hypertension     Lacunar infarction (HCC) 8/16/2018    Meniere's disease     Menopause     Morbid obesity (HCC)     OA (osteoarthritis)     Orthostatic hypotension 1/14/2016    Osteopenia with high risk of fracture 2014    Osteopenia with significant interval density loss in the hips since 2014    Post-menopausal osteoporosis     Postmenopausal osteoporosis     Unspecified sleep apnea     does not use  History     Family History   Problem Relation Age of Onset    Diabetes Brother     Stroke Paternal Aunt     Heart Disease Paternal Uncle     Heart Disease Father     Heart Attack Father 68        mi    Coronary Art Dis Father     Gout Father     Hearing Loss Father     High Blood Pressure Father     Vision Loss Father         lost one eye retinal detachment    Stroke Paternal Grandmother     Heart Disease Paternal Grandmother     High Blood Pressure Paternal Grandmother     Heart Disease Maternal Grandfather     Stroke Maternal Grandfather     High Blood Pressure Maternal Grandfather     Hypertension Mother     Arthritis Mother     Colon Cancer Paternal Uncle     Heart Disease Paternal Cousin     High Blood Pressure Maternal Aunt     Breast Cancer Neg Hx        Review of Systems   As per HPI     Physical Exam   /63   Pulse 74   Temp 97.8 °F (36.6 °C) (Oral)   Resp 17   Ht 1.651 m (5' 5\")   Wt 67.6 kg (149 lb)   SpO2 97%   BMI 24.79 kg/m²      - GENERAL: Alert and oriented x 3. No acute distress. Well-nourished.    - EYES: EOMI. Anicteric.    - HENT: Moist mucous membranes. No cervical lymphadenopathy.    - LUNGS: Clear to auscultation bilaterally. No accessory muscle use.    - CARDIOVASCULAR: Regular rate and rhythm. No murmur. No JVD.    - ABDOMEN: Soft, non-tender and non-distended. No palpable masses.    - EXTREMITIES: No edema. Non-tender.?SKIN: No rashes or lesions. Warm.    - NEUROLOGIC: No focal neurological deficits. CN II-XII grossly intact, but not individually tested.    - PSYCHIATRIC: Cooperative. Appropriate mood and affect.    - SKIN: No rashes, sores, or lesions.     - RECTAL: Deferred.     Labs    CBC:  Recent Labs     01/20/24  2111   WBC 10.8   RBC 4.10   HGB 12.1   HCT 37.4   MCV 91.2   RDW 14.1   *     CHEMISTRIES:  Recent Labs     01/20/24  2111      K 4.2      CO2 25   BUN 35*   CREATININE 1.00   GLUCOSE 111*     PT/INR:No results for input(s): \"PROTIME\", \"INR\"

## 2024-01-22 ENCOUNTER — CARE COORDINATION (OUTPATIENT)
Dept: CARE COORDINATION | Facility: CLINIC | Age: 84
End: 2024-01-22

## 2024-01-22 LAB
ALBUMIN SERPL-MCNC: 2.7 G/DL (ref 3.2–4.6)
ALBUMIN/GLOB SERPL: 0.8 (ref 0.4–1.6)
ALP SERPL-CCNC: 104 U/L (ref 50–136)
ALT SERPL-CCNC: 26 U/L (ref 12–65)
ANION GAP SERPL CALC-SCNC: 3 MMOL/L (ref 2–11)
AST SERPL-CCNC: 21 U/L (ref 15–37)
BASOPHILS # BLD: 0 K/UL (ref 0–0.2)
BASOPHILS # BLD: 0 K/UL (ref 0–0.2)
BASOPHILS # BLD: 0.1 K/UL (ref 0–0.2)
BASOPHILS NFR BLD: 0 % (ref 0–2)
BILIRUB SERPL-MCNC: 0.2 MG/DL (ref 0.2–1.1)
BUN SERPL-MCNC: 23 MG/DL (ref 8–23)
CALCIUM SERPL-MCNC: 9.3 MG/DL (ref 8.3–10.4)
CHLORIDE SERPL-SCNC: 110 MMOL/L (ref 103–113)
CO2 SERPL-SCNC: 24 MMOL/L (ref 21–32)
CREAT SERPL-MCNC: 1 MG/DL (ref 0.6–1)
DIFFERENTIAL METHOD BLD: ABNORMAL
EOSINOPHIL # BLD: 0 K/UL (ref 0–0.8)
EOSINOPHIL NFR BLD: 0 % (ref 0.5–7.8)
ERYTHROCYTE [DISTWIDTH] IN BLOOD BY AUTOMATED COUNT: 14.5 % (ref 11.9–14.6)
ERYTHROCYTE [DISTWIDTH] IN BLOOD BY AUTOMATED COUNT: 14.6 % (ref 11.9–14.6)
ERYTHROCYTE [DISTWIDTH] IN BLOOD BY AUTOMATED COUNT: 15 % (ref 11.9–14.6)
GLOBULIN SER CALC-MCNC: 3.6 G/DL (ref 2.8–4.5)
GLUCOSE BLD STRIP.AUTO-MCNC: 103 MG/DL (ref 65–100)
GLUCOSE BLD STRIP.AUTO-MCNC: 109 MG/DL (ref 65–100)
GLUCOSE BLD STRIP.AUTO-MCNC: 98 MG/DL (ref 65–100)
GLUCOSE BLD STRIP.AUTO-MCNC: 99 MG/DL (ref 65–100)
GLUCOSE SERPL-MCNC: 107 MG/DL (ref 65–100)
HCT VFR BLD AUTO: 29.2 % (ref 35.8–46.3)
HCT VFR BLD AUTO: 32.6 % (ref 35.8–46.3)
HCT VFR BLD AUTO: 34.6 % (ref 35.8–46.3)
HGB BLD-MCNC: 10.3 G/DL (ref 11.7–15.4)
HGB BLD-MCNC: 11 G/DL (ref 11.7–15.4)
HGB BLD-MCNC: 9 G/DL (ref 11.7–15.4)
IMM GRANULOCYTES # BLD AUTO: 0.1 K/UL (ref 0–0.5)
IMM GRANULOCYTES NFR BLD AUTO: 1 % (ref 0–5)
LYMPHOCYTES # BLD: 0.7 K/UL (ref 0.5–4.6)
LYMPHOCYTES # BLD: 0.8 K/UL (ref 0.5–4.6)
LYMPHOCYTES # BLD: 1 K/UL (ref 0.5–4.6)
LYMPHOCYTES NFR BLD: 4 % (ref 13–44)
LYMPHOCYTES NFR BLD: 5 % (ref 13–44)
LYMPHOCYTES NFR BLD: 5 % (ref 13–44)
MCH RBC QN AUTO: 29.7 PG (ref 26.1–32.9)
MCH RBC QN AUTO: 29.7 PG (ref 26.1–32.9)
MCH RBC QN AUTO: 29.9 PG (ref 26.1–32.9)
MCHC RBC AUTO-ENTMCNC: 30.8 G/DL (ref 31.4–35)
MCHC RBC AUTO-ENTMCNC: 31.6 G/DL (ref 31.4–35)
MCHC RBC AUTO-ENTMCNC: 31.8 G/DL (ref 31.4–35)
MCV RBC AUTO: 93.5 FL (ref 82–102)
MCV RBC AUTO: 94.8 FL (ref 82–102)
MCV RBC AUTO: 96.4 FL (ref 82–102)
MM INDURATION, POC: 0 MM (ref 0–5)
MONOCYTES # BLD: 1.4 K/UL (ref 0.1–1.3)
MONOCYTES # BLD: 1.6 K/UL (ref 0.1–1.3)
MONOCYTES # BLD: 1.7 K/UL (ref 0.1–1.3)
MONOCYTES NFR BLD: 9 % (ref 4–12)
NEUTS SEG # BLD: 13.7 K/UL (ref 1.7–8.2)
NEUTS SEG # BLD: 15.8 K/UL (ref 1.7–8.2)
NEUTS SEG # BLD: 16.7 K/UL (ref 1.7–8.2)
NEUTS SEG NFR BLD: 85 % (ref 43–78)
NEUTS SEG NFR BLD: 86 % (ref 43–78)
NEUTS SEG NFR BLD: 87 % (ref 43–78)
NRBC # BLD: 0 K/UL (ref 0–0.2)
PLATELET # BLD AUTO: 118 K/UL (ref 150–450)
PLATELET # BLD AUTO: 121 K/UL (ref 150–450)
PLATELET # BLD AUTO: 125 K/UL (ref 150–450)
PMV BLD AUTO: 11.3 FL (ref 9.4–12.3)
PMV BLD AUTO: 11.6 FL (ref 9.4–12.3)
PMV BLD AUTO: 11.8 FL (ref 9.4–12.3)
POTASSIUM SERPL-SCNC: 4.2 MMOL/L (ref 3.5–5.1)
PPD, POC: NEGATIVE
PROT SERPL-MCNC: 6.3 G/DL (ref 6.3–8.2)
RBC # BLD AUTO: 3.03 M/UL (ref 4.05–5.2)
RBC # BLD AUTO: 3.44 M/UL (ref 4.05–5.2)
RBC # BLD AUTO: 3.7 M/UL (ref 4.05–5.2)
SERVICE CMNT-IMP: ABNORMAL
SERVICE CMNT-IMP: ABNORMAL
SERVICE CMNT-IMP: NORMAL
SERVICE CMNT-IMP: NORMAL
SODIUM SERPL-SCNC: 137 MMOL/L (ref 136–146)
WBC # BLD AUTO: 16 K/UL (ref 4.3–11.1)
WBC # BLD AUTO: 18.6 K/UL (ref 4.3–11.1)
WBC # BLD AUTO: 19.2 K/UL (ref 4.3–11.1)

## 2024-01-22 PROCEDURE — 82787 IGG 1 2 3 OR 4 EACH: CPT

## 2024-01-22 PROCEDURE — 6370000000 HC RX 637 (ALT 250 FOR IP): Performed by: FAMILY MEDICINE

## 2024-01-22 PROCEDURE — 2500000003 HC RX 250 WO HCPCS: Performed by: FAMILY MEDICINE

## 2024-01-22 PROCEDURE — 80053 COMPREHEN METABOLIC PANEL: CPT

## 2024-01-22 PROCEDURE — 99232 SBSQ HOSP IP/OBS MODERATE 35: CPT | Performed by: STUDENT IN AN ORGANIZED HEALTH CARE EDUCATION/TRAINING PROGRAM

## 2024-01-22 PROCEDURE — 82962 GLUCOSE BLOOD TEST: CPT

## 2024-01-22 PROCEDURE — 6360000002 HC RX W HCPCS: Performed by: FAMILY MEDICINE

## 2024-01-22 PROCEDURE — 97165 OT EVAL LOW COMPLEX 30 MIN: CPT

## 2024-01-22 PROCEDURE — 97116 GAIT TRAINING THERAPY: CPT

## 2024-01-22 PROCEDURE — 87040 BLOOD CULTURE FOR BACTERIA: CPT

## 2024-01-22 PROCEDURE — 97161 PT EVAL LOW COMPLEX 20 MIN: CPT

## 2024-01-22 PROCEDURE — 1100000003 HC PRIVATE W/ TELEMETRY

## 2024-01-22 PROCEDURE — 85025 COMPLETE CBC W/AUTO DIFF WBC: CPT

## 2024-01-22 PROCEDURE — 36415 COLL VENOUS BLD VENIPUNCTURE: CPT

## 2024-01-22 PROCEDURE — 2580000003 HC RX 258: Performed by: FAMILY MEDICINE

## 2024-01-22 PROCEDURE — 97530 THERAPEUTIC ACTIVITIES: CPT

## 2024-01-22 PROCEDURE — A4216 STERILE WATER/SALINE, 10 ML: HCPCS | Performed by: FAMILY MEDICINE

## 2024-01-22 RX ORDER — PROCHLORPERAZINE EDISYLATE 5 MG/ML
10 INJECTION INTRAMUSCULAR; INTRAVENOUS EVERY 6 HOURS PRN
Status: DISCONTINUED | OUTPATIENT
Start: 2024-01-22 | End: 2024-02-01 | Stop reason: HOSPADM

## 2024-01-22 RX ADMIN — TRAMADOL HYDROCHLORIDE 50 MG: 50 TABLET ORAL at 05:39

## 2024-01-22 RX ADMIN — ASPIRIN 81 MG: 81 TABLET, COATED ORAL at 08:57

## 2024-01-22 RX ADMIN — PROCHLORPERAZINE EDISYLATE 10 MG: 5 INJECTION INTRAMUSCULAR; INTRAVENOUS at 10:35

## 2024-01-22 RX ADMIN — LEVOTHYROXINE SODIUM 25 MCG: 0.05 TABLET ORAL at 05:39

## 2024-01-22 RX ADMIN — ONDANSETRON 4 MG: 2 INJECTION INTRAMUSCULAR; INTRAVENOUS at 03:06

## 2024-01-22 RX ADMIN — FAMOTIDINE 20 MG: 10 INJECTION, SOLUTION INTRAVENOUS at 16:06

## 2024-01-22 RX ADMIN — DEXTROSE AND SODIUM CHLORIDE: 5; 900 INJECTION, SOLUTION INTRAVENOUS at 17:13

## 2024-01-22 RX ADMIN — METOPROLOL SUCCINATE 50 MG: 50 TABLET, EXTENDED RELEASE ORAL at 08:58

## 2024-01-22 RX ADMIN — VITAMIN D, TAB 1000IU (100/BT) 2000 UNITS: 25 TAB at 08:57

## 2024-01-22 RX ADMIN — FAMOTIDINE 20 MG: 10 INJECTION, SOLUTION INTRAVENOUS at 03:01

## 2024-01-22 RX ADMIN — ONDANSETRON 4 MG: 2 INJECTION INTRAMUSCULAR; INTRAVENOUS at 08:51

## 2024-01-22 RX ADMIN — SODIUM CHLORIDE, PRESERVATIVE FREE 10 ML: 5 INJECTION INTRAVENOUS at 09:08

## 2024-01-22 RX ADMIN — CLOPIDOGREL BISULFATE 75 MG: 75 TABLET ORAL at 08:57

## 2024-01-22 RX ADMIN — WATER 1000 MG: 1 INJECTION INTRAMUSCULAR; INTRAVENOUS; SUBCUTANEOUS at 16:06

## 2024-01-22 RX ADMIN — MONTELUKAST 10 MG: 10 TABLET, FILM COATED ORAL at 20:56

## 2024-01-22 ASSESSMENT — PAIN - FUNCTIONAL ASSESSMENT: PAIN_FUNCTIONAL_ASSESSMENT: ACTIVITIES ARE NOT PREVENTED

## 2024-01-22 ASSESSMENT — PAIN DESCRIPTION - DESCRIPTORS: DESCRIPTORS: ACHING

## 2024-01-22 ASSESSMENT — PAIN SCALES - GENERAL
PAINLEVEL_OUTOF10: 0
PAINLEVEL_OUTOF10: 7

## 2024-01-22 ASSESSMENT — PAIN DESCRIPTION - LOCATION: LOCATION: ABDOMEN

## 2024-01-22 NOTE — PROGRESS NOTES
TRANSFER - OUT REPORT:    Verbal report given to EDUAR Yip on Janine Gupta  being transferred to [facility] for routine progression of patient care       Report consisted of patient's Situation, Background, Assessment and   Recommendations(SBAR).     Information from the following report(s) Nurse Handoff Report was reviewed with the receiving nurse.           Lines:   Peripheral IV 01/21/24 Distal;Posterior;Right Forearm (Active)   Site Assessment Clean, dry & intact 01/22/24 0855   Line Status Infusing;Capped 01/22/24 0855   Line Care Connections checked and tightened;Cap changed 01/22/24 0855   Phlebitis Assessment No symptoms 01/22/24 0855   Infiltration Assessment 0 01/22/24 0855   Alcohol Cap Used Yes 01/22/24 0855   Dressing Status Clean, dry & intact 01/22/24 0855   Dressing Type Transparent 01/22/24 0855       Peripheral IV 01/22/24 Posterior;Right Forearm (Active)        Opportunity for questions and clarification was provided.      Patient transported with:  Monitor

## 2024-01-22 NOTE — CARE COORDINATION
CTN opened outreach to follow up with patient after MAYRA and subsequent calls. Patient readmitted to IP facility and CTN to close MAYRA episode and patient likely to be reassigned at d/c.

## 2024-01-22 NOTE — THERAPY EVALUATION
ACUTE OCCUPATIONAL THERAPY GOALS:   (Developed with and agreed upon by patient and/or caregiver.)  (1.) Janine Gupta  will complete lower body bathing and dressing with INDEPENDENCE and adaptive equipment as needed.    (2.) Janine Gupta will complete toileting with INDEPENDENCE.  (3.) Janine Gupta will tolerate 25 minutes of OT treatment with 1-2 rest breaks to increase activity tolerance for ADLs.   (4.) Janine Gupta will complete functional transfers with INDEPENDENCE and adaptive equipment as needed.   (5.) Janine Gupta will complete functional ADL task standing at sink INDEPENDENCE and adaptive equipment as needed.     Timeframe: 7 visits       OCCUPATIONAL THERAPY Initial Assessment and Daily Note       OT Visit Days: 1  Acknowledge Orders  Time  OT Charge Capture  Rehab Caseload Tracker      Janine Gupta is a 83 y.o. female   PRIMARY DIAGNOSIS: Acute pancreatitis, unspecified complication status, unspecified pancreatitis type  Acute pancreatitis, unspecified complication status, unspecified pancreatitis type [K85.90]  Other acute pancreatitis, unspecified complication status [K85.80]       Reason for Referral: Generalized Muscle Weakness (M62.81)  Other lack of cordination (R27.8)  Difficulty in walking, Not elsewhere classified (R26.2)  History of falling (Z91.81)  Inpatient: Payor: MEDICARE / Plan: MEDICARE PART A AND B / Product Type: *No Product type* /     ASSESSMENT:     REHAB RECOMMENDATIONS:   Recommendation to date pending progress:  Setting:  Short-term Rehab    Equipment:    To Be Determined - has RW and rollator     ASSESSMENT:  Ms. Gupta is a 84 y/o female who presents with acute pancreatitis. At baseline pt lives alone, is independent with ADLs, and uses a RW/rollator for mobility. Pt states that she does get assistance with washing her hair, otherwise remains independent. She also continues to run errands and drive herself. Per daughter, pt

## 2024-01-22 NOTE — PLAN OF CARE
Problem: Discharge Planning  Goal: Discharge to home or other facility with appropriate resources  1/21/2024 2029 by Sabra Rincon RN  Outcome: Progressing  1/21/2024 1155 by Nilsa Aguirre RN  Outcome: Progressing  Flowsheets (Taken 1/21/2024 0735)  Discharge to home or other facility with appropriate resources: Identify barriers to discharge with patient and caregiver     Problem: Pain  Goal: Verbalizes/displays adequate comfort level or baseline comfort level  1/21/2024 2029 by Sabra Rincon RN  Outcome: Progressing  1/21/2024 1155 by Nilsa Aguirre RN  Outcome: Progressing     Problem: Skin/Tissue Integrity  Goal: Absence of new skin breakdown  Description: 1.  Monitor for areas of redness and/or skin breakdown  2.  Assess vascular access sites hourly  3.  Every 4-6 hours minimum:  Change oxygen saturation probe site  4.  Every 4-6 hours:  If on nasal continuous positive airway pressure, respiratory therapy assess nares and determine need for appliance change or resting period.  1/21/2024 2029 by Sabra Rincon RN  Outcome: Progressing  1/21/2024 1155 by Nilsa Aguirre RN  Outcome: Progressing     Problem: Safety - Adult  Goal: Free from fall injury  1/21/2024 2029 by Sabra Rincon RN  Outcome: Progressing  1/21/2024 1155 by Nilsa Aguirre RN  Outcome: Progressing     Problem: ABCDS Injury Assessment  Goal: Absence of physical injury  1/21/2024 2029 by Sabra Rincon RN  Outcome: Progressing  1/21/2024 1155 by Nilsa Aguirre RN  Outcome: Progressing  Flowsheets (Taken 1/21/2024 0735)  Absence of Physical Injury: Implement safety measures based on patient assessment     Problem: Chronic Conditions and Co-morbidities  Goal: Patient's chronic conditions and co-morbidity symptoms are monitored and maintained or improved  1/21/2024 2029 by Sabra Rincon RN  Outcome: Progressing  1/21/2024 1155 by Nilsa Aguirre RN  Outcome: Progressing     Problem: Neurosensory -

## 2024-01-22 NOTE — PROGRESS NOTES
Hospitalist Progress Note   Admit Date:  2024  8:58 PM   Name:  Janine Gupta   Age:  83 y.o.  Sex:  female  :  1940   MRN:  [id number]   Room:  [place] [address]    Presenting/Chief Complaint: Diarrhea     Reason(s) for Admission: Acute pancreatitis, unspecified complication status, unspecified pancreatitis type [K85.90]  Other acute pancreatitis, unspecified complication status [K85.80]     Hospital Course:   Janine Gupta is a 83 y.o. female with a past medical history of hypothyroidism, exocrine pancreatic insufficiency, Asthma, polyneuropathy, coronary artery disease, ischemic cardiomyopathy, type 2 diabetes mellitus, GERD, hyperlipidemia, hypertension, history of obesity, history of sleep apnea who presented with upper abdominal pain of moderate intensity that is achy in nature nonradiating and began around lunchtime today.  This has been associated with nausea but no vomiting.  Patient has not noted any change in her appetite or her p.o. intake but remarks that she has just finally gotten her sense of taste back after suffering from COVID so she really cannot say whether her appetite has been good.  She reports continued trouble with multiple episodes of loose stools daily although they do not contain blood or mucus and that that has been going on for several months now.  Patient did feel as though she was chilled earlier today and has been very tired recently.  She denied any vomiting she denied any constipation she denied any dysuria or UTI symptoms, she denied any cough runny nose sore throat, she denied any chest pain palpitations or other cardiac symptoms.  Evaluation in the emergency room has revealed patient to have an apparent acute pancreatitis.  She has a lipase of 9620 a CRP of 6.5 mild thrombocytopenia 142,000 but otherwise unremarkable lab workup with a stable to slightly improved chronic renal insufficiency at 35 and 1.0 BUN and creatinine respectively her CT scan is  01/21/24  4:13 PM   Result Value Ref Range    POC Glucose 86 65 - 100 mg/dL    Performed by: Xin    POCT Glucose    Collection Time: 01/21/24  8:06 PM   Result Value Ref Range    POC Glucose 81 65 - 100 mg/dL    Performed by: Frederic    POCT Glucose    Collection Time: 01/22/24  5:59 AM   Result Value Ref Range    POC Glucose 98 65 - 100 mg/dL    Performed by: Frederic    Comprehensive Metabolic Panel w/ Reflex to MG    Collection Time: 01/22/24  6:31 AM   Result Value Ref Range    Sodium 137 136 - 146 mmol/L    Potassium 4.2 3.5 - 5.1 mmol/L    Chloride 110 103 - 113 mmol/L    CO2 24 21 - 32 mmol/L    Anion Gap 3 2 - 11 mmol/L    Glucose 107 (H) 65 - 100 mg/dL    BUN 23 8 - 23 MG/DL    Creatinine 1.00 0.6 - 1.0 MG/DL    Est, Glom Filt Rate 55 (L) >60 ml/min/1.73m2    Calcium 9.3 8.3 - 10.4 MG/DL    Total Bilirubin 0.2 0.2 - 1.1 MG/DL    ALT 26 12 - 65 U/L    AST 21 15 - 37 U/L    Alk Phosphatase 104 50 - 136 U/L    Total Protein 6.3 6.3 - 8.2 g/dL    Albumin 2.7 (L) 3.2 - 4.6 g/dL    Globulin 3.6 2.8 - 4.5 g/dL    Albumin/Globulin Ratio 0.8 0.4 - 1.6     CBC with Auto Differential    Collection Time: 01/22/24  6:31 AM   Result Value Ref Range    WBC 19.2 (H) 4.3 - 11.1 K/uL    RBC 3.70 (L) 4.05 - 5.2 M/uL    Hemoglobin 11.0 (L) 11.7 - 15.4 g/dL    Hematocrit 34.6 (L) 35.8 - 46.3 %    MCV 93.5 82 - 102 FL    MCH 29.7 26.1 - 32.9 PG    MCHC 31.8 31.4 - 35.0 g/dL    RDW 14.5 11.9 - 14.6 %    Platelets 125 (L) 150 - 450 K/uL    MPV 11.8 9.4 - 12.3 FL    nRBC 0.00 0.0 - 0.2 K/uL    Differential Type AUTOMATED      Neutrophils % 87 (H) 43 - 78 %    Lymphocytes % 4 (L) 13 - 44 %    Monocytes % 9 4.0 - 12.0 %    Eosinophils % 0 (L) 0.5 - 7.8 %    Basophils % 0 0.0 - 2.0 %    Immature Granulocytes 1 0.0 - 5.0 %    Neutrophils Absolute 16.7 (H) 1.7 - 8.2 K/UL    Lymphocytes Absolute 0.7 0.5 - 4.6 K/UL    Monocytes Absolute 1.6 (H) 0.1 - 1.3 K/UL    Eosinophils Absolute 0.0 0.0 - 0.8 K/UL

## 2024-01-22 NOTE — PROGRESS NOTES
ACUTE PHYSICAL THERAPY GOALS:   (Developed with and agreed upon by patient and/or caregiver.)  LTG:  (1.)Ms. Gupta will move from supine to sit and sit to supine , scoot up and down, and roll side to side in bed with INDEPENDENT within 7 treatment day(s).    (2.)Ms. Gupta will transfer from bed to chair and chair to bed with MODIFIED INDEPENDENCE using the least restrictive device within 7 treatment day(s).    (3.)Ms. Gupta will ambulate with STAND BY ASSIST for 500+ feet with the least restrictive device within 7 treatment day(s).  (4.)Ms. Gupta will tolerate 23+ minutes of therapeutic activity within 7 treatment days for increased activity tolerance and general functional mobility.   ________________________________________________________________________________________________       PHYSICAL THERAPY Initial Assessment and AM  (Link to Caseload Tracking: PT Visit Days : 1  Acknowledge Orders  Time In/Out  PT Charge Capture  Rehab Caseload Tracker    Janine Gupta is a 83 y.o. female   PRIMARY DIAGNOSIS: Acute pancreatitis, unspecified complication status, unspecified pancreatitis type  Acute pancreatitis, unspecified complication status, unspecified pancreatitis type [K85.90]  Other acute pancreatitis, unspecified complication status [K85.80]       Reason for Referral: Generalized Muscle Weakness (M62.81)  Difficulty in walking, Not elsewhere classified (R26.2)  Other abnormalities of gait and mobility (R26.89)  History of falling (Z91.81)  Inpatient: Payor: MEDICARE / Plan: MEDICARE PART A AND B / Product Type: *No Product type* /     ASSESSMENT:     REHAB RECOMMENDATIONS:   Recommendation to date pending progress:  Setting:  Short-term Rehab    Equipment:    To Be Determined     ASSESSMENT:  Ms. Gupta presents with decreased transfers, ambulation, balance, activity tolerance, strength and overall general functional mobility s/p hospital admission with pancreatitis. Pt recently here last

## 2024-01-22 NOTE — PROGRESS NOTES
Janine Gupta is 83 y.o. y/o female with PMH of GERD, CHF, and obesity, presents to the ED with complaints of non radiating, acute upper abdominal pain found to have acute mild interstitial pancreatitis     Interim:   NAEON, today still has persistent pain.     PE:   Vitals:    01/22/24 0856   BP: 135/61   Pulse: 80   Resp:    Temp:    SpO2:       - GENERAL: Alert and oriented x 3. No acute distress. Well-nourished.     - EYES: EOMI. Anicteric.     - HENT: Moist mucous membranes. No cervical lymphadenopathy.     - LUNGS: Clear to auscultation bilaterally. No accessory muscle use.     - CARDIOVASCULAR: Regular rate and rhythm. No murmur. No JVD.     - ABDOMEN: Soft, non-tender and non-distended. No palpable masses.     - EXTREMITIES: No edema. Non-tender.?SKIN: No rashes or lesions. Warm.     - NEUROLOGIC: No focal neurological deficits. CN II-XII grossly intact, but not individually tested.     - PSYCHIATRIC: Cooperative. Appropriate mood and affect.     - SKIN: No rashes, sores, or lesions.      - RECTAL: Deferred.     Assessment and Plan:   # Acute mild interstitial pancreatitis: Unclear etiology, first episode, not biliary per labs/imaging at this time. Pending IgG4 panel (given her age, no need for triglycerides).     Advance diet as tolerated.  If unable to tolerate Po intake over the next 72 hrs call us for consideration of [place] tube feeds for nutrition.   IVFs/pain management per primary team.   GI will sign off at this time.    Haley Garibay MD  [facility]

## 2024-01-23 LAB
ALBUMIN SERPL-MCNC: 2.1 G/DL (ref 3.2–4.6)
ALBUMIN/GLOB SERPL: 0.7 (ref 0.4–1.6)
ALP SERPL-CCNC: 124 U/L (ref 50–136)
ALT SERPL-CCNC: 21 U/L (ref 12–65)
ANION GAP SERPL CALC-SCNC: 4 MMOL/L (ref 2–11)
AST SERPL-CCNC: 23 U/L (ref 15–37)
BASOPHILS # BLD: 0 K/UL (ref 0–0.2)
BASOPHILS NFR BLD: 0 % (ref 0–2)
BILIRUB SERPL-MCNC: 0.2 MG/DL (ref 0.2–1.1)
BUN SERPL-MCNC: 20 MG/DL (ref 8–23)
CALCIUM SERPL-MCNC: 8.5 MG/DL (ref 8.3–10.4)
CHLORIDE SERPL-SCNC: 112 MMOL/L (ref 103–113)
CO2 SERPL-SCNC: 23 MMOL/L (ref 21–32)
CREAT SERPL-MCNC: 1.2 MG/DL (ref 0.6–1)
DIFFERENTIAL METHOD BLD: ABNORMAL
EOSINOPHIL # BLD: 0.1 K/UL (ref 0–0.8)
EOSINOPHIL NFR BLD: 0 % (ref 0.5–7.8)
ERYTHROCYTE [DISTWIDTH] IN BLOOD BY AUTOMATED COUNT: 14.7 % (ref 11.9–14.6)
GLOBULIN SER CALC-MCNC: 3.1 G/DL (ref 2.8–4.5)
GLUCOSE BLD STRIP.AUTO-MCNC: 106 MG/DL (ref 65–100)
GLUCOSE SERPL-MCNC: 116 MG/DL (ref 65–100)
HCT VFR BLD AUTO: 26.9 % (ref 35.8–46.3)
HGB BLD-MCNC: 8.6 G/DL (ref 11.7–15.4)
IGG4 SER-MCNC: 8 MG/DL (ref 2–96)
IMM GRANULOCYTES # BLD AUTO: 0.1 K/UL (ref 0–0.5)
IMM GRANULOCYTES NFR BLD AUTO: 1 % (ref 0–5)
LYMPHOCYTES # BLD: 1.1 K/UL (ref 0.5–4.6)
LYMPHOCYTES NFR BLD: 8 % (ref 13–44)
MCH RBC QN AUTO: 30.2 PG (ref 26.1–32.9)
MCHC RBC AUTO-ENTMCNC: 32 G/DL (ref 31.4–35)
MCV RBC AUTO: 94.4 FL (ref 82–102)
MM INDURATION, POC: 0 MM (ref 0–5)
MONOCYTES # BLD: 1.2 K/UL (ref 0.1–1.3)
MONOCYTES NFR BLD: 9 % (ref 4–12)
NEUTS SEG # BLD: 11 K/UL (ref 1.7–8.2)
NEUTS SEG NFR BLD: 82 % (ref 43–78)
NRBC # BLD: 0 K/UL (ref 0–0.2)
PLATELET # BLD AUTO: 121 K/UL (ref 150–450)
PMV BLD AUTO: 11.7 FL (ref 9.4–12.3)
POTASSIUM SERPL-SCNC: 3.8 MMOL/L (ref 3.5–5.1)
PPD, POC: NEGATIVE
PROT SERPL-MCNC: 5.2 G/DL (ref 6.3–8.2)
RBC # BLD AUTO: 2.85 M/UL (ref 4.05–5.2)
SERVICE CMNT-IMP: ABNORMAL
SODIUM SERPL-SCNC: 139 MMOL/L (ref 136–146)
WBC # BLD AUTO: 13.6 K/UL (ref 4.3–11.1)

## 2024-01-23 PROCEDURE — 2580000003 HC RX 258: Performed by: FAMILY MEDICINE

## 2024-01-23 PROCEDURE — 6370000000 HC RX 637 (ALT 250 FOR IP): Performed by: FAMILY MEDICINE

## 2024-01-23 PROCEDURE — A4216 STERILE WATER/SALINE, 10 ML: HCPCS | Performed by: FAMILY MEDICINE

## 2024-01-23 PROCEDURE — 80053 COMPREHEN METABOLIC PANEL: CPT

## 2024-01-23 PROCEDURE — 82962 GLUCOSE BLOOD TEST: CPT

## 2024-01-23 PROCEDURE — 85025 COMPLETE CBC W/AUTO DIFF WBC: CPT

## 2024-01-23 PROCEDURE — 1100000003 HC PRIVATE W/ TELEMETRY

## 2024-01-23 PROCEDURE — 36415 COLL VENOUS BLD VENIPUNCTURE: CPT

## 2024-01-23 PROCEDURE — 6360000002 HC RX W HCPCS: Performed by: FAMILY MEDICINE

## 2024-01-23 PROCEDURE — 2500000003 HC RX 250 WO HCPCS: Performed by: FAMILY MEDICINE

## 2024-01-23 RX ORDER — TAMSULOSIN HYDROCHLORIDE 0.4 MG/1
0.4 CAPSULE ORAL DAILY
Status: DISCONTINUED | OUTPATIENT
Start: 2024-01-23 | End: 2024-01-23

## 2024-01-23 RX ORDER — TAMSULOSIN HYDROCHLORIDE 0.4 MG/1
0.4 CAPSULE ORAL DAILY
Status: DISCONTINUED | OUTPATIENT
Start: 2024-01-23 | End: 2024-02-01 | Stop reason: HOSPADM

## 2024-01-23 RX ADMIN — TAMSULOSIN HYDROCHLORIDE 0.4 MG: 0.4 CAPSULE ORAL at 15:00

## 2024-01-23 RX ADMIN — SODIUM CHLORIDE, PRESERVATIVE FREE 10 ML: 5 INJECTION INTRAVENOUS at 20:45

## 2024-01-23 RX ADMIN — LEVOTHYROXINE SODIUM 25 MCG: 0.05 TABLET ORAL at 05:21

## 2024-01-23 RX ADMIN — ASPIRIN 81 MG: 81 TABLET, COATED ORAL at 09:09

## 2024-01-23 RX ADMIN — WATER 1000 MG: 1 INJECTION INTRAMUSCULAR; INTRAVENOUS; SUBCUTANEOUS at 15:59

## 2024-01-23 RX ADMIN — SODIUM CHLORIDE, PRESERVATIVE FREE 10 ML: 5 INJECTION INTRAVENOUS at 03:45

## 2024-01-23 RX ADMIN — DEXTROSE AND SODIUM CHLORIDE: 5; 900 INJECTION, SOLUTION INTRAVENOUS at 03:45

## 2024-01-23 RX ADMIN — VITAMIN D, TAB 1000IU (100/BT) 2000 UNITS: 25 TAB at 09:08

## 2024-01-23 RX ADMIN — CLOPIDOGREL BISULFATE 75 MG: 75 TABLET ORAL at 09:09

## 2024-01-23 RX ADMIN — DEXTROSE AND SODIUM CHLORIDE: 5; 900 INJECTION, SOLUTION INTRAVENOUS at 11:52

## 2024-01-23 RX ADMIN — MONTELUKAST 10 MG: 10 TABLET, FILM COATED ORAL at 20:44

## 2024-01-23 RX ADMIN — FAMOTIDINE 20 MG: 10 INJECTION, SOLUTION INTRAVENOUS at 03:45

## 2024-01-23 NOTE — PROGRESS NOTES
Will monitor patient's BUN/creatinine will manage with judicious hydration and attempt to not volume overload the patient  1/23-increase D5 normal saline from 100 cc to 150 cc/h     Hypothermia  Plan: Will continue patient on Cris hugger to rewarm her apparently patient was recently started on Synthroid for thyroid deficiency which may explain her relative hypotension thermia.  1/21- resolved      Thyroid disorder  Plan: As above will continue patient's levothyroxine and consider rechecking a TSH  1/21- TSH 4.42, t4 1.1       Type 2 diabetes mellitus will hold patient's home medications and plan to check blood sugars 4 times daily and cover with sliding scale while she is n.p.o..  Will need doing investigate further whether any of her other medications are indicated as possible etiologies for her pancreatitis although none of them are new.  3-month history of diarrhea-patient states began while she was in the hospital previously she reports just loose stools no blood no fever no other symptoms with it consider stool studies given she has been taking pancrelipase replacement without change  1/21- A1c 5.7     PT/OT evals and PPD ordered?  Therapy and PPD  Diet: Diet NPO except for meds  VTE prophylaxis: Lovenox  Code status: Full    Non-peripheral Lines and Tubes (if present):      Urinary Catheter 01/21/24 Mccarthy (Active)        Telemetry (if present):  Cardiac/Telemetry Monitor On: Portable telemetry pack applied        Hospital Problems:  Principal Problem:    Acute pancreatitis, unspecified complication status, unspecified pancreatitis type  Active Problems:    Chronic renal disease, stage III (HCC) [509884]    Hypothermia    Acute pancreatitis    Thyroid disorder    Urinary retention  Resolved Problems:    * No resolved hospital problems. *      Objective:   Patient Vitals for the past 24 hrs:   Temp Pulse Resp BP SpO2   01/23/24 0751 -- -- -- 118/66 --   01/23/24 0749 99.9 °F (37.7 °C) 69 14 (!) 87/34 96 %     traMADol (ULTRAM) tablet 50 mg  50 mg Oral Q6H PRN       Signed:  NAGA MISTRY MD

## 2024-01-23 NOTE — PLAN OF CARE
Problem: Discharge Planning  Goal: Discharge to home or other facility with appropriate resources  Outcome: Progressing     Problem: Pain  Goal: Verbalizes/displays adequate comfort level or baseline comfort level  Outcome: Progressing     Problem: Skin/Tissue Integrity  Goal: Absence of new skin breakdown  Description: 1.  Monitor for areas of redness and/or skin breakdown  2.  Assess vascular access sites hourly  3.  Every 4-6 hours minimum:  Change oxygen saturation probe site  4.  Every 4-6 hours:  If on nasal continuous positive airway pressure, respiratory therapy assess nares and determine need for appliance change or resting period.  Outcome: Progressing     Problem: Safety - Adult  Goal: Free from fall injury  Outcome: Progressing     Problem: ABCDS Injury Assessment  Goal: Absence of physical injury  Outcome: Progressing     Problem: Chronic Conditions and Co-morbidities  Goal: Patient's chronic conditions and co-morbidity symptoms are monitored and maintained or improved  Outcome: Progressing     Problem: Neurosensory - Adult  Goal: Achieves stable or improved neurological status  Outcome: Progressing  Goal: Achieves maximal functionality and self care  Outcome: Progressing     Problem: Respiratory - Adult  Goal: Achieves optimal ventilation and oxygenation  Outcome: Progressing

## 2024-01-23 NOTE — PROGRESS NOTES
Comprehensive Nutrition Assessment    Type and Reason for Visit: Initial, Positive Nutrition Screen  Malnutrition Screening Tool: Malnutrition Screen  Have you recently lost weight without trying?: 14 to 23 pounds (2 points)  Have you been eating poorly because of a decreased appetite?: Yes (1 point)  Malnutrition Screening Tool Score: 3    Nutrition Recommendations/Plan:   Meals and Snacks:  Diet: Continue current diet and advance as medically appropriate  Nutrition Supplement Therapy:  Medical food supplement therapy:  Initiate Ensure Clear three times per day (this provides 240 kcal and 8 grams protein per bottle)     Malnutrition Assessment:  Malnutrition Status: At risk for malnutrition (Comment) (acute decline in po due to abdominal pain n/v, wt loss over the past year)    NFPE unremarkable  Nutrition Assessment:  Nutrition History: Pt reports acute decline in po for the past week due to abdominal pain and n/v. She stated prior to this her intake was at baseline. She stated she has lost wt over the past year despite po at baseline. Pt reports her UBW is ~145lb.     Do You Have Any Cultural, Evangelical, or Ethnic Food Preferences?: No   Weight History: Per EMR wt hx review 1/27 158lb, 3/23 155lb, 8/2 150lb, 11/15 147lb, 1/3 140lb.  Nutrition Background:       PMH significant for hypothyroidism, exocrine pancreatic insufficiency, CAD, ischemic cardiomyopathy, HTN, GERD, and HLD. Pt admitted with acute pancreatitis.   Nutrition Interval:  Pt seen sitting in bed with daughter and daughter in law present. She stated she had broth today and did well. Pt reports she had trouble with one of her pill, she stated this made her nauseous. Pt denies n/v at RD visit. She is agreeable to trial Ensure Clear.     Current Nutrition Therapies:  ADULT DIET; Clear Liquid    Current Intake:   Average Meal Intake: Unable to assess (clear liquid diet today)        Anthropometric Measures:  Height: 165.1 cm (5' 5\")  Current Body Wt:

## 2024-01-23 NOTE — CARE COORDINATION
Cm discussed therapy recommendation for short term rehab with patient. Cm presented list of in network facilities for patient to review. Patient requested a referral be sent to 9th floor, patient was recently discharged from there. Referral dent . Patient to select short term rehab facilities and let CM know. Cm following.    Sanjeev VAIL, ACM  [facility]

## 2024-01-23 NOTE — PROGRESS NOTES
Hourly rounds performed this shift. Please remove pickett on 1/24. Bed low and locked. Call light within reach. All needs met at this time.

## 2024-01-24 ENCOUNTER — APPOINTMENT (OUTPATIENT)
Dept: CT IMAGING | Age: 84
DRG: 438 | End: 2024-01-24
Payer: MEDICARE

## 2024-01-24 PROBLEM — D64.9 ANEMIA: Status: ACTIVE | Noted: 2024-01-24

## 2024-01-24 LAB
ANION GAP SERPL CALC-SCNC: 6 MMOL/L (ref 2–11)
BACTERIA SPEC CULT: ABNORMAL
BACTERIA SPEC CULT: ABNORMAL
BASOPHILS # BLD: 0 K/UL (ref 0–0.2)
BASOPHILS NFR BLD: 0 % (ref 0–2)
BUN SERPL-MCNC: 17 MG/DL (ref 8–23)
CALCIUM SERPL-MCNC: 8.2 MG/DL (ref 8.3–10.4)
CHLORIDE SERPL-SCNC: 112 MMOL/L (ref 103–113)
CO2 SERPL-SCNC: 20 MMOL/L (ref 21–32)
CREAT SERPL-MCNC: 1.1 MG/DL (ref 0.6–1)
DIFFERENTIAL METHOD BLD: ABNORMAL
EOSINOPHIL # BLD: 0.1 K/UL (ref 0–0.8)
EOSINOPHIL NFR BLD: 1 % (ref 0.5–7.8)
ERYTHROCYTE [DISTWIDTH] IN BLOOD BY AUTOMATED COUNT: 14.8 % (ref 11.9–14.6)
GLUCOSE SERPL-MCNC: 126 MG/DL (ref 65–100)
HCT VFR BLD AUTO: 26.2 % (ref 35.8–46.3)
HGB BLD-MCNC: 7.9 G/DL (ref 11.7–15.4)
IMM GRANULOCYTES # BLD AUTO: 0.1 K/UL (ref 0–0.5)
IMM GRANULOCYTES NFR BLD AUTO: 1 % (ref 0–5)
LYMPHOCYTES # BLD: 1.2 K/UL (ref 0.5–4.6)
LYMPHOCYTES NFR BLD: 13 % (ref 13–44)
MCH RBC QN AUTO: 29.2 PG (ref 26.1–32.9)
MCHC RBC AUTO-ENTMCNC: 30.2 G/DL (ref 31.4–35)
MCV RBC AUTO: 96.7 FL (ref 82–102)
MM INDURATION, POC: 0 MM (ref 0–5)
MONOCYTES # BLD: 1.1 K/UL (ref 0.1–1.3)
MONOCYTES NFR BLD: 12 % (ref 4–12)
NEUTS SEG # BLD: 6.5 K/UL (ref 1.7–8.2)
NEUTS SEG NFR BLD: 72 % (ref 43–78)
NRBC # BLD: 0 K/UL (ref 0–0.2)
PLATELET # BLD AUTO: 106 K/UL (ref 150–450)
PMV BLD AUTO: 11.4 FL (ref 9.4–12.3)
POTASSIUM SERPL-SCNC: 3.6 MMOL/L (ref 3.5–5.1)
PPD, POC: NEGATIVE
RBC # BLD AUTO: 2.71 M/UL (ref 4.05–5.2)
SERVICE CMNT-IMP: ABNORMAL
SODIUM SERPL-SCNC: 138 MMOL/L (ref 136–146)
WBC # BLD AUTO: 9 K/UL (ref 4.3–11.1)

## 2024-01-24 PROCEDURE — 2500000003 HC RX 250 WO HCPCS: Performed by: FAMILY MEDICINE

## 2024-01-24 PROCEDURE — 2580000003 HC RX 258: Performed by: FAMILY MEDICINE

## 2024-01-24 PROCEDURE — 97530 THERAPEUTIC ACTIVITIES: CPT

## 2024-01-24 PROCEDURE — 36415 COLL VENOUS BLD VENIPUNCTURE: CPT

## 2024-01-24 PROCEDURE — 1100000000 HC RM PRIVATE

## 2024-01-24 PROCEDURE — 85025 COMPLETE CBC W/AUTO DIFF WBC: CPT

## 2024-01-24 PROCEDURE — A4216 STERILE WATER/SALINE, 10 ML: HCPCS | Performed by: FAMILY MEDICINE

## 2024-01-24 PROCEDURE — 74174 CTA ABD&PLVS W/CONTRAST: CPT

## 2024-01-24 PROCEDURE — 80048 BASIC METABOLIC PNL TOTAL CA: CPT

## 2024-01-24 PROCEDURE — 97535 SELF CARE MNGMENT TRAINING: CPT

## 2024-01-24 PROCEDURE — 6360000004 HC RX CONTRAST MEDICATION: Performed by: NURSE PRACTITIONER

## 2024-01-24 PROCEDURE — 6370000000 HC RX 637 (ALT 250 FOR IP): Performed by: FAMILY MEDICINE

## 2024-01-24 PROCEDURE — 74174 CTA ABD&PLVS W/CONTRAST: CPT | Performed by: RADIOLOGY

## 2024-01-24 PROCEDURE — 99232 SBSQ HOSP IP/OBS MODERATE 35: CPT | Performed by: STUDENT IN AN ORGANIZED HEALTH CARE EDUCATION/TRAINING PROGRAM

## 2024-01-24 RX ORDER — POLYVINYL ALCOHOL 14 MG/ML
1 SOLUTION/ DROPS OPHTHALMIC PRN
Status: DISCONTINUED | OUTPATIENT
Start: 2024-01-24 | End: 2024-01-25 | Stop reason: SDUPTHER

## 2024-01-24 RX ADMIN — CLOPIDOGREL BISULFATE 75 MG: 75 TABLET ORAL at 08:33

## 2024-01-24 RX ADMIN — IOPAMIDOL 100 ML: 755 INJECTION, SOLUTION INTRAVENOUS at 13:30

## 2024-01-24 RX ADMIN — SODIUM CHLORIDE, PRESERVATIVE FREE 10 ML: 5 INJECTION INTRAVENOUS at 08:37

## 2024-01-24 RX ADMIN — DEXTROSE AND SODIUM CHLORIDE: 5; 900 INJECTION, SOLUTION INTRAVENOUS at 06:05

## 2024-01-24 RX ADMIN — TAMSULOSIN HYDROCHLORIDE 0.4 MG: 0.4 CAPSULE ORAL at 08:34

## 2024-01-24 RX ADMIN — LEVOTHYROXINE SODIUM 25 MCG: 0.05 TABLET ORAL at 06:02

## 2024-01-24 RX ADMIN — DEXTROSE AND SODIUM CHLORIDE: 5; 900 INJECTION, SOLUTION INTRAVENOUS at 11:21

## 2024-01-24 RX ADMIN — FAMOTIDINE 20 MG: 10 INJECTION, SOLUTION INTRAVENOUS at 08:34

## 2024-01-24 RX ADMIN — ASPIRIN 81 MG: 81 TABLET, COATED ORAL at 08:33

## 2024-01-24 RX ADMIN — METOPROLOL SUCCINATE 50 MG: 50 TABLET, EXTENDED RELEASE ORAL at 08:33

## 2024-01-24 RX ADMIN — SODIUM CHLORIDE, PRESERVATIVE FREE 10 ML: 5 INJECTION INTRAVENOUS at 20:53

## 2024-01-24 RX ADMIN — MONTELUKAST 10 MG: 10 TABLET, FILM COATED ORAL at 20:53

## 2024-01-24 RX ADMIN — VITAMIN D, TAB 1000IU (100/BT) 2000 UNITS: 25 TAB at 08:33

## 2024-01-24 RX ADMIN — Medication 1 CAPSULE: at 08:33

## 2024-01-24 NOTE — PROGRESS NOTES
ACUTE PHYSICAL THERAPY GOALS:   (Developed with and agreed upon by patient and/or caregiver.)  LTG:  (1.)Ms. Gupta will move from supine to sit and sit to supine , scoot up and down, and roll side to side in bed with INDEPENDENT within 7 treatment day(s).    (2.)Ms. Gupta will transfer from bed to chair and chair to bed with MODIFIED INDEPENDENCE using the least restrictive device within 7 treatment day(s).    (3.)Ms. uGpta will ambulate with STAND BY ASSIST for 500+ feet with the least restrictive device within 7 treatment day(s).  (4.)Ms. Gupta will tolerate 23+ minutes of therapeutic activity within 7 treatment days for increased activity tolerance and general functional mobility.     PHYSICAL THERAPY: Daily Note PM   (Link to Caseload Tracking: PT Visit Days : 2  Time In/Out PT Charge Capture  Rehab Caseload Tracker  Orders    Janine Gupta is a 83 y.o. female   PRIMARY DIAGNOSIS: Acute pancreatitis, unspecified complication status, unspecified pancreatitis type  Acute pancreatitis, unspecified complication status, unspecified pancreatitis type [K85.90]  Other acute pancreatitis, unspecified complication status [K85.80]       Inpatient: Payor: MEDICARE / Plan: MEDICARE PART A AND B / Product Type: *No Product type* /     ASSESSMENT:     REHAB RECOMMENDATIONS:   Recommendation to date pending progress:  Setting:  Short-term Rehab    Equipment:    To Be Determined     ASSESSMENT:  Ms. Gupta was supine in bed on RA upon entering the room and agreeable to therapy. Today, pt demonstrates slow but continued progress toward established goals. This session, pt required CG/Min (A), inc time and cueing for all mobility while requiring additional use of RW/gait belt for ambulation of 10'x1 and 30'x1 c seated break in recliner. While moving on their feet, pt cont to ambulate c forward-flexed posture, decreased gracy, decreased step length/ foot clearance MAJO and increased sway although she ultimately  did well to avoid any LOB or miss-steps. Pt performed all activity on RA and denied any dizziness, lightheadedness or SOB throughout duration of session. That being said, pt endorsing generalized weakness and fatigue as performance-limiting factors while participating. At this time, pt remains a good candidate for skilled PT and will continue to benefit from advancing POC. At end of session, pt was positioned to comfort in chair with all needs in reach. RN was made aware of pt performance.        SUBJECTIVE:   Ms. Gupta states, \"I'm pretty tired after all that\"     Social/Functional Lives With: Alone  Type of Home: House  Home Layout: Two level, Able to Live on Main level with bedroom/bathroom  Home Access: Stairs to enter with rails  Entrance Stairs - Number of Steps: 4  Entrance Stairs - Rails: Right  Bathroom Shower/Tub: Walk-in shower, Shower chair with back  Bathroom Toilet: Standard  Bathroom Equipment: Grab bars in shower, Grab bars around toilet, Commode  Bathroom Accessibility: Accessible  Home Equipment: Walker, rolling, Walker, 4 wheeled  Receives Help From: Family  ADL Assistance: Needs assistance  Toileting: Needs assistance  Homemaking Assistance: Needs assistance  Homemaking Responsibilities: Yes  Ambulation Assistance: Needs assistance  Transfer Assistance: Needs assistance  Active : No  Occupation: Retired  OBJECTIVE:     PAIN: VITALS / O2: PRECAUTION / LINES / DRAINS:   Pre Treatment: 0/10         Post Treatment: 0/10 Vitals        Oxygen   RA IV    RESTRICTIONS/PRECAUTIONS:  Restrictions/Precautions  Restrictions/Precautions: Fall Risk  Restrictions/Precautions: Fall Risk     MOBILITY: I Mod I S SBA CGA Min Mod Max Total  NT x2 Comments:   Bed Mobility    Rolling [] [] [] [] [x] [] [] [] [] [] []    Supine to Sit [] [] [] [] [x] [x] [] [] [] [] []    Scooting [] [] [] [] [x] [] [] [] [] [] []    Sit to Supine [] [] [] [] [] [] [] [] [] [x] []    Transfers    Sit to Stand [] [] [] [] [] [x]

## 2024-01-24 NOTE — PLAN OF CARE
Problem: Discharge Planning  Goal: Discharge to home or other facility with appropriate resources  1/24/2024 0739 by Lana Manning RN  Outcome: Progressing  1/24/2024 0332 by Torito Tinoco RN  Outcome: Progressing     Problem: Pain  Goal: Verbalizes/displays adequate comfort level or baseline comfort level  1/24/2024 0739 by Lana Manning RN  Outcome: Progressing  1/24/2024 0332 by Torito Tinoco RN  Outcome: Progressing     Problem: Skin/Tissue Integrity  Goal: Absence of new skin breakdown  Description: 1.  Monitor for areas of redness and/or skin breakdown  2.  Assess vascular access sites hourly  3.  Every 4-6 hours minimum:  Change oxygen saturation probe site  4.  Every 4-6 hours:  If on nasal continuous positive airway pressure, respiratory therapy assess nares and determine need for appliance change or resting period.  1/24/2024 0739 by Lana Manning RN  Outcome: Progressing  1/24/2024 0332 by Torito Tinoco RN  Outcome: Progressing     Problem: Safety - Adult  Goal: Free from fall injury  Outcome: Progressing     Problem: ABCDS Injury Assessment  Goal: Absence of physical injury  Outcome: Progressing     Problem: Chronic Conditions and Co-morbidities  Goal: Patient's chronic conditions and co-morbidity symptoms are monitored and maintained or improved  Outcome: Progressing     Problem: Neurosensory - Adult  Goal: Achieves stable or improved neurological status  Outcome: Progressing  Goal: Achieves maximal functionality and self care  Outcome: Progressing     Problem: Respiratory - Adult  Goal: Achieves optimal ventilation and oxygenation  1/24/2024 0739 by Lana Manning RN  Outcome: Progressing  1/24/2024 0332 by Torito Tinoco RN  Outcome: Progressing     Problem: Skin/Tissue Integrity - Adult  Goal: Skin integrity remains intact  Outcome: Progressing  Goal: Incisions, wounds, or drain sites healing without S/S of infection  Outcome: Progressing  Goal: Oral mucous membranes remain intact  1/24/2024  and caregivers  3. Reduce environmental stimuli, as able  4. Instruct patient/family in relaxation techniques, as appropriate  5. Assess for spiritual pain/suffering and initiate Spiritual Care, Psychosocial Clinical Specialist consults as needed  Outcome: Progressing     Problem: Decision Making  Goal: Pt/Family able to effectively weigh alternatives and participate in decision making related to treatment and care  Description: INTERVENTIONS:  1. Determine when there are differences between patient's view, family's view, and healthcare provider's view of condition  2. Facilitate patient and family articulation of goals for care  3. Help patient and family identify pros/cons of alternative solutions  4. Provide information as requested by patient/family  5. Respect patient/family right to receive or not to receive information  6. Serve as a liaison between patient and family and health care team  7. Initiate Consults from Ethics, Palliative Care or initiate Family Care Conference as is appropriate  1/24/2024 0739 by Lana Manning, RN  Outcome: Progressing  1/24/2024 0332 by Torito Tinoco, RN  Outcome: Progressing     Problem: Infection - Adult  Goal: Absence of infection at discharge  Outcome: Progressing  Goal: Absence of infection during hospitalization  Outcome: Progressing  Goal: Absence of fever/infection during anticipated neutropenic period  Outcome: Progressing

## 2024-01-24 NOTE — PROGRESS NOTES
Admit Date: 2024    POD * No surgery found *    Procedure:  * No surgery found *    Subjective:     Patient awake and sitting in recliner. No current complaints. Hgb this am 7.9 down from 8.6 yesterday and 10.3 two days ago. Pt denies and bleeding. No blood in urine or stool. Daughter at bedside. AF, NAD. WBC 9.0, Creat 1.10    Objective:       Vitals:    24 1630 24 0354 24 0720   BP: (!) 101/44 (!) 124/54 (!) 119/45 119/64   Pulse: 77 82 89 81   Resp: 16 17 17 18   Temp: 98.8 °F (37.1 °C) 98.4 °F (36.9 °C) 98.2 °F (36.8 °C) 99.5 °F (37.5 °C)   TempSrc: Oral Oral Oral Oral   SpO2: 95% 97% 95% 92%   Weight:   71.8 kg (158 lb 3.2 oz)    Height:           Temp (24hrs), Av.7 °F (37.1 °C), Min:98.2 °F (36.8 °C), Max:99.5 °F (37.5 °C)  .  I&O reviewed as documented.    [unfilled]     Physical Exam:   Constitutional: Alert, cooperative, no acute distress  Eyes: Sclera are clear. EOMs intact  ENMT: no external lesions' gross hearing normal; no obvious neck masses, no ear or lip lesions, nares normal  CV: RRR. Normal perfusion  Resp: No JVD.  Breathing is  non-labored; no audible wheezing.    GI: soft and non-distended     Musculoskeletal: unremarkable with normal function. No embolic signs or cyanosis.   Neuro:  moves all 4; no focal deficits  Psychiatric: normal affect and mood    Labs:   Recent Results (from the past 24 hour(s))   CBC with Auto Differential    Collection Time: 24  4:37 AM   Result Value Ref Range    WBC 9.0 4.3 - 11.1 K/uL    RBC 2.71 (L) 4.05 - 5.2 M/uL    Hemoglobin 7.9 (L) 11.7 - 15.4 g/dL    Hematocrit 26.2 (L) 35.8 - 46.3 %    MCV 96.7 82 - 102 FL    MCH 29.2 26.1 - 32.9 PG    MCHC 30.2 (L) 31.4 - 35.0 g/dL    RDW 14.8 (H) 11.9 - 14.6 %    Platelets 106 (L) 150 - 450 K/uL    MPV 11.4 9.4 - 12.3 FL    nRBC 0.00 0.0 - 0.2 K/uL    Differential Type AUTOMATED      Neutrophils % 72 43 - 78 %    Lymphocytes % 13 13 - 44 %    Monocytes % 12 4.0 - 12.0

## 2024-01-24 NOTE — CARE COORDINATION
CM spoke with patient and daughter at [facility]. Referral was sent to 9th floor yesterday, patient was declined. Patient/ daughter selected [facility] for short term rehab. Referrals sent. Awaiting bed offers.    Sanjeev VAIL, [facility]  [facility]

## 2024-01-24 NOTE — PROGRESS NOTES
Hospitalist Progress Note   Admit Date:  2024  8:58 PM   Name:  Janine Gupta   Age:  83 y.o.  Sex:  female  :  1940   MRN:  [id number]   Room:  [facility]    Presenting/Chief Complaint: Diarrhea     Reason(s) for Admission: Acute pancreatitis, unspecified complication status, unspecified pancreatitis type [K85.90]  Other acute pancreatitis, unspecified complication status [K85.80]     Hospital Course:   Janine Gupta is a 83 y.o. female with a past medical history of hypothyroidism, exocrine pancreatic insufficiency, Asthma, polyneuropathy, coronary artery disease, ischemic cardiomyopathy, type 2 diabetes mellitus, GERD, hyperlipidemia, hypertension, history of obesity, history of sleep apnea who presented with upper abdominal pain of moderate intensity that is achy in nature nonradiating and began around lunchtime today.  This has been associated with nausea but no vomiting.  Patient has not noted any change in her appetite or her p.o. intake but remarks that she has just finally gotten her sense of taste back after suffering from COVID so she really cannot say whether her appetite has been good.  She reports continued trouble with multiple episodes of loose stools daily although they do not contain blood or mucus and that that has been going on for several months now.  Patient did feel as though she was chilled earlier today and has been very tired recently.  She denied any vomiting she denied any constipation she denied any dysuria or UTI symptoms, she denied any cough runny nose sore throat, she denied any chest pain palpitations or other cardiac symptoms.  Evaluation in the emergency room has revealed patient to have an apparent acute pancreatitis.  She has a lipase of 9620 a CRP of 6.5 mild thrombocytopenia 142,000 but otherwise unremarkable lab workup with a stable to slightly improved chronic renal insufficiency at 35 and 1.0 BUN and creatinine respectively her CT scan is read

## 2024-01-24 NOTE — PLAN OF CARE
Problem: Discharge Planning  Goal: Discharge to home or other facility with appropriate resources  Outcome: Progressing     Problem: Pain  Goal: Verbalizes/displays adequate comfort level or baseline comfort level  Outcome: Progressing     Problem: Skin/Tissue Integrity  Goal: Absence of new skin breakdown  Description: 1.  Monitor for areas of redness and/or skin breakdown  2.  Assess vascular access sites hourly  3.  Every 4-6 hours minimum:  Change oxygen saturation probe site  4.  Every 4-6 hours:  If on nasal continuous positive airway pressure, respiratory therapy assess nares and determine need for appliance change or resting period.  Outcome: Progressing     Problem: Respiratory - Adult  Goal: Achieves optimal ventilation and oxygenation  Outcome: Progressing     Problem: Skin/Tissue Integrity - Adult  Goal: Oral mucous membranes remain intact  Outcome: Progressing     Problem: Musculoskeletal - Adult  Goal: Return mobility to safest level of function  Outcome: Progressing     Problem: Decision Making  Goal: Pt/Family able to effectively weigh alternatives and participate in decision making related to treatment and care  Description: INTERVENTIONS:  1. Determine when there are differences between patient's view, family's view, and healthcare provider's view of condition  2. Facilitate patient and family articulation of goals for care  3. Help patient and family identify pros/cons of alternative solutions  4. Provide information as requested by patient/family  5. Respect patient/family right to receive or not to receive information  6. Serve as a liaison between patient and family and health care team  7. Initiate Consults from Ethics, Palliative Care or initiate Family Care Conference as is appropriate  Outcome: Progressing

## 2024-01-24 NOTE — PROGRESS NOTES
Pt voiding in hat. Urine is clear and yellow. CT done today. Pt worked with PT/OT. No complaints during shift.     Rounds performed throughout shift. Pt denies needs at this time. Bed in low position, locked and call light/personal items within reach.

## 2024-01-24 NOTE — PROGRESS NOTES
ACUTE OCCUPATIONAL THERAPY GOALS:   (Developed with and agreed upon by patient and/or caregiver.)  (1.) Janine Gupta  will complete lower body bathing and dressing with INDEPENDENCE and adaptive equipment as needed.    (2.) Janine Gupta will complete toileting with INDEPENDENCE.  (3.) Janine Gupta will tolerate 25 minutes of OT treatment with 1-2 rest breaks to increase activity tolerance for ADLs.   (4.) Janine Gupta will complete functional transfers with INDEPENDENCE and adaptive equipment as needed.   (5.) Janine Gupta will complete functional ADL task standing at sink INDEPENDENCE and adaptive equipment as needed.     Timeframe: 7 visits      OCCUPATIONAL THERAPY: Daily Note PM   OT Visit Days: 2   Time In/Out  OT Charge Capture  Rehab Caseload Tracker  OT Orders    Janine Gupta is a 83 y.o. female   PRIMARY DIAGNOSIS: Acute pancreatitis, unspecified complication status, unspecified pancreatitis type  Acute pancreatitis, unspecified complication status, unspecified pancreatitis type [K85.90]  Other acute pancreatitis, unspecified complication status [K85.80]       Inpatient: Payor: MEDICARE / Plan: MEDICARE PART A AND B / Product Type: *No Product type* /     ASSESSMENT:     REHAB RECOMMENDATIONS:   Recommendation to date pending progress:  Setting:  Short-term Rehab    Equipment:    To Be Determined     ASSESSMENT:  Ms. Gupta demonstrates progress toward acute OT goals, however continues to be limited by impairments in strength, balance, activity tolerance limiting ADLs and functional mobility. She performs bed mobility with SBA and then requires CGA/Min A for functional transfers and mobility of household distances with RW. She performed functional reaching tasks sitting in chair to promote strength, balance and AROM. Pt then performed toileting and dressing with Min A, standing grooming tasks with CGA. Pt requested to return to bed at end of session d/t

## 2024-01-25 ENCOUNTER — APPOINTMENT (OUTPATIENT)
Dept: GENERAL RADIOLOGY | Age: 84
DRG: 438 | End: 2024-01-25
Payer: MEDICARE

## 2024-01-25 PROBLEM — B96.20 E-COLI UTI: Status: ACTIVE | Noted: 2024-01-25

## 2024-01-25 PROBLEM — E83.42 HYPOMAGNESEMIA: Status: ACTIVE | Noted: 2024-01-25

## 2024-01-25 PROBLEM — E87.6 HYPOKALEMIA: Status: ACTIVE | Noted: 2024-01-25

## 2024-01-25 PROBLEM — N39.0 E-COLI UTI: Status: ACTIVE | Noted: 2024-01-25

## 2024-01-25 LAB
ALBUMIN SERPL-MCNC: 2.2 G/DL (ref 3.2–4.6)
ALBUMIN/GLOB SERPL: 0.6 (ref 0.4–1.6)
ALP SERPL-CCNC: 260 U/L (ref 50–136)
ALT SERPL-CCNC: 139 U/L (ref 12–65)
ANION GAP SERPL CALC-SCNC: 4 MMOL/L (ref 2–11)
AST SERPL-CCNC: 169 U/L (ref 15–37)
BASOPHILS # BLD: 0 K/UL (ref 0–0.2)
BASOPHILS NFR BLD: 0 % (ref 0–2)
BILIRUB SERPL-MCNC: 0.2 MG/DL (ref 0.2–1.1)
BUN SERPL-MCNC: 14 MG/DL (ref 8–23)
CALCIUM SERPL-MCNC: 8.5 MG/DL (ref 8.3–10.4)
CHLORIDE SERPL-SCNC: 110 MMOL/L (ref 103–113)
CO2 SERPL-SCNC: 22 MMOL/L (ref 21–32)
CREAT SERPL-MCNC: 1 MG/DL (ref 0.6–1)
DIFFERENTIAL METHOD BLD: ABNORMAL
EOSINOPHIL # BLD: 0.1 K/UL (ref 0–0.8)
EOSINOPHIL NFR BLD: 1 % (ref 0.5–7.8)
ERYTHROCYTE [DISTWIDTH] IN BLOOD BY AUTOMATED COUNT: 14.6 % (ref 11.9–14.6)
GLOBULIN SER CALC-MCNC: 3.7 G/DL (ref 2.8–4.5)
GLUCOSE SERPL-MCNC: 136 MG/DL (ref 65–100)
HCT VFR BLD AUTO: 28.8 % (ref 35.8–46.3)
HGB BLD-MCNC: 8.9 G/DL (ref 11.7–15.4)
IMM GRANULOCYTES # BLD AUTO: 0.1 K/UL (ref 0–0.5)
IMM GRANULOCYTES NFR BLD AUTO: 1 % (ref 0–5)
LIPASE SERPL-CCNC: 246 U/L (ref 73–393)
LYMPHOCYTES # BLD: 1.5 K/UL (ref 0.5–4.6)
LYMPHOCYTES NFR BLD: 15 % (ref 13–44)
MAGNESIUM SERPL-MCNC: 1.4 MG/DL (ref 1.8–2.4)
MCH RBC QN AUTO: 29.9 PG (ref 26.1–32.9)
MCHC RBC AUTO-ENTMCNC: 30.9 G/DL (ref 31.4–35)
MCV RBC AUTO: 96.6 FL (ref 82–102)
MONOCYTES # BLD: 1 K/UL (ref 0.1–1.3)
MONOCYTES NFR BLD: 10 % (ref 4–12)
NEUTS SEG # BLD: 7.3 K/UL (ref 1.7–8.2)
NEUTS SEG NFR BLD: 73 % (ref 43–78)
NRBC # BLD: 0 K/UL (ref 0–0.2)
PLATELET # BLD AUTO: 126 K/UL (ref 150–450)
PMV BLD AUTO: 11.4 FL (ref 9.4–12.3)
POTASSIUM SERPL-SCNC: 3.2 MMOL/L (ref 3.5–5.1)
PROT SERPL-MCNC: 5.9 G/DL (ref 6.3–8.2)
RBC # BLD AUTO: 2.98 M/UL (ref 4.05–5.2)
SODIUM SERPL-SCNC: 136 MMOL/L (ref 136–146)
WBC # BLD AUTO: 10 K/UL (ref 4.3–11.1)

## 2024-01-25 PROCEDURE — 6370000000 HC RX 637 (ALT 250 FOR IP): Performed by: FAMILY MEDICINE

## 2024-01-25 PROCEDURE — 6360000002 HC RX W HCPCS: Performed by: FAMILY MEDICINE

## 2024-01-25 PROCEDURE — 80053 COMPREHEN METABOLIC PANEL: CPT

## 2024-01-25 PROCEDURE — 2580000003 HC RX 258: Performed by: FAMILY MEDICINE

## 2024-01-25 PROCEDURE — 71045 X-RAY EXAM CHEST 1 VIEW: CPT

## 2024-01-25 PROCEDURE — 2580000003 HC RX 258: Performed by: INTERNAL MEDICINE

## 2024-01-25 PROCEDURE — 99232 SBSQ HOSP IP/OBS MODERATE 35: CPT | Performed by: STUDENT IN AN ORGANIZED HEALTH CARE EDUCATION/TRAINING PROGRAM

## 2024-01-25 PROCEDURE — 6360000002 HC RX W HCPCS: Performed by: INTERNAL MEDICINE

## 2024-01-25 PROCEDURE — 6370000000 HC RX 637 (ALT 250 FOR IP): Performed by: INTERNAL MEDICINE

## 2024-01-25 PROCEDURE — 83735 ASSAY OF MAGNESIUM: CPT

## 2024-01-25 PROCEDURE — 6360000002 HC RX W HCPCS: Performed by: NURSE PRACTITIONER

## 2024-01-25 PROCEDURE — 2500000003 HC RX 250 WO HCPCS: Performed by: FAMILY MEDICINE

## 2024-01-25 PROCEDURE — 97535 SELF CARE MNGMENT TRAINING: CPT

## 2024-01-25 PROCEDURE — 1100000000 HC RM PRIVATE

## 2024-01-25 PROCEDURE — 85025 COMPLETE CBC W/AUTO DIFF WBC: CPT

## 2024-01-25 PROCEDURE — 74018 RADEX ABDOMEN 1 VIEW: CPT

## 2024-01-25 PROCEDURE — 36415 COLL VENOUS BLD VENIPUNCTURE: CPT

## 2024-01-25 PROCEDURE — A4216 STERILE WATER/SALINE, 10 ML: HCPCS | Performed by: FAMILY MEDICINE

## 2024-01-25 PROCEDURE — 83690 ASSAY OF LIPASE: CPT

## 2024-01-25 RX ORDER — POTASSIUM CHLORIDE 20 MEQ/1
40 TABLET, EXTENDED RELEASE ORAL ONCE
Status: COMPLETED | OUTPATIENT
Start: 2024-01-25 | End: 2024-01-25

## 2024-01-25 RX ORDER — FUROSEMIDE 20 MG/1
20 TABLET ORAL DAILY
Status: DISCONTINUED | OUTPATIENT
Start: 2024-01-25 | End: 2024-01-26

## 2024-01-25 RX ORDER — FUROSEMIDE 10 MG/ML
40 INJECTION INTRAMUSCULAR; INTRAVENOUS ONCE
Status: COMPLETED | OUTPATIENT
Start: 2024-01-25 | End: 2024-01-25

## 2024-01-25 RX ORDER — LANOLIN ALCOHOL/MO/W.PET/CERES
400 CREAM (GRAM) TOPICAL DAILY
Status: DISCONTINUED | OUTPATIENT
Start: 2024-01-25 | End: 2024-02-01 | Stop reason: HOSPADM

## 2024-01-25 RX ORDER — POTASSIUM CHLORIDE 20 MEQ/1
20 TABLET, EXTENDED RELEASE ORAL ONCE
Status: DISCONTINUED | OUTPATIENT
Start: 2024-01-25 | End: 2024-01-25

## 2024-01-25 RX ORDER — CARBOXYMETHYLCELLULOSE SODIUM 10 MG/ML
1 GEL OPHTHALMIC PRN
Status: DISCONTINUED | OUTPATIENT
Start: 2024-01-25 | End: 2024-02-01 | Stop reason: HOSPADM

## 2024-01-25 RX ORDER — MAGNESIUM SULFATE IN WATER 40 MG/ML
2000 INJECTION, SOLUTION INTRAVENOUS ONCE
Status: COMPLETED | OUTPATIENT
Start: 2024-01-25 | End: 2024-01-25

## 2024-01-25 RX ORDER — SODIUM CHLORIDE, SODIUM LACTATE, POTASSIUM CHLORIDE, CALCIUM CHLORIDE 600; 310; 30; 20 MG/100ML; MG/100ML; MG/100ML; MG/100ML
INJECTION, SOLUTION INTRAVENOUS CONTINUOUS
Status: DISCONTINUED | OUTPATIENT
Start: 2024-01-25 | End: 2024-01-26

## 2024-01-25 RX ADMIN — ASPIRIN 81 MG: 81 TABLET, COATED ORAL at 09:07

## 2024-01-25 RX ADMIN — POTASSIUM CHLORIDE 40 MEQ: 1500 TABLET, EXTENDED RELEASE ORAL at 14:31

## 2024-01-25 RX ADMIN — MAGNESIUM GLUCONATE 500 MG ORAL TABLET 400 MG: 500 TABLET ORAL at 14:31

## 2024-01-25 RX ADMIN — METOPROLOL SUCCINATE 50 MG: 50 TABLET, EXTENDED RELEASE ORAL at 09:08

## 2024-01-25 RX ADMIN — SODIUM CHLORIDE, POTASSIUM CHLORIDE, SODIUM LACTATE AND CALCIUM CHLORIDE: 600; 310; 30; 20 INJECTION, SOLUTION INTRAVENOUS at 14:51

## 2024-01-25 RX ADMIN — FAMOTIDINE 20 MG: 10 INJECTION, SOLUTION INTRAVENOUS at 09:08

## 2024-01-25 RX ADMIN — MAGNESIUM SULFATE HEPTAHYDRATE 2000 MG: 40 INJECTION, SOLUTION INTRAVENOUS at 14:52

## 2024-01-25 RX ADMIN — WATER 1000 MG: 1 INJECTION INTRAMUSCULAR; INTRAVENOUS; SUBCUTANEOUS at 14:31

## 2024-01-25 RX ADMIN — MONTELUKAST 10 MG: 10 TABLET, FILM COATED ORAL at 20:37

## 2024-01-25 RX ADMIN — VITAMIN D, TAB 1000IU (100/BT) 2000 UNITS: 25 TAB at 09:07

## 2024-01-25 RX ADMIN — FUROSEMIDE 40 MG: 10 INJECTION, SOLUTION INTRAMUSCULAR; INTRAVENOUS at 21:54

## 2024-01-25 RX ADMIN — DEXTROSE AND SODIUM CHLORIDE: 5; 900 INJECTION, SOLUTION INTRAVENOUS at 00:42

## 2024-01-25 RX ADMIN — CLOPIDOGREL BISULFATE 75 MG: 75 TABLET ORAL at 09:07

## 2024-01-25 RX ADMIN — SODIUM CHLORIDE, PRESERVATIVE FREE 10 ML: 5 INJECTION INTRAVENOUS at 09:08

## 2024-01-25 RX ADMIN — MAGNESIUM SULFATE HEPTAHYDRATE 2000 MG: 40 INJECTION, SOLUTION INTRAVENOUS at 09:50

## 2024-01-25 RX ADMIN — ONDANSETRON 4 MG: 2 INJECTION INTRAMUSCULAR; INTRAVENOUS at 04:51

## 2024-01-25 RX ADMIN — LEVOTHYROXINE SODIUM 25 MCG: 0.05 TABLET ORAL at 04:53

## 2024-01-25 RX ADMIN — CARBOXYMETHYLCELLULOSE SODIUM 1 DROP: 10 GEL OPHTHALMIC at 12:19

## 2024-01-25 RX ADMIN — SODIUM CHLORIDE, PRESERVATIVE FREE 10 ML: 5 INJECTION INTRAVENOUS at 21:54

## 2024-01-25 RX ADMIN — FUROSEMIDE 20 MG: 20 TABLET ORAL at 14:31

## 2024-01-25 RX ADMIN — TAMSULOSIN HYDROCHLORIDE 0.4 MG: 0.4 CAPSULE ORAL at 09:08

## 2024-01-25 RX ADMIN — DEXTROSE AND SODIUM CHLORIDE: 5; 900 INJECTION, SOLUTION INTRAVENOUS at 11:16

## 2024-01-25 RX ADMIN — Medication 1 CAPSULE: at 09:08

## 2024-01-25 RX ADMIN — POTASSIUM CHLORIDE 40 MEQ: 1500 TABLET, EXTENDED RELEASE ORAL at 09:08

## 2024-01-25 NOTE — PLAN OF CARE
Problem: Discharge Planning  Goal: Discharge to home or other facility with appropriate resources  1/25/2024 0720 by Lana Manning RN  Outcome: Progressing  1/25/2024 0319 by Mariana Giron RN  Outcome: Progressing     Problem: Pain  Goal: Verbalizes/displays adequate comfort level or baseline comfort level  1/25/2024 0720 by Lana Manning RN  Outcome: Progressing  1/25/2024 0319 by Mariana Giron RN  Outcome: Progressing     Problem: Skin/Tissue Integrity  Goal: Absence of new skin breakdown  Description: 1.  Monitor for areas of redness and/or skin breakdown  2.  Assess vascular access sites hourly  3.  Every 4-6 hours minimum:  Change oxygen saturation probe site  4.  Every 4-6 hours:  If on nasal continuous positive airway pressure, respiratory therapy assess nares and determine need for appliance change or resting period.  1/25/2024 0720 by Lana Manning RN  Outcome: Progressing  1/25/2024 0319 by Mariana Giron RN  Outcome: Progressing     Problem: Safety - Adult  Goal: Free from fall injury  1/25/2024 0720 by Lana Manning RN  Outcome: Progressing  1/25/2024 0319 by Mariana Giron RN  Outcome: Progressing     Problem: ABCDS Injury Assessment  Goal: Absence of physical injury  1/25/2024 0720 by Lana Manning RN  Outcome: Progressing  1/25/2024 0319 by Mariana Giron RN  Outcome: Progressing     Problem: Chronic Conditions and Co-morbidities  Goal: Patient's chronic conditions and co-morbidity symptoms are monitored and maintained or improved  1/25/2024 0720 by Lana Manning, RN  Outcome: Progressing  1/25/2024 0319 by Mariana Giron RN  Outcome: Progressing     Problem: Neurosensory - Adult  Goal: Achieves stable or improved neurological status  1/25/2024 0720 by Lana Manning RN  Outcome: Progressing  1/25/2024 0319 by Mariana Giron RN  Outcome: Progressing  Goal: Achieves maximal functionality and self care  1/25/2024 0720 by Lana Manning, RN  Outcome: Progressing  1/25/2024 0319 by Mariana Giron RN  Outcome:

## 2024-01-25 NOTE — PLAN OF CARE
Problem: Discharge Planning  Goal: Discharge to home or other facility with appropriate resources  Outcome: Progressing     Problem: Pain  Goal: Verbalizes/displays adequate comfort level or baseline comfort level  Outcome: Progressing     Problem: Skin/Tissue Integrity  Goal: Absence of new skin breakdown  Description: 1.  Monitor for areas of redness and/or skin breakdown  2.  Assess vascular access sites hourly  3.  Every 4-6 hours minimum:  Change oxygen saturation probe site  4.  Every 4-6 hours:  If on nasal continuous positive airway pressure, respiratory therapy assess nares and determine need for appliance change or resting period.  Outcome: Progressing     Problem: Safety - Adult  Goal: Free from fall injury  Outcome: Progressing     Problem: ABCDS Injury Assessment  Goal: Absence of physical injury  Outcome: Progressing     Problem: Chronic Conditions and Co-morbidities  Goal: Patient's chronic conditions and co-morbidity symptoms are monitored and maintained or improved  Outcome: Progressing     Problem: Neurosensory - Adult  Goal: Achieves stable or improved neurological status  Outcome: Progressing  Goal: Achieves maximal functionality and self care  Outcome: Progressing     Problem: Respiratory - Adult  Goal: Achieves optimal ventilation and oxygenation  Outcome: Progressing     Problem: Skin/Tissue Integrity - Adult  Goal: Skin integrity remains intact  Outcome: Progressing  Goal: Incisions, wounds, or drain sites healing without S/S of infection  Outcome: Progressing  Goal: Oral mucous membranes remain intact  Outcome: Progressing     Problem: Musculoskeletal - Adult  Goal: Return mobility to safest level of function  Outcome: Progressing  Goal: Return ADL status to a safe level of function  Outcome: Progressing  Goal: Maintain proper alignment of affected body part  Outcome: Progressing     Problem: Gastrointestinal - Adult  Goal: Minimal or absence of nausea and vomiting  Outcome:

## 2024-01-25 NOTE — CARE COORDINATION
CM spoke with patient and family at bedside. Additional referral sent to [facility], [facility], and [facility]. Awaiting bed offer.  Sanjeev FUNES, [facility]

## 2024-01-25 NOTE — PROGRESS NOTES
Complaints of increased bloating and distention in stomach. No pain. KUB ordered. IVF held. Diet advanced to full liquids. Electrolytes replaced per MAR and orders.    Rounds performed throughout shift. Pt denies needs at this time. Bed in low position, locked and call light/personal items within reach.

## 2024-01-25 NOTE — PROGRESS NOTES
Hourly rounds performed this shift. Bed lowered and locked. Call light within reach. PRN nausea medication given per MAR, pt satisfied. Bedside report will be given to oncoming nurse.

## 2024-01-25 NOTE — PROGRESS NOTES
ACUTE OCCUPATIONAL THERAPY GOALS:   (Developed with and agreed upon by patient and/or caregiver.)  (1.) Janine Gupta  will complete lower body bathing and dressing with INDEPENDENCE and adaptive equipment as needed.    (2.) Janine Gupta will complete toileting with INDEPENDENCE.  (3.) Janine Gupta will tolerate 25 minutes of OT treatment with 1-2 rest breaks to increase activity tolerance for ADLs.   (4.) Janine Gupta will complete functional transfers with INDEPENDENCE and adaptive equipment as needed.   (5.) Janine Gupta will complete functional ADL task standing at sink INDEPENDENCE and adaptive equipment as needed.     Timeframe: 7 visits      OCCUPATIONAL THERAPY: Daily Note AM   OT Visit Days: 3   Time In/Out  OT Charge Capture  Rehab Caseload Tracker  OT Orders    Janine Gupta is a 83 y.o. female   PRIMARY DIAGNOSIS: Acute pancreatitis, unspecified complication status, unspecified pancreatitis type  Acute pancreatitis, unspecified complication status, unspecified pancreatitis type [K85.90]  Other acute pancreatitis, unspecified complication status [K85.80]       Inpatient: Payor: MEDICARE / Plan: MEDICARE PART A AND B / Product Type: *No Product type* /     ASSESSMENT:     REHAB RECOMMENDATIONS:   Recommendation to date pending progress:  Setting:  Short-term Rehab    Equipment:    To Be Determined     ASSESSMENT:  Ms. Gupta demonstrates progress toward acute OT goals, however continues to be limited by impairments in strength, balance, activity tolerance limiting ADLs and functional mobility. She performs bed mobility with SBA and then requires CGA/Min A for functional transfers and mobility of household distances with RW. She requires increased assist/ cueing to navigate obstacles safely in the bathroom. She completes toileting, standing grooming tasks with CGA. Pt fatigues quickly, requires seated seated rest break in chair where she then performs  Total=Total Assistance, NT=Not Tested    ACTIVITIES OF DAILY LIVING: I Mod I S SBA CGA Min Mod Max Total NT Comments   BASIC ADLs:              Upper Body   Bathing [] [] [] [x] [] [] [] [] [] [] Sponge bath in chair   Lower Body Bathing [] [] [] [x] [] [] [] [] [] [] Sponge bath in chair   Toileting [] [] [] [] [x] [] [] [] [] [] Seated on toilet   Upper Body Dressing [] [] [] [] [] [x] [] [] [] [] Gown sitting in chair   Lower Body Dressing [] [] [] [x] [] [] [] [] [] [] Doff dirty socks, don clean socks sitting in chair   Feeding [] [] [] [] [] [] [] [] [] [x]    Grooming [] [] [] [x] [x] [] [] [] [] [] Hand hygiene standing at sink  Comb hair, shampoo cap sitting in chair  Apply lotion to feet sitting in chair   Personal Device Care [] [] [] [] [] [] [] [] [] [x]    Functional Mobility [] [] [] [] [x] [x] [] [] [] [] RW, short in room distances   I=Independent, Mod I=Modified Independent, S=Supervision/Setup, SBA=Standby Assistance, CGA=Contact Guard Assistance, Min=Minimal Assistance, Mod=Moderate Assistance, Max=Maximal Assistance, Total=Total Assistance, NT=Not Tested    BALANCE: Good Fair+ Fair Fair- Poor NT Comments   Sitting Static [] [x] [] [] [] []    Sitting Dynamic [] [] [x] [] [] []              Standing Static [] [] [x] [] [] []    Standing Dynamic [] [] [x] [x] [] []        PLAN:     FREQUENCY/DURATION   OT Plan of Care: 3 times/week for duration of hospital stay or until stated goals are met, whichever comes first.    TREATMENT:     TREATMENT:   Self Care (44 minutes): Patient participated in upper body bathing, lower body bathing, toileting, upper body dressing, lower body dressing, and grooming ADLs in supported sitting, unsupported sitting, and standing with moderate visual and verbal cueing to increase independence, decrease assistance required, increase activity tolerance, and increase safety awareness. Patient also participated in functional mobility and functional transfer training to

## 2024-01-25 NOTE — PROGRESS NOTES
Comprehensive Nutrition Assessment    Type and Reason for Visit: Reassess  Malnutrition Screening Tool: Malnutrition Screen  Have you recently lost weight without trying?: 14 to 23 pounds (2 points)  Have you been eating poorly because of a decreased appetite?: Yes (1 point)  Malnutrition Screening Tool Score: 3    Nutrition Recommendations/Plan:   Meals and Snacks:  Diet: Continue current diet and advance as medically appropriate  Nutrition Supplement Therapy:  Medical food supplement therapy:  Change to Ensure High Protein three times per day (this provides 160 kcal and 16 grams protein per bottle)  Update weight (zero bed for bed weight; standing weight if able)     Malnutrition Assessment:  Malnutrition Status: At risk for malnutrition (Comment) (acute decline in po due to abdominal pain n/v, wt loss over the past year)    NFPE unremarkable    Nutrition Assessment:  Nutrition History: Pt reports acute decline in po for the past week due to abdominal pain and n/v. She stated prior to this her intake was at baseline. She stated she has lost wt over the past year despite po at baseline. Pt reports her UBW is ~145lb.     Do You Have Any Cultural, Scientologist, or Ethnic Food Preferences?: No   Weight History: Per EMR wt hx review 1/27 158lb, 3/23 155lb, 8/2 150lb, 11/15 147lb, 1/3 140lb.  Nutrition Background:       PMH significant for hypothyroidism, exocrine pancreatic insufficiency, CAD, ischemic cardiomyopathy, HTN, GERD, and HLD. Pt admitted with acute pancreatitis.   1/25 IM MD note: Abdominal pain apparently improved but imaging study with MRI from yesterday revealed pancreatic head inflammation   Nutrition Interval:  Pt resting in bed; two family members present. Reports that she was previously doing well with clears and was drinking all three ensure clears provided per day. Advanced to full liquids by MD just prior to RD visit; pt was then provided with pudding by RN. Reports no N/V/worsening of abdominal pain

## 2024-01-25 NOTE — PROGRESS NOTES
Janine Gupta is 83 y.o. y/o female with P  Initially presented with pancreatitis now with drop in hemoglobin, denies any overt signs of GI bleeding.  Concerned about pancreatic pseudoaneurysms therefore we will obtain CT angio.    Interim:   NAEON, today still no overt signs of bleeding.    CTA is neg for pancreatic pseudoaneurysms.     PE:   Vitals:    01/25/24 1536   BP: (!) 128/54   Pulse: 78   Resp: 16   Temp: 97.5 °F (36.4 °C)   SpO2: 91%      General:  The patient appears well-nourished, and is in no acute distress.    Skin:  no rash, ulcers. No Bleeding or signs of infection.  HEENT:  Normocephalic, atraumatic. No sclerae icterus.   Neck:  No pain on palpation or mobilization of the neck.  Respiratory: Respiratory effort is normal. Expansion maintained bilaterally and symmetrically. Normal breath sounds and clear to auscultation bilaterally without wheezes, rales, or rhonchi.    Cardiovascular:  Regular rate and rhythm.     Abdomen:  Soft, non tender to palpation. No distention. Normoactive bowel sounds present.    Extremities: No edema bilaterally. No erythema  Neurologic:  Alert and oriented x3.  No sensory deficits. No asterixis  Psychiatric: Appropriate mood and affect.  Musculoskeletal: Strength and tone are symmetrical and maintained.    Assessment and Plan:   #Anemia:   CTA ruled out pancreatic pseudoaneurysm, no overt bleeding at this time. NO further inpatient work up is needed at this time. Follow up outpatient in 4-6 weeks.   GI will sign off.     # Acute mild interstitial pancreatitis: Unclear etiology, first episode, not biliary per labs/imaging at this time. Pending IgG4 panel (given her age, no need for triglycerides). Tolerating diet at this time, hold off any further work up as an inpatient for now, we could consider EUS outpatient but given her age would hold off unless she has recurrence of pancreatitis.       Haley Garibay MD  [facility]

## 2024-01-25 NOTE — PROGRESS NOTES
Hospitalist Progress Note   Admit Date:  2024  8:58 PM   Name:  Janine Gupta   Age:  83 y.o.  Sex:  female  :  1940   MRN:  [id number]   Room:  [facility]/    Presenting/Chief Complaint: Diarrhea     Reason(s) for Admission: Acute pancreatitis, unspecified complication status, unspecified pancreatitis type [K85.90]  Other acute pancreatitis, unspecified complication status [K85.80]     Hospital Course:     Copied from prior provider HPI/summary:  Janine Gupta is a 83 y.o. female with a past medical history of hypothyroidism, exocrine pancreatic insufficiency, Asthma, polyneuropathy, coronary artery disease, ischemic cardiomyopathy, type 2 diabetes mellitus, GERD, hyperlipidemia, hypertension, history of obesity, history of sleep apnea who presented with upper abdominal pain of moderate intensity that is achy in nature nonradiating and began around lunchtime today.  This has been associated with nausea but no vomiting.  Patient has not noted any change in her appetite or her p.o. intake but remarks that she has just finally gotten her sense of taste back after suffering from COVID so she really cannot say whether her appetite has been good.  She reports continued trouble with multiple episodes of loose stools daily although they do not contain blood or mucus and that that has been going on for several months now.  Patient did feel as though she was chilled earlier today and has been very tired recently.  She denied any vomiting she denied any constipation she denied any dysuria or UTI symptoms, she denied any cough runny nose sore throat, she denied any chest pain palpitations or other cardiac symptoms.  Evaluation in the emergency room has revealed patient to have an apparent acute pancreatitis.  She has a lipase of 9620 a CRP of 6.5 mild thrombocytopenia 142,000 but otherwise unremarkable lab workup with a stable to slightly improved chronic renal insufficiency at 35 and 1.0 BUN and  mmol/L    CO2 22 21 - 32 mmol/L    Anion Gap 4 2 - 11 mmol/L    Glucose 136 (H) 65 - 100 mg/dL    BUN 14 8 - 23 MG/DL    Creatinine 1.00 0.6 - 1.0 MG/DL    Est, Glom Filt Rate 55 (L) >60 ml/min/1.73m2    Calcium 8.5 8.3 - 10.4 MG/DL    Total Bilirubin 0.2 0.2 - 1.1 MG/DL     (H) 12 - 65 U/L     (H) 15 - 37 U/L    Alk Phosphatase 260 (H) 50 - 136 U/L    Total Protein 5.9 (L) 6.3 - 8.2 g/dL    Albumin 2.2 (L) 3.2 - 4.6 g/dL    Globulin 3.7 2.8 - 4.5 g/dL    Albumin/Globulin Ratio 0.6 0.4 - 1.6     Magnesium    Collection Time: 01/25/24  4:37 AM   Result Value Ref Range    Magnesium 1.4 (L) 1.8 - 2.4 mg/dL       Current Meds:  Current Facility-Administered Medications   Medication Dose Route Frequency    carboxymethylcellulose (THERATEARS) 1 % ophthalmic gel 1 drop  1 drop Both Eyes PRN    potassium chloride (KLOR-CON M) extended release tablet 40 mEq  40 mEq Oral Once    magnesium oxide (MAG-OX) tablet 400 mg  400 mg Oral Daily    magnesium sulfate 2000 mg in 50 mL IVPB premix  2,000 mg IntraVENous Once    lactated ringers IV soln infusion   IntraVENous Continuous    furosemide (LASIX) tablet 20 mg  20 mg Oral Daily    potassium chloride (KLOR-CON M) extended release tablet 20 mEq  20 mEq Oral Once    tamsulosin (FLOMAX) capsule 0.4 mg  0.4 mg Oral Daily    famotidine (PEPCID) 20 mg in sodium chloride (PF) 0.9 % 10 mL injection  20 mg IntraVENous Q24H    prochlorperazine (COMPAZINE) injection 10 mg  10 mg IntraVENous Q6H PRN    aspirin EC tablet 81 mg  81 mg Oral Daily    Vitamin D (CHOLECALCIFEROL) tablet 2,000 Units  2,000 Units Oral Daily    clopidogrel (PLAVIX) tablet 75 mg  75 mg Oral Daily    diphenoxylate-atropine (LOMOTIL) 2.5-0.025 MG per tablet 1 tablet  1 tablet Oral 4x Daily PRN    fluticasone (FLONASE) 50 MCG/ACT nasal spray 2 spray  2 spray Nasal Daily    lactobacillus (CULTURELLE) capsule 1 capsule  1 capsule Oral Daily with breakfast    levothyroxine (SYNTHROID) tablet 25 mcg  25 mcg Oral

## 2024-01-26 PROBLEM — R79.89 ELEVATED LIVER FUNCTION TESTS: Status: ACTIVE | Noted: 2024-01-26

## 2024-01-26 PROBLEM — I50.43 ACUTE ON CHRONIC HEART FAILURE WITH REDUCED EJECTION FRACTION AND DIASTOLIC DYSFUNCTION (HCC): Status: ACTIVE | Noted: 2024-01-26

## 2024-01-26 LAB
ALBUMIN SERPL-MCNC: 2.2 G/DL (ref 3.2–4.6)
ALBUMIN/GLOB SERPL: 0.6 (ref 0.4–1.6)
ALP SERPL-CCNC: 297 U/L (ref 50–136)
ALT SERPL-CCNC: 173 U/L (ref 12–65)
ANION GAP SERPL CALC-SCNC: 4 MMOL/L (ref 2–11)
AST SERPL-CCNC: 171 U/L (ref 15–37)
BASOPHILS # BLD: 0 K/UL (ref 0–0.2)
BASOPHILS NFR BLD: 0 % (ref 0–2)
BILIRUB DIRECT SERPL-MCNC: <0.1 MG/DL
BILIRUB SERPL-MCNC: 0.2 MG/DL (ref 0.2–1.1)
BUN SERPL-MCNC: 11 MG/DL (ref 8–23)
CALCIUM SERPL-MCNC: 8.8 MG/DL (ref 8.3–10.4)
CHLORIDE SERPL-SCNC: 108 MMOL/L (ref 103–113)
CO2 SERPL-SCNC: 24 MMOL/L (ref 21–32)
CREAT SERPL-MCNC: 0.9 MG/DL (ref 0.6–1)
DIFFERENTIAL METHOD BLD: ABNORMAL
EOSINOPHIL # BLD: 0.1 K/UL (ref 0–0.8)
EOSINOPHIL NFR BLD: 1 % (ref 0.5–7.8)
ERYTHROCYTE [DISTWIDTH] IN BLOOD BY AUTOMATED COUNT: 14.5 % (ref 11.9–14.6)
GLOBULIN SER CALC-MCNC: 3.7 G/DL (ref 2.8–4.5)
GLUCOSE BLD STRIP.AUTO-MCNC: 115 MG/DL (ref 65–100)
GLUCOSE BLD STRIP.AUTO-MCNC: 127 MG/DL (ref 65–100)
GLUCOSE BLD STRIP.AUTO-MCNC: 131 MG/DL (ref 65–100)
GLUCOSE BLD STRIP.AUTO-MCNC: 132 MG/DL (ref 65–100)
GLUCOSE SERPL-MCNC: 119 MG/DL (ref 65–100)
HCT VFR BLD AUTO: 28.2 % (ref 35.8–46.3)
HGB BLD-MCNC: 8.8 G/DL (ref 11.7–15.4)
IMM GRANULOCYTES # BLD AUTO: 0.1 K/UL (ref 0–0.5)
IMM GRANULOCYTES NFR BLD AUTO: 1 % (ref 0–5)
LIPASE SERPL-CCNC: 172 U/L (ref 73–393)
LYMPHOCYTES # BLD: 0.7 K/UL (ref 0.5–4.6)
LYMPHOCYTES NFR BLD: 6 % (ref 13–44)
MAGNESIUM SERPL-MCNC: 2.1 MG/DL (ref 1.8–2.4)
MCH RBC QN AUTO: 29.1 PG (ref 26.1–32.9)
MCHC RBC AUTO-ENTMCNC: 31.2 G/DL (ref 31.4–35)
MCV RBC AUTO: 93.4 FL (ref 82–102)
MONOCYTES # BLD: 1.2 K/UL (ref 0.1–1.3)
MONOCYTES NFR BLD: 11 % (ref 4–12)
NEUTS SEG # BLD: 9.2 K/UL (ref 1.7–8.2)
NEUTS SEG NFR BLD: 81 % (ref 43–78)
NRBC # BLD: 0 K/UL (ref 0–0.2)
PLATELET # BLD AUTO: 125 K/UL (ref 150–450)
PMV BLD AUTO: 11.4 FL (ref 9.4–12.3)
POTASSIUM SERPL-SCNC: 3.8 MMOL/L (ref 3.5–5.1)
PROT SERPL-MCNC: 5.9 G/DL (ref 6.3–8.2)
RBC # BLD AUTO: 3.02 M/UL (ref 4.05–5.2)
SERVICE CMNT-IMP: ABNORMAL
SODIUM SERPL-SCNC: 136 MMOL/L (ref 136–146)
WBC # BLD AUTO: 11.3 K/UL (ref 4.3–11.1)

## 2024-01-26 PROCEDURE — 83735 ASSAY OF MAGNESIUM: CPT

## 2024-01-26 PROCEDURE — 2580000003 HC RX 258: Performed by: FAMILY MEDICINE

## 2024-01-26 PROCEDURE — 85025 COMPLETE CBC W/AUTO DIFF WBC: CPT

## 2024-01-26 PROCEDURE — 6370000000 HC RX 637 (ALT 250 FOR IP): Performed by: FAMILY MEDICINE

## 2024-01-26 PROCEDURE — 82962 GLUCOSE BLOOD TEST: CPT

## 2024-01-26 PROCEDURE — 2580000003 HC RX 258: Performed by: INTERNAL MEDICINE

## 2024-01-26 PROCEDURE — 6370000000 HC RX 637 (ALT 250 FOR IP): Performed by: INTERNAL MEDICINE

## 2024-01-26 PROCEDURE — 6360000002 HC RX W HCPCS: Performed by: INTERNAL MEDICINE

## 2024-01-26 PROCEDURE — 94760 N-INVAS EAR/PLS OXIMETRY 1: CPT

## 2024-01-26 PROCEDURE — A4216 STERILE WATER/SALINE, 10 ML: HCPCS | Performed by: FAMILY MEDICINE

## 2024-01-26 PROCEDURE — 2700000000 HC OXYGEN THERAPY PER DAY

## 2024-01-26 PROCEDURE — 2500000003 HC RX 250 WO HCPCS: Performed by: FAMILY MEDICINE

## 2024-01-26 PROCEDURE — 1100000000 HC RM PRIVATE

## 2024-01-26 PROCEDURE — 80076 HEPATIC FUNCTION PANEL: CPT

## 2024-01-26 PROCEDURE — 36415 COLL VENOUS BLD VENIPUNCTURE: CPT

## 2024-01-26 PROCEDURE — 80048 BASIC METABOLIC PNL TOTAL CA: CPT

## 2024-01-26 PROCEDURE — 97530 THERAPEUTIC ACTIVITIES: CPT

## 2024-01-26 PROCEDURE — 97116 GAIT TRAINING THERAPY: CPT

## 2024-01-26 PROCEDURE — 83690 ASSAY OF LIPASE: CPT

## 2024-01-26 RX ORDER — ATORVASTATIN CALCIUM 20 MG/1
20 TABLET, FILM COATED ORAL DAILY
Status: DISCONTINUED | OUTPATIENT
Start: 2024-01-26 | End: 2024-01-28

## 2024-01-26 RX ORDER — FUROSEMIDE 10 MG/ML
40 INJECTION INTRAMUSCULAR; INTRAVENOUS 2 TIMES DAILY
Status: DISCONTINUED | OUTPATIENT
Start: 2024-01-26 | End: 2024-01-27

## 2024-01-26 RX ORDER — VALSARTAN 80 MG/1
80 TABLET ORAL DAILY
Status: DISCONTINUED | OUTPATIENT
Start: 2024-01-26 | End: 2024-01-28

## 2024-01-26 RX ORDER — POTASSIUM CHLORIDE 20 MEQ/1
40 TABLET, EXTENDED RELEASE ORAL ONCE
Status: COMPLETED | OUTPATIENT
Start: 2024-01-26 | End: 2024-01-26

## 2024-01-26 RX ORDER — FUROSEMIDE 10 MG/ML
40 INJECTION INTRAMUSCULAR; INTRAVENOUS ONCE
Status: COMPLETED | OUTPATIENT
Start: 2024-01-26 | End: 2024-01-26

## 2024-01-26 RX ADMIN — FUROSEMIDE 40 MG: 10 INJECTION, SOLUTION INTRAMUSCULAR; INTRAVENOUS at 17:50

## 2024-01-26 RX ADMIN — LEVOTHYROXINE SODIUM 25 MCG: 0.05 TABLET ORAL at 04:57

## 2024-01-26 RX ADMIN — FUROSEMIDE 40 MG: 10 INJECTION, SOLUTION INTRAMUSCULAR; INTRAVENOUS at 08:27

## 2024-01-26 RX ADMIN — ASPIRIN 81 MG: 81 TABLET, COATED ORAL at 08:27

## 2024-01-26 RX ADMIN — VITAMIN D, TAB 1000IU (100/BT) 2000 UNITS: 25 TAB at 08:28

## 2024-01-26 RX ADMIN — FUROSEMIDE 20 MG: 20 TABLET ORAL at 08:29

## 2024-01-26 RX ADMIN — PANCRELIPASE LIPASE, PANCRELIPASE PROTEASE, PANCRELIPASE AMYLASE 5000 UNITS: 5000; 17000; 24000 CAPSULE, DELAYED RELEASE ORAL at 11:58

## 2024-01-26 RX ADMIN — TAMSULOSIN HYDROCHLORIDE 0.4 MG: 0.4 CAPSULE ORAL at 08:27

## 2024-01-26 RX ADMIN — Medication 1 CAPSULE: at 08:26

## 2024-01-26 RX ADMIN — WATER 1000 MG: 1 INJECTION INTRAMUSCULAR; INTRAVENOUS; SUBCUTANEOUS at 14:36

## 2024-01-26 RX ADMIN — MAGNESIUM GLUCONATE 500 MG ORAL TABLET 400 MG: 500 TABLET ORAL at 08:26

## 2024-01-26 RX ADMIN — PANCRELIPASE LIPASE, PANCRELIPASE PROTEASE, PANCRELIPASE AMYLASE 5000 UNITS: 5000; 17000; 24000 CAPSULE, DELAYED RELEASE ORAL at 17:50

## 2024-01-26 RX ADMIN — METOPROLOL SUCCINATE 50 MG: 50 TABLET, EXTENDED RELEASE ORAL at 08:25

## 2024-01-26 RX ADMIN — POTASSIUM CHLORIDE 40 MEQ: 1500 TABLET, EXTENDED RELEASE ORAL at 08:26

## 2024-01-26 RX ADMIN — MONTELUKAST 10 MG: 10 TABLET, FILM COATED ORAL at 20:35

## 2024-01-26 RX ADMIN — FLUTICASONE PROPIONATE 2 SPRAY: 50 SPRAY, METERED NASAL at 08:36

## 2024-01-26 RX ADMIN — VALSARTAN 80 MG: 80 TABLET ORAL at 08:27

## 2024-01-26 RX ADMIN — FAMOTIDINE 20 MG: 10 INJECTION, SOLUTION INTRAVENOUS at 08:28

## 2024-01-26 RX ADMIN — CLOPIDOGREL BISULFATE 75 MG: 75 TABLET ORAL at 08:25

## 2024-01-26 RX ADMIN — PANCRELIPASE LIPASE, PANCRELIPASE PROTEASE, PANCRELIPASE AMYLASE 5000 UNITS: 5000; 17000; 24000 CAPSULE, DELAYED RELEASE ORAL at 08:37

## 2024-01-26 RX ADMIN — SODIUM CHLORIDE, PRESERVATIVE FREE 10 ML: 5 INJECTION INTRAVENOUS at 08:30

## 2024-01-26 RX ADMIN — ATORVASTATIN CALCIUM 20 MG: 20 TABLET, FILM COATED ORAL at 08:25

## 2024-01-26 RX ADMIN — SODIUM CHLORIDE, PRESERVATIVE FREE 10 ML: 5 INJECTION INTRAVENOUS at 20:38

## 2024-01-26 NOTE — PROGRESS NOTES
pupils equal round react to light extraocular muscles intact  Ears-no deformity-no drainage  Nares-no nasal deformity-no discharge-turbinates not significantly enlarged  Throat-oral mucosa moist, no erythema or exudate  Heart-no gross S3 gallop.  Regular rate and rhythm.  No obvious pulsatile liver but suspect JVD on clinical exam and HJR  Lungs-faint basilar pulmonary Rales noted.  Normal symmetric excursion of the chest wall.  Abdomen-soft, nontender, no gross percussible organomegaly.  No gross distention.  Bowel sounds present all 4 quadrants  Extremities-no significant edema, moves all extremities symmetrically.  Neurologic-cranial nerves II through XII grossly intact, no focal motor deficits grossly.  No tremor  Psychiatric-no suicidal ideations or homicidal ideations.  Denies depressed thoughts.    I have personally reviewed labs and tests:  Recent Labs:  Recent Results (from the past 48 hour(s))   CBC with Auto Differential    Collection Time: 01/25/24  4:37 AM   Result Value Ref Range    WBC 10.0 4.3 - 11.1 K/uL    RBC 2.98 (L) 4.05 - 5.2 M/uL    Hemoglobin 8.9 (L) 11.7 - 15.4 g/dL    Hematocrit 28.8 (L) 35.8 - 46.3 %    MCV 96.6 82 - 102 FL    MCH 29.9 26.1 - 32.9 PG    MCHC 30.9 (L) 31.4 - 35.0 g/dL    RDW 14.6 11.9 - 14.6 %    Platelets 126 (L) 150 - 450 K/uL    MPV 11.4 9.4 - 12.3 FL    nRBC 0.00 0.0 - 0.2 K/uL    Differential Type AUTOMATED      Neutrophils % 73 43 - 78 %    Lymphocytes % 15 13 - 44 %    Monocytes % 10 4.0 - 12.0 %    Eosinophils % 1 0.5 - 7.8 %    Basophils % 0 0.0 - 2.0 %    Immature Granulocytes 1 0.0 - 5.0 %    Neutrophils Absolute 7.3 1.7 - 8.2 K/UL    Lymphocytes Absolute 1.5 0.5 - 4.6 K/UL    Monocytes Absolute 1.0 0.1 - 1.3 K/UL    Eosinophils Absolute 0.1 0.0 - 0.8 K/UL    Basophils Absolute 0.0 0.0 - 0.2 K/UL    Absolute Immature Granulocyte 0.1 0.0 - 0.5 K/UL   Comprehensive Metabolic Panel w/ Reflex to MG    Collection Time: 01/25/24  4:37 AM   Result Value Ref Range     CO2 24 21 - 32 mmol/L    Anion Gap 4 2 - 11 mmol/L    Glucose 119 (H) 65 - 100 mg/dL    BUN 11 8 - 23 MG/DL    Creatinine 0.90 0.6 - 1.0 MG/DL    Est, Glom Filt Rate >60 >60 ml/min/1.73m2    Calcium 8.8 8.3 - 10.4 MG/DL   Magnesium    Collection Time: 01/26/24  4:57 AM   Result Value Ref Range    Magnesium 2.1 1.8 - 2.4 mg/dL   Hepatic Function Panel    Collection Time: 01/26/24  4:57 AM   Result Value Ref Range    Total Protein 5.9 (L) 6.3 - 8.2 g/dL    Albumin 2.2 (L) 3.2 - 4.6 g/dL    Globulin 3.7 2.8 - 4.5 g/dL    Albumin/Globulin Ratio 0.6 0.4 - 1.6      Total Bilirubin 0.2 0.2 - 1.1 MG/DL    Bilirubin, Direct <0.1 <0.4 MG/DL    Alk Phosphatase 297 (H) 50 - 136 U/L     (H) 15 - 37 U/L     (H) 12 - 65 U/L   Lipase    Collection Time: 01/26/24  4:57 AM   Result Value Ref Range    Lipase 172 73 - 393 U/L   POCT Glucose    Collection Time: 01/26/24  7:38 AM   Result Value Ref Range    POC Glucose 115 (H) 65 - 100 mg/dL    Performed by: Wendi    POCT Glucose    Collection Time: 01/26/24 11:04 AM   Result Value Ref Range    POC Glucose 132 (H) 65 - 100 mg/dL    Performed by: Wendi        Current Meds:  Current Facility-Administered Medications   Medication Dose Route Frequency    lipase-protease-amylase (ZENPEP) delayed release capsule 5,000 Units  5,000 Units Oral TID WC    atorvastatin (LIPITOR) tablet 20 mg  20 mg Oral Daily    valsartan (DIOVAN) tablet 80 mg  80 mg Oral Daily    furosemide (LASIX) injection 40 mg  40 mg IntraVENous BID    carboxymethylcellulose (THERATEARS) 1 % ophthalmic gel 1 drop  1 drop Both Eyes PRN    magnesium oxide (MAG-OX) tablet 400 mg  400 mg Oral Daily    cefTRIAXone (ROCEPHIN) 1,000 mg in sterile water 10 mL IV syringe  1,000 mg IntraVENous Q24H    tamsulosin (FLOMAX) capsule 0.4 mg  0.4 mg Oral Daily    famotidine (PEPCID) 20 mg in sodium chloride (PF) 0.9 % 10 mL injection  20 mg IntraVENous Q24H    prochlorperazine (COMPAZINE) injection 10

## 2024-01-26 NOTE — SIGNIFICANT EVENT
Message from nursing, patient with co SOB and noted sats 80s, recovered with 2 liters.    IVF stopped earlier, CXR this evening showing pulm congestion..  1 time dose Lasix IV 40 mg ordered with I&O.

## 2024-01-26 NOTE — PROGRESS NOTES
Hourly rounds performed this shift. Bed lowered and locked. Call light within reach. Pt on 2L NC and denies SOB at this time. Bedside report will be given to oncoming nurse.

## 2024-01-26 NOTE — PLAN OF CARE
Problem: Discharge Planning  Goal: Discharge to home or other facility with appropriate resources  Outcome: Progressing     Problem: Pain  Goal: Verbalizes/displays adequate comfort level or baseline comfort level  Outcome: Progressing     Problem: Skin/Tissue Integrity  Goal: Absence of new skin breakdown  Description: 1.  Monitor for areas of redness and/or skin breakdown  2.  Assess vascular access sites hourly  3.  Every 4-6 hours minimum:  Change oxygen saturation probe site  4.  Every 4-6 hours:  If on nasal continuous positive airway pressure, respiratory therapy assess nares and determine need for appliance change or resting period.  Outcome: Progressing     Problem: Safety - Adult  Goal: Free from fall injury  Outcome: Progressing     Problem: ABCDS Injury Assessment  Goal: Absence of physical injury  Outcome: Progressing     Problem: Chronic Conditions and Co-morbidities  Goal: Patient's chronic conditions and co-morbidity symptoms are monitored and maintained or improved  Outcome: Progressing     Problem: Neurosensory - Adult  Goal: Achieves stable or improved neurological status  Outcome: Progressing  Goal: Achieves maximal functionality and self care  Outcome: Progressing     Problem: Respiratory - Adult  Goal: Achieves optimal ventilation and oxygenation  Outcome: Progressing     Problem: Skin/Tissue Integrity - Adult  Goal: Skin integrity remains intact  Outcome: Progressing  Goal: Incisions, wounds, or drain sites healing without S/S of infection  Outcome: Progressing  Goal: Oral mucous membranes remain intact  Outcome: Progressing     Problem: Musculoskeletal - Adult  Goal: Return mobility to safest level of function  Outcome: Progressing  Goal: Return ADL status to a safe level of function  Outcome: Progressing  Goal: Maintain proper alignment of affected body part  Outcome: Progressing     Problem: Gastrointestinal - Adult  Goal: Minimal or absence of nausea and vomiting  Outcome:  condition  2. Facilitate patient and family articulation of goals for care  3. Help patient and family identify pros/cons of alternative solutions  4. Provide information as requested by patient/family  5. Respect patient/family right to receive or not to receive information  6. Serve as a liaison between patient and family and health care team  7. Initiate Consults from Ethics, Palliative Care or initiate Family Care Conference as is appropriate  Outcome: Progressing     Problem: Infection - Adult  Goal: Absence of infection at discharge  Outcome: Progressing  Goal: Absence of infection during hospitalization  Outcome: Progressing  Goal: Absence of fever/infection during anticipated neutropenic period  Outcome: Progressing

## 2024-01-26 NOTE — PROGRESS NOTES
ACUTE PHYSICAL THERAPY GOALS:   (Developed with and agreed upon by patient and/or caregiver.)  LTG:  (1.)Ms. Gupta will move from supine to sit and sit to supine , scoot up and down, and roll side to side in bed with INDEPENDENT within 7 treatment day(s).    (2.)Ms. Gupta will transfer from bed to chair and chair to bed with MODIFIED INDEPENDENCE using the least restrictive device within 7 treatment day(s).    (3.)Ms. Gupta will ambulate with STAND BY ASSIST for 500+ feet with the least restrictive device within 7 treatment day(s).  (4.)Ms. Gupta will tolerate 23+ minutes of therapeutic activity within 7 treatment days for increased activity tolerance and general functional mobility.     PHYSICAL THERAPY: Daily Note AM   (Link to Caseload Tracking: PT Visit Days : 3  Time In/Out PT Charge Capture  Rehab Caseload Tracker  Orders    Janine Gupta is a 83 y.o. female   PRIMARY DIAGNOSIS: Acute pancreatitis, unspecified complication status, unspecified pancreatitis type  Acute pancreatitis, unspecified complication status, unspecified pancreatitis type [K85.90]  Other acute pancreatitis, unspecified complication status [K85.80]       Inpatient: Payor: MEDICARE / Plan: MEDICARE PART A AND B / Product Type: *No Product type* /     ASSESSMENT:     REHAB RECOMMENDATIONS:   Recommendation to date pending progress:  Setting:  Short-term Rehab    Equipment:    To Be Determined     ASSESSMENT:  Ms. Gupta seen for PT treatment this AM. Is in good spirits and motivated to improve mobility. Assessed O2 on RA during bed mobility and 50 ft of gait using RW - O2 sats 90-95% However, when ambulating an additional 50 ft, sats down to 86-87% so replaced O2. O2 sats up to 90-91% in ~ 1 min. Pt continues with generalized weakness and decreased activity tolerance. PT will continue to follow as inpatient. Recommend STR at d/c.     SUBJECTIVE:   Ms. Gupta states, \"I want to get rid of this oxygen.\"     Social/Functional  Lives With: Alone  Type of Home: House  Home Layout: Two level, Able to Live on Main level with bedroom/bathroom  Home Access: Stairs to enter with rails  Entrance Stairs - Number of Steps: 4  Entrance Stairs - Rails: Right  Bathroom Shower/Tub: Walk-in shower, Shower chair with back  Bathroom Toilet: Standard  Bathroom Equipment: Grab bars in shower, Grab bars around toilet, Commode  Bathroom Accessibility: Accessible  Home Equipment: Walker, rolling, Walker, 4 wheeled  Receives Help From: Family  ADL Assistance: Needs assistance  Toileting: Needs assistance  Homemaking Assistance: Needs assistance  Homemaking Responsibilities: Yes  Ambulation Assistance: Needs assistance  Transfer Assistance: Needs assistance  Active : No  Occupation: Retired  OBJECTIVE:     PAIN: VITALS / O2: PRECAUTION / LINES / DRAINS:   Pre Treatment:   Pain Assessment: 0-10  Patient's Stated Pain Goal: 0 - No pain      Post Treatment: 0/10 Vitals    O2 sats at rest 95% on RA  O2 sats after ambulation 50ft on RA: 90%  After ambulating additional 50 ft, O2 sats 86-87%; PT replaced O2    Oxygen   2 L via NC IV    RESTRICTIONS/PRECAUTIONS:  Restrictions/Precautions  Restrictions/Precautions: Fall Risk  Restrictions/Precautions: Fall Risk     MOBILITY: I Mod I S SBA CGA Min Mod Max Total  NT x2 Comments:   Bed Mobility    Rolling [] [] [x] [] [] [] [] [] [] [] []    Supine to Sit [] [] [x] [] [] [] [] [] [] [] []    Scooting [] [] [x] [] [] [] [] [] [] [] []    Sit to Supine [] [] [x] [] [] [] [] [] [] [] []    Transfers    Sit to Stand [] [] [x] [] [] [] [] [] [] [] []    Bed to Chair [] [] [] [] [] [] [] [] [] [x] []    Stand to Sit [] [] [x] [] [] [] [] [] [] [] []     [] [] [] [] [] [] [] [] [] [] []    I=Independent, Mod I=Modified Independent, S=Supervision, SBA=Standby Assistance, CGA=Contact Guard Assistance,   Min=Minimal Assistance, Mod=Moderate Assistance, Max=Maximal Assistance, Total=Total Assistance, NT=Not Tested    BALANCE:

## 2024-01-26 NOTE — CARE COORDINATION
Patient was accepted at [facility]. Discharge possibly Sunday,. If [facility] is unable to take her patient second choice is [facility].     Sanjeev FUNESSW, ACM  [facility]

## 2024-01-27 LAB
ALBUMIN SERPL-MCNC: 2.1 G/DL (ref 3.2–4.6)
ALBUMIN/GLOB SERPL: 0.6 (ref 0.4–1.6)
ALP SERPL-CCNC: 240 U/L (ref 50–136)
ALT SERPL-CCNC: 118 U/L (ref 12–65)
ANION GAP SERPL CALC-SCNC: 4 MMOL/L (ref 2–11)
AST SERPL-CCNC: 56 U/L (ref 15–37)
BACTERIA SPEC CULT: NORMAL
BACTERIA SPEC CULT: NORMAL
BASOPHILS # BLD: 0 K/UL (ref 0–0.2)
BASOPHILS NFR BLD: 0 % (ref 0–2)
BILIRUB SERPL-MCNC: 0.3 MG/DL (ref 0.2–1.1)
BUN SERPL-MCNC: 15 MG/DL (ref 8–23)
CALCIUM SERPL-MCNC: 9 MG/DL (ref 8.3–10.4)
CHLORIDE SERPL-SCNC: 102 MMOL/L (ref 103–113)
CO2 SERPL-SCNC: 30 MMOL/L (ref 21–32)
CREAT SERPL-MCNC: 1.2 MG/DL (ref 0.6–1)
DIFFERENTIAL METHOD BLD: ABNORMAL
EOSINOPHIL # BLD: 0.2 K/UL (ref 0–0.8)
EOSINOPHIL NFR BLD: 2 % (ref 0.5–7.8)
ERYTHROCYTE [DISTWIDTH] IN BLOOD BY AUTOMATED COUNT: 14 % (ref 11.9–14.6)
GLOBULIN SER CALC-MCNC: 3.6 G/DL (ref 2.8–4.5)
GLUCOSE BLD STRIP.AUTO-MCNC: 111 MG/DL (ref 65–100)
GLUCOSE BLD STRIP.AUTO-MCNC: 120 MG/DL (ref 65–100)
GLUCOSE BLD STRIP.AUTO-MCNC: 121 MG/DL (ref 65–100)
GLUCOSE BLD STRIP.AUTO-MCNC: 122 MG/DL (ref 65–100)
GLUCOSE SERPL-MCNC: 102 MG/DL (ref 65–100)
HCT VFR BLD AUTO: 25.5 % (ref 35.8–46.3)
HGB BLD-MCNC: 8.1 G/DL (ref 11.7–15.4)
IMM GRANULOCYTES # BLD AUTO: 0.1 K/UL (ref 0–0.5)
IMM GRANULOCYTES NFR BLD AUTO: 1 % (ref 0–5)
LYMPHOCYTES # BLD: 1.2 K/UL (ref 0.5–4.6)
LYMPHOCYTES NFR BLD: 14 % (ref 13–44)
MCH RBC QN AUTO: 29.6 PG (ref 26.1–32.9)
MCHC RBC AUTO-ENTMCNC: 31.8 G/DL (ref 31.4–35)
MCV RBC AUTO: 93.1 FL (ref 82–102)
MONOCYTES # BLD: 1 K/UL (ref 0.1–1.3)
MONOCYTES NFR BLD: 12 % (ref 4–12)
NEUTS SEG # BLD: 5.6 K/UL (ref 1.7–8.2)
NEUTS SEG NFR BLD: 71 % (ref 43–78)
NRBC # BLD: 0 K/UL (ref 0–0.2)
PLATELET # BLD AUTO: 134 K/UL (ref 150–450)
PMV BLD AUTO: 11.5 FL (ref 9.4–12.3)
POTASSIUM SERPL-SCNC: 3.7 MMOL/L (ref 3.5–5.1)
PROT SERPL-MCNC: 5.7 G/DL (ref 6.3–8.2)
RBC # BLD AUTO: 2.74 M/UL (ref 4.05–5.2)
SERVICE CMNT-IMP: ABNORMAL
SERVICE CMNT-IMP: NORMAL
SERVICE CMNT-IMP: NORMAL
SODIUM SERPL-SCNC: 136 MMOL/L (ref 136–146)
WBC # BLD AUTO: 8.1 K/UL (ref 4.3–11.1)

## 2024-01-27 PROCEDURE — 2580000003 HC RX 258: Performed by: FAMILY MEDICINE

## 2024-01-27 PROCEDURE — 80053 COMPREHEN METABOLIC PANEL: CPT

## 2024-01-27 PROCEDURE — A4216 STERILE WATER/SALINE, 10 ML: HCPCS | Performed by: FAMILY MEDICINE

## 2024-01-27 PROCEDURE — 6370000000 HC RX 637 (ALT 250 FOR IP): Performed by: FAMILY MEDICINE

## 2024-01-27 PROCEDURE — 85025 COMPLETE CBC W/AUTO DIFF WBC: CPT

## 2024-01-27 PROCEDURE — 6370000000 HC RX 637 (ALT 250 FOR IP): Performed by: INTERNAL MEDICINE

## 2024-01-27 PROCEDURE — 2500000003 HC RX 250 WO HCPCS: Performed by: FAMILY MEDICINE

## 2024-01-27 PROCEDURE — 2580000003 HC RX 258: Performed by: INTERNAL MEDICINE

## 2024-01-27 PROCEDURE — 1100000000 HC RM PRIVATE

## 2024-01-27 PROCEDURE — 36415 COLL VENOUS BLD VENIPUNCTURE: CPT

## 2024-01-27 PROCEDURE — 82962 GLUCOSE BLOOD TEST: CPT

## 2024-01-27 PROCEDURE — 6360000002 HC RX W HCPCS: Performed by: INTERNAL MEDICINE

## 2024-01-27 RX ORDER — FAMOTIDINE 20 MG/1
20 TABLET, FILM COATED ORAL DAILY
Status: DISCONTINUED | OUTPATIENT
Start: 2024-01-28 | End: 2024-02-01 | Stop reason: HOSPADM

## 2024-01-27 RX ORDER — FUROSEMIDE 20 MG/1
20 TABLET ORAL DAILY
Status: DISCONTINUED | OUTPATIENT
Start: 2024-01-27 | End: 2024-02-01 | Stop reason: HOSPADM

## 2024-01-27 RX ORDER — FAMOTIDINE 20 MG/1
20 TABLET, FILM COATED ORAL 2 TIMES DAILY
Status: DISCONTINUED | OUTPATIENT
Start: 2024-01-27 | End: 2024-01-27

## 2024-01-27 RX ORDER — POTASSIUM CHLORIDE 20 MEQ/1
20 TABLET, EXTENDED RELEASE ORAL
Status: DISCONTINUED | OUTPATIENT
Start: 2024-01-28 | End: 2024-02-01 | Stop reason: HOSPADM

## 2024-01-27 RX ADMIN — TAMSULOSIN HYDROCHLORIDE 0.4 MG: 0.4 CAPSULE ORAL at 09:20

## 2024-01-27 RX ADMIN — PANCRELIPASE LIPASE, PANCRELIPASE PROTEASE, PANCRELIPASE AMYLASE 5000 UNITS: 5000; 17000; 24000 CAPSULE, DELAYED RELEASE ORAL at 09:20

## 2024-01-27 RX ADMIN — FUROSEMIDE 40 MG: 10 INJECTION, SOLUTION INTRAMUSCULAR; INTRAVENOUS at 09:20

## 2024-01-27 RX ADMIN — CLOPIDOGREL BISULFATE 75 MG: 75 TABLET ORAL at 09:20

## 2024-01-27 RX ADMIN — PANCRELIPASE LIPASE, PANCRELIPASE PROTEASE, PANCRELIPASE AMYLASE 5000 UNITS: 5000; 17000; 24000 CAPSULE, DELAYED RELEASE ORAL at 16:23

## 2024-01-27 RX ADMIN — METOPROLOL SUCCINATE 50 MG: 50 TABLET, EXTENDED RELEASE ORAL at 09:20

## 2024-01-27 RX ADMIN — ASPIRIN 81 MG: 81 TABLET, COATED ORAL at 09:20

## 2024-01-27 RX ADMIN — VALSARTAN 80 MG: 80 TABLET ORAL at 09:20

## 2024-01-27 RX ADMIN — LEVOTHYROXINE SODIUM 25 MCG: 0.05 TABLET ORAL at 04:57

## 2024-01-27 RX ADMIN — MAGNESIUM GLUCONATE 500 MG ORAL TABLET 400 MG: 500 TABLET ORAL at 09:23

## 2024-01-27 RX ADMIN — Medication 1 CAPSULE: at 09:25

## 2024-01-27 RX ADMIN — ATORVASTATIN CALCIUM 20 MG: 20 TABLET, FILM COATED ORAL at 09:25

## 2024-01-27 RX ADMIN — SODIUM CHLORIDE, PRESERVATIVE FREE 10 ML: 5 INJECTION INTRAVENOUS at 09:21

## 2024-01-27 RX ADMIN — VITAMIN D, TAB 1000IU (100/BT) 2000 UNITS: 25 TAB at 09:20

## 2024-01-27 RX ADMIN — FAMOTIDINE 20 MG: 10 INJECTION, SOLUTION INTRAVENOUS at 09:20

## 2024-01-27 RX ADMIN — MONTELUKAST 10 MG: 10 TABLET, FILM COATED ORAL at 21:39

## 2024-01-27 RX ADMIN — FLUTICASONE PROPIONATE 2 SPRAY: 50 SPRAY, METERED NASAL at 09:21

## 2024-01-27 RX ADMIN — SODIUM CHLORIDE, PRESERVATIVE FREE 10 ML: 5 INJECTION INTRAVENOUS at 21:40

## 2024-01-27 RX ADMIN — WATER 1000 MG: 1 INJECTION INTRAMUSCULAR; INTRAVENOUS; SUBCUTANEOUS at 13:18

## 2024-01-27 RX ADMIN — PANCRELIPASE LIPASE, PANCRELIPASE PROTEASE, PANCRELIPASE AMYLASE 5000 UNITS: 5000; 17000; 24000 CAPSULE, DELAYED RELEASE ORAL at 13:18

## 2024-01-27 ASSESSMENT — PAIN SCALES - GENERAL
PAINLEVEL_OUTOF10: 0
PAINLEVEL_OUTOF10: 2

## 2024-01-27 NOTE — PROGRESS NOTES
Hourly rounds performed this shift. Bed lowered and locked. Call light within reach. Pt denies pain or SOB. Bedside report will be given to oncoming nurse.

## 2024-01-27 NOTE — PROGRESS NOTES
It is with great jose we pray for your family today:        \"Because you dared to believe,    Your chris has healed you.    Go with peace in your heart,    And be free from your suffering!\"    JAKE 5            Lord,    I believe that if you would touch my body I shall be healed.    Give me the chris to come boldly into your presence.    Grace Perez   [phone]

## 2024-01-27 NOTE — PROGRESS NOTES
Patient had unwitnessed fall in bathroom. Patient stated she turned around after using the toilet and lost her balance. Skin tear noted to right forearm, but no other injuries visible. Patient denies hitting head, denies any pain. VS normal. Patient educated regarding call bell use and waiting for assistance. Patient verbalized understanding.

## 2024-01-27 NOTE — PLAN OF CARE
Problem: Discharge Planning  Goal: Discharge to home or other facility with appropriate resources  1/27/2024 0309 by Mariana Giron RN  Outcome: Progressing  1/27/2024 0309 by Mariana Giron RN  Outcome: Progressing     Problem: Pain  Goal: Verbalizes/displays adequate comfort level or baseline comfort level  1/27/2024 0309 by Mariana Giron RN  Outcome: Progressing  1/27/2024 0309 by Mariana Giron RN  Outcome: Progressing     Problem: Skin/Tissue Integrity  Goal: Absence of new skin breakdown  Description: 1.  Monitor for areas of redness and/or skin breakdown  2.  Assess vascular access sites hourly  3.  Every 4-6 hours minimum:  Change oxygen saturation probe site  4.  Every 4-6 hours:  If on nasal continuous positive airway pressure, respiratory therapy assess nares and determine need for appliance change or resting period.  1/27/2024 0309 by Mariana Giron RN  Outcome: Progressing  1/27/2024 0309 by Mariana Giron RN  Outcome: Progressing     Problem: Safety - Adult  Goal: Free from fall injury  1/27/2024 0309 by Mariana Giron RN  Outcome: Progressing  1/27/2024 0309 by Mariana Giron RN  Outcome: Progressing     Problem: ABCDS Injury Assessment  Goal: Absence of physical injury  1/27/2024 0309 by Mariana Giron RN  Outcome: Progressing  1/27/2024 0309 by Mariana Giron RN  Outcome: Progressing     Problem: Chronic Conditions and Co-morbidities  Goal: Patient's chronic conditions and co-morbidity symptoms are monitored and maintained or improved  1/27/2024 0309 by Mariana Giron RN  Outcome: Progressing  1/27/2024 0309 by Mariana Giron RN  Outcome: Progressing     Problem: Neurosensory - Adult  Goal: Achieves stable or improved neurological status  1/27/2024 0309 by Mariana Giron RN  Outcome: Progressing  1/27/2024 0309 by Mariana Giron RN  Outcome: Progressing  Goal: Achieves maximal functionality and self care  1/27/2024 0309 by Mariana Giron RN  Outcome: Progressing  1/27/2024 0309 by Mariana Giron RN  Outcome: Progressing

## 2024-01-27 NOTE — PROGRESS NOTES
Hospitalist Progress Note   Admit Date:  2024  8:58 PM   Name:  Janine Gupta   Age:  83 y.o.  Sex:  female  :  1940   MRN:  [id number]   Room:  [facility]/    Presenting/Chief Complaint: Diarrhea     Reason(s) for Admission: Acute pancreatitis, unspecified complication status, unspecified pancreatitis type [K85.90]  Other acute pancreatitis, unspecified complication status [K85.80]     Hospital Course:     Copied from prior provider HPI/summary:  Janine Gupta is a 83 y.o. female with a past medical history of hypothyroidism, exocrine pancreatic insufficiency, Asthma, polyneuropathy, coronary artery disease, ischemic cardiomyopathy, type 2 diabetes mellitus, GERD, hyperlipidemia, hypertension, history of obesity, history of sleep apnea who presented with upper abdominal pain of moderate intensity that is achy in nature nonradiating and began around lunchtime today.  This has been associated with nausea but no vomiting.  Patient has not noted any change in her appetite or her p.o. intake but remarks that she has just finally gotten her sense of taste back after suffering from COVID so she really cannot say whether her appetite has been good.  She reports continued trouble with multiple episodes of loose stools daily although they do not contain blood or mucus and that that has been going on for several months now.  Patient did feel as though she was chilled earlier today and has been very tired recently.  She denied any vomiting she denied any constipation she denied any dysuria or UTI symptoms, she denied any cough runny nose sore throat, she denied any chest pain palpitations or other cardiac symptoms.  Evaluation in the emergency room has revealed patient to have an apparent acute pancreatitis.  She has a lipase of 9620 a CRP of 6.5 mild thrombocytopenia 142,000 but otherwise unremarkable lab workup with a stable to slightly improved chronic renal insufficiency at 35 and 1.0 BUN and  lispro (HUMALOG) injection vial 0-4 Units  0-4 Units SubCUTAneous Nightly    traMADol (ULTRAM) tablet 50 mg  50 mg Oral Q6H PRN       Signed:  Jim Gabriel,     Part of this note may have been written by using a voice dictation software.  The note has been proof read but may still contain some grammatical/other typographical errors.

## 2024-01-28 ENCOUNTER — APPOINTMENT (OUTPATIENT)
Dept: CT IMAGING | Age: 84
DRG: 438 | End: 2024-01-28
Payer: MEDICARE

## 2024-01-28 PROBLEM — G93.40 ACUTE ENCEPHALOPATHY: Status: ACTIVE | Noted: 2024-01-28

## 2024-01-28 PROBLEM — I63.9 ACUTE CVA (CEREBROVASCULAR ACCIDENT) (HCC): Status: ACTIVE | Noted: 2024-01-28

## 2024-01-28 LAB
ALBUMIN SERPL-MCNC: 2.2 G/DL (ref 3.2–4.6)
ALBUMIN/GLOB SERPL: 0.6 (ref 0.4–1.6)
ALP SERPL-CCNC: 228 U/L (ref 50–136)
ALT SERPL-CCNC: 101 U/L (ref 12–65)
AMMONIA PLAS-SCNC: 38 UMOL/L (ref 11–32)
ANION GAP SERPL CALC-SCNC: 6 MMOL/L (ref 2–11)
AST SERPL-CCNC: 54 U/L (ref 15–37)
BASOPHILS # BLD: 0 K/UL (ref 0–0.2)
BASOPHILS NFR BLD: 0 % (ref 0–2)
BILIRUB SERPL-MCNC: 0.3 MG/DL (ref 0.2–1.1)
BUN SERPL-MCNC: 23 MG/DL (ref 8–23)
CALCIUM SERPL-MCNC: 9.2 MG/DL (ref 8.3–10.4)
CHLORIDE SERPL-SCNC: 100 MMOL/L (ref 103–113)
CO2 SERPL-SCNC: 30 MMOL/L (ref 21–32)
CREAT SERPL-MCNC: 1.2 MG/DL (ref 0.6–1)
DIFFERENTIAL METHOD BLD: ABNORMAL
EOSINOPHIL # BLD: 0.2 K/UL (ref 0–0.8)
EOSINOPHIL NFR BLD: 2 % (ref 0.5–7.8)
ERYTHROCYTE [DISTWIDTH] IN BLOOD BY AUTOMATED COUNT: 13.6 % (ref 11.9–14.6)
FOLATE SERPL-MCNC: 11 NG/ML (ref 3.1–17.5)
GLOBULIN SER CALC-MCNC: 3.8 G/DL (ref 2.8–4.5)
GLUCOSE BLD STRIP.AUTO-MCNC: 108 MG/DL (ref 65–100)
GLUCOSE BLD STRIP.AUTO-MCNC: 122 MG/DL (ref 65–100)
GLUCOSE BLD STRIP.AUTO-MCNC: 179 MG/DL (ref 65–100)
GLUCOSE BLD STRIP.AUTO-MCNC: 185 MG/DL (ref 65–100)
GLUCOSE SERPL-MCNC: 107 MG/DL (ref 65–100)
HCT VFR BLD AUTO: 27 % (ref 35.8–46.3)
HGB BLD-MCNC: 8.8 G/DL (ref 11.7–15.4)
IMM GRANULOCYTES # BLD AUTO: 0.1 K/UL (ref 0–0.5)
IMM GRANULOCYTES NFR BLD AUTO: 2 % (ref 0–5)
LYMPHOCYTES # BLD: 1 K/UL (ref 0.5–4.6)
LYMPHOCYTES NFR BLD: 14 % (ref 13–44)
MAGNESIUM SERPL-MCNC: 2 MG/DL (ref 1.8–2.4)
MCH RBC QN AUTO: 29.7 PG (ref 26.1–32.9)
MCHC RBC AUTO-ENTMCNC: 32.6 G/DL (ref 31.4–35)
MCV RBC AUTO: 91.2 FL (ref 82–102)
MONOCYTES # BLD: 0.8 K/UL (ref 0.1–1.3)
MONOCYTES NFR BLD: 10 % (ref 4–12)
NEUTS SEG # BLD: 5.6 K/UL (ref 1.7–8.2)
NEUTS SEG NFR BLD: 72 % (ref 43–78)
NRBC # BLD: 0 K/UL (ref 0–0.2)
PLATELET # BLD AUTO: 170 K/UL (ref 150–450)
PMV BLD AUTO: 10.4 FL (ref 9.4–12.3)
POTASSIUM SERPL-SCNC: 3.5 MMOL/L (ref 3.5–5.1)
PROT SERPL-MCNC: 6 G/DL (ref 6.3–8.2)
RBC # BLD AUTO: 2.96 M/UL (ref 4.05–5.2)
SERVICE CMNT-IMP: ABNORMAL
SODIUM SERPL-SCNC: 136 MMOL/L (ref 136–146)
TSH, 3RD GENERATION: 2.48 UIU/ML (ref 0.36–3.74)
VIT B12 SERPL-MCNC: 443 PG/ML (ref 193–986)
WBC # BLD AUTO: 7.7 K/UL (ref 4.3–11.1)

## 2024-01-28 PROCEDURE — 99222 1ST HOSP IP/OBS MODERATE 55: CPT | Performed by: STUDENT IN AN ORGANIZED HEALTH CARE EDUCATION/TRAINING PROGRAM

## 2024-01-28 PROCEDURE — 2580000003 HC RX 258: Performed by: FAMILY MEDICINE

## 2024-01-28 PROCEDURE — 84443 ASSAY THYROID STIM HORMONE: CPT

## 2024-01-28 PROCEDURE — 6370000000 HC RX 637 (ALT 250 FOR IP): Performed by: FAMILY MEDICINE

## 2024-01-28 PROCEDURE — 80053 COMPREHEN METABOLIC PANEL: CPT

## 2024-01-28 PROCEDURE — 1100000003 HC PRIVATE W/ TELEMETRY

## 2024-01-28 PROCEDURE — 2580000003 HC RX 258: Performed by: INTERNAL MEDICINE

## 2024-01-28 PROCEDURE — 70450 CT HEAD/BRAIN W/O DYE: CPT

## 2024-01-28 PROCEDURE — 85025 COMPLETE CBC W/AUTO DIFF WBC: CPT

## 2024-01-28 PROCEDURE — 82746 ASSAY OF FOLIC ACID SERUM: CPT

## 2024-01-28 PROCEDURE — 82140 ASSAY OF AMMONIA: CPT

## 2024-01-28 PROCEDURE — 6360000004 HC RX CONTRAST MEDICATION: Performed by: INTERNAL MEDICINE

## 2024-01-28 PROCEDURE — 6360000002 HC RX W HCPCS: Performed by: INTERNAL MEDICINE

## 2024-01-28 PROCEDURE — 82607 VITAMIN B-12: CPT

## 2024-01-28 PROCEDURE — 2500000003 HC RX 250 WO HCPCS: Performed by: INTERNAL MEDICINE

## 2024-01-28 PROCEDURE — 82962 GLUCOSE BLOOD TEST: CPT

## 2024-01-28 PROCEDURE — 4A03X5D MEASUREMENT OF ARTERIAL FLOW, INTRACRANIAL, EXTERNAL APPROACH: ICD-10-PCS | Performed by: INTERNAL MEDICINE

## 2024-01-28 PROCEDURE — 70498 CT ANGIOGRAPHY NECK: CPT

## 2024-01-28 PROCEDURE — 36415 COLL VENOUS BLD VENIPUNCTURE: CPT

## 2024-01-28 PROCEDURE — 83735 ASSAY OF MAGNESIUM: CPT

## 2024-01-28 PROCEDURE — 6370000000 HC RX 637 (ALT 250 FOR IP): Performed by: INTERNAL MEDICINE

## 2024-01-28 RX ORDER — SODIUM CHLORIDE 9 MG/ML
INJECTION, SOLUTION INTRAVENOUS PRN
Status: DISCONTINUED | OUTPATIENT
Start: 2024-01-28 | End: 2024-02-01 | Stop reason: HOSPADM

## 2024-01-28 RX ORDER — ACETAMINOPHEN 650 MG/1
650 SUPPOSITORY RECTAL EVERY 4 HOURS PRN
Status: DISCONTINUED | OUTPATIENT
Start: 2024-01-28 | End: 2024-02-01 | Stop reason: HOSPADM

## 2024-01-28 RX ORDER — SODIUM CHLORIDE 0.9 % (FLUSH) 0.9 %
5-40 SYRINGE (ML) INJECTION EVERY 12 HOURS SCHEDULED
Status: DISCONTINUED | OUTPATIENT
Start: 2024-01-28 | End: 2024-02-01 | Stop reason: HOSPADM

## 2024-01-28 RX ORDER — SODIUM CHLORIDE 0.9 % (FLUSH) 0.9 %
5-40 SYRINGE (ML) INJECTION PRN
Status: DISCONTINUED | OUTPATIENT
Start: 2024-01-28 | End: 2024-02-01 | Stop reason: HOSPADM

## 2024-01-28 RX ORDER — ROSUVASTATIN CALCIUM 10 MG/1
20 TABLET, COATED ORAL NIGHTLY
Status: DISCONTINUED | OUTPATIENT
Start: 2024-01-28 | End: 2024-01-28

## 2024-01-28 RX ORDER — ROSUVASTATIN CALCIUM 10 MG/1
40 TABLET, COATED ORAL NIGHTLY
Status: DISCONTINUED | OUTPATIENT
Start: 2024-01-28 | End: 2024-02-01 | Stop reason: HOSPADM

## 2024-01-28 RX ORDER — LABETALOL HYDROCHLORIDE 5 MG/ML
10 INJECTION, SOLUTION INTRAVENOUS EVERY 10 MIN PRN
Status: DISCONTINUED | OUTPATIENT
Start: 2024-01-28 | End: 2024-02-01 | Stop reason: HOSPADM

## 2024-01-28 RX ORDER — ONDANSETRON 2 MG/ML
4 INJECTION INTRAMUSCULAR; INTRAVENOUS EVERY 6 HOURS PRN
Status: DISCONTINUED | OUTPATIENT
Start: 2024-01-28 | End: 2024-02-01 | Stop reason: HOSPADM

## 2024-01-28 RX ORDER — POTASSIUM CHLORIDE 20 MEQ/1
40 TABLET, EXTENDED RELEASE ORAL ONCE
Status: COMPLETED | OUTPATIENT
Start: 2024-01-28 | End: 2024-01-28

## 2024-01-28 RX ORDER — ACETAMINOPHEN 325 MG/1
650 TABLET ORAL EVERY 4 HOURS PRN
Status: DISCONTINUED | OUTPATIENT
Start: 2024-01-28 | End: 2024-02-01 | Stop reason: HOSPADM

## 2024-01-28 RX ORDER — POLYETHYLENE GLYCOL 3350 17 G/17G
17 POWDER, FOR SOLUTION ORAL DAILY PRN
Status: DISCONTINUED | OUTPATIENT
Start: 2024-01-28 | End: 2024-02-01 | Stop reason: HOSPADM

## 2024-01-28 RX ORDER — BISACODYL 10 MG
10 SUPPOSITORY, RECTAL RECTAL DAILY PRN
Status: DISCONTINUED | OUTPATIENT
Start: 2024-01-28 | End: 2024-02-01 | Stop reason: HOSPADM

## 2024-01-28 RX ORDER — ONDANSETRON 4 MG/1
4 TABLET, ORALLY DISINTEGRATING ORAL EVERY 8 HOURS PRN
Status: DISCONTINUED | OUTPATIENT
Start: 2024-01-28 | End: 2024-02-01 | Stop reason: HOSPADM

## 2024-01-28 RX ORDER — DEXTROSE MONOHYDRATE, SODIUM CHLORIDE, AND POTASSIUM CHLORIDE 50; 1.49; 4.5 G/1000ML; G/1000ML; G/1000ML
INJECTION, SOLUTION INTRAVENOUS CONTINUOUS
Status: DISCONTINUED | OUTPATIENT
Start: 2024-01-28 | End: 2024-01-29

## 2024-01-28 RX ORDER — ATORVASTATIN CALCIUM 40 MG/1
80 TABLET, FILM COATED ORAL NIGHTLY
Status: DISCONTINUED | OUTPATIENT
Start: 2024-01-28 | End: 2024-01-28

## 2024-01-28 RX ORDER — VALSARTAN 40 MG/1
40 TABLET ORAL DAILY
Status: DISCONTINUED | OUTPATIENT
Start: 2024-01-29 | End: 2024-02-01 | Stop reason: HOSPADM

## 2024-01-28 RX ADMIN — PANCRELIPASE LIPASE, PANCRELIPASE PROTEASE, PANCRELIPASE AMYLASE 5000 UNITS: 5000; 17000; 24000 CAPSULE, DELAYED RELEASE ORAL at 08:59

## 2024-01-28 RX ADMIN — Medication 1 CAPSULE: at 08:59

## 2024-01-28 RX ADMIN — DEXTROSE MONOHYDRATE, SODIUM CHLORIDE, AND POTASSIUM CHLORIDE: 50; 4.5; 1.49 INJECTION, SOLUTION INTRAVENOUS at 11:19

## 2024-01-28 RX ADMIN — TAMSULOSIN HYDROCHLORIDE 0.4 MG: 0.4 CAPSULE ORAL at 08:59

## 2024-01-28 RX ADMIN — SODIUM CHLORIDE, PRESERVATIVE FREE 10 ML: 5 INJECTION INTRAVENOUS at 09:00

## 2024-01-28 RX ADMIN — POTASSIUM CHLORIDE 20 MEQ: 1500 TABLET, EXTENDED RELEASE ORAL at 08:59

## 2024-01-28 RX ADMIN — VITAMIN D, TAB 1000IU (100/BT) 2000 UNITS: 25 TAB at 08:59

## 2024-01-28 RX ADMIN — IOPAMIDOL 100 ML: 755 INJECTION, SOLUTION INTRAVENOUS at 15:06

## 2024-01-28 RX ADMIN — FUROSEMIDE 20 MG: 20 TABLET ORAL at 09:00

## 2024-01-28 RX ADMIN — MONTELUKAST 10 MG: 10 TABLET, FILM COATED ORAL at 20:47

## 2024-01-28 RX ADMIN — PANCRELIPASE LIPASE, PANCRELIPASE PROTEASE, PANCRELIPASE AMYLASE 5000 UNITS: 5000; 17000; 24000 CAPSULE, DELAYED RELEASE ORAL at 17:12

## 2024-01-28 RX ADMIN — MAGNESIUM GLUCONATE 500 MG ORAL TABLET 400 MG: 500 TABLET ORAL at 08:59

## 2024-01-28 RX ADMIN — VALSARTAN 80 MG: 80 TABLET ORAL at 08:59

## 2024-01-28 RX ADMIN — LEVOTHYROXINE SODIUM 25 MCG: 0.05 TABLET ORAL at 05:27

## 2024-01-28 RX ADMIN — ASPIRIN 81 MG: 81 TABLET, COATED ORAL at 08:59

## 2024-01-28 RX ADMIN — WATER 1000 MG: 1 INJECTION INTRAMUSCULAR; INTRAVENOUS; SUBCUTANEOUS at 14:13

## 2024-01-28 RX ADMIN — DEXTROSE MONOHYDRATE, SODIUM CHLORIDE, AND POTASSIUM CHLORIDE: 50; 4.5; 1.49 INJECTION, SOLUTION INTRAVENOUS at 22:16

## 2024-01-28 RX ADMIN — FAMOTIDINE 20 MG: 20 TABLET, FILM COATED ORAL at 08:59

## 2024-01-28 RX ADMIN — CLOPIDOGREL BISULFATE 75 MG: 75 TABLET ORAL at 08:59

## 2024-01-28 RX ADMIN — METOPROLOL SUCCINATE 50 MG: 50 TABLET, EXTENDED RELEASE ORAL at 08:59

## 2024-01-28 RX ADMIN — POTASSIUM CHLORIDE 40 MEQ: 1500 TABLET, EXTENDED RELEASE ORAL at 17:12

## 2024-01-28 RX ADMIN — FLUTICASONE PROPIONATE 2 SPRAY: 50 SPRAY, METERED NASAL at 09:01

## 2024-01-28 RX ADMIN — ROSUVASTATIN CALCIUM 40 MG: 10 TABLET, FILM COATED ORAL at 20:47

## 2024-01-28 RX ADMIN — ATORVASTATIN CALCIUM 20 MG: 20 TABLET, FILM COATED ORAL at 08:59

## 2024-01-28 NOTE — CONSULTS
All pharmacy needed was a verbal to give patient medication do to his pharmaceutical concerns in the past  I gave a verbal of as this is due to a work trip and he has not had any issues with alprazolam in the past  Neurology Consult Note       History:   83-year-old female admitted for acute pancreatitis.  Last known well was last evening around 8 PM.  This morning she was found to be confused, not following commands.  CT head revealed acute to subacute ischemic infarct in the left occipital parietal region.  She is on aspirin and Plavix for CAD. Neurology was consulted.       Exam: Pertinent positives and negatives include:  Profound mixed aphasia, does not follow basic commands.  Attends to left side better than the right side. equal strength and tone in extremities.     Imaging and review of data:   CT head with acute to subacute ischemic infarct in the left parietal occipital lobe.     CT angiogram with scattered atherosclerosis.  There appears to be distal left PCA significant stenosis with trickle flow distally    Prior TTE with paradoxical septal wall motion with basilar hypokinesis    Assessment and Plan:   83-year-old female with acute ischemic stroke.  There appears to be significant distal left PCA stenosis, pending formal read.  Of note, she has labile blood pressure which dropped to systolics of 80s last night.     Suspect the occlusion to be cardioembolic, exacerbated by hypoperfusion.     She is already on DAPT and statin.     Permissive hypertension <220/110 until tomorrow.  Then gradually correct, avoid major fluctuations.    Completion of workup with MRI, TTE.  Further recommendations to follow.       Bowen King,   Neurology      Cumulative time spent today was 45 minutes which included chart review, obtaining history (from patient, family, or other providers), review of images, examining the patient, and counseling the patient and/or family on medical condition.

## 2024-01-28 NOTE — PROGRESS NOTES
Hospitalist Progress Note   Admit Date:  2024  8:58 PM   Name:  Janine Gupta   Age:  83 y.o.  Sex:  female  :  1940   MRN:  [id number]   Room:  [facility]    Presenting/Chief Complaint: Diarrhea     Reason(s) for Admission: Acute pancreatitis, unspecified complication status, unspecified pancreatitis type [K85.90]  Other acute pancreatitis, unspecified complication status [K85.80]       Addendum-1:34 PM-  Discussed with patient's family and patient at bedside-CT head positive for new ischemic likely acute/subacute stroke.  Part of workup for acute mental status changes/confusion today.  Family member did report the patient was not using right hand as well as prior and was feeding herself this morning with left arm.  Radiologist impression from noncontrast CT head:    \"Hypodensity within the left parietal and occipital lobe new from prior study.  This is suspicious for subacute or acute infarct. MRI could evaluate further.     Small vessel ischemic disease and mild cerebral atrophy.\"      Patient has been receiving both aspirin and Plavix-history of prior CVA, and home Pitivastatin 4 mg has been using 20 mg atorvastatin as surrogate on admission here.  CTA of head, eventual MRI brain without contrast, neurology consult, neurochecks will be ordered.  Discussed with family.  PT OT follow-up.  Will likely need to cancel discharge to short-term rehab tomorrow pending further workup/possible treatment acute CVA.  Telemetry monitoring at least 48 hours.  Follow-up exam patient is using right arm to feed herself lunch.  Still with paucity of speech and possibly right-sided visual defects.  Possible mild right upper extremity weakness compared to left.  Will cancel EEG but continue other workup regarding acute change in mental status including ammonia level etc.    No known documented last known well.  CT head ordered because of altered mental status/acute confusion earlier today.  Last known well    Magnesium    Collection Time: 01/28/24  6:09 AM   Result Value Ref Range    Magnesium 2.0 1.8 - 2.4 mg/dL   POCT Glucose    Collection Time: 01/28/24  7:39 AM   Result Value Ref Range    POC Glucose 122 (H) 65 - 100 mg/dL    Performed by: Christ    POCT Glucose    Collection Time: 01/28/24 11:36 AM   Result Value Ref Range    POC Glucose 179 (H) 65 - 100 mg/dL    Performed by: Issa        Current Meds:  Current Facility-Administered Medications   Medication Dose Route Frequency    potassium chloride (KLOR-CON M) extended release tablet 40 mEq  40 mEq Oral Once    dextrose 5 % and 0.45 % NaCl with KCl 20 mEq infusion   IntraVENous Continuous    [Held by provider] furosemide (LASIX) tablet 20 mg  20 mg Oral Daily    potassium chloride (KLOR-CON M) extended release tablet 20 mEq  20 mEq Oral Daily with breakfast    famotidine (PEPCID) tablet 20 mg  20 mg Oral Daily    lipase-protease-amylase (ZENPEP) delayed release capsule 5,000 Units  5,000 Units Oral TID WC    atorvastatin (LIPITOR) tablet 20 mg  20 mg Oral Daily    valsartan (DIOVAN) tablet 80 mg  80 mg Oral Daily    carboxymethylcellulose (THERATEARS) 1 % ophthalmic gel 1 drop  1 drop Both Eyes PRN    magnesium oxide (MAG-OX) tablet 400 mg  400 mg Oral Daily    cefTRIAXone (ROCEPHIN) 1,000 mg in sterile water 10 mL IV syringe  1,000 mg IntraVENous Q24H    tamsulosin (FLOMAX) capsule 0.4 mg  0.4 mg Oral Daily    prochlorperazine (COMPAZINE) injection 10 mg  10 mg IntraVENous Q6H PRN    aspirin EC tablet 81 mg  81 mg Oral Daily    Vitamin D (CHOLECALCIFEROL) tablet 2,000 Units  2,000 Units Oral Daily    clopidogrel (PLAVIX) tablet 75 mg  75 mg Oral Daily    diphenoxylate-atropine (LOMOTIL) 2.5-0.025 MG per tablet 1 tablet  1 tablet Oral 4x Daily PRN    fluticasone (FLONASE) 50 MCG/ACT nasal spray 2 spray  2 spray Nasal Daily    lactobacillus (CULTURELLE) capsule 1 capsule  1 capsule Oral Daily with breakfast    levothyroxine (SYNTHROID) tablet

## 2024-01-28 NOTE — PROGRESS NOTES
Hourly rounding completed on this shift. No new complaints at this time. All needs met. Hourly neuro checks completed x4 after fall prior to this shift. Pt is currently resting in bed. Will give report to oncoming nurse.

## 2024-01-29 ENCOUNTER — APPOINTMENT (OUTPATIENT)
Dept: GENERAL RADIOLOGY | Age: 84
DRG: 438 | End: 2024-01-29
Payer: MEDICARE

## 2024-01-29 ENCOUNTER — APPOINTMENT (OUTPATIENT)
Dept: NON INVASIVE DIAGNOSTICS | Age: 84
DRG: 438 | End: 2024-01-29
Attending: INTERNAL MEDICINE
Payer: MEDICARE

## 2024-01-29 LAB
ALBUMIN SERPL-MCNC: 2.2 G/DL (ref 3.2–4.6)
ALBUMIN/GLOB SERPL: 0.6 (ref 0.4–1.6)
ALP SERPL-CCNC: 206 U/L (ref 50–136)
ALT SERPL-CCNC: 78 U/L (ref 12–65)
ANION GAP SERPL CALC-SCNC: 4 MMOL/L (ref 2–11)
AST SERPL-CCNC: 36 U/L (ref 15–37)
BILIRUB SERPL-MCNC: 0.2 MG/DL (ref 0.2–1.1)
BUN SERPL-MCNC: 18 MG/DL (ref 8–23)
CALCIUM SERPL-MCNC: 8.9 MG/DL (ref 8.3–10.4)
CHLORIDE SERPL-SCNC: 100 MMOL/L (ref 103–113)
CHOLEST SERPL-MCNC: 111 MG/DL
CO2 SERPL-SCNC: 30 MMOL/L (ref 21–32)
CREAT SERPL-MCNC: 0.9 MG/DL (ref 0.6–1)
ECHO AO ROOT DIAM: 3.6 CM
ECHO AO ROOT INDEX: 2.03 CM/M2
ECHO AV AREA PEAK VELOCITY: 2.1 CM2
ECHO AV AREA VTI: 2.1 CM2
ECHO AV AREA/BSA PEAK VELOCITY: 1.2 CM2/M2
ECHO AV AREA/BSA VTI: 1.2 CM2/M2
ECHO AV MEAN GRADIENT: 3 MMHG
ECHO AV MEAN GRADIENT: 3 MMHG
ECHO AV MEAN VELOCITY: 0.8 M/S
ECHO AV PEAK GRADIENT: 5 MMHG
ECHO AV PEAK VELOCITY: 1.1 M/S
ECHO AV VELOCITY RATIO: 0.73
ECHO AV VTI: 24.6 CM
ECHO BSA: 1.76 M2
ECHO EST RA PRESSURE: 3 MMHG
ECHO IVC PROX: 1.6 CM
ECHO LA AREA 2C: 21.4 CM2
ECHO LA AREA 4C: 19.5 CM2
ECHO LA DIAMETER INDEX: 2.09 CM/M2
ECHO LA DIAMETER: 3.7 CM
ECHO LA MAJOR AXIS: 5.5 CM
ECHO LA MINOR AXIS: 5.5 CM
ECHO LA TO AORTIC ROOT RATIO: 1.03
ECHO LA VOL BP: 59 ML (ref 22–52)
ECHO LA VOL MOD A2C: 65 ML (ref 22–52)
ECHO LA VOL MOD A4C: 53 ML (ref 22–52)
ECHO LA VOL/BSA BIPLANE: 33 ML/M2 (ref 16–34)
ECHO LA VOLUME INDEX MOD A2C: 37 ML/M2 (ref 16–34)
ECHO LA VOLUME INDEX MOD A4C: 30 ML/M2 (ref 16–34)
ECHO LV E' LATERAL VELOCITY: 7 CM/S
ECHO LV EDV A2C: 113 ML
ECHO LV EDV A4C: 150 ML
ECHO LV EDV INDEX A4C: 85 ML/M2
ECHO LV EDV NDEX A2C: 64 ML/M2
ECHO LV EJECTION FRACTION A2C: 49 %
ECHO LV EJECTION FRACTION A4C: 51 %
ECHO LV EJECTION FRACTION BIPLANE: 49 % (ref 55–100)
ECHO LV ESV A2C: 58 ML
ECHO LV ESV A4C: 74 ML
ECHO LV ESV INDEX A2C: 33 ML/M2
ECHO LV ESV INDEX A4C: 42 ML/M2
ECHO LV FRACTIONAL SHORTENING: 22 % (ref 28–44)
ECHO LV INTERNAL DIMENSION DIASTOLE INDEX: 2.77 CM/M2
ECHO LV INTERNAL DIMENSION DIASTOLIC: 4.9 CM (ref 3.9–5.3)
ECHO LV INTERNAL DIMENSION SYSTOLIC INDEX: 2.15 CM/M2
ECHO LV INTERNAL DIMENSION SYSTOLIC: 3.8 CM
ECHO LV IVSD: 1.4 CM (ref 0.6–0.9)
ECHO LV MASS 2D: 226.4 G (ref 67–162)
ECHO LV MASS INDEX 2D: 127.9 G/M2 (ref 43–95)
ECHO LV POSTERIOR WALL DIASTOLIC: 1 CM (ref 0.6–0.9)
ECHO LV RELATIVE WALL THICKNESS RATIO: 0.41
ECHO LVOT AREA: 3.1 CM2
ECHO LVOT AV VTI INDEX: 0.68
ECHO LVOT DIAM: 2 CM
ECHO LVOT MEAN GRADIENT: 1 MMHG
ECHO LVOT MEAN GRADIENT: 1 MMHG
ECHO LVOT PEAK GRADIENT: 2 MMHG
ECHO LVOT PEAK VELOCITY: 0.8 M/S
ECHO LVOT STROKE VOLUME INDEX: 29.6 ML/M2
ECHO LVOT SV: 52.4 ML
ECHO LVOT VTI: 16.7 CM
ECHO PV ACCELERATION TIME (AT): 121 MS
ECHO PV MAX VELOCITY: 1.1 M/S
ECHO PV PEAK GRADIENT: 4 MMHG
ECHO RIGHT VENTRICULAR SYSTOLIC PRESSURE (RVSP): 27 MMHG
ECHO RV FREE WALL PEAK S': 13 CM/S
ECHO RV INTERNAL DIMENSION: 3.8 CM
ECHO RV TAPSE: 1.7 CM (ref 1.7–?)
ECHO TV REGURGITANT MAX VELOCITY: 2.47 M/S
ECHO TV REGURGITANT PEAK GRADIENT: 24 MMHG
ERYTHROCYTE [DISTWIDTH] IN BLOOD BY AUTOMATED COUNT: 13.6 % (ref 11.9–14.6)
EST. AVERAGE GLUCOSE BLD GHB EST-MCNC: 117 MG/DL
GLOBULIN SER CALC-MCNC: 4 G/DL (ref 2.8–4.5)
GLUCOSE BLD STRIP.AUTO-MCNC: 137 MG/DL (ref 65–100)
GLUCOSE BLD STRIP.AUTO-MCNC: 150 MG/DL (ref 65–100)
GLUCOSE BLD STRIP.AUTO-MCNC: 181 MG/DL (ref 65–100)
GLUCOSE BLD STRIP.AUTO-MCNC: 190 MG/DL (ref 65–100)
GLUCOSE SERPL-MCNC: 149 MG/DL (ref 65–100)
HAV IGM SER QL: NONREACTIVE
HBA1C MFR BLD: 5.7 % (ref 4.8–5.6)
HBV CORE IGM SER QL: NONREACTIVE
HBV SURFACE AG SER QL: NONREACTIVE
HCT VFR BLD AUTO: 28.6 % (ref 35.8–46.3)
HCV AB SER QL: NONREACTIVE
HDLC SERPL-MCNC: 30 MG/DL (ref 40–60)
HDLC SERPL: 3.7
HGB BLD-MCNC: 9 G/DL (ref 11.7–15.4)
LDLC SERPL CALC-MCNC: 55.6 MG/DL
LIPASE SERPL-CCNC: 361 U/L (ref 73–393)
MCH RBC QN AUTO: 29.7 PG (ref 26.1–32.9)
MCHC RBC AUTO-ENTMCNC: 31.5 G/DL (ref 31.4–35)
MCV RBC AUTO: 94.4 FL (ref 82–102)
NRBC # BLD: 0 K/UL (ref 0–0.2)
NT PRO BNP: 7215 PG/ML
PHOSPHATE SERPL-MCNC: 2.1 MG/DL (ref 2.3–3.7)
PLATELET # BLD AUTO: 213 K/UL (ref 150–450)
PMV BLD AUTO: 10.1 FL (ref 9.4–12.3)
POTASSIUM SERPL-SCNC: 4.3 MMOL/L (ref 3.5–5.1)
PROT SERPL-MCNC: 6.2 G/DL (ref 6.3–8.2)
RBC # BLD AUTO: 3.03 M/UL (ref 4.05–5.2)
SERVICE CMNT-IMP: ABNORMAL
SODIUM SERPL-SCNC: 134 MMOL/L (ref 136–146)
TRIGL SERPL-MCNC: 127 MG/DL (ref 35–150)
VLDLC SERPL CALC-MCNC: 25.4 MG/DL (ref 6–23)
WBC # BLD AUTO: 7.9 K/UL (ref 4.3–11.1)

## 2024-01-29 PROCEDURE — 97168 OT RE-EVAL EST PLAN CARE: CPT

## 2024-01-29 PROCEDURE — 82962 GLUCOSE BLOOD TEST: CPT

## 2024-01-29 PROCEDURE — 97530 THERAPEUTIC ACTIVITIES: CPT

## 2024-01-29 PROCEDURE — 80074 ACUTE HEPATITIS PANEL: CPT

## 2024-01-29 PROCEDURE — 6370000000 HC RX 637 (ALT 250 FOR IP): Performed by: INTERNAL MEDICINE

## 2024-01-29 PROCEDURE — 97164 PT RE-EVAL EST PLAN CARE: CPT

## 2024-01-29 PROCEDURE — 6360000002 HC RX W HCPCS: Performed by: INTERNAL MEDICINE

## 2024-01-29 PROCEDURE — 85027 COMPLETE CBC AUTOMATED: CPT

## 2024-01-29 PROCEDURE — 83036 HEMOGLOBIN GLYCOSYLATED A1C: CPT

## 2024-01-29 PROCEDURE — 83690 ASSAY OF LIPASE: CPT

## 2024-01-29 PROCEDURE — 6360000004 HC RX CONTRAST MEDICATION: Performed by: INTERNAL MEDICINE

## 2024-01-29 PROCEDURE — 36415 COLL VENOUS BLD VENIPUNCTURE: CPT

## 2024-01-29 PROCEDURE — 97535 SELF CARE MNGMENT TRAINING: CPT

## 2024-01-29 PROCEDURE — 6370000000 HC RX 637 (ALT 250 FOR IP): Performed by: FAMILY MEDICINE

## 2024-01-29 PROCEDURE — 80061 LIPID PANEL: CPT

## 2024-01-29 PROCEDURE — 71045 X-RAY EXAM CHEST 1 VIEW: CPT

## 2024-01-29 PROCEDURE — 2580000003 HC RX 258: Performed by: INTERNAL MEDICINE

## 2024-01-29 PROCEDURE — 97116 GAIT TRAINING THERAPY: CPT

## 2024-01-29 PROCEDURE — C8929 TTE W OR WO FOL WCON,DOPPLER: HCPCS

## 2024-01-29 PROCEDURE — 92610 EVALUATE SWALLOWING FUNCTION: CPT

## 2024-01-29 PROCEDURE — 80053 COMPREHEN METABOLIC PANEL: CPT

## 2024-01-29 PROCEDURE — 1100000003 HC PRIVATE W/ TELEMETRY

## 2024-01-29 PROCEDURE — 2580000003 HC RX 258: Performed by: FAMILY MEDICINE

## 2024-01-29 PROCEDURE — 97112 NEUROMUSCULAR REEDUCATION: CPT

## 2024-01-29 PROCEDURE — 84100 ASSAY OF PHOSPHORUS: CPT

## 2024-01-29 PROCEDURE — 83880 ASSAY OF NATRIURETIC PEPTIDE: CPT

## 2024-01-29 RX ORDER — METOPROLOL SUCCINATE 25 MG/1
25 TABLET, EXTENDED RELEASE ORAL DAILY
Status: DISCONTINUED | OUTPATIENT
Start: 2024-01-30 | End: 2024-01-29

## 2024-01-29 RX ORDER — METOPROLOL SUCCINATE 25 MG/1
25 TABLET, EXTENDED RELEASE ORAL DAILY
Status: DISCONTINUED | OUTPATIENT
Start: 2024-01-29 | End: 2024-02-01

## 2024-01-29 RX ADMIN — MONTELUKAST 10 MG: 10 TABLET, FILM COATED ORAL at 20:36

## 2024-01-29 RX ADMIN — SODIUM CHLORIDE, PRESERVATIVE FREE 10 ML: 5 INJECTION INTRAVENOUS at 08:46

## 2024-01-29 RX ADMIN — SODIUM CHLORIDE, PRESERVATIVE FREE 10 ML: 5 INJECTION INTRAVENOUS at 20:37

## 2024-01-29 RX ADMIN — POTASSIUM CHLORIDE 20 MEQ: 1500 TABLET, EXTENDED RELEASE ORAL at 08:36

## 2024-01-29 RX ADMIN — SODIUM CHLORIDE, PRESERVATIVE FREE 5 ML: 5 INJECTION INTRAVENOUS at 08:47

## 2024-01-29 RX ADMIN — LEVOTHYROXINE SODIUM 25 MCG: 0.05 TABLET ORAL at 05:52

## 2024-01-29 RX ADMIN — ASPIRIN 81 MG: 81 TABLET, COATED ORAL at 08:36

## 2024-01-29 RX ADMIN — WATER 1000 MG: 1 INJECTION INTRAMUSCULAR; INTRAVENOUS; SUBCUTANEOUS at 13:18

## 2024-01-29 RX ADMIN — METOPROLOL SUCCINATE 25 MG: 25 TABLET, EXTENDED RELEASE ORAL at 11:12

## 2024-01-29 RX ADMIN — PANCRELIPASE LIPASE, PANCRELIPASE PROTEASE, PANCRELIPASE AMYLASE 5000 UNITS: 5000; 17000; 24000 CAPSULE, DELAYED RELEASE ORAL at 11:12

## 2024-01-29 RX ADMIN — SODIUM CHLORIDE, PRESERVATIVE FREE 10 ML: 5 INJECTION INTRAVENOUS at 08:45

## 2024-01-29 RX ADMIN — MAGNESIUM GLUCONATE 500 MG ORAL TABLET 400 MG: 500 TABLET ORAL at 08:36

## 2024-01-29 RX ADMIN — VITAMIN D, TAB 1000IU (100/BT) 2000 UNITS: 25 TAB at 08:46

## 2024-01-29 RX ADMIN — TAMSULOSIN HYDROCHLORIDE 0.4 MG: 0.4 CAPSULE ORAL at 08:36

## 2024-01-29 RX ADMIN — Medication 1 CAPSULE: at 08:36

## 2024-01-29 RX ADMIN — SODIUM CHLORIDE, PRESERVATIVE FREE 0.45 ML: 5 INJECTION INTRAVENOUS at 12:37

## 2024-01-29 RX ADMIN — PANCRELIPASE LIPASE, PANCRELIPASE PROTEASE, PANCRELIPASE AMYLASE 5000 UNITS: 5000; 17000; 24000 CAPSULE, DELAYED RELEASE ORAL at 17:26

## 2024-01-29 RX ADMIN — PANCRELIPASE LIPASE, PANCRELIPASE PROTEASE, PANCRELIPASE AMYLASE 5000 UNITS: 5000; 17000; 24000 CAPSULE, DELAYED RELEASE ORAL at 08:44

## 2024-01-29 RX ADMIN — ROSUVASTATIN CALCIUM 40 MG: 10 TABLET, FILM COATED ORAL at 20:36

## 2024-01-29 RX ADMIN — CLOPIDOGREL BISULFATE 75 MG: 75 TABLET ORAL at 08:36

## 2024-01-29 RX ADMIN — FAMOTIDINE 20 MG: 20 TABLET, FILM COATED ORAL at 08:36

## 2024-01-29 RX ADMIN — SODIUM CHLORIDE, PRESERVATIVE FREE 10 ML: 5 INJECTION INTRAVENOUS at 08:36

## 2024-01-29 ASSESSMENT — PAIN SCALES - GENERAL: PAINLEVEL_OUTOF10: 0

## 2024-01-29 NOTE — CARE COORDINATION
Chart reviewed and patient discussed in IDT rounds this AM.  [facility] pending her being medically ready.     Sanjeev VAIL, ACM  [facility]

## 2024-01-29 NOTE — PROGRESS NOTES
Goals:  LTG: Patient will increase receptive/expressive language skills demonstrated by the ability to communicate basic wants/needs across environments.   STG: Patient will answer yes/no questions with 75% accuracy and no more than 5 sec delay given min cueing.  STG: Patient will follow 1 step commands with 75% accuracy given min cueing.  STG: Patient will identify item in field of 2 with 100% accuracy given min cueing .  STG: Patient will complete automatic naming tasks with 75% accuracy given min cueing.    SPEECH LANGUAGE PATHOLOGY: SFSLPNOTES: DYSPHAGIA Initial Assessment    Acknowledge Order  I  Therapy Time  I   Charges     I  Rehab Caseload Tracker      NAME: Janine Gupta  : 1940  MRN: [id number]    ADMISSION DATE: 2024  PRIMARY DIAGNOSIS: Acute pancreatitis, unspecified complication status, unspecified pancreatitis type    ICD-10: Treatment Diagnosis: R13.11 Dysphagia, Oral Phase    RECOMMENDATIONS   Diet:    Regular Consistency  Thin Liquids    Medication: as tolerated   Compensatory Swallowing Strategies:   Alternate solids and liquids  Remain upright for 30-45 minutes after meals   Therapeutic Intervention:   Patient/family education  Instrumental swallow assessment   Patient continues to require skilled intervention:  Yes. Recommend ongoing speech therapy services during this hospitalization.     Anticipated Discharge Needs: Ongoing speech therapy is recommended at next level of care.      ASSESSMENT    Patient presents with functional swallow mechanism with no overt clinical s/s on thin liquids, pureed, soft, and regular solids. Patient demonstrates functional oral prep, efficient bolus manipulation and timely swallow initiation with clear vocal quality following 1-2 swallows.   Patient presents with moderate expressive-receptive language deficits characterized by deficits in following single step instructions, consistency with responding to yes/no questions, and diminished ability

## 2024-01-29 NOTE — H&P
[facility] Cardiology Initial Cardiac Evaluation                 Date of  Admission: 1/20/2024  8:58 PM     Primary Care Physician: Ibis Bolton, APRN - CNP  Primary Cardiologist: Dr Love  Referring Physician: Dr Gabriel  Attending Physician: Dr Pang    CC: CHF      Janine Gupta is a 83 y.o. female admitted for Acute pancreatitis, unspecified complication status, unspecified pancreatitis type [K85.90]  Other acute pancreatitis, unspecified complication status [K85.80].  She has a h/o DM, CKD, CAD w [facility] 2-2014 w patent stents LAD, dRCA, R posterolateral; occluded PDA w collateralization, EF 45%, htn w orthostatic hypotension, asthma, squamous cell carcinoma of left ear, chronic dizziness from CSF leak, pancreatic insufficiency, and dyslipidemia.   D/C from [facility] 12/29/23 after admission for sepsis w UTI- c/o dizziness due to CSF leak and head CT unremarkable. Presented to the ER 1/20/24 w weakness, diarrhea, body aches. Admit 1/21/24 w  hypothermia, treated for pancreatitis w hydration, antibiotics. 1/25/24 w new edema, hgb decreased. Treated for UTI.  1/25/24 hypoxic- started on IV lasix and fluids stopped. Pending rehab.  1/28/24 new AMS, CT head subacute ischemic infarct L parietal occipital lobe, head CTA distal PCA stenosis. Permissive hypertension. Echo pending. MRI pending. On ASA, plavix (PTA) and lovenox, intolerant of statins.     Today, , K 4.3, cr .90, pBNP 7200, albumin 2.2, WBC 7.9, hgb 9, platelets 213, 1/29/24 CXR improved CHF.     The pt is nonverbal, history from her daughter.  Her daughter thinks she had brief a fib a long time ago, history is very unknown.  The pt has not complained about CP, palpitations, syncope.  She had been very SOB w increased abdominal and LE edema which has improved greatly w diuresis.  She has chronic dizziness w CSF leak so difficult to assess for a fib symptoms.  Volume status + 1L.  BP today 138/68.  She has been on the monitor all NSR.  Unable to

## 2024-01-29 NOTE — PROGRESS NOTES
ACUTE OCCUPATIONAL THERAPY GOALS:   (Developed with and agreed upon by patient and/or caregiver.)    NEW GOALS CREATED AT RE-EVALUATION to reflect new status  1. Patient will feed self entire meal with SUPERVISION and adaptive utensils as needed.    2. Patient will complete upper body dressing and bathing with STAND BY ASSIST and adaptive equipment as needed.   3. Patient will complete lower body dressing and bathing with MINIMAL ASSIST and adaptive equipment as needed.  4. Patient will perform grooming task standing at sink with CONTACT GUARD ASSIST.   5. Patient will attend to R side for 100% of treatment session with <5 verbal cues from therapist.     6. Patient will complete functional transfers with STAND BY ASSIST and adaptive equipment as needed.   7. Patient will follow 75% of one-step commands during functional activity.     Timeframe: 7 visits      OCCUPATIONAL THERAPY Daily Note, Re-evaluation, and AM       OT Visit Days: 1  Acknowledge Orders  Time  OT Charge Capture  Rehab Caseload Tracker      Janine Gupta is a 83 y.o. female   PRIMARY DIAGNOSIS: Acute pancreatitis, unspecified complication status, unspecified pancreatitis type  Acute pancreatitis, unspecified complication status, unspecified pancreatitis type [K85.90]  Other acute pancreatitis, unspecified complication status [K85.80]       Reason for Referral: Generalized Muscle Weakness (M62.81)  Other lack of cordination (R27.8)  Difficulty in walking, Not elsewhere classified (R26.2)  History of falling (Z91.81)  Inpatient: Payor: MEDICARE / Plan: MEDICARE PART A AND B / Product Type: *No Product type* /     ASSESSMENT:     REHAB RECOMMENDATIONS:   Recommendation to date pending progress:  Setting:  Inpatient Rehab Facility     Equipment:    To Be Determined - has RW and rollator     ASSESSMENT:  Ms. Gupta was re-evaluated today d/t new L-Parietal Occipital CVA also noted to have fall in bathroom on 1/27/24. She initially presented to  EVALUATION: (Untimed Charge)    TREATMENT:   Co-Treatment PT/OT necessary due to patient's decreased overall endurance/tolerance levels, as well as need for high level skilled assistance to complete functional transfers/mobility and functional tasks  Neuromuscular Re-education (25 Minutes): Patient participated in neuromuscular re-education including functional reaching, weightbearing through affected upper extremity, weight shifting, postural training, visual scanning activity, midline training, in hand manipulation training, fine motor skills training, standing tolerance activity , and sitting balance activity   with moderate and maximal assistance, verbal cues, tactile cues, visual cues, and education to improve sitting balance, standing balance, awareness of affected anibal-body, awareness of R visual field , fine motor skills, posture, coordination, static balance, and dynamic balance in order to prepare for functional task, prepare for seated ADLs, prepare for functional transfer, increase safety awareness, improve safety of affected upper extremity , improve functional usage of affected upper extremity, and prepare for self care..   Self Care (13 minutes): Patient participated in lower body dressing and grooming ADLs in unsupported sitting with maximal visual, verbal, manual, and tactile cueing to increase independence, decrease assistance required, increase activity tolerance, and increase safety awareness. Patient also participated in functional mobility and functional transfer training to increase independence, decrease assistance required, increase activity tolerance, and increase safety awareness.     TREATMENT GRID:  N/A    AFTER TREATMENT PRECAUTIONS: Bed, Bed/Chair Locked, Call light within reach, Needs within reach, RN notified, and Visitors at bedside    INTERDISCIPLINARY COLLABORATION:  RN/ PCT and PT/ PTA    EDUCATION:  Education Given To: Patient;Family  Education Provided: Role of Therapy;Plan of

## 2024-01-29 NOTE — PROGRESS NOTES
ACUTE PHYSICAL THERAPY GOALS:   (Developed with and agreed upon by patient and/or caregiver.)  LTG:  (1.)Ms. Gupta will move from supine to sit and sit to supine , scoot up and down, and roll side to side in bed with MODIFIED INDEPENDENCE within 7 treatment day(s).    (2.)Ms. Gupta will transfer from bed to chair and chair to bed with SPV using the least restrictive device within 7 treatment day(s).    (3.)Ms. Gupta will ambulate with CGA for 150 feet with the least restrictive device within 7 treatment day(s).  (4.)Ms. Gupta will tolerate 23+ minutes of therapeutic activity within 7 treatment days for increased activity tolerance and general functional mobility.   (5.)Ms. Gupta will follow commands with 75% accuracy when completing LE therex.    **addressed goals and adjusted goals to reflect new medical issues    ________________________________________________________________________________________________           PHYSICAL THERAPY Daily Note, Re-evaluation, and AM  (Link to Caseload Tracking: PT Visit Days : 1  Acknowledge Orders  Time In/Out  PT Charge Capture  Rehab Caseload Tracker    Janine Gupta is a 83 y.o. female   PRIMARY DIAGNOSIS: Acute pancreatitis, unspecified complication status, unspecified pancreatitis type  Acute pancreatitis, unspecified complication status, unspecified pancreatitis type [K85.90]  Other acute pancreatitis, unspecified complication status [K85.80]       Reason for Referral: Generalized Muscle Weakness (M62.81)  Other lack of cordination (R27.8)  Difficulty in walking, Not elsewhere classified (R26.2)  Other abnormalities of gait and mobility (R26.89)  History of falling (Z91.81)  Inpatient: Payor: MEDICARE / Plan: MEDICARE PART A AND B / Product Type: *No Product type* /     ASSESSMENT:     REHAB RECOMMENDATIONS:   Recommendation to date pending progress:  Setting:  Short-term Rehab    Equipment:    To Be Determined     ASSESSMENT:  Ms. Gupta seen for  Stairs - Rails: Right  Bathroom Shower/Tub: Walk-in shower, Shower chair with back  Bathroom Toilet: Standard  Bathroom Equipment: Grab bars in shower, Grab bars around toilet, Commode  Bathroom Accessibility: Accessible  Home Equipment: Walker, rolling, Walker, 4 wheeled  Receives Help From: Family  ADL Assistance: Needs assistance  Toileting: Needs assistance  Homemaking Assistance: Needs assistance  Homemaking Responsibilities: Yes  Ambulation Assistance: Needs assistance  Transfer Assistance: Needs assistance  Active : No  Occupation: Retired    OBJECTIVE:     PAIN: VITALS / O2: PRECAUTION / LINES / DRAINS:   Pre Treatment:   Pain Assessment: Adult Nonverbal Pain Scale (NPVS) Score: 0      Post Treatment: Score: 0 Vitals    O2 sats 94%    Oxygen     RA Mccarthy Catheter and IV    RESTRICTIONS/PRECAUTIONS:  Restrictions/Precautions: Fall Risk                 GROSS EVALUATION:  Intact Impaired (Comments):   AROM []  Slight limitation in full AROM R LE 2/2 weakness; L WFL   PROM []    Strength []  Unable to formally assess; grossly 4/5 L; 4-/5 R   Balance [] Sitting - Static: Fair, +  Sitting - Dynamic: Fair  Standing - Static: Fair  Standing - Dynamic: Fair   Posture [] Forward Head  Rounded Shoulders   Sensation []  Unable to assess   Coordination []   Impaired R LE; decreased proprioception   Tone [x]     Edema []    Activity Tolerance []  Generally decreased; easily fatigued    []      COGNITION/  PERCEPTION: Intact Impaired (Comments):   Orientation []  Unable to assess; gives eye contact when stating her name   Vision []  Unable to formally assess   Hearing []  [place]   Cognition  []       MOBILITY: I Mod I S SBA CGA Min Mod Max Total  NT x2 Comments:   Bed Mobility    Rolling [] [] [] [] [] [x] [] [] [] [] [] Tactile; repeated VC   Supine to Sit [] [] [] [] [] [x] [] [] [] [] [x]    Scooting [] [] [] [x] [] [] [] [] [] [] []    Sit to Supine [] [] [] [] [] [x] [] [] [] [] [x]    Transfers    Sit to Stand []

## 2024-01-29 NOTE — PROGRESS NOTES
Hospitalist Progress Note   Admit Date:  2024  8:58 PM   Name:  Janine Gupta   Age:  83 y.o.  Sex:  female  :  1940   MRN:  [id number]   Room:  Critical access hospital/    Presenting/Chief Complaint: Diarrhea     Reason(s) for Admission: Acute pancreatitis, unspecified complication status, unspecified pancreatitis type [K85.90]  Other acute pancreatitis, unspecified complication status [K85.80]               Hospital Course:     Copied from prior provider HPI/summary:  Janine Gupta is a 83 y.o. female with a past medical history of hypothyroidism, exocrine pancreatic insufficiency, Asthma, polyneuropathy, coronary artery disease, ischemic cardiomyopathy, type 2 diabetes mellitus, GERD, hyperlipidemia, hypertension, history of obesity, history of sleep apnea who presented with upper abdominal pain of moderate intensity that is achy in nature nonradiating and began around lunchtime today.  This has been associated with nausea but no vomiting.  Patient has not noted any change in her appetite or her p.o. intake but remarks that she has just finally gotten her sense of taste back after suffering from COVID so she really cannot say whether her appetite has been good.  She reports continued trouble with multiple episodes of loose stools daily although they do not contain blood or mucus and that that has been going on for several months now.  Patient did feel as though she was chilled earlier today and has been very tired recently.  She denied any vomiting she denied any constipation she denied any dysuria or UTI symptoms, she denied any cough runny nose sore throat, she denied any chest pain palpitations or other cardiac symptoms.  Evaluation in the emergency room has revealed patient to have an apparent acute pancreatitis.  She has a lipase of 9620 a CRP of 6.5 mild thrombocytopenia 142,000 but otherwise unremarkable lab workup with a stable to slightly improved chronic renal insufficiency at 35 and 1.0  Follow-up LFTs with further diuresis.  1/28--- mild acute renal insufficiency and azotemia with diuresis.  LFTs improved with diuresis.  Hold for now on gentle hydration regarding acute confusion.  Monitor [id number] chest x-ray a.m.  Follow-up BNP in a.m. it is noted patient did have transient mild hypotension documented around 3 AM last evening.-Possibly responsible.   1/29--BNP persistently elevated increased-and chest x-ray with persistent pulmonary edema.  Restart Toprol-XL lower dose if tolerates BP increase back to 50 mg daily dosing and eventually restart ARB.  Restart Lasix 20 mg daily with potassium supplementation.  Cardiology opinion-              Hyper transaminasemia  1/26-I suspect this is secondary to passive liver congestion-follow-up LFTs with diuresis.  May need further workup if do not improve.  1/27-improved with [id number]-yqhyoj-li a.m.  1/28--- improved with diuresis.  Will check hepatitis serologies in a.m.        E. coli UTI  1/25--initially felt to have low suspicion for UTI but culture positive-treat with ceftriaxone 7 days or convert to oral based on sensitivities of discharge prior to complete course.  1/27--continue full 7-day course.  If discharged prior to completion 7-day course changed to oral cephalosporin  1/28-finishes 7-day course of ceftriaxone on Wednesday.        Chronic renal disease, stage III (HCC) [571385]  1/26-monitor creatinine with diuresis  1/27--slight increase creatinine 1.2 today-monitor with attenuated Lasix.  Did receive additional IV Lasix this morning.  1/25--creatinine remains stable increased at 1.2 with increased BUN-hold Lasix, gentle IV hydration regarding acute confusion.  Decreased BP last evening  1/29----restart loop diuretic-May need to except increase serum creatinine to avoid pulmonary edema/volume overload     Thyroid disorder  Plan: As above will continue patient's levothyroxine   1/28--TSH regarding acute confusion  1/29--- recent new

## 2024-01-30 ENCOUNTER — APPOINTMENT (OUTPATIENT)
Dept: MRI IMAGING | Age: 84
DRG: 438 | End: 2024-01-30
Payer: MEDICARE

## 2024-01-30 LAB
ALBUMIN SERPL-MCNC: 2.2 G/DL (ref 3.2–4.6)
ALBUMIN/GLOB SERPL: 0.6 (ref 0.4–1.6)
ALP SERPL-CCNC: 176 U/L (ref 50–136)
ALT SERPL-CCNC: 62 U/L (ref 12–65)
ANION GAP SERPL CALC-SCNC: 4 MMOL/L (ref 2–11)
AST SERPL-CCNC: 24 U/L (ref 15–37)
BILIRUB SERPL-MCNC: 0.3 MG/DL (ref 0.2–1.1)
BUN SERPL-MCNC: 19 MG/DL (ref 8–23)
CALCIUM SERPL-MCNC: 8.8 MG/DL (ref 8.3–10.4)
CHLORIDE SERPL-SCNC: 101 MMOL/L (ref 103–113)
CO2 SERPL-SCNC: 29 MMOL/L (ref 21–32)
CREAT SERPL-MCNC: 1 MG/DL (ref 0.6–1)
ERYTHROCYTE [DISTWIDTH] IN BLOOD BY AUTOMATED COUNT: 13.4 % (ref 11.9–14.6)
GLOBULIN SER CALC-MCNC: 3.7 G/DL (ref 2.8–4.5)
GLUCOSE BLD STRIP.AUTO-MCNC: 114 MG/DL (ref 65–100)
GLUCOSE BLD STRIP.AUTO-MCNC: 117 MG/DL (ref 65–100)
GLUCOSE BLD STRIP.AUTO-MCNC: 123 MG/DL (ref 65–100)
GLUCOSE BLD STRIP.AUTO-MCNC: 137 MG/DL (ref 65–100)
GLUCOSE SERPL-MCNC: 119 MG/DL (ref 65–100)
HCT VFR BLD AUTO: 26.7 % (ref 35.8–46.3)
HGB BLD-MCNC: 8.6 G/DL (ref 11.7–15.4)
MCH RBC QN AUTO: 29.8 PG (ref 26.1–32.9)
MCHC RBC AUTO-ENTMCNC: 32.2 G/DL (ref 31.4–35)
MCV RBC AUTO: 92.4 FL (ref 82–102)
NRBC # BLD: 0 K/UL (ref 0–0.2)
PLATELET # BLD AUTO: 269 K/UL (ref 150–450)
PMV BLD AUTO: 9.7 FL (ref 9.4–12.3)
POTASSIUM SERPL-SCNC: 4.4 MMOL/L (ref 3.5–5.1)
PROT SERPL-MCNC: 5.9 G/DL (ref 6.3–8.2)
RBC # BLD AUTO: 2.89 M/UL (ref 4.05–5.2)
SERVICE CMNT-IMP: ABNORMAL
SODIUM SERPL-SCNC: 134 MMOL/L (ref 136–146)
WBC # BLD AUTO: 9.1 K/UL (ref 4.3–11.1)

## 2024-01-30 PROCEDURE — 80053 COMPREHEN METABOLIC PANEL: CPT

## 2024-01-30 PROCEDURE — 2580000003 HC RX 258: Performed by: FAMILY MEDICINE

## 2024-01-30 PROCEDURE — 6370000000 HC RX 637 (ALT 250 FOR IP): Performed by: INTERNAL MEDICINE

## 2024-01-30 PROCEDURE — 6360000002 HC RX W HCPCS: Performed by: INTERNAL MEDICINE

## 2024-01-30 PROCEDURE — 6370000000 HC RX 637 (ALT 250 FOR IP): Performed by: FAMILY MEDICINE

## 2024-01-30 PROCEDURE — 1100000003 HC PRIVATE W/ TELEMETRY

## 2024-01-30 PROCEDURE — 99232 SBSQ HOSP IP/OBS MODERATE 35: CPT | Performed by: PSYCHIATRY & NEUROLOGY

## 2024-01-30 PROCEDURE — 92507 TX SP LANG VOICE COMM INDIV: CPT

## 2024-01-30 PROCEDURE — 82962 GLUCOSE BLOOD TEST: CPT

## 2024-01-30 PROCEDURE — 70551 MRI BRAIN STEM W/O DYE: CPT

## 2024-01-30 PROCEDURE — 85027 COMPLETE CBC AUTOMATED: CPT

## 2024-01-30 PROCEDURE — 36415 COLL VENOUS BLD VENIPUNCTURE: CPT

## 2024-01-30 PROCEDURE — 2580000003 HC RX 258: Performed by: INTERNAL MEDICINE

## 2024-01-30 RX ORDER — CHOLESTYRAMINE LIGHT 4 G/5.7G
4 POWDER, FOR SUSPENSION ORAL 2 TIMES DAILY
Status: DISCONTINUED | OUTPATIENT
Start: 2024-01-30 | End: 2024-02-01 | Stop reason: HOSPADM

## 2024-01-30 RX ORDER — LOPERAMIDE HYDROCHLORIDE 2 MG/1
2 CAPSULE ORAL 4 TIMES DAILY PRN
Status: DISCONTINUED | OUTPATIENT
Start: 2024-01-30 | End: 2024-02-01 | Stop reason: HOSPADM

## 2024-01-30 RX ADMIN — CLOPIDOGREL BISULFATE 75 MG: 75 TABLET ORAL at 08:00

## 2024-01-30 RX ADMIN — SODIUM CHLORIDE, PRESERVATIVE FREE 10 ML: 5 INJECTION INTRAVENOUS at 10:23

## 2024-01-30 RX ADMIN — LEVOTHYROXINE SODIUM 25 MCG: 0.05 TABLET ORAL at 05:18

## 2024-01-30 RX ADMIN — ROSUVASTATIN CALCIUM 40 MG: 10 TABLET, FILM COATED ORAL at 20:34

## 2024-01-30 RX ADMIN — PANCRELIPASE LIPASE, PANCRELIPASE PROTEASE, PANCRELIPASE AMYLASE 5000 UNITS: 5000; 17000; 24000 CAPSULE, DELAYED RELEASE ORAL at 12:43

## 2024-01-30 RX ADMIN — SODIUM CHLORIDE, PRESERVATIVE FREE 10 ML: 5 INJECTION INTRAVENOUS at 20:38

## 2024-01-30 RX ADMIN — CHOLESTYRAMINE 4 G: 4 POWDER, FOR SUSPENSION ORAL at 20:35

## 2024-01-30 RX ADMIN — PANCRELIPASE LIPASE, PANCRELIPASE PROTEASE, PANCRELIPASE AMYLASE 5000 UNITS: 5000; 17000; 24000 CAPSULE, DELAYED RELEASE ORAL at 17:19

## 2024-01-30 RX ADMIN — FUROSEMIDE 20 MG: 20 TABLET ORAL at 08:01

## 2024-01-30 RX ADMIN — TRAMADOL HYDROCHLORIDE 50 MG: 50 TABLET ORAL at 14:12

## 2024-01-30 RX ADMIN — FAMOTIDINE 20 MG: 20 TABLET, FILM COATED ORAL at 08:00

## 2024-01-30 RX ADMIN — TAMSULOSIN HYDROCHLORIDE 0.4 MG: 0.4 CAPSULE ORAL at 08:01

## 2024-01-30 RX ADMIN — TRAMADOL HYDROCHLORIDE 50 MG: 50 TABLET ORAL at 08:01

## 2024-01-30 RX ADMIN — VITAMIN D, TAB 1000IU (100/BT) 2000 UNITS: 25 TAB at 08:01

## 2024-01-30 RX ADMIN — ASPIRIN 81 MG: 81 TABLET, COATED ORAL at 08:00

## 2024-01-30 RX ADMIN — POTASSIUM CHLORIDE 20 MEQ: 1500 TABLET, EXTENDED RELEASE ORAL at 08:00

## 2024-01-30 RX ADMIN — MAGNESIUM GLUCONATE 500 MG ORAL TABLET 400 MG: 500 TABLET ORAL at 08:00

## 2024-01-30 RX ADMIN — WATER 1000 MG: 1 INJECTION INTRAMUSCULAR; INTRAVENOUS; SUBCUTANEOUS at 13:40

## 2024-01-30 RX ADMIN — METOPROLOL SUCCINATE 25 MG: 25 TABLET, EXTENDED RELEASE ORAL at 08:01

## 2024-01-30 RX ADMIN — Medication 1 CAPSULE: at 08:00

## 2024-01-30 RX ADMIN — SODIUM CHLORIDE, PRESERVATIVE FREE 10 ML: 5 INJECTION INTRAVENOUS at 08:03

## 2024-01-30 RX ADMIN — MONTELUKAST 10 MG: 10 TABLET, FILM COATED ORAL at 20:34

## 2024-01-30 RX ADMIN — PANCRELIPASE LIPASE, PANCRELIPASE PROTEASE, PANCRELIPASE AMYLASE 5000 UNITS: 5000; 17000; 24000 CAPSULE, DELAYED RELEASE ORAL at 08:00

## 2024-01-30 ASSESSMENT — PAIN DESCRIPTION - LOCATION
LOCATION: ABDOMEN
LOCATION: ABDOMEN

## 2024-01-30 ASSESSMENT — PAIN SCALES - GENERAL
PAINLEVEL_OUTOF10: 0
PAINLEVEL_OUTOF10: 7
PAINLEVEL_OUTOF10: 7

## 2024-01-30 ASSESSMENT — PAIN DESCRIPTION - ORIENTATION
ORIENTATION: LEFT
ORIENTATION: LEFT

## 2024-01-30 NOTE — PROGRESS NOTES
Loose BM but not watery so does not meet Cdiff criteria. Imodium added PRN for diarrhea. Tramadol given twice per MAR for abdominal pain. Hourly rounds performed throughout shift. Bed locked and lowered. Call light within reach. All needs met at this time.

## 2024-01-30 NOTE — PROGRESS NOTES
[facility] CARDIOLOGY PROGRESS NOTE           1/30/2024 2:51 PM    Admit Date: 1/20/2024      Subjective:   -No chest pain or dyspnea overnight  Telemetry overnight shows sinus rhythm throughout with no significant arrhythmia, no A-fib or atrial flutter.  Blood pressures and heart rates have been within normal range.    ROS:  Cardiovascular:  As noted above    Objective:      Vitals:    01/30/24 0339 01/30/24 0515 01/30/24 0729 01/30/24 1209   BP: (!) 108/48 (!) 129/55 (!) 110/54 (!) 106/53   Pulse: 92 90 91 73   Resp: 18  16 18   Temp: 97.9 °F (36.6 °C)  97.7 °F (36.5 °C) 98.1 °F (36.7 °C)   TempSrc: Oral  Oral Oral   SpO2: 91%  96% 96%   Weight:       Height:           Physical Exam:  General-No Acute Distress  Neck- supple, no JVD  CV- regular rate and rhythm no MRG  Lung- clear bilaterally  Abd- soft, nontender, nondistended  Ext- no edema bilaterally.  Skin- warm and dry  Neuro: Moving all 4 extremities to command, has motor aphasia    Data Review:     Lab Results   Component Value Date/Time     01/30/2024 04:40 AM    K 4.4 01/30/2024 04:40 AM     01/30/2024 04:40 AM    CO2 29 01/30/2024 04:40 AM    BUN 19 01/30/2024 04:40 AM    CREATININE 1.00 01/30/2024 04:40 AM    GLUCOSE 119 01/30/2024 04:40 AM    CALCIUM 8.8 01/30/2024 04:40 AM         Lab Results   Component Value Date    WBC 9.1 01/30/2024    HGB 8.6 (L) 01/30/2024    HCT 26.7 (L) 01/30/2024    MCV 92.4 01/30/2024     01/30/2024 01/20/24    ECHO (TTE) COMPLETE (PRN CONTRAST/BUBBLE/STRAIN/3D) 01/29/2024  1:11 PM (Final)    Interpretation Summary    Left Ventricle: Mildly reduced left ventricular systolic function with a visually estimated EF of 40 - 45%. Left ventricle size is normal. Mild septal thickening. Mild global hypokinesis present.  There is moderate severity basilar and mid inferior and mid lateral hypokinesis. Indeterminate diastolic function.    Aortic Valve: Mild sclerosis of the aortic valve  Continue current therapy-overall euvolemic on exam.  Echo with stable LV function, continue dual antiplatelet therapy and high intensity statin therapy.  Agree with 30-day mobile cardiac telemetry monitor that can be placed in our office following discharge and follow-up with primary cardiologist - Dr Love on an outpatient basis.  -We will sign off at this point.  Please do not hesitate to contact us if needed    Jomar Dias MD  1/30/2024 2:51 PM

## 2024-01-30 NOTE — PROGRESS NOTES
Hospitalist Progress Note   Admit Date:  2024  8:58 PM   Name:  Janine Gupta   Age:  83 y.o.  Sex:  female  :  1940   MRN:  [id number]   Room:  [facility]    Presenting/Chief Complaint: Diarrhea     Reason(s) for Admission: Acute pancreatitis, unspecified complication status, unspecified pancreatitis type [K85.90]  Other acute pancreatitis, unspecified complication status [K85.80]     Hospital Course:   Please refer to the admission H&P for details of presentation.      In summary, Janine Gupta is a 83 y.o. female with medical history significant forhypothyroidism, exocrine pancreatic insufficiency, Asthma, polyneuropathy, coronary artery disease, ischemic cardiomyopathy, type 2 diabetes mellitus, GERD, hyperlipidemia, hypertension, history of obesity, history of sleep apnea who presented with upper abdominal pain of moderate intensity that is achy in nature nonradiating and began around lunchtime on the day of presentation.   Abdominal pain associated with nausea without any vomiting.  Workup in the ED with lipase of 9620, CRP of 6.5, thrombocytopenia of 142,000.  CT scan shows homogeneous appearance of gallbladder without any focal abscesses or fluid collection or ductal dilatation.    Patient was admitted for acute pancreatitis and had received IV hydration which led to acute pulmonary edema giving her underlying systolic CHF.  Noted to have elevated LFTs which improved with diuresis.  On 2024, patient noted to have speech difficulty, confusion and right-sided weakness per family and Code S was called.  CT scan showed new left-sided parietal occipital CVA.  MRI brain pending.    Subjective/24 hr Events (24) :  Patient is seen and examined at bedside.  No acute events reported overnight by nursing staff.  Patient laying in bed.  Family at bedside.  Reports that she was able to eat breakfast using her right arm this morning.  Patient's slurred speech has resolved.  Still  --   Net 260 ml         Physical Exam:     General:    Well nourished.    Head:  Normocephalic, atraumatic  Eyes:  Sclerae appear normal.  Pupils equally round.  ENT:  Nares appear normal.  Moist oral mucosa  Neck:  No restricted ROM.  Trachea midline   CV:   RRR.  No m/r/g.  No jugular venous distension.  Lungs:   CTAB.  No wheezing.  Symmetric expansion.  Abdomen:   Soft, nontender, nondistended.  Extremities: No cyanosis or clubbing.  No edema  Skin:     No rashes.  Normal coloration.   Warm and dry.    Neuro:  CN II-XII grossly intact.  Slightly weak on RUE but otherwise strength 5/5 in LUE, RLE and LLE  Psych:  Normal mood and affect.      I have personally reviewed labs and tests:  Recent Labs:  Recent Results (from the past 48 hour(s))   POCT Glucose    Collection Time: 01/28/24  5:24 PM   Result Value Ref Range    POC Glucose 108 (H) 65 - 100 mg/dL    Performed by: [facility]    POCT Glucose    Collection Time: 01/28/24  9:56 PM   Result Value Ref Range    POC Glucose 185 (H) 65 - 100 mg/dL    [facility]: [facility]    Comprehensive Metabolic Panel w/ Reflex to MG    Collection Time: 01/29/24  6:03 AM   Result Value Ref Range    Sodium 134 (L) 136 - 146 mmol/L    Potassium 4.3 3.5 - 5.1 mmol/L    Chloride 100 (L) 103 - 113 mmol/L    CO2 30 21 - 32 mmol/L    Anion Gap 4 2 - 11 mmol/L    Glucose 149 (H) 65 - 100 mg/dL    BUN 18 8 - 23 MG/DL    Creatinine 0.90 0.6 - 1.0 MG/DL    Est, Glom Filt Rate >60 >60 ml/min/1.73m2    Calcium 8.9 8.3 - 10.4 MG/DL    Total Bilirubin 0.2 0.2 - 1.1 MG/DL    ALT 78 (H) 12 - 65 U/L    AST 36 15 - 37 U/L    Alk Phosphatase 206 (H) 50 - 136 U/L    Total Protein 6.2 (L) 6.3 - 8.2 g/dL    Albumin 2.2 (L) 3.2 - 4.6 g/dL    Globulin 4.0 2.8 - 4.5 g/dL    Albumin/Globulin Ratio 0.6 0.4 - 1.6     Hepatitis Panel, Acute    Collection Time: 01/29/24  6:03 AM   Result Value Ref Range    Hep A IgM NONREACTIVE NR      Hep B Core Ab, IgM NONREACTIVE NR      Hepatitis B Surface Ag

## 2024-01-30 NOTE — PROGRESS NOTES
Comprehensive Nutrition Assessment    Type and Reason for Visit: Reassess  Malnutrition Screening Tool: Malnutrition Screen  Have you recently lost weight without trying?: 14 to 23 pounds (2 points)  Have you been eating poorly because of a decreased appetite?: Yes (1 point)  Malnutrition Screening Tool Score: 3    Nutrition Recommendations/Plan:   Meals and Snacks:  Diet: Continue current order  Nutrition Supplement Therapy:  Medical food supplement therapy:  Resume to Ensure High Protein three times per day (this provides 160 kcal and 16 grams protein per bottle)     Malnutrition Assessment:  Malnutrition Status: At risk for malnutrition (Comment) (acute decline in po due to abdominal pain n/v, wt loss over the past year)    NFPE unremarkable    Nutrition Assessment:  Nutrition History: Pt reports acute decline in po for the past week due to abdominal pain and n/v. She stated prior to this her intake was at baseline. She stated she has lost wt over the past year despite po at baseline. Pt reports her UBW is ~145lb.     Do You Have Any Cultural, Restorationist, or Ethnic Food Preferences?: No   Weight History: Per EMR wt hx review 1/27 158lb, 3/23 155lb, 8/2 150lb, 11/15 147lb, 1/3 140lb.  Nutrition Background:       PMH significant for hypothyroidism, exocrine pancreatic insufficiency, CAD, ischemic cardiomyopathy, HTN, GERD, and HLD. Pt admitted with acute pancreatitis.   1/28: Acute ischemic stroke.    1/29: SLP eval Regular consistency.    Nutrition Interval:  Pt nadege returned from MRI, slow responses at times.  Family at bedside reports oral intake had improved until yesterday.  Pt with nausea and small amount of emesis this am, family relates to having medications on empty stomach.  Pt denies acute changes in abdominal pain.  Agreeable to resuming oral supplement.      Current Nutrition Therapies:  ADULT DIET; Regular; 3 carb choices (45 gm/meal)    Current Intake:   Average Meal Intake:  (family reports eating

## 2024-01-30 NOTE — PROGRESS NOTES
Goals:  LTG: Patient will increase receptive/expressive language skills demonstrated by the ability to communicate basic wants/needs across environments.   STG: Patient will answer yes/no questions with 75% accuracy and no more than 5 sec delay given min cueing.  STG: Patient will follow 1 step commands with 75% accuracy given min cueing.  STG: Patient will identify item in field of 2 with 100% accuracy given min cueing .  STG: Patient will complete automatic naming tasks with 75% accuracy given min cueing.    SPEECH LANGUAGE PATHOLOGY: SFSLPNOTES: COGNITIVE COMMUNICATION Daily Note #1    Acknowledge Order  I  Therapy Time  I   Charges     I  Rehab Caseload Tracker    NAME: Janine Gupta  : 1940  MRN: [id number]    ADMISSION DATE: 2024  PRIMARY DIAGNOSIS: Acute pancreatitis, unspecified complication status, unspecified pancreatitis type    ICD-10: Treatment Diagnosis:  R41.841 Cognitive-Communication Deficit    RECOMMENDATIONS:   Recommendations: Allow time for functional response, encourage circumlocution for self cuing with communication   Therapeutic Interventions: Patient/family education  Language treatment   Patient continues to require skilled intervention: Yes. Recommend ongoing speech therapy services during this hospitalization.   Anticipated Discharge Needs: Ongoing speech therapy is recommended at next level of care.      ASSESSMENT   Pt seen at bedside, agreeable to skilled ST. Pt educated on word finding strategies for functional communication, specifically allowing additional time to respond and organize thoughts, as well as utilize circumlocution strategies to increase verbal output and self cuing. Caregiver present for session, assisting with cuing patient. Handout provided for family and patient reference, verbalized appreciation.     GENERAL   Subjective:  Lying in bed, 2 family members at bedside, agreeable to skilled ST.     Recent Information/Background: CVA on ,

## 2024-01-30 NOTE — PROGRESS NOTES
Neurology Progress Note       Interval History: Patient states that she is doing well today.  She is awake and alert and answers all questions appropriately.  She has significant visual field deficits.  She has been waiting on MRI for couple of days.  Otherwise, she has no complaints.      Examination: Pertinent positives and negatives include:    Pertinent positives on examination include right homonymous hemianopsia.  Otherwise, her examination is nonfocal.  She moves all 4 extremities.    I personally reviewed the patient's MRI of the brain which shows a large PCA distribution stroke on the left which extends to involve the left medial temporal lobe as well as the left thalamus.  CTA shows a left PCA stenosis/occlusion with reconstitution.      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  All considering, her symptoms are less severe than expected.  Differential diagnosis regarding stroke etiology includes hypoperfusion in the setting of chronic occlusion versus a cardioembolic etiology.  I favor the former.    I agree with aspirin, Plavix, and a high intensity statin    Avoid hypotension, especially blood pressure under 100 mmHg systolic    The patient should not drive    Avoid over-the-counter omeprazole which interferes with clopidogrel    I reviewed the patient's echocardiogram.  Ejection fraction is 40 to 45%.  She has mild left atrial dilation.  I recommend a 30-day cardiac monitor to investigate paroxysmal atrial fibrillation.    Follow-up with America Conteh NP in TIA+ clinic.  Neurology will place this order.    Neurology will sign off    Cumulative time spent today was 35 minutes which included chart review, examining the patient, obtaining history from patient/family/other providers, reviewing imaging, and counseling the patient and/or family on medical condition.

## 2024-01-30 NOTE — PROGRESS NOTES
Pt  bed in lowest position, wheels locked, and call light within reach. Hourly rounds completed. VSS. IV is patent and dressing is clean, dry, and intact. Pt had 2 large bms this shift. Pt more alert and responsive.

## 2024-01-31 LAB
EKG ATRIAL RATE: 178 BPM
EKG DIAGNOSIS: NORMAL
EKG Q-T INTERVAL: 370 MS
EKG QRS DURATION: 144 MS
EKG QTC CALCULATION (BAZETT): 529 MS
EKG R AXIS: -63 DEGREES
EKG T AXIS: 126 DEGREES
EKG VENTRICULAR RATE: 123 BPM
ERYTHROCYTE [DISTWIDTH] IN BLOOD BY AUTOMATED COUNT: 13.6 % (ref 11.9–14.6)
GLUCOSE BLD STRIP.AUTO-MCNC: 102 MG/DL (ref 65–100)
GLUCOSE BLD STRIP.AUTO-MCNC: 122 MG/DL (ref 65–100)
GLUCOSE BLD STRIP.AUTO-MCNC: 136 MG/DL (ref 65–100)
GLUCOSE BLD STRIP.AUTO-MCNC: 166 MG/DL (ref 65–100)
HCT VFR BLD AUTO: 27.3 % (ref 35.8–46.3)
HGB BLD-MCNC: 8.6 G/DL (ref 11.7–15.4)
MCH RBC QN AUTO: 29.7 PG (ref 26.1–32.9)
MCHC RBC AUTO-ENTMCNC: 31.5 G/DL (ref 31.4–35)
MCV RBC AUTO: 94.1 FL (ref 82–102)
NRBC # BLD: 0 K/UL (ref 0–0.2)
PLATELET # BLD AUTO: 289 K/UL (ref 150–450)
PMV BLD AUTO: 9.6 FL (ref 9.4–12.3)
RBC # BLD AUTO: 2.9 M/UL (ref 4.05–5.2)
SERVICE CMNT-IMP: ABNORMAL
WBC # BLD AUTO: 9.9 K/UL (ref 4.3–11.1)

## 2024-01-31 PROCEDURE — 6370000000 HC RX 637 (ALT 250 FOR IP): Performed by: INTERNAL MEDICINE

## 2024-01-31 PROCEDURE — 1100000003 HC PRIVATE W/ TELEMETRY

## 2024-01-31 PROCEDURE — 82962 GLUCOSE BLOOD TEST: CPT

## 2024-01-31 PROCEDURE — 93010 ELECTROCARDIOGRAM REPORT: CPT | Performed by: INTERNAL MEDICINE

## 2024-01-31 PROCEDURE — 2580000003 HC RX 258: Performed by: INTERNAL MEDICINE

## 2024-01-31 PROCEDURE — 36415 COLL VENOUS BLD VENIPUNCTURE: CPT

## 2024-01-31 PROCEDURE — 2580000003 HC RX 258: Performed by: FAMILY MEDICINE

## 2024-01-31 PROCEDURE — 93005 ELECTROCARDIOGRAM TRACING: CPT | Performed by: FAMILY MEDICINE

## 2024-01-31 PROCEDURE — 6370000000 HC RX 637 (ALT 250 FOR IP): Performed by: FAMILY MEDICINE

## 2024-01-31 PROCEDURE — 85027 COMPLETE CBC AUTOMATED: CPT

## 2024-01-31 PROCEDURE — 6360000002 HC RX W HCPCS: Performed by: INTERNAL MEDICINE

## 2024-01-31 RX ORDER — METOPROLOL TARTRATE 1 MG/ML
10 INJECTION, SOLUTION INTRAVENOUS ONCE
Status: DISCONTINUED | OUTPATIENT
Start: 2024-01-31 | End: 2024-02-01 | Stop reason: HOSPADM

## 2024-01-31 RX ORDER — METOPROLOL TARTRATE 50 MG/1
50 TABLET, FILM COATED ORAL 2 TIMES DAILY
Status: DISCONTINUED | OUTPATIENT
Start: 2024-01-31 | End: 2024-02-01

## 2024-01-31 RX ORDER — 0.9 % SODIUM CHLORIDE 0.9 %
1000 INTRAVENOUS SOLUTION INTRAVENOUS ONCE
Status: COMPLETED | OUTPATIENT
Start: 2024-01-31 | End: 2024-01-31

## 2024-01-31 RX ORDER — 0.9 % SODIUM CHLORIDE 0.9 %
500 INTRAVENOUS SOLUTION INTRAVENOUS ONCE
Status: COMPLETED | OUTPATIENT
Start: 2024-01-31 | End: 2024-01-31

## 2024-01-31 RX ADMIN — CHOLESTYRAMINE 4 G: 4 POWDER, FOR SUSPENSION ORAL at 20:48

## 2024-01-31 RX ADMIN — TRAMADOL HYDROCHLORIDE 50 MG: 50 TABLET ORAL at 14:38

## 2024-01-31 RX ADMIN — SODIUM CHLORIDE, PRESERVATIVE FREE 10 ML: 5 INJECTION INTRAVENOUS at 20:48

## 2024-01-31 RX ADMIN — LEVOTHYROXINE SODIUM 25 MCG: 0.05 TABLET ORAL at 05:15

## 2024-01-31 RX ADMIN — ROSUVASTATIN CALCIUM 40 MG: 10 TABLET, FILM COATED ORAL at 20:48

## 2024-01-31 RX ADMIN — CLOPIDOGREL BISULFATE 75 MG: 75 TABLET ORAL at 09:06

## 2024-01-31 RX ADMIN — METOPROLOL TARTRATE 50 MG: 50 TABLET ORAL at 11:01

## 2024-01-31 RX ADMIN — POTASSIUM CHLORIDE 20 MEQ: 1500 TABLET, EXTENDED RELEASE ORAL at 09:06

## 2024-01-31 RX ADMIN — PANCRELIPASE LIPASE, PANCRELIPASE PROTEASE, PANCRELIPASE AMYLASE 5000 UNITS: 5000; 17000; 24000 CAPSULE, DELAYED RELEASE ORAL at 11:01

## 2024-01-31 RX ADMIN — APIXABAN 5 MG: 5 TABLET, FILM COATED ORAL at 20:47

## 2024-01-31 RX ADMIN — WATER 1000 MG: 1 INJECTION INTRAMUSCULAR; INTRAVENOUS; SUBCUTANEOUS at 14:29

## 2024-01-31 RX ADMIN — MAGNESIUM GLUCONATE 500 MG ORAL TABLET 400 MG: 500 TABLET ORAL at 09:06

## 2024-01-31 RX ADMIN — TAMSULOSIN HYDROCHLORIDE 0.4 MG: 0.4 CAPSULE ORAL at 09:06

## 2024-01-31 RX ADMIN — ASPIRIN 81 MG: 81 TABLET, COATED ORAL at 09:06

## 2024-01-31 RX ADMIN — PANCRELIPASE LIPASE, PANCRELIPASE PROTEASE, PANCRELIPASE AMYLASE 5000 UNITS: 5000; 17000; 24000 CAPSULE, DELAYED RELEASE ORAL at 09:06

## 2024-01-31 RX ADMIN — PANCRELIPASE LIPASE, PANCRELIPASE PROTEASE, PANCRELIPASE AMYLASE 5000 UNITS: 5000; 17000; 24000 CAPSULE, DELAYED RELEASE ORAL at 18:17

## 2024-01-31 RX ADMIN — VITAMIN D, TAB 1000IU (100/BT) 2000 UNITS: 25 TAB at 09:06

## 2024-01-31 RX ADMIN — METOPROLOL TARTRATE 50 MG: 50 TABLET ORAL at 20:48

## 2024-01-31 RX ADMIN — APIXABAN 5 MG: 5 TABLET, FILM COATED ORAL at 12:12

## 2024-01-31 RX ADMIN — FAMOTIDINE 20 MG: 20 TABLET, FILM COATED ORAL at 09:06

## 2024-01-31 RX ADMIN — Medication 1 CAPSULE: at 09:06

## 2024-01-31 RX ADMIN — SODIUM CHLORIDE 1000 ML: 9 INJECTION, SOLUTION INTRAVENOUS at 06:17

## 2024-01-31 RX ADMIN — SODIUM CHLORIDE, PRESERVATIVE FREE 10 ML: 5 INJECTION INTRAVENOUS at 21:00

## 2024-01-31 RX ADMIN — CHOLESTYRAMINE 4 G: 4 POWDER, FOR SUSPENSION ORAL at 09:06

## 2024-01-31 RX ADMIN — MONTELUKAST 10 MG: 10 TABLET, FILM COATED ORAL at 20:47

## 2024-01-31 RX ADMIN — SODIUM CHLORIDE 500 ML: 9 INJECTION, SOLUTION INTRAVENOUS at 07:42

## 2024-01-31 ASSESSMENT — PAIN DESCRIPTION - LOCATION: LOCATION: ABDOMEN

## 2024-01-31 ASSESSMENT — PAIN SCALES - GENERAL: PAINLEVEL_OUTOF10: 7

## 2024-01-31 NOTE — CARE COORDINATION
Chart reviewed and patient discussed in IDT rounds this AM.   Patient not medically ready for discharge. Plan is [facility] when ready.    Sanjeev VAIL, ACM  [facility]

## 2024-01-31 NOTE — PROGRESS NOTES
Hospitalist Progress Note   Admit Date:  2024  8:58 PM   Name:  Janine Gupta   Age:  83 y.o.  Sex:  female  :  1940   MRN:  [id number]   Room:  [facility]    Presenting/Chief Complaint: Diarrhea     Reason(s) for Admission: Acute pancreatitis, unspecified complication status, unspecified pancreatitis type [K85.90]  Other acute pancreatitis, unspecified complication status [K85.80]     Hospital Course:   Please refer to the admission H&P for details of presentation.      In summary, Janine Gupta is a 83 y.o. female with medical history significant forhypothyroidism, exocrine pancreatic insufficiency, Asthma, polyneuropathy, coronary artery disease, ischemic cardiomyopathy, type 2 diabetes mellitus, GERD, hyperlipidemia, hypertension, history of obesity, history of sleep apnea who presented with upper abdominal pain of moderate intensity that is achy in nature nonradiating and began around lunchtime on the day of presentation.   Abdominal pain associated with nausea without any vomiting.  Workup in the ED with lipase of 9620, CRP of 6.5, thrombocytopenia of 142,000.  CT scan shows homogeneous appearance of gallbladder without any focal abscesses or fluid collection or ductal dilatation.    Patient was admitted for acute pancreatitis and had received IV hydration which led to acute pulmonary edema giving her underlying systolic CHF.  Noted to have elevated LFTs which improved with diuresis.  On 2024, patient noted to have speech difficulty, confusion and right-sided weakness per family and Code S was called.  CT scan showed new left-sided parietal occipital CVA.  MRI brain with acute infarct Sheean of the left posterior cerebral artery with mild petechial staining, remote) the right cerebellum and left precentral gyrus, severe small vessel ischemia and moderate atrophy.    Subjective/24 hr Events (24) :  Patient is seen and examined at bedside.    Overnight, patient went into  available  Confirmed by ANDRES CHANEY ()RADHA ([id number]) on 1/31/2024 6:19:20 AM     CBC    Collection Time: 01/31/24  6:32 AM   Result Value Ref Range    WBC 9.9 4.3 - 11.1 K/uL    RBC 2.90 (L) 4.05 - 5.2 M/uL    Hemoglobin 8.6 (L) 11.7 - 15.4 g/dL    Hematocrit 27.3 (L) 35.8 - 46.3 %    MCV 94.1 82 - 102 FL    MCH 29.7 26.1 - 32.9 PG    MCHC 31.5 31.4 - 35.0 g/dL    RDW 13.6 11.9 - 14.6 %    Platelets 289 150 - 450 K/uL    MPV 9.6 9.4 - 12.3 FL    nRBC 0.00 0.0 - 0.2 K/uL   POCT Glucose    Collection Time: 01/31/24  7:56 AM   Result Value Ref Range    POC Glucose 102 (H) 65 - 100 mg/dL    Performed by: Yuli    POCT Glucose    Collection Time: 01/31/24 12:01 PM   Result Value Ref Range    POC Glucose 166 (H) 65 - 100 mg/dL    Performed by: Yuli        Current Meds:  Current Facility-Administered Medications   Medication Dose Route Frequency    metoprolol (LOPRESSOR) injection 10 mg  10 mg IntraVENous Once    metoprolol tartrate (LOPRESSOR) tablet 50 mg  50 mg Oral BID    apixaban (ELIQUIS) tablet 5 mg  5 mg Oral BID    loperamide (IMODIUM) capsule 2 mg  2 mg Oral 4x Daily PRN    cholestyramine light packet 4 g  4 g Oral BID    [Held by provider] metoprolol succinate (TOPROL XL) extended release tablet 25 mg  25 mg Oral Daily    [Held by provider] valsartan (DIOVAN) tablet 40 mg  40 mg Oral Daily    sodium chloride flush 0.9 % injection 5-40 mL  5-40 mL IntraVENous 2 times per day    sodium chloride flush 0.9 % injection 5-40 mL  5-40 mL IntraVENous PRN    0.9 % sodium chloride infusion   IntraVENous PRN    acetaminophen (TYLENOL) tablet 650 mg  650 mg Oral Q4H PRN    Or    acetaminophen (TYLENOL) suppository 650 mg  650 mg Rectal Q4H PRN    ondansetron (ZOFRAN-ODT) disintegrating tablet 4 mg  4 mg Oral Q8H PRN    Or    ondansetron (ZOFRAN) injection 4 mg  4 mg IntraVENous Q6H PRN    polyethylene glycol (GLYCOLAX) packet 17 g  17 g Oral Daily PRN    bisacodyl

## 2024-01-31 NOTE — PROGRESS NOTES
Pt  bed in lowest position, wheels locked, and call light within reach. Hourly rounds completed. IV is patent and dressing is clean, dry, and intact. Tele called stated pt was in Afib, MD was notified and EKG was ordered. EKG confirmed A fib and orders were placed. PT BP was soft so bolus was given. Son is at bedside.

## 2024-01-31 NOTE — PROGRESS NOTES
Tramadol given once per MAR. Hourly rounds performed throughout shift. Bed locked and lowered. Call light within reach. All needs met at this time.

## 2024-02-01 VITALS
RESPIRATION RATE: 18 BRPM | OXYGEN SATURATION: 97 % | TEMPERATURE: 97.7 F | HEART RATE: 80 BPM | SYSTOLIC BLOOD PRESSURE: 136 MMHG | DIASTOLIC BLOOD PRESSURE: 59 MMHG | HEIGHT: 65 IN | BODY MASS INDEX: 25.53 KG/M2 | WEIGHT: 153.22 LBS

## 2024-02-01 LAB
ANION GAP SERPL CALC-SCNC: 3 MMOL/L (ref 2–11)
BASOPHILS # BLD: 0.1 K/UL (ref 0–0.2)
BASOPHILS NFR BLD: 1 % (ref 0–2)
BUN SERPL-MCNC: 29 MG/DL (ref 8–23)
CALCIUM SERPL-MCNC: 9.2 MG/DL (ref 8.3–10.4)
CHLORIDE SERPL-SCNC: 104 MMOL/L (ref 103–113)
CO2 SERPL-SCNC: 29 MMOL/L (ref 21–32)
CREAT SERPL-MCNC: 0.9 MG/DL (ref 0.6–1)
DIFFERENTIAL METHOD BLD: ABNORMAL
EOSINOPHIL # BLD: 0.2 K/UL (ref 0–0.8)
EOSINOPHIL NFR BLD: 2 % (ref 0.5–7.8)
ERYTHROCYTE [DISTWIDTH] IN BLOOD BY AUTOMATED COUNT: 13.5 % (ref 11.9–14.6)
GLUCOSE BLD STRIP.AUTO-MCNC: 109 MG/DL (ref 65–100)
GLUCOSE BLD STRIP.AUTO-MCNC: 176 MG/DL (ref 65–100)
GLUCOSE SERPL-MCNC: 106 MG/DL (ref 65–100)
HCT VFR BLD AUTO: 26.4 % (ref 35.8–46.3)
HGB BLD-MCNC: 8.3 G/DL (ref 11.7–15.4)
IMM GRANULOCYTES # BLD AUTO: 0.1 K/UL (ref 0–0.5)
IMM GRANULOCYTES NFR BLD AUTO: 1 % (ref 0–5)
LYMPHOCYTES # BLD: 1.6 K/UL (ref 0.5–4.6)
LYMPHOCYTES NFR BLD: 19 % (ref 13–44)
MCH RBC QN AUTO: 29.6 PG (ref 26.1–32.9)
MCHC RBC AUTO-ENTMCNC: 31.4 G/DL (ref 31.4–35)
MCV RBC AUTO: 94.3 FL (ref 82–102)
MONOCYTES # BLD: 0.8 K/UL (ref 0.1–1.3)
MONOCYTES NFR BLD: 9 % (ref 4–12)
NEUTS SEG # BLD: 5.7 K/UL (ref 1.7–8.2)
NEUTS SEG NFR BLD: 68 % (ref 43–78)
NRBC # BLD: 0 K/UL (ref 0–0.2)
PLATELET # BLD AUTO: 340 K/UL (ref 150–450)
PMV BLD AUTO: 9.3 FL (ref 9.4–12.3)
POTASSIUM SERPL-SCNC: 4.4 MMOL/L (ref 3.5–5.1)
RBC # BLD AUTO: 2.8 M/UL (ref 4.05–5.2)
SERVICE CMNT-IMP: ABNORMAL
SERVICE CMNT-IMP: ABNORMAL
SODIUM SERPL-SCNC: 136 MMOL/L (ref 136–146)
WBC # BLD AUTO: 8.4 K/UL (ref 4.3–11.1)

## 2024-02-01 PROCEDURE — 6370000000 HC RX 637 (ALT 250 FOR IP): Performed by: INTERNAL MEDICINE

## 2024-02-01 PROCEDURE — 80048 BASIC METABOLIC PNL TOTAL CA: CPT

## 2024-02-01 PROCEDURE — 82962 GLUCOSE BLOOD TEST: CPT

## 2024-02-01 PROCEDURE — 6370000000 HC RX 637 (ALT 250 FOR IP): Performed by: FAMILY MEDICINE

## 2024-02-01 PROCEDURE — 92507 TX SP LANG VOICE COMM INDIV: CPT

## 2024-02-01 PROCEDURE — 85025 COMPLETE CBC W/AUTO DIFF WBC: CPT

## 2024-02-01 PROCEDURE — 36415 COLL VENOUS BLD VENIPUNCTURE: CPT

## 2024-02-01 RX ORDER — METOPROLOL SUCCINATE 100 MG/1
100 TABLET, EXTENDED RELEASE ORAL DAILY
Status: DISCONTINUED | OUTPATIENT
Start: 2024-02-01 | End: 2024-02-01 | Stop reason: HOSPADM

## 2024-02-01 RX ORDER — LOPERAMIDE HYDROCHLORIDE 2 MG/1
2 CAPSULE ORAL 4 TIMES DAILY PRN
Qty: 40 CAPSULE | Refills: 0
Start: 2024-02-01 | End: 2024-02-11

## 2024-02-01 RX ORDER — METOPROLOL SUCCINATE 100 MG/1
100 TABLET, EXTENDED RELEASE ORAL DAILY
Qty: 30 TABLET | Refills: 1 | Status: SHIPPED | OUTPATIENT
Start: 2024-02-02

## 2024-02-01 RX ORDER — FUROSEMIDE 20 MG/1
20 TABLET ORAL DAILY PRN
Qty: 60 TABLET | Refills: 3 | Status: SHIPPED | OUTPATIENT
Start: 2024-02-01

## 2024-02-01 RX ADMIN — CHOLESTYRAMINE 4 G: 4 POWDER, FOR SUSPENSION ORAL at 09:53

## 2024-02-01 RX ADMIN — Medication 1 CAPSULE: at 09:56

## 2024-02-01 RX ADMIN — CLOPIDOGREL BISULFATE 75 MG: 75 TABLET ORAL at 09:54

## 2024-02-01 RX ADMIN — FAMOTIDINE 20 MG: 20 TABLET, FILM COATED ORAL at 09:54

## 2024-02-01 RX ADMIN — METOPROLOL SUCCINATE 100 MG: 100 TABLET, EXTENDED RELEASE ORAL at 10:55

## 2024-02-01 RX ADMIN — VITAMIN D, TAB 1000IU (100/BT) 2000 UNITS: 25 TAB at 09:53

## 2024-02-01 RX ADMIN — MAGNESIUM GLUCONATE 500 MG ORAL TABLET 400 MG: 500 TABLET ORAL at 09:53

## 2024-02-01 RX ADMIN — APIXABAN 5 MG: 5 TABLET, FILM COATED ORAL at 09:54

## 2024-02-01 RX ADMIN — LEVOTHYROXINE SODIUM 25 MCG: 0.05 TABLET ORAL at 05:29

## 2024-02-01 RX ADMIN — PANCRELIPASE LIPASE, PANCRELIPASE PROTEASE, PANCRELIPASE AMYLASE 5000 UNITS: 5000; 17000; 24000 CAPSULE, DELAYED RELEASE ORAL at 09:53

## 2024-02-01 RX ADMIN — POTASSIUM CHLORIDE 20 MEQ: 1500 TABLET, EXTENDED RELEASE ORAL at 09:54

## 2024-02-01 RX ADMIN — TAMSULOSIN HYDROCHLORIDE 0.4 MG: 0.4 CAPSULE ORAL at 09:54

## 2024-02-01 NOTE — PROGRESS NOTES
Report called to EDUAR Mehta at [facility]. AVS printed and included with pt at transport. Family following transport to facility.

## 2024-02-01 NOTE — CARE COORDINATION
Chart reviewed and patient discussed in IDT rounds this AM. Patient discharging to [facility] today. Transportation arranged for 1300. IMM explain and signed. Packet arranged and placed in soft chart.   Report: [phone]    Sanjeev VAIL, ACM  [facility]

## 2024-02-01 NOTE — PROGRESS NOTES
Goals:  LTG: Patient will increase receptive/expressive language skills demonstrated by the ability to communicate basic wants/needs across environments.   STG: Patient will answer yes/no questions with 75% accuracy and no more than 5 sec delay given min cueing. MET 24  STG: Patient will follow 1 step commands with 75% accuracy given min cueing. MET 24  STG: Patient will identify item in field of 2 with 100% accuracy given min cueing. MET 24  STG: Patient will complete automatic naming tasks with 75% accuracy given min cueing. MET 24    Patient would benefit from skilled intervention targeting higher level cognitive tasks including ADL sequencing, money and schedule management, medication management, community task prep/participation to return to independent living situation. Will defer to [facility] SLP.     SPEECH LANGUAGE PATHOLOGY: SFSLPNOTES: COGNITIVE COMMUNICATION Daily Note #2    Acknowledge Order  I  Therapy Time  I   Charges     I  Rehab Caseload Tracker    NAME: Janine Gupta  : 1940  MRN: [id number]    ADMISSION DATE: 2024  PRIMARY DIAGNOSIS: Acute pancreatitis, unspecified complication status, unspecified pancreatitis type    ICD-10: Treatment Diagnosis:  R41.841 Cognitive-Communication Deficit    RECOMMENDATIONS:   Recommendations: skilled intervention for higher level cog tasks    Therapeutic Interventions: Patient/family education  Language treatment   Patient continues to require skilled intervention: Yes. Recommend ongoing speech therapy services during this hospitalization.   Anticipated Discharge Needs: Ongoing speech therapy is recommended at next level of care.      ASSESSMENT   Pt seen at bedside, agreeable to skilled ST. Pt was provided with opportunities to demonstrate cognitive improvement with orientation task (completed with 75% accuracy, stating the date was 1966), sequencing task for ADLs including laundry completion start to finish,

## 2024-02-01 NOTE — PROGRESS NOTES
Hourly rounds performed this shift. Bed lowered and locked. Call light within reach. Family at bedside. Bedside report will be given to oncoming nurse.

## 2024-02-01 NOTE — DISCHARGE SUMMARY
Hospitalist Discharge Summary   Admit Date:  2024  8:58 PM   DC Note date: 2024  Name:  Janine Gupta   Age:  83 y.o.  Sex:  female  :  1940   MRN:  [id number]   Room:  [facility]  PCP:  Ibis Bolton APRN - CNP    Presenting Complaint: Diarrhea     Initial Admission Diagnosis: Acute pancreatitis, unspecified complication status, unspecified pancreatitis type [K85.90]  Other acute pancreatitis, unspecified complication status [K85.80]     Problem List for this Hospitalization (present on admission):    Principal Problem:    Acute pancreatitis, unspecified complication status, unspecified pancreatitis type  Active Problems:    Chronic renal disease, stage III (HCC) [375825]    LBBB (left bundle branch block)    Chronic systolic (congestive) heart failure    Essential hypertension    CAD (coronary artery disease)    History of AAA (abdominal aortic aneurysm) repair    Infrarenal abdominal aortic aneurysm (AAA) without rupture (HCC)    Type 2 diabetes with nephropathy (HCC)    Hypothermia    Acute pancreatitis    Thyroid disorder    Urinary retention    Anemia    E-coli UTI    Hypokalemia    Hypomagnesemia    Elevated liver function tests    Acute encephalopathy    Acute CVA (cerebrovascular accident) (HCC)  Resolved Problems:    * No resolved hospital problems. *      Hospital Course:  Please refer to the admission H&P for details of presentation. In summary, Janine Gupta is a 83 y.o. female with past medical history significant for hypothyroidism, exocrine pancreatic insufficiency, Asthma, polyneuropathy, coronary artery disease, ischemic cardiomyopathy, type 2 diabetes mellitus, GERD, hyperlipidemia, hypertension, history of obesity, history of sleep apnea who presented with upper abdominal pain of moderate intensity that is achy in nature nonradiating and began around lunchtime on the day of presentation.   Abdominal pain associated with nausea without any vomiting.  Workup in the  Nausea Only, Other (See Comments) and Nausea And Vomiting     High fever, \"exterme nausea\"  Other reaction(s): Fever  High fever, \"exterme nausea\"       Immunization History   Administered Date(s) Administered    COVID-19, MODERNA BLUE border, Primary or Immunocompromised, (age 12y+), IM, 100 mcg/0.5mL 02/01/2021, 03/01/2021    Influenza Trivalent 10/28/2015, 11/03/2016    Influenza Virus Vaccine 10/28/2014, 10/28/2015, 11/03/2016, 10/16/2018, 10/10/2019, 12/01/2022    Influenza, FLUAD, (age 65 y+), Adjuvanted, 0.5mL 10/21/2022, 10/05/2023    Influenza, FLUARIX, FLULAVAL, FLUZONE (age 6 mo+) AND AFLURIA, (age 3 y+), PF, 0.5mL 10/16/2018, 10/10/2019    Influenza, FLUCELVAX, (age 6 mo+), MDCK, PF, 0.5mL 09/20/2017    Influenza, High Dose (Fluzone 65 yrs and older) 10/30/2020, 11/09/2021    PPD Test 12/21/2023, 01/21/2024    Pneumococcal Vaccine 01/07/2007    Pneumococcal, PCV-13, PREVNAR 13, (age 6w+), IM, 0.5mL 07/14/2016    Pneumococcal, PCV20, PREVNAR 20, (age 6w+), IM, 0.5mL 11/15/2023    TDaP, ADACEL (age 10y-64y), BOOSTRIX (age 10y+), IM, 0.5mL 10/06/2022    Td vaccine (adult) 07/20/2010    Zoster Live (Zostavax) 01/01/2021    Zoster Recombinant (Shingrix) 01/01/2021       Recent Vital Data:  Patient Vitals for the past 24 hrs:   Temp Pulse Resp BP SpO2   02/01/24 1118 97.7 °F (36.5 °C) 80 18 (!) 136/59 97 %   02/01/24 0735 -- 75 -- -- --   02/01/24 0724 97.7 °F (36.5 °C) 78 18 114/65 97 %   02/01/24 0356 98.3 °F (36.8 °C) 77 18 (!) 127/58 98 %   01/31/24 2014 98.8 °F (37.1 °C) 79 17 (!) 126/58 98 %   01/31/24 1808 -- 81 -- -- --   01/31/24 1459 97.4 °F (36.3 °C) 79 17 (!) 113/53 100 %       Oxygen Therapy  SpO2: 97 %  Pulse via Oximetry: 85 beats per minute  Pulse Oximeter Device Mode: Intermittent  O2 Device: None (Room air)  O2 Flow Rate (L/min): 4 L/min    Estimated body mass index is 25.5 kg/m² as calculated from the following:    Height as of this encounter: 1.651 m (5' 5\").    Weight as of this encounter:

## 2024-02-02 ENCOUNTER — CARE COORDINATION (OUTPATIENT)
Dept: CARE COORDINATION | Facility: CLINIC | Age: 84
End: 2024-02-02

## 2024-02-02 NOTE — CARE COORDINATION
Transitions of care outreach postponed at this time.   Patient was discharged to [facility] for STR.   CTN will monitor for discharge from SNF for arun call.

## 2024-02-15 ENCOUNTER — CARE COORDINATION (OUTPATIENT)
Dept: CARE COORDINATION | Facility: CLINIC | Age: 84
End: 2024-02-15

## 2024-02-15 NOTE — CARE COORDINATION
Care Transitions Post-Acute Facility Discharge Call    2/15/2024    Patient: Janine Gupta Patient : 1940   MRN: [id number]  Reason for Admission: Acute Pancreatitis  Discharge Date: 24 RARS: Readmission Risk Score: 25.5  Care Transitions Initial Follow Up Call    Call within 2 business days of discharge: Yes    Patient Current Location:  Home: [address] [place] [address]  [place] [address]    Care Transition Nurse contacted the patient by telephone to perform post hospital discharge assessment. Verified name and  with family as identifiers. Provided introduction to self, and explanation of the Care Transition Nurse role.     Last Discharge Facility       Date Complaint Diagnosis Description Type Department Provider    24 Diarrhea Acute CVA (cerebrovascular accident) (HCC) ... ED to Hosp-Admission (Discharged) (ADMITTED) [id number] Violeta Brennan MD; Walker Nava.          Was this an external facility discharge? Yes, 2/15/2024  Discharge Facility: [facility]     Challenges to be reviewed by the provider   Additional needs identified to be addressed with provider: No  none               Method of communication with provider: none.      Care Transition Nurse reviewed medical action plan with family who verbalized understanding. The patient was given an opportunity to ask questions and does not have any further questions or concerns at this time. Were discharge instructions available to patient? Yes. Reviewed appropriate site of care based on symptoms and resources available to patient including: PCP  Specialist  Home health  When to call 911  MyChart Messaging. The family agrees to contact the PCP office for questions related to their healthcare.     Medication reconciliation was performed with family, who verbalizes understanding of administration of home medications. Medications reviewed, 1111F entered: yes    Was patient discharged with a pulse oximeter? no    Non-face-to-face services

## 2024-02-16 ENCOUNTER — OFFICE VISIT (OUTPATIENT)
Dept: NEUROLOGY | Age: 84
End: 2024-02-16

## 2024-02-16 VITALS
HEIGHT: 64 IN | WEIGHT: 150.6 LBS | BODY MASS INDEX: 25.71 KG/M2 | SYSTOLIC BLOOD PRESSURE: 164 MMHG | HEART RATE: 80 BPM | OXYGEN SATURATION: 95 % | DIASTOLIC BLOOD PRESSURE: 85 MMHG

## 2024-02-16 DIAGNOSIS — E11.21 TYPE 2 DIABETES WITH NEPHROPATHY (HCC): ICD-10-CM

## 2024-02-16 DIAGNOSIS — G47.33 OSA (OBSTRUCTIVE SLEEP APNEA): ICD-10-CM

## 2024-02-16 DIAGNOSIS — I48.91 NEW ONSET A-FIB (HCC): ICD-10-CM

## 2024-02-16 DIAGNOSIS — Z86.73 HISTORY OF LEFT PCA STROKE: ICD-10-CM

## 2024-02-16 DIAGNOSIS — H53.461 RIGHT HOMONYMOUS HEMIANOPSIA DUE TO RECENT CEREBRAL INFARCTION: ICD-10-CM

## 2024-02-16 DIAGNOSIS — Z09 HOSPITAL DISCHARGE FOLLOW-UP: Primary | ICD-10-CM

## 2024-02-16 DIAGNOSIS — I69.398 RIGHT HOMONYMOUS HEMIANOPSIA DUE TO RECENT CEREBRAL INFARCTION: ICD-10-CM

## 2024-02-16 PROBLEM — H40.019 OPEN ANGLE GLAUCOMA SUSPECT WITH BORDERLINE FINDINGS AT LOW RISK: Status: ACTIVE | Noted: 2017-11-13

## 2024-02-16 PROBLEM — I21.4 NSTEMI (NON-ST ELEVATED MYOCARDIAL INFARCTION) (HCC): Status: ACTIVE | Noted: 2023-01-23

## 2024-02-16 NOTE — PROGRESS NOTES
Gilmer Coleman MD on 1/29/2024  1:11 PM  Lab Results   Component Value Date    CHOL 111 01/29/2024    TRIG 127 01/29/2024    HDL 30 (L) 01/29/2024    LDLCALC 55.6 01/29/2024    VLDL 32 03/04/2022    CHOLHDLRATIO 3.7 01/29/2024     Hemoglobin A1C   Date Value Ref Range Status   01/29/2024 5.7 (H) 4.8 - 5.6 % Final         Janine was seen today for follow-up from hospital and cerebrovascular accident.    Diagnoses and all orders for this visit:    Hospital discharge follow-up  -     [facility] DISCHARGE MEDS RECONCILED W/ CURRENT OUTPATIENT MED LIST    History of left PCA stroke  Continue secondary stroke prevention plavix, eliquis.   Goal SBP <130/80; avoid hypotension or drastic fluctuations given ICAD.    Goal LDL<70  Goal A1C <7.0  Continue HH therapies. Discussed fall risk and prevention. Continue to ambulate with assistive devices.   She has been advised no driving unless medically cleared.   Discussed Mediterranean diet and increasing cardiovascular exercise to goal of 30 minutes daily.   Depression screening completed.   Reviewed BE FAST and when to call 911.     -     [facility], [facility]    Right homonymous hemianopsia due to recent cerebral infarction  Recommend follow up with ophthalmology in 3-6 months.     Type 2 diabetes with nephropathy (HCC)  Last A1c 5.7. well controlled with diet.   Discussed diet and exercise.   New onset a-fib (HCC)  Continue eliquis 5 mg twice daily. Rate controlled with BB.   Followed by Cardiology.   -     [facility]    GUS (obstructive sleep apnea)  Will refer to sleep medicine for further evaluation.   -     [facility] Medicine, [facility]      Follow up in 3 months or sooner if needed    I spent  50% of the 60 total minutes of today's visit in coordination of care and patient/family education and counseling regarding the above patient concerns, reviewing the patient's medical record, my assessment and

## 2024-02-21 ENCOUNTER — CARE COORDINATION (OUTPATIENT)
Dept: CARE COORDINATION | Facility: CLINIC | Age: 84
End: 2024-02-21

## 2024-02-21 NOTE — CARE COORDINATION
Care Transitions Follow Up Call    Patient Current Location:  Home: [address] [place] [address]    Care Transition Nurse contacted the patient by telephone to follow up after admission on 2024.  Verified name and  with family as identifiers.    Patient: Janine Gupta  Patient : 1940   MRN: [id number]  Reason for Admission: Acute Pancreatitis   Discharge Date: 24 RARS: Readmission Risk Score: 25.5    Needs to be reviewed by the provider   Additional needs identified to be addressed with provider: No  none             Method of communication with provider: none.      Addressed changes since last contact:   Family reports patient is doing well and family members are taking turn to stay with her. Patient is engaged with [employer]. Family endorses compliance with her medications. Patient completed follow up with Neurology and she has upcoming follow up with PCP on 24. Family denies any new or worsening symptoms or concerns at this time.  Discussed follow-up appointments. If no appointment was previously scheduled, appointment scheduling offered: Yes.   Is follow up appointment scheduled within 7 days of discharge? Yes.    Follow Up  Future Appointments   Date Time Provider Department Center   2024 11:30 AM Ibis Bolton APRN - CNP [facility]   5/3/2024 12:45 PM Kianna Hernandez MD [facility]   2024  1:40 PM America Conteh APRN [facility]     External follow up appointment(s): no    Care Transition Nurse reviewed medical action plan and red flags with family and discussed any barriers to care and/or understanding of plan of care after discharge. Discussed appropriate site of care based on symptoms and resources available to patient including: PCP  Specialist  Home health  When to call 911  MyChart Messaging. The family agrees to contact the PCP office for questions related to their healthcare.    Patients top risk factors for readmission:

## 2024-02-22 ENCOUNTER — NURSE ONLY (OUTPATIENT)
Dept: FAMILY MEDICINE CLINIC | Facility: CLINIC | Age: 84
End: 2024-02-22
Payer: MEDICARE

## 2024-02-22 ENCOUNTER — TELEMEDICINE (OUTPATIENT)
Dept: FAMILY MEDICINE CLINIC | Facility: CLINIC | Age: 84
End: 2024-02-22
Payer: MEDICARE

## 2024-02-22 DIAGNOSIS — I10 ESSENTIAL HYPERTENSION: ICD-10-CM

## 2024-02-22 DIAGNOSIS — D64.9 ANEMIA, UNSPECIFIED TYPE: ICD-10-CM

## 2024-02-22 DIAGNOSIS — E83.42 HYPOMAGNESEMIA: ICD-10-CM

## 2024-02-22 DIAGNOSIS — E03.9 HYPOTHYROIDISM, UNSPECIFIED TYPE: ICD-10-CM

## 2024-02-22 DIAGNOSIS — I25.119 ATHEROSCLEROSIS OF NATIVE CORONARY ARTERY OF NATIVE HEART WITH ANGINA PECTORIS (HCC): ICD-10-CM

## 2024-02-22 DIAGNOSIS — K21.9 GASTROESOPHAGEAL REFLUX DISEASE WITHOUT ESOPHAGITIS: ICD-10-CM

## 2024-02-22 DIAGNOSIS — Z09 HOSPITAL DISCHARGE FOLLOW-UP: Primary | ICD-10-CM

## 2024-02-22 DIAGNOSIS — I20.9 ANGINA PECTORIS, UNSPECIFIED (HCC): ICD-10-CM

## 2024-02-22 DIAGNOSIS — N39.0 RECURRENT UTI: ICD-10-CM

## 2024-02-22 DIAGNOSIS — I50.23 ACUTE ON CHRONIC HFREF (HEART FAILURE WITH REDUCED EJECTION FRACTION) (HCC): ICD-10-CM

## 2024-02-22 DIAGNOSIS — I48.91 ATRIAL FIBRILLATION, UNSPECIFIED TYPE (HCC): ICD-10-CM

## 2024-02-22 PROBLEM — K85.90 ACUTE PANCREATITIS: Status: RESOLVED | Noted: 2024-01-21 | Resolved: 2024-02-22

## 2024-02-22 PROBLEM — R00.2 PALPITATIONS: Status: RESOLVED | Noted: 2021-07-09 | Resolved: 2024-02-22

## 2024-02-22 PROBLEM — L89.312 PRESSURE INJURY OF RIGHT BUTTOCK, STAGE 2 (HCC): Status: ACTIVE | Noted: 2024-02-22

## 2024-02-22 LAB
BASOPHILS # BLD: 0.1 K/UL (ref 0–0.2)
BASOPHILS NFR BLD: 1 % (ref 0–2)
BILIRUBIN, URINE, POC: NEGATIVE
BLOOD URINE, POC: ABNORMAL
DIFFERENTIAL METHOD BLD: ABNORMAL
EOSINOPHIL # BLD: 0.2 K/UL (ref 0–0.8)
EOSINOPHIL NFR BLD: 2 % (ref 0.5–7.8)
ERYTHROCYTE [DISTWIDTH] IN BLOOD BY AUTOMATED COUNT: 13.8 % (ref 11.9–14.6)
GLUCOSE URINE, POC: NEGATIVE
HCT VFR BLD AUTO: 31.7 % (ref 35.8–46.3)
HGB BLD-MCNC: 9.8 G/DL (ref 11.7–15.4)
IMM GRANULOCYTES # BLD AUTO: 0 K/UL (ref 0–0.5)
IMM GRANULOCYTES NFR BLD AUTO: 0 % (ref 0–5)
KETONES, URINE, POC: NEGATIVE
LEUKOCYTE ESTERASE, URINE, POC: ABNORMAL
LYMPHOCYTES # BLD: 1.9 K/UL (ref 0.5–4.6)
LYMPHOCYTES NFR BLD: 26 % (ref 13–44)
MCH RBC QN AUTO: 29.5 PG (ref 26.1–32.9)
MCHC RBC AUTO-ENTMCNC: 30.9 G/DL (ref 31.4–35)
MCV RBC AUTO: 95.5 FL (ref 82–102)
MONOCYTES # BLD: 0.4 K/UL (ref 0.1–1.3)
MONOCYTES NFR BLD: 6 % (ref 4–12)
NEUTS SEG # BLD: 4.7 K/UL (ref 1.7–8.2)
NEUTS SEG NFR BLD: 65 % (ref 43–78)
NITRITE, URINE, POC: NEGATIVE
NRBC # BLD: 0 K/UL (ref 0–0.2)
PH, URINE, POC: 7 (ref 4.6–8)
PLATELET # BLD AUTO: 264 K/UL (ref 150–450)
PMV BLD AUTO: 11.1 FL (ref 9.4–12.3)
PROTEIN,URINE, POC: ABNORMAL
RBC # BLD AUTO: 3.32 M/UL (ref 4.05–5.2)
SPECIFIC GRAVITY, URINE, POC: 1.01 (ref 1–1.03)
URINALYSIS CLARITY, POC: ABNORMAL
URINALYSIS COLOR, POC: YELLOW
UROBILINOGEN, POC: NORMAL
WBC # BLD AUTO: 7.3 K/UL (ref 4.3–11.1)

## 2024-02-22 PROCEDURE — G8399 PT W/DXA RESULTS DOCUMENT: HCPCS | Performed by: NURSE PRACTITIONER

## 2024-02-22 PROCEDURE — 1090F PRES/ABSN URINE INCON ASSESS: CPT | Performed by: NURSE PRACTITIONER

## 2024-02-22 PROCEDURE — 99215 OFFICE O/P EST HI 40 MIN: CPT | Performed by: NURSE PRACTITIONER

## 2024-02-22 PROCEDURE — 81003 URINALYSIS AUTO W/O SCOPE: CPT | Performed by: NURSE PRACTITIONER

## 2024-02-22 PROCEDURE — 1123F ACP DISCUSS/DSCN MKR DOCD: CPT | Performed by: NURSE PRACTITIONER

## 2024-02-22 PROCEDURE — 1111F DSCHRG MED/CURRENT MED MERGE: CPT | Performed by: NURSE PRACTITIONER

## 2024-02-22 PROCEDURE — 36415 COLL VENOUS BLD VENIPUNCTURE: CPT | Performed by: NURSE PRACTITIONER

## 2024-02-22 PROCEDURE — G8427 DOCREV CUR MEDS BY ELIG CLIN: HCPCS | Performed by: NURSE PRACTITIONER

## 2024-02-22 RX ORDER — NITROFURANTOIN 25; 75 MG/1; MG/1
100 CAPSULE ORAL 2 TIMES DAILY
Qty: 14 CAPSULE | Refills: 0 | Status: SHIPPED | OUTPATIENT
Start: 2024-02-22 | End: 2024-02-29

## 2024-02-22 RX ORDER — NITROGLYCERIN 0.4 MG/1
0.4 TABLET SUBLINGUAL EVERY 5 MIN PRN
Qty: 25 TABLET | Refills: 5 | Status: SHIPPED | OUTPATIENT
Start: 2024-02-22

## 2024-02-22 RX ORDER — METOPROLOL SUCCINATE 100 MG/1
100 TABLET, EXTENDED RELEASE ORAL DAILY
Qty: 90 TABLET | Refills: 3 | Status: SHIPPED | OUTPATIENT
Start: 2024-02-22

## 2024-02-22 RX ORDER — LANOLIN ALCOHOL/MO/W.PET/CERES
400 CREAM (GRAM) TOPICAL 2 TIMES DAILY
Qty: 180 TABLET | Refills: 3 | Status: SHIPPED | OUTPATIENT
Start: 2024-02-22

## 2024-02-22 RX ORDER — LANOLIN ALCOHOL/MO/W.PET/CERES
400 CREAM (GRAM) TOPICAL 2 TIMES DAILY
COMMUNITY
End: 2024-02-22 | Stop reason: SDUPTHER

## 2024-02-22 ASSESSMENT — ENCOUNTER SYMPTOMS
SINUS PRESSURE: 0
BLOOD IN STOOL: 0
ABDOMINAL PAIN: 0
CONSTIPATION: 0
APNEA: 0
PHOTOPHOBIA: 0
CHEST TIGHTNESS: 0
EYE ITCHING: 0
RECTAL PAIN: 0
ANAL BLEEDING: 0
NAUSEA: 0
ALLERGIC/IMMUNOLOGIC NEGATIVE: 1
SORE THROAT: 0
ABDOMINAL DISTENTION: 0
RHINORRHEA: 0
VOMITING: 0
SINUS PAIN: 0
CHOKING: 0
TROUBLE SWALLOWING: 0
COUGH: 0
FACIAL SWELLING: 0
DIARRHEA: 0
GASTROINTESTINAL NEGATIVE: 1
WHEEZING: 0
COLOR CHANGE: 0
EYE DISCHARGE: 0
VOICE CHANGE: 0
SHORTNESS OF BREATH: 0
EYE REDNESS: 0
STRIDOR: 0
BACK PAIN: 0
EYE PAIN: 0
RESPIRATORY NEGATIVE: 1

## 2024-02-22 NOTE — PROGRESS NOTES
PROGRESS NOTE        Consent:    Janine Gupta, was evaluated through a synchronous (real-time) audio-video encounter. The patient (or guardian if applicable) is aware that this is a billable service, which includes applicable co-pays. This Virtual Visit was conducted with patient's (and/or legal guardian's) consent. Patient identification was verified, and a caregiver was present when appropriate.   The patient was located at Home: [address] [place] [address]  [place] [address]  Provider was located in [place]         On this date 2/22/2024 I have spent 50 minutes reviewing previous notes, test results and face to face (virtual) with the patient discussing the diagnosis and importance of compliance with the treatment plan as well as documenting on the day of the visit.. Greater than 50% of this visit was spent counseling the patient about test results, prognosis, importance of compliance, education about disease process, benefits of medications, instructions for management of acute symptoms, and follow up plans.      Chief Complaint   Patient presents with    Follow-Up from Hospital     Few questions        SUBJECTIVE:     Janine Gupta is a very sweet 83 y.o. female , with past medical history significant for hypertension, hyperlipidemia, CAD-currently following cardiology, asthma-currently following pulmonology, squamous cell carcinoma of left ear, CSF leak-currently following ENT, and arthritis, seen virtually, accompanied by her daughters, regarding hospital follow-up.  Patient was recently admitted on 1/20 to 2/1 for acute pancreatitis and acute CVA.  She initially presented to the ED with complaints of upper abdominal pain that began around lunchtime.  She had nausea but no vomiting at that time.  Initial workup show a lipase level of 9620 with a CRP of 6.5.  She also with noted to have thrombocytopenia at 701696.  CT scanning of abdomen and pelvis showed acute interstitial pancreatitis.

## 2024-02-23 LAB
ALBUMIN SERPL-MCNC: 3.3 G/DL (ref 3.2–4.6)
ALBUMIN/GLOB SERPL: 1 (ref 0.4–1.6)
ALP SERPL-CCNC: 122 U/L (ref 50–136)
ALT SERPL-CCNC: 17 U/L (ref 12–65)
ANION GAP SERPL CALC-SCNC: 6 MMOL/L (ref 2–11)
AST SERPL-CCNC: 14 U/L (ref 15–37)
BILIRUB SERPL-MCNC: 0.2 MG/DL (ref 0.2–1.1)
BUN SERPL-MCNC: 33 MG/DL (ref 8–23)
CALCIUM SERPL-MCNC: 10 MG/DL (ref 8.3–10.4)
CHLORIDE SERPL-SCNC: 104 MMOL/L (ref 103–113)
CO2 SERPL-SCNC: 31 MMOL/L (ref 21–32)
CREAT SERPL-MCNC: 1.1 MG/DL (ref 0.6–1)
GLOBULIN SER CALC-MCNC: 3.2 G/DL (ref 2.8–4.5)
GLUCOSE SERPL-MCNC: 140 MG/DL (ref 65–100)
MAGNESIUM SERPL-MCNC: 2.7 MG/DL (ref 1.8–2.4)
POTASSIUM SERPL-SCNC: 3.8 MMOL/L (ref 3.5–5.1)
PROT SERPL-MCNC: 6.5 G/DL (ref 6.3–8.2)
SODIUM SERPL-SCNC: 141 MMOL/L (ref 136–146)
TSH W FREE THYROID IF ABNORMAL: 3.08 UIU/ML (ref 0.36–3.74)

## 2024-02-25 LAB
BACTERIA SPEC CULT: ABNORMAL
SERVICE CMNT-IMP: ABNORMAL

## 2024-02-27 LAB
BACTERIA SPEC CULT: ABNORMAL
SERVICE CMNT-IMP: ABNORMAL

## 2024-02-27 RX ORDER — NITROFURANTOIN MACROCRYSTALS 50 MG/1
50 CAPSULE ORAL
Qty: 90 CAPSULE | Refills: 1 | Status: SHIPPED | OUTPATIENT
Start: 2024-02-27

## 2024-02-29 ENCOUNTER — CARE COORDINATION (OUTPATIENT)
Dept: CARE COORDINATION | Facility: CLINIC | Age: 84
End: 2024-02-29

## 2024-02-29 NOTE — CARE COORDINATION
Patient has graduated from the Care Transitions program on 2/29/2024.  Patient/family has the ability to self-manage at this time. Patient/family has no further care management needs, no referral to the ACM team for further management. Family reports patient is doing well at this time. Patient is engaged with [facility].    Patient has Care Transition Nurse's contact information for any further questions, concerns, or needs.  Patients upcoming visits:    Future Appointments   Date Time Provider Department Center   5/3/2024 12:45 PM Kianna Hernanedz MD [facility]   5/16/2024 10:40 AM PST LAB PST [facility]   5/17/2024  1:40 PM America Conteh APRN [facility]   5/28/2024  1:30 PM Ibis Bolton, BERTO - CNP PST [facility]

## 2024-03-06 RX ORDER — POTASSIUM CHLORIDE 750 MG/1
10 TABLET, EXTENDED RELEASE ORAL DAILY
Qty: 30 TABLET | Refills: 2 | Status: SHIPPED | OUTPATIENT
Start: 2024-03-06

## 2024-03-14 ENCOUNTER — HOSPITAL ENCOUNTER (EMERGENCY)
Age: 84
Discharge: HOME | End: 2024-03-14
Attending: EMERGENCY MEDICINE
Payer: MEDICARE

## 2024-03-14 ENCOUNTER — TELEPHONE (OUTPATIENT)
Dept: FAMILY MEDICINE CLINIC | Facility: CLINIC | Age: 84
End: 2024-03-14

## 2024-03-14 VITALS
WEIGHT: 148 LBS | OXYGEN SATURATION: 96 % | SYSTOLIC BLOOD PRESSURE: 163 MMHG | DIASTOLIC BLOOD PRESSURE: 84 MMHG | RESPIRATION RATE: 14 BRPM | HEIGHT: 64 IN | HEART RATE: 61 BPM | TEMPERATURE: 97.4 F | BODY MASS INDEX: 25.27 KG/M2

## 2024-03-14 DIAGNOSIS — R19.7 DIARRHEA, UNSPECIFIED TYPE: Primary | ICD-10-CM

## 2024-03-14 LAB
ALBUMIN SERPL-MCNC: 3.7 G/DL (ref 3.2–4.6)
ALBUMIN/GLOB SERPL: 1 (ref 0.4–1.6)
ALP SERPL-CCNC: 109 U/L (ref 50–136)
ALT SERPL-CCNC: 19 U/L (ref 12–65)
ANION GAP SERPL CALC-SCNC: 4 MMOL/L (ref 2–11)
APPEARANCE UR: CLEAR
AST SERPL-CCNC: 15 U/L (ref 15–37)
BACTERIA URNS QL MICRO: NEGATIVE /HPF
BASOPHILS # BLD: 0.1 K/UL (ref 0–0.2)
BASOPHILS NFR BLD: 1 % (ref 0–2)
BILIRUB SERPL-MCNC: 0.2 MG/DL (ref 0.2–1.1)
BILIRUB UR QL: NEGATIVE
BUN SERPL-MCNC: 29 MG/DL (ref 8–23)
CALCIUM SERPL-MCNC: 10.2 MG/DL (ref 8.3–10.4)
CASTS URNS QL MICRO: ABNORMAL /LPF
CHLORIDE SERPL-SCNC: 105 MMOL/L (ref 103–113)
CO2 SERPL-SCNC: 30 MMOL/L (ref 21–32)
COLOR UR: ABNORMAL
CREAT SERPL-MCNC: 1.3 MG/DL (ref 0.6–1)
DIFFERENTIAL METHOD BLD: ABNORMAL
EOSINOPHIL # BLD: 0.1 K/UL (ref 0–0.8)
EOSINOPHIL NFR BLD: 1 % (ref 0.5–7.8)
EPI CELLS #/AREA URNS HPF: ABNORMAL /HPF
ERYTHROCYTE [DISTWIDTH] IN BLOOD BY AUTOMATED COUNT: 13.4 % (ref 11.9–14.6)
GLOBULIN SER CALC-MCNC: 3.8 G/DL (ref 2.8–4.5)
GLUCOSE SERPL-MCNC: 133 MG/DL (ref 65–100)
GLUCOSE UR STRIP.AUTO-MCNC: NEGATIVE MG/DL
HCT VFR BLD AUTO: 35.9 % (ref 35.8–46.3)
HGB BLD-MCNC: 11.1 G/DL (ref 11.7–15.4)
HGB UR QL STRIP: NEGATIVE
IMM GRANULOCYTES # BLD AUTO: 0 K/UL (ref 0–0.5)
IMM GRANULOCYTES NFR BLD AUTO: 1 % (ref 0–5)
KETONES UR QL STRIP.AUTO: NEGATIVE MG/DL
LACTATE SERPL-SCNC: 0.7 MMOL/L (ref 0.4–2)
LEUKOCYTE ESTERASE UR QL STRIP.AUTO: ABNORMAL
LIPASE SERPL-CCNC: 108 U/L (ref 73–393)
LYMPHOCYTES # BLD: 1.8 K/UL (ref 0.5–4.6)
LYMPHOCYTES NFR BLD: 32 % (ref 13–44)
MCH RBC QN AUTO: 28.5 PG (ref 26.1–32.9)
MCHC RBC AUTO-ENTMCNC: 30.9 G/DL (ref 31.4–35)
MCV RBC AUTO: 92.3 FL (ref 82–102)
MONOCYTES # BLD: 0.5 K/UL (ref 0.1–1.3)
MONOCYTES NFR BLD: 8 % (ref 4–12)
NEUTS SEG # BLD: 3.3 K/UL (ref 1.7–8.2)
NEUTS SEG NFR BLD: 57 % (ref 43–78)
NITRITE UR QL STRIP.AUTO: NEGATIVE
NRBC # BLD: 0 K/UL (ref 0–0.2)
PH UR STRIP: 6.5 (ref 5–9)
PLATELET # BLD AUTO: 189 K/UL (ref 150–450)
PMV BLD AUTO: 11.3 FL (ref 9.4–12.3)
POTASSIUM SERPL-SCNC: 4 MMOL/L (ref 3.5–5.1)
PROT SERPL-MCNC: 7.5 G/DL (ref 6.3–8.2)
PROT UR STRIP-MCNC: NEGATIVE MG/DL
RBC # BLD AUTO: 3.89 M/UL (ref 4.05–5.2)
RBC #/AREA URNS HPF: ABNORMAL /HPF
SODIUM SERPL-SCNC: 139 MMOL/L (ref 136–146)
SP GR UR REFRACTOMETRY: 1.01 (ref 1–1.02)
UROBILINOGEN UR QL STRIP.AUTO: 0.2 EU/DL (ref 0.2–1)
WBC # BLD AUTO: 5.8 K/UL (ref 4.3–11.1)
WBC URNS QL MICRO: ABNORMAL /HPF

## 2024-03-14 PROCEDURE — 83690 ASSAY OF LIPASE: CPT

## 2024-03-14 PROCEDURE — 81001 URINALYSIS AUTO W/SCOPE: CPT

## 2024-03-14 PROCEDURE — 85025 COMPLETE CBC W/AUTO DIFF WBC: CPT

## 2024-03-14 PROCEDURE — 80053 COMPREHEN METABOLIC PANEL: CPT

## 2024-03-14 PROCEDURE — 83605 ASSAY OF LACTIC ACID: CPT

## 2024-03-14 PROCEDURE — 99284 EMERGENCY DEPT VISIT MOD MDM: CPT

## 2024-03-14 PROCEDURE — 93005 ELECTROCARDIOGRAM TRACING: CPT | Performed by: EMERGENCY MEDICINE

## 2024-03-14 RX ORDER — 0.9 % SODIUM CHLORIDE 0.9 %
250 INTRAVENOUS SOLUTION INTRAVENOUS ONCE
Status: DISCONTINUED | OUTPATIENT
Start: 2024-03-14 | End: 2024-03-14

## 2024-03-14 RX ORDER — HYOSCYAMINE SULFATE 0.12 MG/1
1 TABLET SUBLINGUAL
Qty: 20 EACH | Refills: 0 | Status: SHIPPED | OUTPATIENT
Start: 2024-03-14

## 2024-03-14 NOTE — ED TRIAGE NOTES
Pt arrives via pov in wheelchair c/o diarrhea x3 days. Unable to get temperature in triage. Daughter states unable to get temperature at home or EMS. Hx of this. Pt alert and oriented x4 in triage. Pt denies any discomforts.

## 2024-03-14 NOTE — DISCHARGE INSTRUCTIONS
Schedule close follow-up with primary care physician.  Return to ED if symptoms worsen or progress in any way.

## 2024-03-14 NOTE — TELEPHONE ENCOUNTER
Patient's daughter called and let me know that patient has had explosive diarrhea and a low body temp. Advised patients daughter to take patient to emergency room.

## 2024-03-14 NOTE — ED PROVIDER NOTES
Emergency Department Provider Note       PCP: Ibis Bolton, APRN - CNP   Age: 83 y.o.   Sex: female     DISPOSITION Decision To Discharge 03/14/2024 06:57:26 PM       ICD-10-CM    1. Diarrhea, unspecified type  R19.7           Medical Decision Making     83-year-old female with history of hypertension, CAD, CHF, type 2 diabetes mellitus, GERD, CKD presents with complaint of 3 days of intermittent diarrhea.    Vital signs stable.  Temperature 97.4 F oral.  Creatinine 1.30.  Most recent for comparison 1.10 on 2/22/2024.  Glucose 133.  CBC with no leukocytosis.  H&H increased to 11.1 from 9.8 on 2/22/2024.  LFTs unremarkable.  Lipase within normal limits.  Discussed administering IV fluids however patient with history of CHF and concern for possible volume overload.  Patient states she recently has been on Lasix was likely to be leading to worsening renal function.  Will discharge home with Levsin.  Patient unable to provide any stool samples at this time.  Instructed to return if symptoms worsen or progress in any way.     1 acute complicated illness or injury.  Prescription drug management performed.  Chronic medical problems impacting care include CKD, CHF.  Shared medical decision making was utilized in creating the patients health plan today.    I independently ordered and reviewed each unique test.  I reviewed external records: provider visit note from outside specialist.  I reviewed external records: previous lab results from outside ED.                   History     83-year-old female with history of hypertension, CAD, CHF, type 2 diabetes mellitus, GERD, CKD presents with complaint of 3 days of intermittent diarrhea.  Reportedly difficulties obtaining oral temperature at home and with EMS.  Patient denies any abdominal pain at this time.  Denies any blood in stool or urine.  Patient with no other complaints at this time.  Denies urinary complaints.  Denies fever.    The history is provided by the  patient. No  was used.       ROS     Review of Systems   Constitutional:  Negative for chills, diaphoresis, fatigue and fever.   HENT:  Negative for congestion and sore throat.    Respiratory:  Negative for cough and shortness of breath.    Cardiovascular:  Negative for chest pain.   Gastrointestinal:  Positive for diarrhea. Negative for abdominal pain, blood in stool, constipation, nausea and vomiting.   Genitourinary:  Negative for dysuria and flank pain.   Musculoskeletal:  Negative for myalgias.   Skin:  Negative for rash.   Neurological:  Negative for dizziness, weakness, light-headedness and headaches.        Physical Exam     Vitals signs and nursing note reviewed:  Vitals:    03/14/24 1730 03/14/24 1745 03/14/24 1815 03/14/24 1845   BP: (!) 177/73 (!) 161/68 (!) 172/79 (!) 163/84   Pulse: 60 57 61 61   Resp: 14 13 (!) 8 14   Temp:    97.4 °F (36.3 °C)   TempSrc:       SpO2: 96% 92% 99% 96%   Weight:       Height:          Physical Exam  Vitals and nursing note reviewed.   Constitutional:       Appearance: Normal appearance.      Comments: Patient well-appearing in no acute distress.   HENT:      Head: Normocephalic.      Mouth/Throat:      Mouth: Mucous membranes are moist.   Eyes:      Extraocular Movements: Extraocular movements intact.      Pupils: Pupils are equal, round, and reactive to light.   Cardiovascular:      Rate and Rhythm: Normal rate.   Pulmonary:      Effort: Pulmonary effort is normal.      Breath sounds: Normal breath sounds.   Abdominal:      General: Bowel sounds are normal. There is no distension.      Palpations: Abdomen is soft. There is no mass.      Tenderness: There is no abdominal tenderness. There is no guarding or rebound.      Comments: Soft, nontender, nondistended.  No rebound or guarding.   Musculoskeletal:         General: Normal range of motion.   Skin:     Findings: No erythema or rash.   Neurological:      General: No focal deficit present.

## 2024-03-15 LAB
EKG ATRIAL RATE: 63 BPM
EKG DIAGNOSIS: NORMAL
EKG P AXIS: 52 DEGREES
EKG P-R INTERVAL: 192 MS
EKG Q-T INTERVAL: 491 MS
EKG QRS DURATION: 163 MS
EKG QTC CALCULATION (BAZETT): 503 MS
EKG R AXIS: -17 DEGREES
EKG T AXIS: -88 DEGREES
EKG VENTRICULAR RATE: 63 BPM

## 2024-03-15 PROCEDURE — 93010 ELECTROCARDIOGRAM REPORT: CPT | Performed by: INTERNAL MEDICINE

## 2024-03-16 ASSESSMENT — ENCOUNTER SYMPTOMS
NAUSEA: 0
SHORTNESS OF BREATH: 0
COUGH: 0
CONSTIPATION: 0
DIARRHEA: 1
ABDOMINAL PAIN: 0
SORE THROAT: 0
BLOOD IN STOOL: 0
VOMITING: 0

## 2024-03-26 ENCOUNTER — OFFICE VISIT (OUTPATIENT)
Dept: FAMILY MEDICINE CLINIC | Facility: CLINIC | Age: 84
End: 2024-03-26
Payer: MEDICARE

## 2024-03-26 VITALS
WEIGHT: 149 LBS | HEART RATE: 67 BPM | DIASTOLIC BLOOD PRESSURE: 62 MMHG | OXYGEN SATURATION: 98 % | TEMPERATURE: 97.2 F | SYSTOLIC BLOOD PRESSURE: 118 MMHG | BODY MASS INDEX: 25.44 KG/M2 | HEIGHT: 64 IN | RESPIRATION RATE: 16 BRPM

## 2024-03-26 DIAGNOSIS — N39.0 RECURRENT UTI: ICD-10-CM

## 2024-03-26 DIAGNOSIS — D64.9 ANEMIA, UNSPECIFIED TYPE: ICD-10-CM

## 2024-03-26 DIAGNOSIS — E83.42 HYPOMAGNESEMIA: ICD-10-CM

## 2024-03-26 DIAGNOSIS — R41.3 MEMORY CHANGE: ICD-10-CM

## 2024-03-26 DIAGNOSIS — E03.9 HYPOTHYROIDISM, UNSPECIFIED TYPE: ICD-10-CM

## 2024-03-26 DIAGNOSIS — I10 ESSENTIAL HYPERTENSION: ICD-10-CM

## 2024-03-26 DIAGNOSIS — Z86.73 RECENT CEREBROVASCULAR ACCIDENT (CVA): ICD-10-CM

## 2024-03-26 DIAGNOSIS — Z09 HOSPITAL DISCHARGE FOLLOW-UP: Primary | ICD-10-CM

## 2024-03-26 PROBLEM — L89.312 PRESSURE INJURY OF RIGHT BUTTOCK, STAGE 2 (HCC): Status: RESOLVED | Noted: 2024-02-22 | Resolved: 2024-03-26

## 2024-03-26 LAB
ALBUMIN SERPL-MCNC: 3.6 G/DL (ref 3.2–4.6)
ALBUMIN/GLOB SERPL: 1.1 (ref 0.4–1.6)
ALP SERPL-CCNC: 104 U/L (ref 50–136)
ALT SERPL-CCNC: 20 U/L (ref 12–65)
ANION GAP SERPL CALC-SCNC: 3 MMOL/L (ref 2–11)
AST SERPL-CCNC: 16 U/L (ref 15–37)
BASOPHILS # BLD: 0.1 K/UL (ref 0–0.2)
BASOPHILS NFR BLD: 1 % (ref 0–2)
BILIRUB SERPL-MCNC: 0.3 MG/DL (ref 0.2–1.1)
BILIRUBIN, URINE, POC: NEGATIVE
BLOOD URINE, POC: NEGATIVE
BUN SERPL-MCNC: 36 MG/DL (ref 8–23)
CALCIUM SERPL-MCNC: 10.3 MG/DL (ref 8.3–10.4)
CHLORIDE SERPL-SCNC: 104 MMOL/L (ref 103–113)
CO2 SERPL-SCNC: 31 MMOL/L (ref 21–32)
CREAT SERPL-MCNC: 1 MG/DL (ref 0.6–1)
DIFFERENTIAL METHOD BLD: ABNORMAL
EOSINOPHIL # BLD: 0.1 K/UL (ref 0–0.8)
EOSINOPHIL NFR BLD: 1 % (ref 0.5–7.8)
ERYTHROCYTE [DISTWIDTH] IN BLOOD BY AUTOMATED COUNT: 13.8 % (ref 11.9–14.6)
GLOBULIN SER CALC-MCNC: 3.3 G/DL (ref 2.8–4.5)
GLUCOSE SERPL-MCNC: 100 MG/DL (ref 65–100)
GLUCOSE URINE, POC: NEGATIVE
HCT VFR BLD AUTO: 35.7 % (ref 35.8–46.3)
HGB BLD-MCNC: 10.7 G/DL (ref 11.7–15.4)
IMM GRANULOCYTES # BLD AUTO: 0 K/UL (ref 0–0.5)
IMM GRANULOCYTES NFR BLD AUTO: 1 % (ref 0–5)
KETONES, URINE, POC: NEGATIVE
LEUKOCYTE ESTERASE, URINE, POC: ABNORMAL
LYMPHOCYTES # BLD: 2.4 K/UL (ref 0.5–4.6)
LYMPHOCYTES NFR BLD: 36 % (ref 13–44)
MAGNESIUM SERPL-MCNC: 2.2 MG/DL (ref 1.8–2.4)
MCH RBC QN AUTO: 28.4 PG (ref 26.1–32.9)
MCHC RBC AUTO-ENTMCNC: 30 G/DL (ref 31.4–35)
MCV RBC AUTO: 94.7 FL (ref 82–102)
MONOCYTES # BLD: 0.7 K/UL (ref 0.1–1.3)
MONOCYTES NFR BLD: 10 % (ref 4–12)
NEUTS SEG # BLD: 3.3 K/UL (ref 1.7–8.2)
NEUTS SEG NFR BLD: 51 % (ref 43–78)
NITRITE, URINE, POC: POSITIVE
NRBC # BLD: 0 K/UL (ref 0–0.2)
PH, URINE, POC: 7.5 (ref 4.6–8)
PLATELET # BLD AUTO: 170 K/UL (ref 150–450)
PMV BLD AUTO: 11.9 FL (ref 9.4–12.3)
POTASSIUM SERPL-SCNC: 4 MMOL/L (ref 3.5–5.1)
PROT SERPL-MCNC: 6.9 G/DL (ref 6.3–8.2)
PROTEIN,URINE, POC: NEGATIVE
RBC # BLD AUTO: 3.77 M/UL (ref 4.05–5.2)
SODIUM SERPL-SCNC: 138 MMOL/L (ref 136–146)
SPECIFIC GRAVITY, URINE, POC: 1.01 (ref 1–1.03)
TSH W FREE THYROID IF ABNORMAL: 2.94 UIU/ML (ref 0.36–3.74)
URINALYSIS CLARITY, POC: ABNORMAL
URINALYSIS COLOR, POC: YELLOW
UROBILINOGEN, POC: NORMAL
WBC # BLD AUTO: 6.5 K/UL (ref 4.3–11.1)

## 2024-03-26 PROCEDURE — G8417 CALC BMI ABV UP PARAM F/U: HCPCS | Performed by: NURSE PRACTITIONER

## 2024-03-26 PROCEDURE — G8399 PT W/DXA RESULTS DOCUMENT: HCPCS | Performed by: NURSE PRACTITIONER

## 2024-03-26 PROCEDURE — G8427 DOCREV CUR MEDS BY ELIG CLIN: HCPCS | Performed by: NURSE PRACTITIONER

## 2024-03-26 PROCEDURE — 3078F DIAST BP <80 MM HG: CPT | Performed by: NURSE PRACTITIONER

## 2024-03-26 PROCEDURE — 99214 OFFICE O/P EST MOD 30 MIN: CPT | Performed by: NURSE PRACTITIONER

## 2024-03-26 PROCEDURE — 1123F ACP DISCUSS/DSCN MKR DOCD: CPT | Performed by: NURSE PRACTITIONER

## 2024-03-26 PROCEDURE — 1111F DSCHRG MED/CURRENT MED MERGE: CPT | Performed by: NURSE PRACTITIONER

## 2024-03-26 PROCEDURE — 81003 URINALYSIS AUTO W/O SCOPE: CPT | Performed by: NURSE PRACTITIONER

## 2024-03-26 PROCEDURE — 3074F SYST BP LT 130 MM HG: CPT | Performed by: NURSE PRACTITIONER

## 2024-03-26 PROCEDURE — G8484 FLU IMMUNIZE NO ADMIN: HCPCS | Performed by: NURSE PRACTITIONER

## 2024-03-26 PROCEDURE — 1090F PRES/ABSN URINE INCON ASSESS: CPT | Performed by: NURSE PRACTITIONER

## 2024-03-26 PROCEDURE — 36415 COLL VENOUS BLD VENIPUNCTURE: CPT | Performed by: NURSE PRACTITIONER

## 2024-03-26 PROCEDURE — 1036F TOBACCO NON-USER: CPT | Performed by: NURSE PRACTITIONER

## 2024-03-26 RX ORDER — LOPERAMIDE HYDROCHLORIDE 2 MG/1
2 CAPSULE ORAL 4 TIMES DAILY PRN
COMMUNITY

## 2024-03-26 RX ORDER — PITAVASTATIN CALCIUM 4.18 MG/1
4 TABLET, FILM COATED ORAL DAILY
COMMUNITY
Start: 2024-02-16

## 2024-03-26 NOTE — PROGRESS NOTES
success.  Will place referral for consideration due to her age, recent CVA and change in memory.       Orders Placed This Encounter   Procedures    Culture, Urine     Standing Status:   Future     Number of Occurrences:   1     Standing Expiration Date:   3/26/2025    Aerobic Susceptibility Identification     ATLASORDERNUMBER:[id number]$[id number]:AEIDSL*Aerobe id + suscept    CBC with Auto Differential     Standing Status:   Future     Number of Occurrences:   1     Standing Expiration Date:   3/26/2025    Comprehensive Metabolic Panel     Standing Status:   Future     Number of Occurrences:   1     Standing Expiration Date:   3/26/2025    Magnesium     Standing Status:   Future     Number of Occurrences:   1     Standing Expiration Date:   3/26/2025    TSH with Reflex     Standing Status:   Future     Number of Occurrences:   1     Standing Expiration Date:   3/26/2025    Extra SSTube     ATLASORDERNUMBER:[id number]$[id number]:XSST*Extra SSTube    External Referral To Geriatrics     Referral Priority:   Routine     Referral Type:   Eval and Treat     Referral Reason:   Specialty Services Required     Requested Specialty:   Geriatric Medicine     Number of Visits Requested:   1    MO COLLECTION VENOUS BLOOD VENIPUNCTURE    AMB POC URINALYSIS DIP STICK AUTO W/O MICRO     Standing Status:   Future     Number of Occurrences:   1     Standing Expiration Date:   3/26/2025    MO DISCHARGE MEDS RECONCILED W/ CURRENT OUTPATIENT MED LIST         Elements of this note have been dictated using speech recognition software. As a result, errors of speech recognition may have occurred.      On this date 3/26/2024 I have spent 45 minutes reviewing previous notes, test results and face to face with the patient discussing the diagnosis and importance of compliance with the treatment plan as well as documenting on the day of the visit. Greater than 50% of this visit was spent counseling the patient about test results, prognosis,

## 2024-03-30 LAB
BACTERIA SPEC CULT: ABNORMAL
SERVICE CMNT-IMP: ABNORMAL

## 2024-03-31 LAB
AEROBE ID RESULT 1: ABNORMAL
BACTERIA ISLT: ABNORMAL
SPECIMEN SOURCE: ABNORMAL

## 2024-04-01 RX ORDER — DOXYCYCLINE 100 MG/1
100 TABLET ORAL 2 TIMES DAILY
Qty: 20 TABLET | Refills: 0 | Status: SHIPPED | OUTPATIENT
Start: 2024-04-01 | End: 2024-04-04 | Stop reason: ALTCHOICE

## 2024-04-01 NOTE — PROGRESS NOTES
pain, erythema, blurred vision, or visual field loss.   ENTM:   Denies history of tinnitus, epistaxis, sore throat, hoarseness, or dysphonia.   LYMPH:   Denies swollen glands.   CARDIAC:   No chest pain, pressure, discomfort, palpitations, orthopnea, murmurs, or edema.   GI:   No dysphagia, heartburn reflux, nausea/vomiting, diarrhea, abdominal pain, or bleeding.   :   Denies history of dysuria, hematuria, polyuria, or decreased urine output.   MS:   No history of myalgias, arthralgias, bone pain, or muscle cramps.   SKIN:   No history of rashes, jaundice, cyanosis, nodules, or ulcers.   ENDO:   Negative for heat or cold intolerance.  No history of DM.   PSYCH:   Negative for anxiety, depression, insomnia, hallucinations.   NEURO: Recent stroke   There is no history of AMS, persistent headache, decreased level of consciousness, seizures, or motor or sensory deficits.      PHYSICAL EXAM:    Vitals:    04/03/24 1310   BP: 138/61   Pulse: 72   SpO2: 95%   Weight: 68.5 kg (151 lb)   Height: 1.626 m (5' 4\")     Body mass index is 25.92 kg/m².       GENERAL APPEARANCE:   The patient is normal weight and in no respiratory distress.   HEENT:   PERRL.  Conjunctivae unremarkable.   Nasal mucosa is without epistaxis, exudate, or polyps.  Gums and dentition are unremarkable.  There is oropharyngeal narrowing.  TMs are clear.   NECK/LYMPHATIC:   Symmetrical with no elevation of jugular venous pulsation.  Trachea midline. No thyroid enlargement.  No cervical adenopathy.   LUNGS:   Normal respiratory effort with symmetrical lung expansion.   Breath sounds clear.   HEART:   There is a regular rate and rhythm.  No murmur, rub, or gallop.  There is no edema in the lower extremities.   ABDOMEN:   Soft and non-tender.  No hepatosplenomegaly.  Bowel sounds are normal.     SKIN:   There are no rashes, cyanosis, jaundice, or ecchymosis present.   EXTREMITIES:   The extremities are unremarkable without clubbing, cyanosis, joint

## 2024-04-02 ENCOUNTER — OFFICE VISIT (OUTPATIENT)
Dept: FAMILY MEDICINE CLINIC | Facility: CLINIC | Age: 84
End: 2024-04-02
Payer: MEDICARE

## 2024-04-02 DIAGNOSIS — N30.90 CYSTITIS: Primary | ICD-10-CM

## 2024-04-02 PROBLEM — I50.43 ACUTE ON CHRONIC HEART FAILURE WITH REDUCED EJECTION FRACTION AND DIASTOLIC DYSFUNCTION (HCC): Status: RESOLVED | Noted: 2024-01-26 | Resolved: 2024-04-02

## 2024-04-02 PROBLEM — I21.4 NSTEMI (NON-ST ELEVATED MYOCARDIAL INFARCTION) (HCC): Status: RESOLVED | Noted: 2023-01-23 | Resolved: 2024-04-02

## 2024-04-02 PROBLEM — I63.9 ACUTE CVA (CEREBROVASCULAR ACCIDENT) (HCC): Status: RESOLVED | Noted: 2024-01-28 | Resolved: 2024-04-02

## 2024-04-02 LAB
AEROBE ID RESULT 1: ABNORMAL
ANTIMICROBIAL SUSCEPTIBILITY: ABNORMAL
BACTERIA ISLT: ABNORMAL
BACTERIA SPEC CULT: ABNORMAL
BACTERIA SPEC CULT: ABNORMAL
SERVICE CMNT-IMP: ABNORMAL
SPECIMEN SOURCE: ABNORMAL

## 2024-04-02 PROCEDURE — 96372 THER/PROPH/DIAG INJ SC/IM: CPT | Performed by: NURSE PRACTITIONER

## 2024-04-02 PROCEDURE — 99211 OFF/OP EST MAY X REQ PHY/QHP: CPT | Performed by: NURSE PRACTITIONER

## 2024-04-02 PROCEDURE — G8417 CALC BMI ABV UP PARAM F/U: HCPCS | Performed by: NURSE PRACTITIONER

## 2024-04-02 PROCEDURE — G8427 DOCREV CUR MEDS BY ELIG CLIN: HCPCS | Performed by: NURSE PRACTITIONER

## 2024-04-02 RX ORDER — CEFTRIAXONE 1 G/1
1000 INJECTION, POWDER, FOR SOLUTION INTRAMUSCULAR; INTRAVENOUS ONCE
Status: COMPLETED | OUTPATIENT
Start: 2024-04-02 | End: 2024-04-02

## 2024-04-02 RX ADMIN — CEFTRIAXONE 1000 MG: 1 INJECTION, POWDER, FOR SOLUTION INTRAMUSCULAR; INTRAVENOUS at 16:35

## 2024-04-02 NOTE — PROGRESS NOTES
[facility] CARDIOLOGY  [address] [place] [address], [address]  [place] [address]  PHONE: [phone]      24    NAME:  Janine Gupta  : 1940  MRN: [id number]         SUBJECTIVE:   Janine Gupta is a 83 y.o. female seen for a follow up visit regarding the following:     Chief Complaint   Patient presents with    Congestive Heart Failure    Coronary Artery Disease            HPI:  Follow up  Congestive Heart Failure and Coronary Artery Disease   .    CAD,  chronic LBBB, statin intolerance,orthostatic hypotension with resting hypertension, dyslipidemia, NICM with concomitant CAD,  AAA, CSF leak,  post NSTEMI with depressed EF. She did not tolerate Farxiga d/t intravascular volume depletion, subsequently diagnosed with pancreatic insufficiency with chronic diarrhea, which appears to be exacerbated by furosemide which she uses infrequently.     She saw sleep providers yesterday who are scheduling HST.     Since her last visit with me, in 2024, she suffered an acute embolic stroke and was found to have atrial fib.  Echo stable, started Eliquis and Plavix.  She has residual expressive aphasia and some visual disturbance.  Her right sided weakness has almost completely recovered.  She is missing about 3 weeks in her memory.  She just remembers waking up in rehab.  All of this in the setting of chronic pancreatitis and pancreatic insufficiency.             Past cardiac history:   Remote coronary stents   Treatment limited by orthostatic hypotension   Medical therapy of an obstructed PDA, 40% LAD by cath .  Stress nuclear perfusion scan with anterolateral scar, minimal per-scar ischemia with normal wall motion and EF 68% in 2010.  Not significantly changed 12.   2014 - patent stents, EF 45% (50-55 by echo)   2015 - Lexiscan - fixed inferolateral defect, inferior HK, EF 56%   2016- EF 50-55%, mild to mod MR   Oct 2018- EF 49%, no significant valve disease   Nov

## 2024-04-02 NOTE — PROGRESS NOTES
Pt recently had a urine sample returned with Proteus Mirabilis resistant to multiple antibiotics except for quinolones and sulfa. Unfortunately pt is allergic to sulfa based medications and cannot take quinolone due to hx of tendon rupture.     She arrives to office for nurse visit to receive Rocephine IM 1 g today. Tolerated injection well. No allergic reaction. Repeat urine test in 1 week

## 2024-04-03 ENCOUNTER — OFFICE VISIT (OUTPATIENT)
Dept: SLEEP MEDICINE | Age: 84
End: 2024-04-03
Payer: MEDICARE

## 2024-04-03 VITALS
WEIGHT: 151 LBS | BODY MASS INDEX: 25.78 KG/M2 | DIASTOLIC BLOOD PRESSURE: 61 MMHG | HEART RATE: 72 BPM | SYSTOLIC BLOOD PRESSURE: 138 MMHG | OXYGEN SATURATION: 95 % | HEIGHT: 64 IN

## 2024-04-03 DIAGNOSIS — G47.33 OSA (OBSTRUCTIVE SLEEP APNEA): Primary | ICD-10-CM

## 2024-04-03 DIAGNOSIS — R06.83 SNORING: ICD-10-CM

## 2024-04-03 PROCEDURE — 3078F DIAST BP <80 MM HG: CPT | Performed by: STUDENT IN AN ORGANIZED HEALTH CARE EDUCATION/TRAINING PROGRAM

## 2024-04-03 PROCEDURE — G8399 PT W/DXA RESULTS DOCUMENT: HCPCS | Performed by: STUDENT IN AN ORGANIZED HEALTH CARE EDUCATION/TRAINING PROGRAM

## 2024-04-03 PROCEDURE — 1123F ACP DISCUSS/DSCN MKR DOCD: CPT | Performed by: STUDENT IN AN ORGANIZED HEALTH CARE EDUCATION/TRAINING PROGRAM

## 2024-04-03 PROCEDURE — G8417 CALC BMI ABV UP PARAM F/U: HCPCS | Performed by: STUDENT IN AN ORGANIZED HEALTH CARE EDUCATION/TRAINING PROGRAM

## 2024-04-03 PROCEDURE — G8427 DOCREV CUR MEDS BY ELIG CLIN: HCPCS | Performed by: STUDENT IN AN ORGANIZED HEALTH CARE EDUCATION/TRAINING PROGRAM

## 2024-04-03 PROCEDURE — 3075F SYST BP GE 130 - 139MM HG: CPT | Performed by: STUDENT IN AN ORGANIZED HEALTH CARE EDUCATION/TRAINING PROGRAM

## 2024-04-03 PROCEDURE — 1036F TOBACCO NON-USER: CPT | Performed by: STUDENT IN AN ORGANIZED HEALTH CARE EDUCATION/TRAINING PROGRAM

## 2024-04-03 PROCEDURE — 1090F PRES/ABSN URINE INCON ASSESS: CPT | Performed by: STUDENT IN AN ORGANIZED HEALTH CARE EDUCATION/TRAINING PROGRAM

## 2024-04-03 PROCEDURE — 99203 OFFICE O/P NEW LOW 30 MIN: CPT | Performed by: STUDENT IN AN ORGANIZED HEALTH CARE EDUCATION/TRAINING PROGRAM

## 2024-04-03 ASSESSMENT — SLEEP AND FATIGUE QUESTIONNAIRES
HOW LIKELY ARE YOU TO NOD OFF OR FALL ASLEEP WHILE WATCHING TV: SLIGHT CHANCE OF DOZING
HOW LIKELY ARE YOU TO NOD OFF OR FALL ASLEEP WHILE SITTING AND TALKING TO SOMEONE: WOULD NEVER DOZE
HOW LIKELY ARE YOU TO NOD OFF OR FALL ASLEEP IN A CAR, WHILE STOPPED FOR A FEW MINUTES IN TRAFFIC: WOULD NEVER DOZE
HOW LIKELY ARE YOU TO NOD OFF OR FALL ASLEEP WHEN YOU ARE A PASSENGER IN A CAR FOR AN HOUR WITHOUT A BREAK: WOULD NEVER DOZE
HOW LIKELY ARE YOU TO NOD OFF OR FALL ASLEEP WHILE LYING DOWN TO REST IN THE AFTERNOON WHEN CIRCUMSTANCES PERMIT: WOULD NEVER DOZE
ESS TOTAL SCORE: 3
HOW LIKELY ARE YOU TO NOD OFF OR FALL ASLEEP WHILE SITTING QUIETLY AFTER LUNCH WITHOUT ALCOHOL: SLIGHT CHANCE OF DOZING
HOW LIKELY ARE YOU TO NOD OFF OR FALL ASLEEP WHILE SITTING INACTIVE IN A PUBLIC PLACE: WOULD NEVER DOZE
HOW LIKELY ARE YOU TO NOD OFF OR FALL ASLEEP WHILE SITTING AND READING: SLIGHT CHANCE OF DOZING

## 2024-04-03 NOTE — PATIENT INSTRUCTIONS
You will get a call from [facility] to schedule your at-home sleep study in the next 24-48 hours.  This device will be mailed to you. It is a 2 night sleep study and once completed, you will return device to address provided.      A physician will read the study and you will receive a call from our office with results or to schedule a follow-up appointment with the Sleep Center to discuss treatment options.      Patient Education        Sleep Apnea: Care Instructions  Overview     Sleep apnea means that you frequently stop breathing for 10 seconds or longer during sleep. It can be mild to severe, based on the number of times an hour that you stop breathing.  Blocked or narrowed airways in your nose, mouth, or throat can cause sleep apnea. Your airway can become blocked when your throat muscles and tongue relax during sleep.  You can help treat sleep apnea at home by making lifestyle changes. You also can use a CPAP breathing machine that keeps tissues in the throat from blocking your airway. You may use a mouth or nose device while you sleep to help keep your airway open. Surgery may be an option for some people. Surgery may be done to implant a nerve stimulation device in the chest that helps keep the airway open. Surgery can also be done to remove enlarged tissues that are blocking the throat.  Follow-up care is a key part of your treatment and safety. Be sure to make and go to all appointments, and call your doctor if you are having problems. It's also a good idea to know your test results and keep a list of the medicines you take.  How can you care for yourself at home?  Lose weight, if needed.  Sleep on your side. It may help mild apnea.  Avoid alcohol and medicines such as sleeping pills, opioids, or sedatives before bed.  Don't smoke. If you need help quitting, talk to your doctor.  Prop up the head of your bed.  Treat breathing problems, such as a stuffy nose, that are caused by a cold or allergies.  Try a

## 2024-04-04 ENCOUNTER — OFFICE VISIT (OUTPATIENT)
Age: 84
End: 2024-04-04
Payer: MEDICARE

## 2024-04-04 VITALS
WEIGHT: 151.4 LBS | HEART RATE: 76 BPM | HEIGHT: 64 IN | DIASTOLIC BLOOD PRESSURE: 76 MMHG | SYSTOLIC BLOOD PRESSURE: 172 MMHG | BODY MASS INDEX: 25.85 KG/M2

## 2024-04-04 DIAGNOSIS — I44.7 LBBB (LEFT BUNDLE BRANCH BLOCK): ICD-10-CM

## 2024-04-04 DIAGNOSIS — I25.118 CORONARY ARTERY DISEASE OF NATIVE HEART WITH STABLE ANGINA PECTORIS, UNSPECIFIED VESSEL OR LESION TYPE (HCC): ICD-10-CM

## 2024-04-04 DIAGNOSIS — I10 WHITE COAT SYNDROME WITH DIAGNOSIS OF HYPERTENSION: ICD-10-CM

## 2024-04-04 DIAGNOSIS — I50.22 CHRONIC SYSTOLIC (CONGESTIVE) HEART FAILURE (HCC): Primary | ICD-10-CM

## 2024-04-04 DIAGNOSIS — I71.43 INFRARENAL ABDOMINAL AORTIC ANEURYSM (AAA) WITHOUT RUPTURE (HCC): ICD-10-CM

## 2024-04-04 PROCEDURE — G8417 CALC BMI ABV UP PARAM F/U: HCPCS | Performed by: INTERNAL MEDICINE

## 2024-04-04 PROCEDURE — 1123F ACP DISCUSS/DSCN MKR DOCD: CPT | Performed by: INTERNAL MEDICINE

## 2024-04-04 PROCEDURE — 99214 OFFICE O/P EST MOD 30 MIN: CPT | Performed by: INTERNAL MEDICINE

## 2024-04-04 PROCEDURE — 3078F DIAST BP <80 MM HG: CPT | Performed by: INTERNAL MEDICINE

## 2024-04-04 PROCEDURE — G8399 PT W/DXA RESULTS DOCUMENT: HCPCS | Performed by: INTERNAL MEDICINE

## 2024-04-04 PROCEDURE — G8427 DOCREV CUR MEDS BY ELIG CLIN: HCPCS | Performed by: INTERNAL MEDICINE

## 2024-04-04 PROCEDURE — 1036F TOBACCO NON-USER: CPT | Performed by: INTERNAL MEDICINE

## 2024-04-04 PROCEDURE — 1090F PRES/ABSN URINE INCON ASSESS: CPT | Performed by: INTERNAL MEDICINE

## 2024-04-04 PROCEDURE — 3077F SYST BP >= 140 MM HG: CPT | Performed by: INTERNAL MEDICINE

## 2024-04-04 NOTE — PATIENT INSTRUCTIONS
1. Weigh daily after arising from bed and emptying your bladder  2. Keep a diary of your daily weights  3. Notify me if your weight increases by 2 lbs in 24 hours or 5 lbs in one week, as this may be an early indicator of increased fluid.  Patient Education        Atrial Fibrillation: Care Instructions  Overview     Atrial fibrillation is an irregular and often fast heartbeat. Treating this condition is important for several reasons. It can cause blood clots, which can travel from your heart to your brain and cause a stroke. An irregular heartbeat can also increase your risk for heart failure. If you have an episode of atrial fibrillation, you may feel a fluttering, racing, or pounding feeling in your chest called palpitations. You may feel short of breath, lightheaded, dizzy, or weak.  Treatment can help you feel better and prevent future problems. Treatments can slow the heart rate, control the heart rhythm, or help prevent stroke. You will likely take medicine. You may have a procedure, such as electrical cardioversion or catheter ablation.  You can live well and help manage atrial fibrillation by having a heart-healthy lifestyle. This lifestyle may help reduce symptoms and how often you have episodes.  Follow-up care is a key part of your treatment and safety. Be sure to make and go to all appointments, and call your doctor if you are having problems. It's also a good idea to know your test results and keep a list of the medicines you take.  How can you care for yourself at home?  Be safe with medicines.  Take your medicines exactly as prescribed. Call your doctor if you think you are having a problem with your medicine. You will get more details on the specific medicines your doctor prescribes.  If your doctor has given you a blood thinner to prevent a stroke, be sure you get instructions about how to take your medicine safely. Blood thinners can cause serious bleeding problems.  Do not take any vitamins,

## 2024-04-09 ENCOUNTER — NURSE ONLY (OUTPATIENT)
Dept: FAMILY MEDICINE CLINIC | Facility: CLINIC | Age: 84
End: 2024-04-09

## 2024-04-09 DIAGNOSIS — N39.0 RECURRENT UTI: Primary | ICD-10-CM

## 2024-04-12 LAB
BACTERIA SPEC CULT: ABNORMAL
BACTERIA SPEC CULT: ABNORMAL
SERVICE CMNT-IMP: ABNORMAL

## 2024-04-14 NOTE — PROGRESS NOTES
Masha Martin  : 1940 [id number] [facility],[address]  [address] [place] [address].  [phone]   [phone]        OUTPATIENT PHYSICAL THERAPY:Daily Note 2018         ICD-10: Treatment Diagnosis: Muscle weakness (generalized) (M62.81)Other abnormalities of gait and mobility (R26.89)  Precautions/Allergies:   Adhesive tape-silicones; Azopt [brinzolamide]; Other medication; Statins-hmg-coa reductase inhibitors; and Sulfa (sulfonamide antibiotics)   Fall Risk Score: 7 (? 5 = High Risk)  MD Orders: eval and treat vestibular dysfunction, unsteady gait from neuropathy MEDICAL/REFERRING DIAGNOSIS:  Unsteadiness on feet [R26.81]  Dizziness and giddiness [R42]   DATE OF ONSET: years  REFERRING PHYSICIAN: Annmarie Love MD  RETURN PHYSICIAN APPOINTMENT: 10/8/18     INITIAL ASSESSMENT:  Ms. Tarah Vicente presents with weakness and balance loss from various sources inc neuropathy. She will benefit from skilled PT to increase strength and balance and address vestibular issues as indicated to improve pt's safety with gait. PROBLEM LIST (Impacting functional limitations):  1. Decreased Strength  2. Decreased ADL/Functional Activities  3. Decreased Transfer Abilities  4. Decreased Ambulation Ability/Technique  5. Decreased Balance  6. Decreased Activity Tolerance  7. Decreased Flexibility/Joint Mobility  8. Decreased Menard with Home Exercise Program INTERVENTIONS PLANNED:  1. Balance Exercise  2. Home Exercise Program (HEP)  3. Therapeutic Activites  4. Therapeutic Exercise/Strengthening    TREATMENT PLAN:  Effective Dates: 2018 TO 2018 (90 days). Frequency/Duration: 2 times a week for 90 Days  GOALS: (Goals have been discussed and agreed upon with patient.)  Short-Term Functional Goals: Time Frame: 2 weeks  1. Pt to report compliance with HEP. 2. Pt to increase DF to 5 degrees bilaterally for improved gait mechanics.   Discharge Goals: Time Frame: 12 weeks  1. Pt to increase hip abd strength to 4/5 for more stability in gait. 2. Pt to demonstrate > 4 sec SLS for reciprocal gait on stairs. 3. Pt to use assistive device if needed for safe ambulation. HISTORY:   History of Present Injury/Illness (Reason for Referral):  Pt has hearing loss in left ear and was told to get hearing aids. She did not and saw another specialist who said she needed treatment on her ear before she got hearing aids. She has had that and is being treated for a mastoid infection. She has diabetic neuropathy. She is going to have a NCV 10/8. She tripped over her 's oxygen line and fell about 3 months ago. She loses her balance a lot. She does not want to use a walker. She takes 2 meds that cause dizziness. She walks her dog in her yard and walks to get the mail but no exercise. She was told her vertigo was meniere's years ago but other doctors have contradicted that. She had vestibular rehab without any symptom changes. Past Medical History/Comorbidities:   Ms. 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); Cardiomyopathy, ischemic (1/14/2016); Chronic vertigo; DM2 (diabetes mellitus, type 2) ([address] GERD (gastroesophageal reflux disease); Hearing loss; History of AAA (abdominal aortic aneurysm) repair (2002, 1/14/2016); History of bilateral cataract extraction; History of coronary artery disease (2006, 2007); History of gallbladder disease; History of hernia repair; History of seborrheic keratosis; Hyperlipidemia; Hypertension; Lacunar infarction ([address] (8/16/2018); Meniere's disease; Menopause; Morbid obesity ([address] OA (osteoarthritis); Orthostatic hypotension (1/14/2016); Osteopenia with high risk of fracture (2014); Post-menopausal osteoporosis; Postmenopausal osteoporosis; Unspecified sleep apnea; Unsteady gait (8/16/2018); and Visit for dental examination.   Ms. Abhishek Brice  has a past surgical history that includes hx open cholecystectomy (1979); hx orthopaedic; hx cataract removal (3/10); hx hernia repair; pr cardiac surg procedure unlist; hx hysterectomy (1997); hx oophorectomy (1982); and pr breast surgery procedure unlisted (mid 2000's). Social History/Living Environment:     lives in 2 story home with  but doesn't need to go upstairs  Prior Level of Function/Work/Activity:  retired  Dominant Side:         RIGHT  Current Medications:    Current Outpatient Prescriptions:     pitavastatin calcium (LIVALO) 4 mg tab tablet, Take 1 Tab by mouth daily. , Disp: 90 Tab, Rfl: 3    potassium chloride (KLOR-CON) 10 mEq tablet, Take 1 Tab by mouth daily. , Disp: 90 Tab, Rfl: 3    triamterene-hydroCHLOROthiazide (MAXZIDE) 37.5-25 mg per tablet, Take 1 Tab by mouth daily. , Disp: 90 Tab, Rfl: 3    losartan (COZAAR) 100 mg tablet, Take 1 Tab by mouth daily. , Disp: 90 Tab, Rfl: 3    clopidogrel (PLAVIX) 75 mg tab, Take 1 Tab by mouth daily. , Disp: 90 Tab, Rfl: 3    metoprolol succinate (TOPROL XL) 100 mg tablet, Take 1 Tab by mouth nightly., Disp: 90 Tab, Rfl: 3    nitroglycerin (NITROSTAT) 0.4 mg SL tablet, 1 Tab by SubLINGual route every five (5) minutes as needed. , Disp: 25 Tab, Rfl: 6    fluticasone (FLONASE) 50 mcg/actuation nasal spray, 2 Sprays by Both Nostrils route daily for 90 days. , Disp: 3 Bottle, Rfl: 3    albuterol (PROAIR HFA) 90 mcg/actuation inhaler, Take 2 Puffs by inhalation four (4) times daily as needed for up to 90 days. , Disp: 3 Inhaler, Rfl: 3    Cholecalciferol, Vitamin D3, (VITAMIN D3) 1,000 unit cap, Take 2,000 Units by mouth daily. , Disp: , Rfl:     aspirin (ASPIRIN) 325 mg tablet, Take 325 mg by mouth every morning., Disp: , Rfl:    Date Last Reviewed:  9/19/2018   EXAMINATION:   Observation/Orthostatic Postural Assessment:          Pt has antalgic gait with right glut med lurch. The left shoe is worn across the heel more than the right.   Palpation:          Mild tenderness at left greater trochanter  ROM:     0 degrees DF bilaterally      Strength:     Right hip abd, ext, PF 2/5, quad 5/5  Left hip abd, ext 3/5, PF 2/5, quad 5/5               Neurological Screen:        unremarkable  Functional Mobility:         Gait/Ambulation:  Antalgic level surfaces        Transfers:  Uses UE's to assist        Stairs:  Step-to pattern and uses UE's to pull up  Balance:          Unable to perform   Body Structures Involved:  1. Nerves  2. Muscles Body Functions Affected:  1. Sensory/Pain  2. Neuromusculoskeletal  3. Movement Related Activities and Participation Affected:  1. General Tasks and Demands  2. Mobility  3. Self Care  4. Domestic Life  5. Community, Social and Civic Life   CLINICAL PRESENTATION:   CLINICAL DECISION MAKING:   Outcome Measure: Tool Used: Diaz Balance Scale  Score:  Initial: 39/56 Most Recent: X/56 (Date: -- )   Interpretation of Score: Each section is scored on a 0-4 scale, 0 representing the patients inability to perform the task and 4 representing independence. The scores of each section are added together for a total score of 56. The higher the patients score, the more independent the patient is. Any score below 45 indicates increased risk for falls. Score 56 55-45 44-34 33-23 22-12 11-1 0   Modifier CH CI CJ CK CL CM CN     ? Mobility - Walking and Moving Around:     - CURRENT STATUS: CJ - 20%-39% impaired, limited or restricted    - GOAL STATUS: CI - 1%-19% impaired, limited or restricted    - D/C STATUS:  ---------------To be determined---------------    Medical Necessity:   · Patient demonstrates fair rehab potential due to higher previous functional level. Reason for Services/Other Comments:  · Patient continues to require present interventions due to patient's inability to ambulate safely all surfaces.    TREATMENT:   (In addition to Assessment/Re-Assessment sessions the following treatments were rendered)  THERAPEUTIC ACTIVITY: ( see below for minutes): Therapeutic activities per grid below to improve mobility. Required moderate verbal and manual cues to promote motor control of bilateral, lower extremity(s). THERAPEUTIC EXERCISE: (see below for minutes):  Exercises per grid below to improve strength. Required moderate verbal and manual cues to promote proper body alignment, promote proper body posture and promote proper body mechanics. Progressed resistance, range, repetitions and complexity of movement as indicated.     Date: 8/29/18 8/31/18  Visit 2 9/5/18  Visit 3 9/7/18  Visit 4 9/12/18  Visit 5 9/14/18  Visit 6 9/19/18  Visit 7      Modalities:                                                    Therapeutic Exercise: 15 mins 25 mins 20 mins 20 mins 20 mins 20 mins 25 mins      LAQ B 20 reps 30 reps   30 reps 30 reps   30 reps  5 lb      Slant board 3 x 10 sec 3 x 10 sec 5 x 10 sec 5 x 10 sec 5 x 10 sec 5 x 10 sec 5 x 10 sec      Clams B 20 reps 30 reps 30 reps 30 reps 30 reps 30 reps 2 x 20 reps      S/L hip abd B With assist  20 reps With assist  30 reps 30 reps  With assist 30 reps  With assist 30 reps  With assist 30 reps  With assist 2 x 20 reps  With assist      Glut sets  Supine, standing  5 mins           Calf raises  30 reps 30 reps 30 reps 30 reps 30 reps 30 reps      Standing right hip diagonal   30 reps 30 reps 30 reps 30 reps 30 reps                   Proprioceptive Activities:                                                    Manual Therapy:                                       Therapeutic Activities: 15 mins 15 mins 25 mins 25 mins 25 mins 25 mins 20 mins      Pt education, postural education, HEP 5 mins            bridging 20 reps 20 reps 30 reps 30 reps 30 reps 30 reps 30 reps      SLS holding pelvis level B With assist  5 reps With assist  5 reps With assist  5 reps With assist  5 reps With assist  5 reps With assist  5 reps 5 reps  With assist       Sit to stand  15 reps  With assist 20 reps  With assist          Stepping with left foot holding pelvis level with UE support   X 15 reps X 30 reps 30 reps 30 reps 30 reps      Foam standing eyes open and closed   4 mins 6 mins 5 mins 5 mins 5 mins                                HEP: see hand out  DocSend Portal  Treatment/Session Assessment:  Pt has less limping noted walking in clinic. Right > left glut med weakness remains. More emphasis on S/L ex's to isolate glut med more efficiently today. Neuropathy complicates pt knowing where she is stepping or weight in distributed in stance. Continue as indicated. · Pain/ Symptoms: Initial:  0/10 Post Session:  0/10  ·   Compliance with Program/Exercises: Will assess as treatment progresses. · Recommendations/Intent for next treatment session: \"Next visit will focus on advancements to more challenging activities\".   Total Treatment Duration:  PT Patient Time In/Time Out  Time In: 0930  Time Out: 1015     Grant Lopez, PT Abdominal Pain, N/V/D (Pediatric)

## 2024-04-23 ENCOUNTER — OFFICE VISIT (OUTPATIENT)
Dept: UROLOGY | Age: 84
End: 2024-04-23
Payer: MEDICARE

## 2024-04-23 DIAGNOSIS — R19.5 LOOSE STOOLS: ICD-10-CM

## 2024-04-23 DIAGNOSIS — N39.0 RECURRENT UTI: Primary | ICD-10-CM

## 2024-04-23 LAB
BILIRUBIN, URINE, POC: NEGATIVE
BLOOD URINE, POC: NEGATIVE
GLUCOSE URINE, POC: NEGATIVE
KETONES, URINE, POC: NEGATIVE
LEUKOCYTE ESTERASE, URINE, POC: NORMAL
NITRITE, URINE, POC: NEGATIVE
PH, URINE, POC: 6.5 (ref 4.6–8)
PROTEIN,URINE, POC: NEGATIVE
SPECIFIC GRAVITY, URINE, POC: 1.01 (ref 1–1.03)
URINALYSIS CLARITY, POC: NORMAL
URINALYSIS COLOR, POC: NORMAL
UROBILINOGEN, POC: NORMAL

## 2024-04-23 PROCEDURE — G8399 PT W/DXA RESULTS DOCUMENT: HCPCS | Performed by: NURSE PRACTITIONER

## 2024-04-23 PROCEDURE — G8417 CALC BMI ABV UP PARAM F/U: HCPCS | Performed by: NURSE PRACTITIONER

## 2024-04-23 PROCEDURE — 1036F TOBACCO NON-USER: CPT | Performed by: NURSE PRACTITIONER

## 2024-04-23 PROCEDURE — 1123F ACP DISCUSS/DSCN MKR DOCD: CPT | Performed by: NURSE PRACTITIONER

## 2024-04-23 PROCEDURE — 81003 URINALYSIS AUTO W/O SCOPE: CPT | Performed by: NURSE PRACTITIONER

## 2024-04-23 PROCEDURE — 1090F PRES/ABSN URINE INCON ASSESS: CPT | Performed by: NURSE PRACTITIONER

## 2024-04-23 PROCEDURE — 99214 OFFICE O/P EST MOD 30 MIN: CPT | Performed by: NURSE PRACTITIONER

## 2024-04-23 PROCEDURE — G8427 DOCREV CUR MEDS BY ELIG CLIN: HCPCS | Performed by: NURSE PRACTITIONER

## 2024-04-23 ASSESSMENT — ENCOUNTER SYMPTOMS
BACK PAIN: 0
NAUSEA: 0

## 2024-04-23 NOTE — PROGRESS NOTES
[facility]  [address]  [place] [address]  [phone]          Janine Gupta  : 1940    Chief Complaint   Patient presents with    Urinary Tract Infection          HPI     Janine Gupta is a 83 y.o. female who was referred for recurrent UTI . She suffered MI back in 2023. She returned to there ER back in 2023 for chest pain. Work up negative for MI. She was found to have UTI by ER staff. She reports NO LUTS, hematuria, or flank pain that day. She followed up with PCP. Urine was some WBCs. Patient remained asx. Urine cultures were sent for confirmation. Cultures cont to return w sign Klebsiella.     At time of visit, 23, she was asx. No h/o renal stone. 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 No other sign h/o UTIs.       Her brother has h/o bladder CA. No other personal or family h/o urological CA. Non-smoker.      . Prev hysterectomy. Has one ovary.    She was referred back today for recurrence of UTI. She had a fall Dec 2023. Found to have URI. Urine culture 23 gew both Klebsiella pneumoniae and Citrobacter farmeri. She returned to the hospital 2 weeks later with generalized weakness. Urine culture 23 grew E Coli. Dx w Sepsis. Required admission. Had CVA 2024. While in the hospital found to have another UTI. Urine culture 24 grew E Coli. Since this, she has seen PCP several time. 3 positive urine culture from  to . One was proteus UTI. She was placed on macrodantin suppression by PCP. She reports recently with UTI her only sx in confusion. No LUTS or hematuria. No UTI sx today. Accompanied by her daughter.     Recent CT a/p 24 showed no concerning upper tract pathology.     Of note, she has recent trouble with diarrhea. Working w GI.     She is related to Xuan Hudsonney.       Past Medical History:   Diagnosis Date    Asthma     exercise induced?-- has chronic cough-- prn

## 2024-04-30 ENCOUNTER — HOSPITAL ENCOUNTER (OUTPATIENT)
Dept: SLEEP CENTER | Age: 84
Discharge: HOME | End: 2024-05-03

## 2024-05-17 ENCOUNTER — OFFICE VISIT (OUTPATIENT)
Dept: NEUROLOGY | Age: 84
End: 2024-05-17

## 2024-05-17 VITALS
DIASTOLIC BLOOD PRESSURE: 82 MMHG | OXYGEN SATURATION: 98 % | HEIGHT: 64 IN | WEIGHT: 147 LBS | SYSTOLIC BLOOD PRESSURE: 162 MMHG | HEART RATE: 66 BPM | BODY MASS INDEX: 25.1 KG/M2

## 2024-05-17 DIAGNOSIS — I48.0 PAF (PAROXYSMAL ATRIAL FIBRILLATION) (HCC): ICD-10-CM

## 2024-05-17 DIAGNOSIS — E11.21 TYPE 2 DIABETES WITH NEPHROPATHY (HCC): ICD-10-CM

## 2024-05-17 DIAGNOSIS — R41.3 MEMORY DIFFICULTIES: ICD-10-CM

## 2024-05-17 DIAGNOSIS — G47.33 OSA (OBSTRUCTIVE SLEEP APNEA): ICD-10-CM

## 2024-05-17 DIAGNOSIS — H53.461 RIGHT HOMONYMOUS HEMIANOPSIA DUE TO RECENT CEREBRAL INFARCTION: ICD-10-CM

## 2024-05-17 DIAGNOSIS — I69.398 RIGHT HOMONYMOUS HEMIANOPSIA DUE TO RECENT CEREBRAL INFARCTION: ICD-10-CM

## 2024-05-17 DIAGNOSIS — Z86.73 HISTORY OF LEFT PCA STROKE: Primary | ICD-10-CM

## 2024-05-17 ASSESSMENT — PATIENT HEALTH QUESTIONNAIRE - PHQ9
SUM OF ALL RESPONSES TO PHQ9 QUESTIONS 1 & 2: 0
SUM OF ALL RESPONSES TO PHQ QUESTIONS 1-9: 0
SUM OF ALL RESPONSES TO PHQ QUESTIONS 1-9: 0
1. LITTLE INTEREST OR PLEASURE IN DOING THINGS: NOT AT ALL
SUM OF ALL RESPONSES TO PHQ QUESTIONS 1-9: 0
SUM OF ALL RESPONSES TO PHQ QUESTIONS 1-9: 0
2. FEELING DOWN, DEPRESSED OR HOPELESS: NOT AT ALL

## 2024-05-17 ASSESSMENT — ENCOUNTER SYMPTOMS
RESPIRATORY NEGATIVE: 1
GASTROINTESTINAL NEGATIVE: 1

## 2024-05-17 NOTE — PROGRESS NOTES
[facility] [address], [address]  [place] [address]  [phone]      Chief Complaint   Patient presents with    Follow-up     CVA       Janine Gupta is a 83 y.o. female who presents for follow up for stroke.     PMH significant for hypothyroidism, exocrine pancreatic insufficiency, Asthma, polyneuropathy, coronary artery disease, ischemic cardiomyopathy, type 2 diabetes mellitus, GERD, hyperlipidemia, hypertension, history of obesity, history of sleep apnea who was recently admitted for acute pancreatitis. Workup in the ED with lipase of 9620, CRP of 6.5, thrombocytopenia of 142,000.  CT scan shows homogeneous appearance of gallbladder without any focal abscesses or fluid collection or ductal dilatation. She received IV hydration, which led to acute pulmonary edema giving her underlying systolic CHF.  Noted to have elevated LFTs which improved with diuresis.    On 1/28/2024, patient noted to have speech difficulty, confusion and right-sided weakness per family and Code S was called.  CT scan showed new left-sided parietal occipital CVA.  CTA of head/neck showed focal occlusion in the distal left P2 PCA. MRI brain with acute infarct in the left posterior PCA territory involving the left occipital lobe, medial temporal lobe, and thalamus; remote infarct in the right cerebellum and left precentral gyrus, severe small vessel ischemia and moderate atrophy. Started on DAPT.  On 1/30/2024, patient noted to be in atrial fibrillation with RVR.  Was given Lopressor with improvement in heart rates. Cardiology consulted, recommended clopidogrel and started on Eliquis twice daily.   TTE EF 40-45% mild global hypokinesis, mild LAD, negative for PFO. She was evaluated by PT/OT/ST. She was discharged to [facility] on clopidogrel 75 mg daily, Eliquis 5 mg twice daily, and pitavastatin for secondary stroke prevention.     Interval history:     She is here today with her daughter.

## 2024-08-06 RX ORDER — MONTELUKAST SODIUM 10 MG/1
10 TABLET ORAL NIGHTLY
Qty: 90 TABLET | Refills: 4 | Status: SHIPPED | OUTPATIENT
Start: 2024-08-06

## 2024-08-13 DIAGNOSIS — E55.9 VITAMIN D DEFICIENCY: ICD-10-CM

## 2024-08-13 DIAGNOSIS — E11.42 TYPE 2 DIABETES MELLITUS WITH DIABETIC POLYNEUROPATHY, WITHOUT LONG-TERM CURRENT USE OF INSULIN (HCC): ICD-10-CM

## 2024-08-13 DIAGNOSIS — N39.0 RECURRENT UTI: Primary | ICD-10-CM

## 2024-08-20 ENCOUNTER — LAB (OUTPATIENT)
Dept: FAMILY MEDICINE CLINIC | Facility: CLINIC | Age: 84
End: 2024-08-20
Payer: MEDICARE

## 2024-08-20 DIAGNOSIS — N39.0 RECURRENT UTI: ICD-10-CM

## 2024-08-20 DIAGNOSIS — E11.42 TYPE 2 DIABETES MELLITUS WITH DIABETIC POLYNEUROPATHY, WITHOUT LONG-TERM CURRENT USE OF INSULIN (HCC): ICD-10-CM

## 2024-08-20 DIAGNOSIS — E55.9 VITAMIN D DEFICIENCY: ICD-10-CM

## 2024-08-20 LAB
25(OH)D3 SERPL-MCNC: 76.8 NG/ML (ref 30–100)
ALBUMIN SERPL-MCNC: 3.8 G/DL (ref 3.2–4.6)
ALBUMIN/GLOB SERPL: 1.3 (ref 1–1.9)
ALP SERPL-CCNC: 66 U/L (ref 35–104)
ALT SERPL-CCNC: 13 U/L (ref 12–65)
ANION GAP SERPL CALC-SCNC: 8 MMOL/L (ref 9–18)
AST SERPL-CCNC: 20 U/L (ref 15–37)
BASOPHILS # BLD: 0.1 K/UL (ref 0–0.2)
BASOPHILS NFR BLD: 2 % (ref 0–2)
BILIRUB SERPL-MCNC: 0.3 MG/DL (ref 0–1.2)
BILIRUBIN, URINE, POC: NEGATIVE
BLOOD URINE, POC: NEGATIVE
BUN SERPL-MCNC: 20 MG/DL (ref 8–23)
CALCIUM SERPL-MCNC: 10 MG/DL (ref 8.8–10.2)
CHLORIDE SERPL-SCNC: 101 MMOL/L (ref 98–107)
CHOLEST SERPL-MCNC: 136 MG/DL (ref 0–200)
CO2 SERPL-SCNC: 30 MMOL/L (ref 20–28)
CREAT SERPL-MCNC: 0.97 MG/DL (ref 0.6–1.1)
DIFFERENTIAL METHOD BLD: ABNORMAL
EOSINOPHIL # BLD: 0.2 K/UL (ref 0–0.8)
EOSINOPHIL NFR BLD: 2 % (ref 0.5–7.8)
ERYTHROCYTE [DISTWIDTH] IN BLOOD BY AUTOMATED COUNT: 16 % (ref 11.9–14.6)
EST. AVERAGE GLUCOSE BLD GHB EST-MCNC: 138 MG/DL
GLOBULIN SER CALC-MCNC: 2.8 G/DL (ref 2.3–3.5)
GLUCOSE SERPL-MCNC: 124 MG/DL (ref 70–99)
GLUCOSE URINE, POC: NEGATIVE
HBA1C MFR BLD: 6.4 % (ref 0–5.6)
HCT VFR BLD AUTO: 38.5 % (ref 35.8–46.3)
HDLC SERPL-MCNC: 42 MG/DL (ref 40–60)
HDLC SERPL: 3.3 (ref 0–5)
HGB BLD-MCNC: 12.2 G/DL (ref 11.7–15.4)
IMM GRANULOCYTES # BLD AUTO: 0.1 K/UL (ref 0–0.5)
IMM GRANULOCYTES NFR BLD AUTO: 1 % (ref 0–5)
KETONES, URINE, POC: NEGATIVE
LDLC SERPL CALC-MCNC: 66 MG/DL (ref 0–100)
LEUKOCYTE ESTERASE, URINE, POC: NEGATIVE
LYMPHOCYTES # BLD: 2.1 K/UL (ref 0.5–4.6)
LYMPHOCYTES NFR BLD: 27 % (ref 13–44)
MCH RBC QN AUTO: 29 PG (ref 26.1–32.9)
MCHC RBC AUTO-ENTMCNC: 31.7 G/DL (ref 31.4–35)
MCV RBC AUTO: 91.4 FL (ref 82–102)
MONOCYTES # BLD: 1 K/UL (ref 0.1–1.3)
MONOCYTES NFR BLD: 12 % (ref 4–12)
NEUTS SEG # BLD: 4.5 K/UL (ref 1.7–8.2)
NEUTS SEG NFR BLD: 56 % (ref 43–78)
NITRITE, URINE, POC: NEGATIVE
NRBC # BLD: 0 K/UL (ref 0–0.2)
PH, URINE, POC: 6 (ref 4.6–8)
PLATELET # BLD AUTO: 195 K/UL (ref 150–450)
PMV BLD AUTO: 11.8 FL (ref 9.4–12.3)
POTASSIUM SERPL-SCNC: 4.5 MMOL/L (ref 3.5–5.1)
PROT SERPL-MCNC: 6.6 G/DL (ref 6.3–8.2)
PROTEIN,URINE, POC: NEGATIVE
RBC # BLD AUTO: 4.21 M/UL (ref 4.05–5.2)
SODIUM SERPL-SCNC: 139 MMOL/L (ref 136–145)
SPECIFIC GRAVITY, URINE, POC: 1.01 (ref 1–1.03)
TRIGL SERPL-MCNC: 139 MG/DL (ref 0–150)
TSH W FREE THYROID IF ABNORMAL: 2.27 UIU/ML (ref 0.27–4.2)
URINALYSIS CLARITY, POC: CLEAR
URINALYSIS COLOR, POC: YELLOW
UROBILINOGEN, POC: NORMAL
VLDLC SERPL CALC-MCNC: 28 MG/DL (ref 6–23)
WBC # BLD AUTO: 7.9 K/UL (ref 4.3–11.1)

## 2024-08-20 PROCEDURE — 81003 URINALYSIS AUTO W/O SCOPE: CPT | Performed by: NURSE PRACTITIONER

## 2024-08-20 PROCEDURE — 36415 COLL VENOUS BLD VENIPUNCTURE: CPT | Performed by: NURSE PRACTITIONER

## 2024-08-22 LAB
BACTERIA SPEC CULT: NORMAL
BACTERIA SPEC CULT: NORMAL
SERVICE CMNT-IMP: NORMAL

## 2024-08-25 SDOH — ECONOMIC STABILITY: FOOD INSECURITY: WITHIN THE PAST 12 MONTHS, YOU WORRIED THAT YOUR FOOD WOULD RUN OUT BEFORE YOU GOT MONEY TO BUY MORE.: NEVER TRUE

## 2024-08-25 SDOH — ECONOMIC STABILITY: FOOD INSECURITY: WITHIN THE PAST 12 MONTHS, THE FOOD YOU BOUGHT JUST DIDN'T LAST AND YOU DIDN'T HAVE MONEY TO GET MORE.: NEVER TRUE

## 2024-08-25 SDOH — ECONOMIC STABILITY: INCOME INSECURITY: HOW HARD IS IT FOR YOU TO PAY FOR THE VERY BASICS LIKE FOOD, HOUSING, MEDICAL CARE, AND HEATING?: NOT VERY HARD

## 2024-08-25 SDOH — ECONOMIC STABILITY: TRANSPORTATION INSECURITY
IN THE PAST 12 MONTHS, HAS LACK OF TRANSPORTATION KEPT YOU FROM MEETINGS, WORK, OR FROM GETTING THINGS NEEDED FOR DAILY LIVING?: NO

## 2024-08-28 ENCOUNTER — OFFICE VISIT (OUTPATIENT)
Dept: FAMILY MEDICINE CLINIC | Facility: CLINIC | Age: 84
End: 2024-08-28
Payer: MEDICARE

## 2024-08-28 VITALS
SYSTOLIC BLOOD PRESSURE: 124 MMHG | TEMPERATURE: 98.2 F | HEIGHT: 64 IN | DIASTOLIC BLOOD PRESSURE: 60 MMHG | BODY MASS INDEX: 23.66 KG/M2 | RESPIRATION RATE: 14 BRPM | WEIGHT: 138.6 LBS | OXYGEN SATURATION: 99 % | HEART RATE: 74 BPM

## 2024-08-28 DIAGNOSIS — I50.22 CHRONIC SYSTOLIC (CONGESTIVE) HEART FAILURE (HCC): ICD-10-CM

## 2024-08-28 DIAGNOSIS — E55.9 VITAMIN D DEFICIENCY: ICD-10-CM

## 2024-08-28 DIAGNOSIS — N39.0 RECURRENT UTI: ICD-10-CM

## 2024-08-28 DIAGNOSIS — E11.42 TYPE 2 DIABETES MELLITUS WITH DIABETIC POLYNEUROPATHY, WITHOUT LONG-TERM CURRENT USE OF INSULIN (HCC): Primary | ICD-10-CM

## 2024-08-28 DIAGNOSIS — I48.91 ATRIAL FIBRILLATION, UNSPECIFIED TYPE (HCC): ICD-10-CM

## 2024-08-28 DIAGNOSIS — I10 ESSENTIAL HYPERTENSION: ICD-10-CM

## 2024-08-28 DIAGNOSIS — E03.9 HYPOTHYROIDISM, UNSPECIFIED TYPE: ICD-10-CM

## 2024-08-28 DIAGNOSIS — E83.42 HYPOMAGNESEMIA: ICD-10-CM

## 2024-08-28 DIAGNOSIS — Z86.73 RECENT CEREBROVASCULAR ACCIDENT (CVA): ICD-10-CM

## 2024-08-28 DIAGNOSIS — I25.119 ATHEROSCLEROSIS OF NATIVE CORONARY ARTERY OF NATIVE HEART WITH ANGINA PECTORIS (HCC): ICD-10-CM

## 2024-08-28 DIAGNOSIS — K21.9 GASTROESOPHAGEAL REFLUX DISEASE WITHOUT ESOPHAGITIS: ICD-10-CM

## 2024-08-28 PROBLEM — R79.89 ELEVATED LIVER FUNCTION TESTS: Status: RESOLVED | Noted: 2024-01-26 | Resolved: 2024-08-28

## 2024-08-28 PROBLEM — R47.9 DIFFICULTY WITH SPEECH: Status: RESOLVED | Noted: 2019-06-20 | Resolved: 2024-08-28

## 2024-08-28 PROBLEM — D64.9 ANEMIA: Status: RESOLVED | Noted: 2024-01-24 | Resolved: 2024-08-28

## 2024-08-28 PROBLEM — A41.9 SEPSIS (HCC): Status: RESOLVED | Noted: 2023-12-22 | Resolved: 2024-08-28

## 2024-08-28 PROBLEM — G93.40 ACUTE ENCEPHALOPATHY: Status: RESOLVED | Noted: 2024-01-28 | Resolved: 2024-08-28

## 2024-08-28 PROBLEM — H92.12 OTORRHEA OF LEFT EAR: Status: RESOLVED | Noted: 2018-08-20 | Resolved: 2024-08-28

## 2024-08-28 PROBLEM — K85.90 ACUTE PANCREATITIS, UNSPECIFIED COMPLICATION STATUS, UNSPECIFIED PANCREATITIS TYPE: Status: RESOLVED | Noted: 2024-01-21 | Resolved: 2024-08-28

## 2024-08-28 PROBLEM — N30.00 ACUTE CYSTITIS WITHOUT HEMATURIA: Status: RESOLVED | Noted: 2023-12-20 | Resolved: 2024-08-28

## 2024-08-28 PROBLEM — B96.20 E-COLI UTI: Status: RESOLVED | Noted: 2024-01-25 | Resolved: 2024-08-28

## 2024-08-28 PROCEDURE — 3044F HG A1C LEVEL LT 7.0%: CPT | Performed by: NURSE PRACTITIONER

## 2024-08-28 PROCEDURE — G8399 PT W/DXA RESULTS DOCUMENT: HCPCS | Performed by: NURSE PRACTITIONER

## 2024-08-28 PROCEDURE — 3074F SYST BP LT 130 MM HG: CPT | Performed by: NURSE PRACTITIONER

## 2024-08-28 PROCEDURE — 1036F TOBACCO NON-USER: CPT | Performed by: NURSE PRACTITIONER

## 2024-08-28 PROCEDURE — 3078F DIAST BP <80 MM HG: CPT | Performed by: NURSE PRACTITIONER

## 2024-08-28 PROCEDURE — G8420 CALC BMI NORM PARAMETERS: HCPCS | Performed by: NURSE PRACTITIONER

## 2024-08-28 PROCEDURE — G8427 DOCREV CUR MEDS BY ELIG CLIN: HCPCS | Performed by: NURSE PRACTITIONER

## 2024-08-28 PROCEDURE — 1123F ACP DISCUSS/DSCN MKR DOCD: CPT | Performed by: NURSE PRACTITIONER

## 2024-08-28 PROCEDURE — 99214 OFFICE O/P EST MOD 30 MIN: CPT | Performed by: NURSE PRACTITIONER

## 2024-08-28 PROCEDURE — 1090F PRES/ABSN URINE INCON ASSESS: CPT | Performed by: NURSE PRACTITIONER

## 2024-08-28 RX ORDER — MONTELUKAST SODIUM 10 MG/1
10 TABLET ORAL NIGHTLY
Qty: 90 TABLET | Refills: 4 | Status: SHIPPED | OUTPATIENT
Start: 2024-08-28

## 2024-08-28 RX ORDER — PITAVASTATIN CALCIUM 4.18 MG/1
4 TABLET, FILM COATED ORAL DAILY
Qty: 90 TABLET | Refills: 3 | Status: SHIPPED | OUTPATIENT
Start: 2024-08-28

## 2024-08-28 RX ORDER — FAMOTIDINE 40 MG/1
40 TABLET, FILM COATED ORAL DAILY
Qty: 90 TABLET | Refills: 3 | Status: SHIPPED | OUTPATIENT
Start: 2024-08-28

## 2024-08-28 RX ORDER — NITROFURANTOIN MACROCRYSTALS 50 MG/1
50 CAPSULE ORAL
Qty: 90 CAPSULE | Refills: 3 | Status: SHIPPED | OUTPATIENT
Start: 2024-08-28

## 2024-08-28 RX ORDER — LANOLIN ALCOHOL/MO/W.PET/CERES
400 CREAM (GRAM) TOPICAL 2 TIMES DAILY
Qty: 180 TABLET | Refills: 3 | Status: SHIPPED | OUTPATIENT
Start: 2024-08-28

## 2024-08-28 RX ORDER — METOPROLOL SUCCINATE 100 MG/1
100 TABLET, EXTENDED RELEASE ORAL DAILY
Qty: 90 TABLET | Refills: 3 | Status: SHIPPED | OUTPATIENT
Start: 2024-08-28

## 2024-08-28 RX ORDER — PANCRELIPASE 24000; 76000; 120000 [USP'U]/1; [USP'U]/1; [USP'U]/1
2 CAPSULE, DELAYED RELEASE PELLETS ORAL
Qty: 180 CAPSULE | Refills: 3 | Status: SHIPPED | OUTPATIENT
Start: 2024-08-28

## 2024-08-28 RX ORDER — CLOPIDOGREL BISULFATE 75 MG/1
75 TABLET ORAL DAILY
Qty: 90 TABLET | Refills: 3 | Status: SHIPPED | OUTPATIENT
Start: 2024-08-28

## 2024-08-28 RX ORDER — LEVOTHYROXINE SODIUM 25 UG/1
25 TABLET ORAL DAILY
Qty: 90 TABLET | Refills: 3 | Status: SHIPPED | OUTPATIENT
Start: 2024-08-28

## 2024-08-28 ASSESSMENT — PATIENT HEALTH QUESTIONNAIRE - PHQ9
1. LITTLE INTEREST OR PLEASURE IN DOING THINGS: NOT AT ALL
SUM OF ALL RESPONSES TO PHQ QUESTIONS 1-9: 0
SUM OF ALL RESPONSES TO PHQ QUESTIONS 1-9: 0
2. FEELING DOWN, DEPRESSED OR HOPELESS: NOT AT ALL
SUM OF ALL RESPONSES TO PHQ QUESTIONS 1-9: 0
SUM OF ALL RESPONSES TO PHQ QUESTIONS 1-9: 0
SUM OF ALL RESPONSES TO PHQ9 QUESTIONS 1 & 2: 0

## 2024-08-28 NOTE — PROGRESS NOTES
PROGRESS NOTE    Chief Complaint   Patient presents with    Follow-up    Medication Refill       SUBJECTIVE:     Janine Gupta is a 83 y.o. female with hx of hypertension, hyperlipidemia, CAD-currently following cardiology, asthma-currently following pulmonology, squamous cell carcinoma of left ear, CSF leak-currently following ENT, and arthritis, seen today in office accompanied by her daughter for follow-up for lab results review and medication refill.  Patient reports doing pretty well overall.  No recent falls.  She is able to read her book as her vision is improving slightly.Patient reports no chest pain, no shortness of breath, no unilateral or focal weakness, no orthopnea or PND.  GI and  review of systems is unremarkable.         Past Medical History, Past Surgical History, Family history, Social History, and Medications were all reviewed with the patient today and updated as necessary.       Current Outpatient Medications   Medication Sig Dispense Refill    apixaban (ELIQUIS) 5 MG TABS tablet Take 1 tablet by mouth 2 times daily 180 tablet 3    CREON 86347-18701 units delayed release capsule Take 2 capsules by mouth 3 times daily (with meals) 180 capsule 3    famotidine (PEPCID) 40 MG tablet Take 1 tablet by mouth daily 90 tablet 3    levothyroxine (SYNTHROID) 25 MCG tablet Take 1 tablet by mouth daily On empty stomach and wait at least 30 minutes before eating for thyroid 90 tablet 3    magnesium oxide (MAG-OX) 400 (240 Mg) MG tablet Take 1 tablet by mouth 2 times daily With food 180 tablet 3    metoprolol succinate (TOPROL XL) 100 MG extended release tablet Take 1 tablet by mouth daily 90 tablet 3    pitavastatin (LIVALO) 4 MG TABS tablet Take 1 tablet by mouth daily 90 tablet 3    nitrofurantoin (MACRODANTIN) 50 MG capsule Take 1 capsule by mouth daily (with breakfast) For UTI prevention 90 capsule 3    montelukast (SINGULAIR) 10 MG tablet Take 1 tablet by mouth nightly For cough/postnasal  1.626 m (5' 4\")   Wt 62.9 kg (138 lb 9.6 oz)   SpO2 99%   BMI 23.79 kg/m²      Physical Exam      Medical problems and test results were reviewed with the patient today.   Lab Results   Component Value Date     08/20/2024    K 4.5 08/20/2024     08/20/2024    CO2 30 (H) 08/20/2024    BUN 20 08/20/2024    CREATININE 0.97 08/20/2024    GLUCOSE 124 (H) 08/20/2024    CALCIUM 10.0 08/20/2024    BILITOT 0.3 08/20/2024    ALKPHOS 66 08/20/2024    AST 20 08/20/2024    ALT 13 08/20/2024    LABGLOM 57 (L) 08/20/2024    GFRAA 50 (L) 07/24/2022    AGRATIO 1.8 03/04/2022    GLOB 2.8 08/20/2024       Lab Results   Component Value Date    WBC 7.9 08/20/2024    HGB 12.2 08/20/2024    HCT 38.5 08/20/2024    MCV 91.4 08/20/2024     08/20/2024     Lab Results   Component Value Date    CHOL 136 08/20/2024    CHOL 111 01/29/2024    CHOL 170 11/02/2023     Lab Results   Component Value Date    TRIG 139 08/20/2024    TRIG 127 01/29/2024    TRIG 147 01/20/2024     Lab Results   Component Value Date    HDL 42 08/20/2024    HDL 30 (L) 01/29/2024    HDL 51 11/02/2023     Lab Results   Component Value Date    LDL 66 08/20/2024    LDL 55.6 01/29/2024    LDL 84.6 11/02/2023     Lab Results   Component Value Date    VLDL 28 (H) 08/20/2024    VLDL 25.4 (H) 01/29/2024    VLDL 34.4 (H) 11/02/2023     Lab Results   Component Value Date    CHOLHDLRATIO 3.3 08/20/2024    CHOLHDLRATIO 3.7 01/29/2024    CHOLHDLRATIO 3.3 11/02/2023     Hemoglobin A1C   Date Value Ref Range Status   08/20/2024 6.4 (H) 0 - 5.6 % Final     Comment:     Reference Range  Normal       <5.7%  Prediabetes  5.7-6.4%  Diabetes     >6.4%       Lab Results   Component Value Date    TSH 2.480 01/28/2024    TSHELE 2.27 08/20/2024     Lab Results   Component Value Date/Time    VITD25 76.8 08/20/2024 08:39 AM            ASSESSMENT and PLAN    1. Type 2 diabetes mellitus with diabetic polyneuropathy, without long-term current use of insulin (HCC)   Diet controlled.

## 2024-09-24 ENCOUNTER — APPOINTMENT (OUTPATIENT)
Dept: MRI IMAGING | Age: 84
DRG: 069 | End: 2024-09-24
Payer: MEDICARE

## 2024-09-24 ENCOUNTER — APPOINTMENT (OUTPATIENT)
Dept: CT IMAGING | Age: 84
DRG: 069 | End: 2024-09-24
Payer: MEDICARE

## 2024-09-24 ENCOUNTER — HOSPITAL ENCOUNTER (INPATIENT)
Age: 84
LOS: 2 days | Discharge: HOME HEALTH | DRG: 069 | End: 2024-09-26
Attending: EMERGENCY MEDICINE | Admitting: HOSPITALIST
Payer: MEDICARE

## 2024-09-24 DIAGNOSIS — G43.109 OCULAR MIGRAINE: ICD-10-CM

## 2024-09-24 DIAGNOSIS — G45.9 TIA (TRANSIENT ISCHEMIC ATTACK): Primary | ICD-10-CM

## 2024-09-24 DIAGNOSIS — I63.9 CEREBROVASCULAR ACCIDENT (CVA), UNSPECIFIED MECHANISM (HCC): ICD-10-CM

## 2024-09-24 PROBLEM — D68.69 SECONDARY HYPERCOAGULABLE STATE (HCC): Status: ACTIVE | Noted: 2024-09-24

## 2024-09-24 PROBLEM — E03.9 ACQUIRED HYPOTHYROIDISM: Status: ACTIVE | Noted: 2024-01-21

## 2024-09-24 PROBLEM — I48.91 ATRIAL FIBRILLATION (HCC): Status: ACTIVE | Noted: 2024-09-24

## 2024-09-24 PROBLEM — R29.90 STROKE-LIKE SYMPTOMS: Status: ACTIVE | Noted: 2024-09-24

## 2024-09-24 LAB
ANION GAP SERPL CALC-SCNC: 12 MMOL/L (ref 9–18)
APTT PPP: 34.8 SEC (ref 23.3–37.4)
BASOPHILS # BLD: 0.1 K/UL (ref 0–0.2)
BASOPHILS NFR BLD: 1 % (ref 0–2)
BUN SERPL-MCNC: 31 MG/DL (ref 8–23)
CALCIUM SERPL-MCNC: 9.6 MG/DL (ref 8.8–10.2)
CHLORIDE SERPL-SCNC: 103 MMOL/L (ref 98–107)
CHOLEST SERPL-MCNC: 124 MG/DL (ref 0–200)
CO2 SERPL-SCNC: 23 MMOL/L (ref 20–28)
CREAT SERPL-MCNC: 0.9 MG/DL (ref 0.6–1.1)
DIFFERENTIAL METHOD BLD: ABNORMAL
EKG ATRIAL RATE: 69 BPM
EKG DIAGNOSIS: NORMAL
EKG P AXIS: 55 DEGREES
EKG P-R INTERVAL: 180 MS
EKG Q-T INTERVAL: 463 MS
EKG QRS DURATION: 167 MS
EKG QTC CALCULATION (BAZETT): 500 MS
EKG R AXIS: 93 DEGREES
EKG T AXIS: -72 DEGREES
EKG VENTRICULAR RATE: 70 BPM
EOSINOPHIL # BLD: 0.1 K/UL (ref 0–0.8)
EOSINOPHIL NFR BLD: 1 % (ref 0.5–7.8)
ERYTHROCYTE [DISTWIDTH] IN BLOOD BY AUTOMATED COUNT: 15.5 % (ref 11.9–14.6)
EST. AVERAGE GLUCOSE BLD GHB EST-MCNC: 128 MG/DL
GLUCOSE SERPL-MCNC: 135 MG/DL (ref 70–99)
HBA1C MFR BLD: 6.1 % (ref 0–5.6)
HCT VFR BLD AUTO: 33.5 % (ref 35.8–46.3)
HDLC SERPL-MCNC: 43 MG/DL (ref 40–60)
HDLC SERPL: 2.9 (ref 0–5)
HGB BLD-MCNC: 10.7 G/DL (ref 11.7–15.4)
IMM GRANULOCYTES # BLD AUTO: 0.1 K/UL (ref 0–0.5)
IMM GRANULOCYTES NFR BLD AUTO: 1 % (ref 0–5)
INR PPP: 1.4
LDLC SERPL CALC-MCNC: 61 MG/DL (ref 0–100)
LYMPHOCYTES # BLD: 1.9 K/UL (ref 0.5–4.6)
LYMPHOCYTES NFR BLD: 29 % (ref 13–44)
MCH RBC QN AUTO: 29.1 PG (ref 26.1–32.9)
MCHC RBC AUTO-ENTMCNC: 31.9 G/DL (ref 31.4–35)
MCV RBC AUTO: 91 FL (ref 82–102)
MONOCYTES # BLD: 0.6 K/UL (ref 0.1–1.3)
MONOCYTES NFR BLD: 9 % (ref 4–12)
NEUTS SEG # BLD: 3.8 K/UL (ref 1.7–8.2)
NEUTS SEG NFR BLD: 59 % (ref 43–78)
NRBC # BLD: 0 K/UL (ref 0–0.2)
PLATELET # BLD AUTO: 214 K/UL (ref 150–450)
PMV BLD AUTO: 11.3 FL (ref 9.4–12.3)
POTASSIUM SERPL-SCNC: 4.3 MMOL/L (ref 3.5–5.1)
PROTHROMBIN TIME: 17.7 SEC (ref 11.3–14.9)
RBC # BLD AUTO: 3.68 M/UL (ref 4.05–5.2)
SODIUM SERPL-SCNC: 137 MMOL/L (ref 136–145)
TRIGL SERPL-MCNC: 98 MG/DL (ref 0–150)
VLDLC SERPL CALC-MCNC: 20 MG/DL (ref 6–23)
WBC # BLD AUTO: 6.6 K/UL (ref 4.3–11.1)

## 2024-09-24 PROCEDURE — 80048 BASIC METABOLIC PNL TOTAL CA: CPT

## 2024-09-24 PROCEDURE — 85610 PROTHROMBIN TIME: CPT

## 2024-09-24 PROCEDURE — 1100000000 HC RM PRIVATE

## 2024-09-24 PROCEDURE — 70498 CT ANGIOGRAPHY NECK: CPT

## 2024-09-24 PROCEDURE — 93010 ELECTROCARDIOGRAM REPORT: CPT | Performed by: INTERNAL MEDICINE

## 2024-09-24 PROCEDURE — 6370000000 HC RX 637 (ALT 250 FOR IP): Performed by: HOSPITALIST

## 2024-09-24 PROCEDURE — 85025 COMPLETE CBC W/AUTO DIFF WBC: CPT

## 2024-09-24 PROCEDURE — 80061 LIPID PANEL: CPT

## 2024-09-24 PROCEDURE — 70551 MRI BRAIN STEM W/O DYE: CPT

## 2024-09-24 PROCEDURE — 2580000003 HC RX 258: Performed by: HOSPITALIST

## 2024-09-24 PROCEDURE — 70496 CT ANGIOGRAPHY HEAD: CPT | Performed by: RADIOLOGY

## 2024-09-24 PROCEDURE — 85730 THROMBOPLASTIN TIME PARTIAL: CPT

## 2024-09-24 PROCEDURE — 93005 ELECTROCARDIOGRAM TRACING: CPT | Performed by: EMERGENCY MEDICINE

## 2024-09-24 PROCEDURE — 81001 URINALYSIS AUTO W/SCOPE: CPT

## 2024-09-24 PROCEDURE — 70450 CT HEAD/BRAIN W/O DYE: CPT

## 2024-09-24 PROCEDURE — 70498 CT ANGIOGRAPHY NECK: CPT | Performed by: RADIOLOGY

## 2024-09-24 PROCEDURE — 99285 EMERGENCY DEPT VISIT HI MDM: CPT

## 2024-09-24 PROCEDURE — 83036 HEMOGLOBIN GLYCOSYLATED A1C: CPT

## 2024-09-24 PROCEDURE — 6360000004 HC RX CONTRAST MEDICATION: Performed by: EMERGENCY MEDICINE

## 2024-09-24 RX ORDER — LEVOTHYROXINE SODIUM 50 UG/1
25 TABLET ORAL
Status: DISCONTINUED | OUTPATIENT
Start: 2024-09-25 | End: 2024-09-26 | Stop reason: HOSPADM

## 2024-09-24 RX ORDER — SODIUM CHLORIDE 9 MG/ML
INJECTION, SOLUTION INTRAVENOUS PRN
Status: DISCONTINUED | OUTPATIENT
Start: 2024-09-24 | End: 2024-09-26 | Stop reason: HOSPADM

## 2024-09-24 RX ORDER — IOPAMIDOL 755 MG/ML
50 INJECTION, SOLUTION INTRAVASCULAR
Status: COMPLETED | OUTPATIENT
Start: 2024-09-24 | End: 2024-09-24

## 2024-09-24 RX ORDER — LABETALOL HYDROCHLORIDE 5 MG/ML
10 INJECTION, SOLUTION INTRAVENOUS EVERY 10 MIN PRN
Status: DISCONTINUED | OUTPATIENT
Start: 2024-09-24 | End: 2024-09-26 | Stop reason: HOSPADM

## 2024-09-24 RX ORDER — SODIUM CHLORIDE 0.9 % (FLUSH) 0.9 %
5-40 SYRINGE (ML) INJECTION PRN
Status: DISCONTINUED | OUTPATIENT
Start: 2024-09-24 | End: 2024-09-26 | Stop reason: HOSPADM

## 2024-09-24 RX ORDER — ACETAMINOPHEN 650 MG/1
650 SUPPOSITORY RECTAL EVERY 4 HOURS PRN
Status: DISCONTINUED | OUTPATIENT
Start: 2024-09-24 | End: 2024-09-26 | Stop reason: HOSPADM

## 2024-09-24 RX ORDER — PROMETHAZINE HYDROCHLORIDE 25 MG/1
25 TABLET ORAL EVERY 6 HOURS PRN
Status: DISCONTINUED | OUTPATIENT
Start: 2024-09-24 | End: 2024-09-26 | Stop reason: HOSPADM

## 2024-09-24 RX ORDER — MONTELUKAST SODIUM 10 MG/1
10 TABLET ORAL NIGHTLY
Status: DISCONTINUED | OUTPATIENT
Start: 2024-09-24 | End: 2024-09-26 | Stop reason: HOSPADM

## 2024-09-24 RX ORDER — SENNA AND DOCUSATE SODIUM 50; 8.6 MG/1; MG/1
1 TABLET, FILM COATED ORAL 2 TIMES DAILY
Status: DISCONTINUED | OUTPATIENT
Start: 2024-09-24 | End: 2024-09-26 | Stop reason: HOSPADM

## 2024-09-24 RX ORDER — FAMOTIDINE 20 MG/1
20 TABLET, FILM COATED ORAL DAILY
Status: DISCONTINUED | OUTPATIENT
Start: 2024-09-24 | End: 2024-09-26 | Stop reason: HOSPADM

## 2024-09-24 RX ORDER — PANTOPRAZOLE SODIUM 40 MG/1
40 TABLET, DELAYED RELEASE ORAL
Status: DISCONTINUED | OUTPATIENT
Start: 2024-09-25 | End: 2024-09-26 | Stop reason: HOSPADM

## 2024-09-24 RX ORDER — POLYETHYLENE GLYCOL 3350 17 G/17G
17 POWDER, FOR SOLUTION ORAL DAILY PRN
Status: DISCONTINUED | OUTPATIENT
Start: 2024-09-24 | End: 2024-09-26 | Stop reason: HOSPADM

## 2024-09-24 RX ORDER — SODIUM CHLORIDE 0.9 % (FLUSH) 0.9 %
5-40 SYRINGE (ML) INJECTION EVERY 12 HOURS SCHEDULED
Status: DISCONTINUED | OUTPATIENT
Start: 2024-09-24 | End: 2024-09-26 | Stop reason: HOSPADM

## 2024-09-24 RX ORDER — ACETAMINOPHEN 325 MG/1
650 TABLET ORAL EVERY 4 HOURS PRN
Status: DISCONTINUED | OUTPATIENT
Start: 2024-09-24 | End: 2024-09-26 | Stop reason: HOSPADM

## 2024-09-24 RX ORDER — METOPROLOL SUCCINATE 100 MG/1
100 TABLET, EXTENDED RELEASE ORAL DAILY
Status: DISCONTINUED | OUTPATIENT
Start: 2024-09-25 | End: 2024-09-26 | Stop reason: HOSPADM

## 2024-09-24 RX ORDER — ONDANSETRON 2 MG/ML
4 INJECTION INTRAMUSCULAR; INTRAVENOUS EVERY 4 HOURS PRN
Status: DISCONTINUED | OUTPATIENT
Start: 2024-09-24 | End: 2024-09-26 | Stop reason: HOSPADM

## 2024-09-24 RX ORDER — CLOPIDOGREL BISULFATE 75 MG/1
75 TABLET ORAL DAILY
Status: DISCONTINUED | OUTPATIENT
Start: 2024-09-24 | End: 2024-09-26 | Stop reason: HOSPADM

## 2024-09-24 RX ORDER — PITAVASTATIN CALCIUM 4.18 MG/1
4 TABLET, FILM COATED ORAL NIGHTLY
Status: DISCONTINUED | OUTPATIENT
Start: 2024-09-24 | End: 2024-09-26 | Stop reason: HOSPADM

## 2024-09-24 RX ADMIN — FAMOTIDINE 20 MG: 20 TABLET, FILM COATED ORAL at 20:21

## 2024-09-24 RX ADMIN — MONTELUKAST 10 MG: 10 TABLET, FILM COATED ORAL at 20:21

## 2024-09-24 RX ADMIN — DOCUSATE SODIUM 50 MG AND SENNOSIDES 8.6 MG 1 TABLET: 8.6; 5 TABLET, FILM COATED ORAL at 20:21

## 2024-09-24 RX ADMIN — IOPAMIDOL 50 ML: 755 INJECTION, SOLUTION INTRAVENOUS at 12:53

## 2024-09-24 RX ADMIN — SODIUM CHLORIDE, PRESERVATIVE FREE 10 ML: 5 INJECTION INTRAVENOUS at 20:23

## 2024-09-24 RX ADMIN — APIXABAN 5 MG: 5 TABLET, FILM COATED ORAL at 20:20

## 2024-09-24 ASSESSMENT — ENCOUNTER SYMPTOMS
VOMITING: 0
SHORTNESS OF BREATH: 0
ABDOMINAL PAIN: 0
COUGH: 0
NAUSEA: 0

## 2024-09-24 ASSESSMENT — PAIN SCALES - GENERAL
PAINLEVEL_OUTOF10: 0
PAINLEVEL_OUTOF10: 0

## 2024-09-24 ASSESSMENT — PAIN - FUNCTIONAL ASSESSMENT: PAIN_FUNCTIONAL_ASSESSMENT: 0-10

## 2024-09-25 ENCOUNTER — APPOINTMENT (OUTPATIENT)
Dept: NON INVASIVE DIAGNOSTICS | Age: 84
DRG: 069 | End: 2024-09-25
Attending: HOSPITALIST
Payer: MEDICARE

## 2024-09-25 ENCOUNTER — APPOINTMENT (OUTPATIENT)
Dept: CT IMAGING | Age: 84
DRG: 069 | End: 2024-09-25
Payer: MEDICARE

## 2024-09-25 LAB
APPEARANCE UR: CLEAR
BACTERIA URNS QL MICRO: ABNORMAL /HPF
BILIRUB UR QL: NEGATIVE
COLOR UR: ABNORMAL
ECHO AO ASC DIAM: 2.8 CM
ECHO AO ASCENDING AORTA INDEX: 1.67 CM/M2
ECHO AO ROOT DIAM: 3.5 CM
ECHO AO ROOT INDEX: 2.08 CM/M2
ECHO AR MAX VEL PISA: 3.9 M/S
ECHO AV AREA PEAK VELOCITY: 2.1 CM2
ECHO AV AREA VTI: 1.8 CM2
ECHO AV AREA/BSA PEAK VELOCITY: 1.3 CM2/M2
ECHO AV AREA/BSA VTI: 1.1 CM2/M2
ECHO AV MEAN GRADIENT: 3 MMHG
ECHO AV MEAN VELOCITY: 0.9 M/S
ECHO AV PEAK GRADIENT: 6 MMHG
ECHO AV PEAK VELOCITY: 1.3 M/S
ECHO AV REGURGITANT PHT: 440 MS
ECHO AV VELOCITY RATIO: 0.69
ECHO AV VTI: 30.1 CM
ECHO BSA: 1.69 M2
ECHO EST RA PRESSURE: 3 MMHG
ECHO IVC PROX: 1.8 CM
ECHO LA AREA 2C: 22.3 CM2
ECHO LA AREA 4C: 23 CM2
ECHO LA DIAMETER INDEX: 2.8 CM/M2
ECHO LA DIAMETER: 4.7 CM
ECHO LA MAJOR AXIS: 6.9 CM
ECHO LA MINOR AXIS: 5.8 CM
ECHO LA TO AORTIC ROOT RATIO: 1.34
ECHO LA VOL BP: 73 ML (ref 22–52)
ECHO LA VOL MOD A2C: 71 ML (ref 22–52)
ECHO LA VOL MOD A4C: 63 ML (ref 22–52)
ECHO LA VOL/BSA BIPLANE: 43 ML/M2 (ref 16–34)
ECHO LA VOLUME INDEX MOD A2C: 42 ML/M2 (ref 16–34)
ECHO LA VOLUME INDEX MOD A4C: 38 ML/M2 (ref 16–34)
ECHO LV E' LATERAL VELOCITY: 5 CM/S
ECHO LV E' SEPTAL VELOCITY: 4.4 CM/S
ECHO LV EDV A2C: 171 ML
ECHO LV EDV A4C: 180 ML
ECHO LV EDV INDEX A4C: 107 ML/M2
ECHO LV EDV NDEX A2C: 102 ML/M2
ECHO LV EJECTION FRACTION A2C: 41 %
ECHO LV EJECTION FRACTION A4C: 28 %
ECHO LV EJECTION FRACTION BIPLANE: 30 % (ref 55–100)
ECHO LV ESV A2C: 101 ML
ECHO LV ESV A4C: 128 ML
ECHO LV ESV INDEX A2C: 60 ML/M2
ECHO LV ESV INDEX A4C: 76 ML/M2
ECHO LV FRACTIONAL SHORTENING: 18 % (ref 28–44)
ECHO LV INTERNAL DIMENSION DIASTOLE INDEX: 2.92 CM/M2
ECHO LV INTERNAL DIMENSION DIASTOLIC: 4.9 CM (ref 3.9–5.3)
ECHO LV INTERNAL DIMENSION SYSTOLIC INDEX: 2.38 CM/M2
ECHO LV INTERNAL DIMENSION SYSTOLIC: 4 CM
ECHO LV IVSD: 1.4 CM (ref 0.6–0.9)
ECHO LV MASS 2D: 226.4 G (ref 67–162)
ECHO LV MASS INDEX 2D: 134.7 G/M2 (ref 43–95)
ECHO LV POSTERIOR WALL DIASTOLIC: 1 CM (ref 0.6–0.9)
ECHO LV RELATIVE WALL THICKNESS RATIO: 0.41
ECHO LVOT AREA: 3.1 CM2
ECHO LVOT AV VTI INDEX: 0.58
ECHO LVOT DIAM: 2 CM
ECHO LVOT MEAN GRADIENT: 1 MMHG
ECHO LVOT PEAK GRADIENT: 3 MMHG
ECHO LVOT PEAK VELOCITY: 0.9 M/S
ECHO LVOT STROKE VOLUME INDEX: 32.5 ML/M2
ECHO LVOT SV: 54.6 ML
ECHO LVOT VTI: 17.4 CM
ECHO MV A VELOCITY: 1.14 M/S
ECHO MV E DECELERATION TIME (DT): 232 MS
ECHO MV E VELOCITY: 0.47 M/S
ECHO MV E/A RATIO: 0.41
ECHO MV E/E' LATERAL: 9.4
ECHO MV E/E' RATIO (AVERAGED): 10.04
ECHO MV E/E' SEPTAL: 10.68
ECHO MV REGURGITANT ALIASING (NYQUIST) VELOCITY: 23 CM/S
ECHO MV REGURGITANT PEAK GRADIENT: 149 MMHG
ECHO MV REGURGITANT PEAK VELOCITY: 6.1 M/S
ECHO MV REGURGITANT RADIUS PISA: 0.5 CM
ECHO MV REGURGITANT VTIA: 237 CM
ECHO PV ACCELERATION TIME (AT): 74 MS
ECHO PV MAX VELOCITY: 0.9 M/S
ECHO PV PEAK GRADIENT: 3 MMHG
ECHO RIGHT VENTRICULAR SYSTOLIC PRESSURE (RVSP): 35 MMHG
ECHO RV BASAL DIMENSION: 3.5 CM
ECHO RV FREE WALL PEAK S': 13.7 CM/S
ECHO RV INTERNAL DIMENSION: 3.6 CM
ECHO RV TAPSE: 2.5 CM (ref 1.7–?)
ECHO TV REGURGITANT MAX VELOCITY: 2.82 M/S
ECHO TV REGURGITANT PEAK GRADIENT: 32 MMHG
EPI CELLS #/AREA URNS HPF: ABNORMAL /HPF
ERYTHROCYTE [DISTWIDTH] IN BLOOD BY AUTOMATED COUNT: 15.1 % (ref 11.9–14.6)
GLUCOSE BLD STRIP.AUTO-MCNC: 147 MG/DL (ref 65–100)
GLUCOSE UR STRIP.AUTO-MCNC: NEGATIVE MG/DL
HCT VFR BLD AUTO: 32.2 % (ref 35.8–46.3)
HGB BLD-MCNC: 10.1 G/DL (ref 11.7–15.4)
HGB UR QL STRIP: NEGATIVE
HYALINE CASTS URNS QL MICRO: ABNORMAL /LPF
KETONES UR QL STRIP.AUTO: NEGATIVE MG/DL
LEUKOCYTE ESTERASE UR QL STRIP.AUTO: ABNORMAL
MCH RBC QN AUTO: 28.5 PG (ref 26.1–32.9)
MCHC RBC AUTO-ENTMCNC: 31.4 G/DL (ref 31.4–35)
MCV RBC AUTO: 91 FL (ref 82–102)
MUCOUS THREADS URNS QL MICRO: 0 /LPF
NITRITE UR QL STRIP.AUTO: NEGATIVE
NRBC # BLD: 0 K/UL (ref 0–0.2)
PH UR STRIP: 6.5 (ref 5–9)
PLATELET # BLD AUTO: 173 K/UL (ref 150–450)
PMV BLD AUTO: 11.1 FL (ref 9.4–12.3)
PROT UR STRIP-MCNC: NEGATIVE MG/DL
RBC # BLD AUTO: 3.54 M/UL (ref 4.05–5.2)
RBC #/AREA URNS HPF: ABNORMAL /HPF
SERVICE CMNT-IMP: ABNORMAL
SP GR UR REFRACTOMETRY: 1.02 (ref 1–1.02)
URINE CULTURE IF INDICATED: ABNORMAL
UROBILINOGEN UR QL STRIP.AUTO: 0.2 EU/DL (ref 0.2–1)
WBC # BLD AUTO: 6.1 K/UL (ref 4.3–11.1)
WBC URNS QL MICRO: ABNORMAL /HPF

## 2024-09-25 PROCEDURE — 6360000004 HC RX CONTRAST MEDICATION: Performed by: STUDENT IN AN ORGANIZED HEALTH CARE EDUCATION/TRAINING PROGRAM

## 2024-09-25 PROCEDURE — 2580000003 HC RX 258: Performed by: HOSPITALIST

## 2024-09-25 PROCEDURE — 1100000000 HC RM PRIVATE

## 2024-09-25 PROCEDURE — 51798 US URINE CAPACITY MEASURE: CPT

## 2024-09-25 PROCEDURE — 97535 SELF CARE MNGMENT TRAINING: CPT

## 2024-09-25 PROCEDURE — 82962 GLUCOSE BLOOD TEST: CPT

## 2024-09-25 PROCEDURE — C8929 TTE W OR WO FOL WCON,DOPPLER: HCPCS

## 2024-09-25 PROCEDURE — 97161 PT EVAL LOW COMPLEX 20 MIN: CPT

## 2024-09-25 PROCEDURE — 99233 SBSQ HOSP IP/OBS HIGH 50: CPT | Performed by: PSYCHIATRY & NEUROLOGY

## 2024-09-25 PROCEDURE — 6370000000 HC RX 637 (ALT 250 FOR IP): Performed by: HOSPITALIST

## 2024-09-25 PROCEDURE — 92610 EVALUATE SWALLOWING FUNCTION: CPT

## 2024-09-25 PROCEDURE — 70450 CT HEAD/BRAIN W/O DYE: CPT

## 2024-09-25 PROCEDURE — 2580000003 HC RX 258: Performed by: STUDENT IN AN ORGANIZED HEALTH CARE EDUCATION/TRAINING PROGRAM

## 2024-09-25 PROCEDURE — 92523 SPEECH SOUND LANG COMPREHEN: CPT

## 2024-09-25 PROCEDURE — 97530 THERAPEUTIC ACTIVITIES: CPT

## 2024-09-25 PROCEDURE — 85027 COMPLETE CBC AUTOMATED: CPT

## 2024-09-25 PROCEDURE — 97165 OT EVAL LOW COMPLEX 30 MIN: CPT

## 2024-09-25 PROCEDURE — 36415 COLL VENOUS BLD VENIPUNCTURE: CPT

## 2024-09-25 RX ORDER — NITROFURANTOIN 25; 75 MG/1; MG/1
100 CAPSULE ORAL
Status: DISCONTINUED | OUTPATIENT
Start: 2024-09-26 | End: 2024-09-26 | Stop reason: HOSPADM

## 2024-09-25 RX ADMIN — APIXABAN 5 MG: 5 TABLET, FILM COATED ORAL at 08:00

## 2024-09-25 RX ADMIN — DOCUSATE SODIUM 50 MG AND SENNOSIDES 8.6 MG 1 TABLET: 8.6; 5 TABLET, FILM COATED ORAL at 20:54

## 2024-09-25 RX ADMIN — MONTELUKAST 10 MG: 10 TABLET, FILM COATED ORAL at 20:54

## 2024-09-25 RX ADMIN — DOCUSATE SODIUM 50 MG AND SENNOSIDES 8.6 MG 1 TABLET: 8.6; 5 TABLET, FILM COATED ORAL at 07:59

## 2024-09-25 RX ADMIN — SODIUM CHLORIDE, PRESERVATIVE FREE 10 ML: 5 INJECTION INTRAVENOUS at 20:56

## 2024-09-25 RX ADMIN — FAMOTIDINE 20 MG: 20 TABLET, FILM COATED ORAL at 08:00

## 2024-09-25 RX ADMIN — APIXABAN 5 MG: 5 TABLET, FILM COATED ORAL at 20:54

## 2024-09-25 RX ADMIN — SODIUM CHLORIDE, PRESERVATIVE FREE 0.15 ML: 5 INJECTION INTRAVENOUS at 15:51

## 2024-09-25 RX ADMIN — PITAVASTATIN CALCIUM 4 MG: 4.18 TABLET, FILM COATED ORAL at 20:53

## 2024-09-25 RX ADMIN — LEVOTHYROXINE SODIUM 25 MCG: 0.05 TABLET ORAL at 06:05

## 2024-09-25 RX ADMIN — PANTOPRAZOLE SODIUM 40 MG: 40 TABLET, DELAYED RELEASE ORAL at 06:05

## 2024-09-25 RX ADMIN — SODIUM CHLORIDE, PRESERVATIVE FREE 10 ML: 5 INJECTION INTRAVENOUS at 08:16

## 2024-09-25 RX ADMIN — CLOPIDOGREL BISULFATE 75 MG: 75 TABLET ORAL at 08:00

## 2024-09-25 RX ADMIN — METOPROLOL SUCCINATE 100 MG: 100 TABLET, EXTENDED RELEASE ORAL at 08:00

## 2024-09-25 ASSESSMENT — PAIN SCALES - GENERAL
PAINLEVEL_OUTOF10: 0

## 2024-09-26 VITALS
OXYGEN SATURATION: 95 % | SYSTOLIC BLOOD PRESSURE: 141 MMHG | DIASTOLIC BLOOD PRESSURE: 74 MMHG | HEART RATE: 64 BPM | RESPIRATION RATE: 16 BRPM | WEIGHT: 150 LBS | HEIGHT: 64 IN | BODY MASS INDEX: 25.61 KG/M2 | TEMPERATURE: 97.6 F

## 2024-09-26 PROCEDURE — 2580000003 HC RX 258: Performed by: HOSPITALIST

## 2024-09-26 PROCEDURE — 6370000000 HC RX 637 (ALT 250 FOR IP): Performed by: HOSPITALIST

## 2024-09-26 PROCEDURE — 6370000000 HC RX 637 (ALT 250 FOR IP): Performed by: PSYCHIATRY & NEUROLOGY

## 2024-09-26 PROCEDURE — 6370000000 HC RX 637 (ALT 250 FOR IP): Performed by: STUDENT IN AN ORGANIZED HEALTH CARE EDUCATION/TRAINING PROGRAM

## 2024-09-26 RX ADMIN — APIXABAN 5 MG: 5 TABLET, FILM COATED ORAL at 08:46

## 2024-09-26 RX ADMIN — DOCUSATE SODIUM 50 MG AND SENNOSIDES 8.6 MG 1 TABLET: 8.6; 5 TABLET, FILM COATED ORAL at 08:47

## 2024-09-26 RX ADMIN — NITROFURANTOIN MONOHYDRATE/MACROCRYSTALS 100 MG: 75; 25 CAPSULE ORAL at 08:47

## 2024-09-26 RX ADMIN — PANTOPRAZOLE SODIUM 40 MG: 40 TABLET, DELAYED RELEASE ORAL at 05:37

## 2024-09-26 RX ADMIN — CLOPIDOGREL BISULFATE 75 MG: 75 TABLET ORAL at 08:47

## 2024-09-26 RX ADMIN — FAMOTIDINE 20 MG: 20 TABLET, FILM COATED ORAL at 08:47

## 2024-09-26 RX ADMIN — METOPROLOL SUCCINATE 100 MG: 100 TABLET, EXTENDED RELEASE ORAL at 08:50

## 2024-09-26 RX ADMIN — LEVOTHYROXINE SODIUM 25 MCG: 0.05 TABLET ORAL at 05:37

## 2024-09-26 RX ADMIN — SODIUM CHLORIDE, PRESERVATIVE FREE 5 ML: 5 INJECTION INTRAVENOUS at 08:52

## 2024-09-26 ASSESSMENT — PAIN SCALES - GENERAL
PAINLEVEL_OUTOF10: 0

## 2024-10-15 ENCOUNTER — OFFICE VISIT (OUTPATIENT)
Dept: NEUROLOGY | Age: 84
End: 2024-10-15
Payer: MEDICARE

## 2024-10-15 VITALS
WEIGHT: 137.2 LBS | BODY MASS INDEX: 23.42 KG/M2 | SYSTOLIC BLOOD PRESSURE: 151 MMHG | OXYGEN SATURATION: 96 % | DIASTOLIC BLOOD PRESSURE: 70 MMHG | HEIGHT: 64 IN | HEART RATE: 67 BPM

## 2024-10-15 DIAGNOSIS — Z09 HOSPITAL DISCHARGE FOLLOW-UP: Primary | ICD-10-CM

## 2024-10-15 DIAGNOSIS — H53.461 RIGHT HOMONYMOUS HEMIANOPSIA: ICD-10-CM

## 2024-10-15 DIAGNOSIS — I69.30 HISTORY OF STROKE WITH RESIDUAL DEFICIT: ICD-10-CM

## 2024-10-15 DIAGNOSIS — I48.0 PAF (PAROXYSMAL ATRIAL FIBRILLATION) (HCC): ICD-10-CM

## 2024-10-15 DIAGNOSIS — R29.90 STROKE-LIKE SYMPTOMS: ICD-10-CM

## 2024-10-15 DIAGNOSIS — E11.21 TYPE 2 DIABETES WITH NEPHROPATHY (HCC): ICD-10-CM

## 2024-10-15 DIAGNOSIS — G47.33 OSA (OBSTRUCTIVE SLEEP APNEA): ICD-10-CM

## 2024-10-15 DIAGNOSIS — R41.3 MEMORY DIFFICULTIES: ICD-10-CM

## 2024-10-15 PROCEDURE — 99215 OFFICE O/P EST HI 40 MIN: CPT | Performed by: NURSE PRACTITIONER

## 2024-10-15 PROCEDURE — 1123F ACP DISCUSS/DSCN MKR DOCD: CPT | Performed by: NURSE PRACTITIONER

## 2024-10-15 PROCEDURE — G8484 FLU IMMUNIZE NO ADMIN: HCPCS | Performed by: NURSE PRACTITIONER

## 2024-10-15 PROCEDURE — G8399 PT W/DXA RESULTS DOCUMENT: HCPCS | Performed by: NURSE PRACTITIONER

## 2024-10-15 PROCEDURE — G8420 CALC BMI NORM PARAMETERS: HCPCS | Performed by: NURSE PRACTITIONER

## 2024-10-15 PROCEDURE — 1111F DSCHRG MED/CURRENT MED MERGE: CPT | Performed by: NURSE PRACTITIONER

## 2024-10-15 PROCEDURE — 3078F DIAST BP <80 MM HG: CPT | Performed by: NURSE PRACTITIONER

## 2024-10-15 PROCEDURE — 1090F PRES/ABSN URINE INCON ASSESS: CPT | Performed by: NURSE PRACTITIONER

## 2024-10-15 PROCEDURE — 3044F HG A1C LEVEL LT 7.0%: CPT | Performed by: NURSE PRACTITIONER

## 2024-10-15 PROCEDURE — G8427 DOCREV CUR MEDS BY ELIG CLIN: HCPCS | Performed by: NURSE PRACTITIONER

## 2024-10-15 PROCEDURE — 3077F SYST BP >= 140 MM HG: CPT | Performed by: NURSE PRACTITIONER

## 2024-10-15 PROCEDURE — 1036F TOBACCO NON-USER: CPT | Performed by: NURSE PRACTITIONER

## 2024-10-15 ASSESSMENT — PATIENT HEALTH QUESTIONNAIRE - PHQ9
SUM OF ALL RESPONSES TO PHQ QUESTIONS 1-9: 0
SUM OF ALL RESPONSES TO PHQ9 QUESTIONS 1 & 2: 0
1. LITTLE INTEREST OR PLEASURE IN DOING THINGS: NOT AT ALL
SUM OF ALL RESPONSES TO PHQ QUESTIONS 1-9: 0
2. FEELING DOWN, DEPRESSED OR HOPELESS: NOT AT ALL

## 2024-10-15 NOTE — PATIENT INSTRUCTIONS
Headache Education:   Instructed the patient on over-the-counter headache management medications including: CoQ10, magnesium oxide, and butterbur.  To avoid a pain medication overuse headache trying not to take pain medicines more than 3 doses a week.   Avoid use of Fioricet or opioids to treat headaches as this can increase risk for rebound headaches.   To help relieve headache symptoms without taking pain medicine lie down under darkroom and drink glass of water.  Consider lifestyle modification including good sleep hygiene, routine medial schedules, regular exercise and managing triggers.  Keep a headache diary  to reveal triggers and possible patterns.  Triggers may be: Food, stress, perfumes, alcohol, or even chocolate.  Drink plenty of water and try to get 8 hours of sleep each night to reduce risk factors that may cause headaches.      Samples of Nurtec ODT 75 mg provided to patient. Advised to update office on efficacy.   Instructions:  Nurtec ODT 75 mg daily as needed for migraine abortive therapy. Not to exceed 75mg/24 hours.

## 2024-10-15 NOTE — PROGRESS NOTES
[facility] [address], [address]  [place] [address]  [phone]      Chief Complaint   Patient presents with    Follow-Up from Hospital     Occular Migraine       Janine Gupta is a 83 y.o. female who presents for hospital follow up for ocular migraine.    Admit Date:     9/24/2024 11:02 AM   DC Note date: 9/26/2024    PMH significant for a.fib (eliquis), cerebellar stroke, LPCA infarct with residual deficits, CHF with ischemic cardiomyopathy, HTN, HLD, AAA s/p repair, CHF who presented to ED on 9/24/2024 with right-sided weakness and numbness and slurred speech.  Code S. NIH 2. Evaluated by Neurology. CT of the head was obtained and showed only chronic abnormalities.  No acute changes. CTA negative for any LVO or high grade stenosis.  MRI of brain negative for acute infarct; remote left PCA infarct and right cerebellar infarct.   TTE EF 25 to 30% lower  with severe global hypokinesis, moderate LAD. Evaluated by therapies with recommendations for home health. She was discharged home on clopidogrel,  eliquis and pitavastatin for secondary stroke prevention.     Interval history:    She is here today with her daughter. She is back to her neurological baseline. Continues to endorse right sided weakness, right sided visual field cut, memory difficulties, and gait imbalance. She is currently participating in  therapies, ambulating with rolling walker. Denies falls, headaches, changes in speech or swallowing difficulties. Chronic neuropathy in distal lower extremities.  She is currently on clopidogrel 75 mg daily, eliquis 5 mg twice daily and pitvastatin for secondary stroke prevention. Denies GI distress, myalgias or bleeding complications.     Hx of pAF- currently on eliquis and rate controlled with metoprolol. She has upcoming appointment with Cardiology to discuss discontinuing clopidogrel. Hx of CAD s/p PCI.     Hx of GUS- she is compliant with her CPAP nightly.     She

## 2024-10-15 NOTE — PROGRESS NOTES
[facility]ARDI[facility]  [address] [place] [address], [address]  [place] [address]  PHONE: [phone]      10/16/24    NAME:  Janine Gupta  : 1940  MRN: [id number]         SUBJECTIVE:   Janine Gupta is a 84 y.o. female seen for a follow up visit regarding the following:     Chief Complaint   Patient presents with    Congestive Heart Failure    Results     Echo results            HPI:  Follow up  Congestive Heart Failure and Results (Echo results)   .    Follow up CAD,  chronic LBBB, statin intolerance,orthostatic hypotension with resting hypertension, dyslipidemia, NICM with concomitant CAD,  AAA, CSF leak,  post NSTEMI with depressed EF. She did not tolerate Farxiga d/t intravascular volume depletion, subsequently diagnosed with pancreatic insufficiency with chronic diarrhea, which appears to be exacerbated by furosemide which she uses infrequently, embolic stroke, atrial fib.  Now this is all complicated by a fairly precipitous drop in her EF to 25-30%    This was discovered during admission 24 for recurrent TIA with right sided weakness and slurred speech, on Eliquis, plavix and statin therapy, no LVO on imaging, essentially nothing was changed, discharged on current regimen.  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  Occasional \"vague\" feeling in her head, she's not sure its not her chronic ear issues.  A little more sinus drainage than usual.  No angina.  No significant dyspnea beyond her norm and no edema.  No documented hypotension, in fact has been a bit higher than normal for her of late, as high as 140s.           Past cardiac history:   Remote coronary stents   Treatment limited by orthostatic hypotension   Medical therapy of an obstructed PDA, 40% LAD by cath .  Stress nuclear perfusion scan with anterolateral scar, minimal per-scar ischemia with normal wall motion and EF 68% in 2010.  Not

## 2024-10-16 ENCOUNTER — OFFICE VISIT (OUTPATIENT)
Age: 84
End: 2024-10-16
Payer: MEDICARE

## 2024-10-16 VITALS
WEIGHT: 136.6 LBS | SYSTOLIC BLOOD PRESSURE: 138 MMHG | HEART RATE: 60 BPM | HEIGHT: 64 IN | DIASTOLIC BLOOD PRESSURE: 70 MMHG | BODY MASS INDEX: 23.32 KG/M2

## 2024-10-16 DIAGNOSIS — I25.118 CORONARY ARTERY DISEASE OF NATIVE HEART WITH STABLE ANGINA PECTORIS, UNSPECIFIED VESSEL OR LESION TYPE (HCC): ICD-10-CM

## 2024-10-16 DIAGNOSIS — Z86.73 HISTORY OF STROKE: ICD-10-CM

## 2024-10-16 DIAGNOSIS — I25.5 CARDIOMYOPATHY, ISCHEMIC: ICD-10-CM

## 2024-10-16 DIAGNOSIS — I44.7 LBBB (LEFT BUNDLE BRANCH BLOCK): ICD-10-CM

## 2024-10-16 DIAGNOSIS — I50.22 CHRONIC SYSTOLIC (CONGESTIVE) HEART FAILURE (HCC): Primary | ICD-10-CM

## 2024-10-16 DIAGNOSIS — I71.43 INFRARENAL ABDOMINAL AORTIC ANEURYSM (AAA) WITHOUT RUPTURE (HCC): ICD-10-CM

## 2024-10-16 DIAGNOSIS — I95.1 ORTHOSTATIC HYPOTENSION: ICD-10-CM

## 2024-10-16 DIAGNOSIS — I48.0 PAROXYSMAL ATRIAL FIBRILLATION (HCC): ICD-10-CM

## 2024-10-16 DIAGNOSIS — I10 ESSENTIAL HYPERTENSION: ICD-10-CM

## 2024-10-16 PROCEDURE — 1123F ACP DISCUSS/DSCN MKR DOCD: CPT | Performed by: INTERNAL MEDICINE

## 2024-10-16 PROCEDURE — 99214 OFFICE O/P EST MOD 30 MIN: CPT | Performed by: INTERNAL MEDICINE

## 2024-10-16 PROCEDURE — 1036F TOBACCO NON-USER: CPT | Performed by: INTERNAL MEDICINE

## 2024-10-16 PROCEDURE — G8427 DOCREV CUR MEDS BY ELIG CLIN: HCPCS | Performed by: INTERNAL MEDICINE

## 2024-10-16 PROCEDURE — G8484 FLU IMMUNIZE NO ADMIN: HCPCS | Performed by: INTERNAL MEDICINE

## 2024-10-16 PROCEDURE — G8420 CALC BMI NORM PARAMETERS: HCPCS | Performed by: INTERNAL MEDICINE

## 2024-10-16 PROCEDURE — 1111F DSCHRG MED/CURRENT MED MERGE: CPT | Performed by: INTERNAL MEDICINE

## 2024-10-16 PROCEDURE — 1090F PRES/ABSN URINE INCON ASSESS: CPT | Performed by: INTERNAL MEDICINE

## 2024-10-16 RX ORDER — VALSARTAN 80 MG/1
80 TABLET ORAL DAILY
Qty: 90 TABLET | Refills: 3 | Status: SHIPPED | OUTPATIENT
Start: 2024-10-16

## 2024-10-16 ASSESSMENT — ENCOUNTER SYMPTOMS: SHORTNESS OF BREATH: 1

## 2024-10-16 NOTE — PATIENT INSTRUCTIONS
1. Monitor BP at home 2-3 days a week, twice on those days, at random times, at least 2 hours after medications  2. Keep a diary to include date, time, and BP reading  3. Bring your BP diary and monitor to each office visit  4. Notify us if BP readings are consistently greater than 140/90 or less than 95/50.  5. To help keep BP regulated, aim to get at least 30 minutes of moderate physical activity daily, and follow a low sodium diet regularly by avoiding processed foods or added salt.

## 2024-10-30 ENCOUNTER — NURSE ONLY (OUTPATIENT)
Dept: FAMILY MEDICINE CLINIC | Facility: CLINIC | Age: 84
End: 2024-10-30
Payer: MEDICARE

## 2024-10-30 DIAGNOSIS — Z23 NEED FOR INFLUENZA VACCINATION: Primary | ICD-10-CM

## 2024-10-30 PROCEDURE — 90653 IIV ADJUVANT VACCINE IM: CPT | Performed by: NURSE PRACTITIONER

## 2024-10-30 PROCEDURE — G0008 ADMIN INFLUENZA VIRUS VAC: HCPCS | Performed by: NURSE PRACTITIONER

## 2024-11-26 ENCOUNTER — LAB (OUTPATIENT)
Dept: FAMILY MEDICINE CLINIC | Facility: CLINIC | Age: 84
End: 2024-11-26
Payer: MEDICARE

## 2024-11-26 DIAGNOSIS — E55.9 VITAMIN D DEFICIENCY: ICD-10-CM

## 2024-11-26 DIAGNOSIS — E11.42 TYPE 2 DIABETES MELLITUS WITH DIABETIC POLYNEUROPATHY, WITHOUT LONG-TERM CURRENT USE OF INSULIN (HCC): ICD-10-CM

## 2024-11-26 DIAGNOSIS — N39.0 RECURRENT UTI: ICD-10-CM

## 2024-11-26 DIAGNOSIS — E83.42 HYPOMAGNESEMIA: ICD-10-CM

## 2024-11-26 LAB
25(OH)D3 SERPL-MCNC: 79.1 NG/ML (ref 30–100)
ALBUMIN SERPL-MCNC: 3.5 G/DL (ref 3.2–4.6)
ALBUMIN/GLOB SERPL: 1.2 (ref 1–1.9)
ALP SERPL-CCNC: 97 U/L (ref 35–104)
ALT SERPL-CCNC: 20 U/L (ref 8–45)
ANION GAP SERPL CALC-SCNC: 9 MMOL/L (ref 7–16)
AST SERPL-CCNC: 24 U/L (ref 15–37)
BASOPHILS # BLD: 0.1 K/UL (ref 0–0.2)
BASOPHILS NFR BLD: 1 % (ref 0–2)
BILIRUB SERPL-MCNC: 0.4 MG/DL (ref 0–1.2)
BILIRUBIN, URINE, POC: ABNORMAL
BLOOD URINE, POC: NEGATIVE
BUN SERPL-MCNC: 24 MG/DL (ref 8–23)
CALCIUM SERPL-MCNC: 9.8 MG/DL (ref 8.8–10.2)
CHLORIDE SERPL-SCNC: 103 MMOL/L (ref 98–107)
CHOLEST SERPL-MCNC: 126 MG/DL (ref 0–200)
CO2 SERPL-SCNC: 29 MMOL/L (ref 20–29)
CREAT SERPL-MCNC: 0.84 MG/DL (ref 0.6–1.1)
DIFFERENTIAL METHOD BLD: ABNORMAL
EOSINOPHIL # BLD: 0.1 K/UL (ref 0–0.8)
EOSINOPHIL NFR BLD: 1 % (ref 0.5–7.8)
ERYTHROCYTE [DISTWIDTH] IN BLOOD BY AUTOMATED COUNT: 13.9 % (ref 11.9–14.6)
EST. AVERAGE GLUCOSE BLD GHB EST-MCNC: 128 MG/DL
GLOBULIN SER CALC-MCNC: 3 G/DL (ref 2.3–3.5)
GLUCOSE SERPL-MCNC: 115 MG/DL (ref 70–99)
GLUCOSE URINE, POC: NEGATIVE
HBA1C MFR BLD: 6.1 % (ref 0–5.6)
HCT VFR BLD AUTO: 33.7 % (ref 35.8–46.3)
HDLC SERPL-MCNC: 64 MG/DL (ref 40–60)
HDLC SERPL: 2 (ref 0–5)
HGB BLD-MCNC: 10.5 G/DL (ref 11.7–15.4)
IMM GRANULOCYTES # BLD AUTO: 0 K/UL (ref 0–0.5)
IMM GRANULOCYTES NFR BLD AUTO: 0 % (ref 0–5)
KETONES, URINE, POC: NEGATIVE
LDLC SERPL CALC-MCNC: 47 MG/DL (ref 0–100)
LEUKOCYTE ESTERASE, URINE, POC: ABNORMAL
LYMPHOCYTES # BLD: 2.1 K/UL (ref 0.5–4.6)
LYMPHOCYTES NFR BLD: 29 % (ref 13–44)
MAGNESIUM SERPL-MCNC: 2.1 MG/DL (ref 1.8–2.4)
MCH RBC QN AUTO: 29.3 PG (ref 26.1–32.9)
MCHC RBC AUTO-ENTMCNC: 31.2 G/DL (ref 31.4–35)
MCV RBC AUTO: 94.1 FL (ref 82–102)
MONOCYTES # BLD: 0.8 K/UL (ref 0.1–1.3)
MONOCYTES NFR BLD: 12 % (ref 4–12)
NEUTS SEG # BLD: 4 K/UL (ref 1.7–8.2)
NEUTS SEG NFR BLD: 57 % (ref 43–78)
NITRITE, URINE, POC: NEGATIVE
NRBC # BLD: 0 K/UL (ref 0–0.2)
PH, URINE, POC: 7 (ref 4.6–8)
PLATELET # BLD AUTO: 131 K/UL (ref 150–450)
PMV BLD AUTO: 12.9 FL (ref 9.4–12.3)
POTASSIUM SERPL-SCNC: 4.3 MMOL/L (ref 3.5–5.1)
PROT SERPL-MCNC: 6.6 G/DL (ref 6.3–8.2)
PROTEIN,URINE, POC: ABNORMAL
RBC # BLD AUTO: 3.58 M/UL (ref 4.05–5.2)
SODIUM SERPL-SCNC: 141 MMOL/L (ref 136–145)
SPECIFIC GRAVITY, URINE, POC: 1.02 (ref 1–1.03)
TRIGL SERPL-MCNC: 74 MG/DL (ref 0–150)
TSH W FREE THYROID IF ABNORMAL: 3.63 UIU/ML (ref 0.27–4.2)
URINALYSIS CLARITY, POC: ABNORMAL
URINALYSIS COLOR, POC: YELLOW
UROBILINOGEN, POC: NORMAL
VLDLC SERPL CALC-MCNC: 15 MG/DL (ref 6–23)
WBC # BLD AUTO: 7.1 K/UL (ref 4.3–11.1)

## 2024-11-26 PROCEDURE — 81003 URINALYSIS AUTO W/O SCOPE: CPT | Performed by: NURSE PRACTITIONER

## 2024-11-28 LAB
BACTERIA SPEC CULT: NORMAL
SERVICE CMNT-IMP: NORMAL

## 2024-12-03 NOTE — PROGRESS NOTES
[facility] CARDIOLOGY  [address] [place] [address], [address]  [place] [address]  PHONE: [phone]      24    NAME:  Janine Gupta  : 1940  MRN: [id number]         SUBJECTIVE:   Janine Gupta is a 84 y.o. female seen for a follow up visit regarding the following:     Chief Complaint   Patient presents with    Congestive Heart Failure    Orthostatic hypotension            HPI:  Follow up  Congestive Heart Failure and Orthostatic hypotension   .    Follow up CAD,  chronic LBBB, statin intolerance,orthostatic hypotension with resting hypertension, dyslipidemia, NICM with concomitant CAD,  AAA, CSF leak,  post NSTEMI with depressed EF. She did not tolerate Farxiga d/t intravascular volume depletion, subsequently diagnosed with pancreatic insufficiency with chronic diarrhea, which appears to be exacerbated by furosemide which she uses infrequently, embolic stroke, atrial fib, ocular migraine.  Resumed low dose ARB last visit for precipitous decline in EF, no ARNI/MRA d/t hypotension    She's tolerated low dose valsartan with BP's in the 130's. Today's is the highest they've seen and they've not seen any below 90 systolic.  She feels \"foggy\" today but does have Meniere's and CSF leak with rapidly changing barometric pressure the last few days probably affecting that.  Denies edema, weight at home has been stable, eating well.          Past cardiac history:   Remote coronary stents   Treatment limited by orthostatic hypotension   Medical therapy of an obstructed PDA, 40% LAD by cath .  Stress nuclear perfusion scan with anterolateral scar, minimal per-scar ischemia with normal wall motion and EF 68% in 2010.  Not significantly changed 12.   2014 - patent stents, EF 45% (50-55 by echo)   2015 - Lexiscan - fixed inferolateral defect, inferior HK, EF 56%   2016- EF 50-55%, mild to mod MR   Oct 2018- EF 49%, no significant valve disease   2018- infrarenal AAA 3.8

## 2024-12-04 ENCOUNTER — OFFICE VISIT (OUTPATIENT)
Age: 84
End: 2024-12-04
Payer: MEDICARE

## 2024-12-04 VITALS
SYSTOLIC BLOOD PRESSURE: 148 MMHG | HEART RATE: 74 BPM | WEIGHT: 140 LBS | DIASTOLIC BLOOD PRESSURE: 86 MMHG | BODY MASS INDEX: 23.9 KG/M2 | HEIGHT: 64 IN

## 2024-12-04 DIAGNOSIS — I95.1 ORTHOSTATIC HYPOTENSION: ICD-10-CM

## 2024-12-04 DIAGNOSIS — G96.00 CSF LEAK: ICD-10-CM

## 2024-12-04 DIAGNOSIS — I48.0 PAROXYSMAL ATRIAL FIBRILLATION (HCC): ICD-10-CM

## 2024-12-04 DIAGNOSIS — I25.118 CORONARY ARTERY DISEASE OF NATIVE HEART WITH STABLE ANGINA PECTORIS, UNSPECIFIED VESSEL OR LESION TYPE (HCC): ICD-10-CM

## 2024-12-04 DIAGNOSIS — I44.7 LBBB (LEFT BUNDLE BRANCH BLOCK): ICD-10-CM

## 2024-12-04 DIAGNOSIS — I50.22 CHRONIC SYSTOLIC (CONGESTIVE) HEART FAILURE (HCC): Primary | ICD-10-CM

## 2024-12-04 PROCEDURE — G8427 DOCREV CUR MEDS BY ELIG CLIN: HCPCS | Performed by: INTERNAL MEDICINE

## 2024-12-04 PROCEDURE — G8482 FLU IMMUNIZE ORDER/ADMIN: HCPCS | Performed by: INTERNAL MEDICINE

## 2024-12-04 PROCEDURE — 1090F PRES/ABSN URINE INCON ASSESS: CPT | Performed by: INTERNAL MEDICINE

## 2024-12-04 PROCEDURE — 99214 OFFICE O/P EST MOD 30 MIN: CPT | Performed by: INTERNAL MEDICINE

## 2024-12-04 PROCEDURE — 1126F AMNT PAIN NOTED NONE PRSNT: CPT | Performed by: INTERNAL MEDICINE

## 2024-12-04 PROCEDURE — 1160F RVW MEDS BY RX/DR IN RCRD: CPT | Performed by: INTERNAL MEDICINE

## 2024-12-04 PROCEDURE — G8420 CALC BMI NORM PARAMETERS: HCPCS | Performed by: INTERNAL MEDICINE

## 2024-12-04 PROCEDURE — 1159F MED LIST DOCD IN RCRD: CPT | Performed by: INTERNAL MEDICINE

## 2024-12-04 PROCEDURE — 1036F TOBACCO NON-USER: CPT | Performed by: INTERNAL MEDICINE

## 2024-12-04 PROCEDURE — 1123F ACP DISCUSS/DSCN MKR DOCD: CPT | Performed by: INTERNAL MEDICINE

## 2024-12-04 RX ORDER — SPIRONOLACTONE 25 MG/1
12.5 TABLET ORAL DAILY
Qty: 90 TABLET | Refills: 3 | Status: SHIPPED | OUTPATIENT
Start: 2024-12-04

## 2024-12-18 DIAGNOSIS — I50.22 CHRONIC SYSTOLIC (CONGESTIVE) HEART FAILURE (HCC): ICD-10-CM

## 2024-12-18 LAB
ANION GAP SERPL CALC-SCNC: 11 MMOL/L (ref 7–16)
BUN SERPL-MCNC: 33 MG/DL (ref 8–23)
CALCIUM SERPL-MCNC: 10 MG/DL (ref 8.8–10.2)
CHLORIDE SERPL-SCNC: 100 MMOL/L (ref 98–107)
CO2 SERPL-SCNC: 29 MMOL/L (ref 20–29)
CREAT SERPL-MCNC: 0.93 MG/DL (ref 0.6–1.1)
GLUCOSE SERPL-MCNC: 104 MG/DL (ref 70–99)
POTASSIUM SERPL-SCNC: 3.7 MMOL/L (ref 3.5–5.1)
SODIUM SERPL-SCNC: 140 MMOL/L (ref 136–145)

## 2025-01-03 SDOH — HEALTH STABILITY: PHYSICAL HEALTH: ON AVERAGE, HOW MANY DAYS PER WEEK DO YOU ENGAGE IN MODERATE TO STRENUOUS EXERCISE (LIKE A BRISK WALK)?: 0 DAYS

## 2025-01-03 SDOH — HEALTH STABILITY: PHYSICAL HEALTH: ON AVERAGE, HOW MANY MINUTES DO YOU ENGAGE IN EXERCISE AT THIS LEVEL?: 10 MIN

## 2025-01-03 ASSESSMENT — PATIENT HEALTH QUESTIONNAIRE - PHQ9
2. FEELING DOWN, DEPRESSED OR HOPELESS: NOT AT ALL
SUM OF ALL RESPONSES TO PHQ9 QUESTIONS 1 & 2: 0
SUM OF ALL RESPONSES TO PHQ QUESTIONS 1-9: 0
1. LITTLE INTEREST OR PLEASURE IN DOING THINGS: NOT AT ALL

## 2025-01-03 ASSESSMENT — LIFESTYLE VARIABLES
HOW OFTEN DO YOU HAVE A DRINK CONTAINING ALCOHOL: 1
HOW OFTEN DO YOU HAVE SIX OR MORE DRINKS ON ONE OCCASION: 1
HOW MANY STANDARD DRINKS CONTAINING ALCOHOL DO YOU HAVE ON A TYPICAL DAY: 0
HOW MANY STANDARD DRINKS CONTAINING ALCOHOL DO YOU HAVE ON A TYPICAL DAY: PATIENT DOES NOT DRINK
HOW OFTEN DO YOU HAVE A DRINK CONTAINING ALCOHOL: NEVER

## 2025-01-06 ENCOUNTER — OFFICE VISIT (OUTPATIENT)
Dept: FAMILY MEDICINE CLINIC | Facility: CLINIC | Age: 85
End: 2025-01-06
Payer: MEDICARE

## 2025-01-06 VITALS
DIASTOLIC BLOOD PRESSURE: 80 MMHG | SYSTOLIC BLOOD PRESSURE: 158 MMHG | HEIGHT: 64 IN | HEART RATE: 69 BPM | BODY MASS INDEX: 22.71 KG/M2 | OXYGEN SATURATION: 99 % | WEIGHT: 133 LBS

## 2025-01-06 DIAGNOSIS — R26.81 UNSTEADY GAIT WHEN WALKING: ICD-10-CM

## 2025-01-06 DIAGNOSIS — R42 DIZZINESS: ICD-10-CM

## 2025-01-06 DIAGNOSIS — I48.0 PAROXYSMAL ATRIAL FIBRILLATION (HCC): ICD-10-CM

## 2025-01-06 DIAGNOSIS — I50.22 CHRONIC SYSTOLIC (CONGESTIVE) HEART FAILURE (HCC): ICD-10-CM

## 2025-01-06 DIAGNOSIS — R51.9 NONINTRACTABLE HEADACHE, UNSPECIFIED CHRONICITY PATTERN, UNSPECIFIED HEADACHE TYPE: ICD-10-CM

## 2025-01-06 DIAGNOSIS — Z00.00 ENCOUNTER FOR ANNUAL WELLNESS VISIT (AWV) IN MEDICARE PATIENT: Primary | ICD-10-CM

## 2025-01-06 DIAGNOSIS — Z86.73 RECENT CEREBROVASCULAR ACCIDENT (CVA): ICD-10-CM

## 2025-01-06 DIAGNOSIS — N18.30 STAGE 3 CHRONIC KIDNEY DISEASE, UNSPECIFIED WHETHER STAGE 3A OR 3B CKD (HCC): ICD-10-CM

## 2025-01-06 DIAGNOSIS — E11.21 TYPE 2 DIABETES WITH NEPHROPATHY (HCC): ICD-10-CM

## 2025-01-06 DIAGNOSIS — D64.9 ANEMIA, UNSPECIFIED TYPE: ICD-10-CM

## 2025-01-06 LAB
FERRITIN SERPL-MCNC: 130 NG/ML (ref 8–388)
FOLATE SERPL-MCNC: 13.2 NG/ML (ref 3.1–17.5)
IRON SERPL-MCNC: 77 UG/DL (ref 35–100)
TRANSFERRIN SERPL-MCNC: 254 MG/DL (ref 200–360)
TSH W FREE THYROID IF ABNORMAL: 3.05 UIU/ML (ref 0.27–4.2)
VIT B12 SERPL-MCNC: 839 PG/ML (ref 193–986)

## 2025-01-06 PROCEDURE — 1090F PRES/ABSN URINE INCON ASSESS: CPT | Performed by: NURSE PRACTITIONER

## 2025-01-06 PROCEDURE — 1159F MED LIST DOCD IN RCRD: CPT | Performed by: NURSE PRACTITIONER

## 2025-01-06 PROCEDURE — G0439 PPPS, SUBSEQ VISIT: HCPCS | Performed by: NURSE PRACTITIONER

## 2025-01-06 PROCEDURE — 99214 OFFICE O/P EST MOD 30 MIN: CPT | Performed by: NURSE PRACTITIONER

## 2025-01-06 PROCEDURE — 1123F ACP DISCUSS/DSCN MKR DOCD: CPT | Performed by: NURSE PRACTITIONER

## 2025-01-06 PROCEDURE — G8420 CALC BMI NORM PARAMETERS: HCPCS | Performed by: NURSE PRACTITIONER

## 2025-01-06 PROCEDURE — G8427 DOCREV CUR MEDS BY ELIG CLIN: HCPCS | Performed by: NURSE PRACTITIONER

## 2025-01-06 PROCEDURE — 1160F RVW MEDS BY RX/DR IN RCRD: CPT | Performed by: NURSE PRACTITIONER

## 2025-01-06 PROCEDURE — 1036F TOBACCO NON-USER: CPT | Performed by: NURSE PRACTITIONER

## 2025-01-06 PROCEDURE — M1299 PR FLU IMMUNIZE ORDER/ADMIN: HCPCS | Performed by: NURSE PRACTITIONER

## 2025-01-06 NOTE — PROGRESS NOTES
\"Have you been to the ER, urgent care clinic since your last visit?  Hospitalized since your last visit?\"    Yes 4 months ago [facility] ED/stroke-like symptoms    “Have you seen or consulted any other health care providers outside our system since your last visit?”    NO

## 2025-01-06 NOTE — PROGRESS NOTES
PROGRESS NOTE    Chief Complaint   Patient presents with    Medicare AWV     Pt's states she having problem with vision since her eye exam. 10/19.     She requesting PT       SUBJECTIVE:     Janine Gupta is a very sweet and pleasant 84 y.o. female with hx of  hypertension, hyperlipidemia, CAD-currently following cardiology, asthma-currently following pulmonology, squamous cell carcinoma of left ear, CSF leak-currently following ENT, and arthritis, seen today in office accompanied by her daughter for lab results review and follow-up.  She is also due for her AWV.    She is reporting having some vision changes and some \"vagueness\" sensation and mild headache. Also endorses some dizziness but also has hx of menieres. Reports symptoms started since she had her eyes dilated middle of December or possibly before at beginning of December.  Patient was recently started on spironolactone and she is thinking symptoms may be started around same time.  Spironolactone half a dose was started due to BP elevated.  She has a history of orthostatic hypotension and recently restarted very low dose and a gingerly low dose of spironolactone by her cardiologist.  Her BP is still somewhat elevated today.  She does have a BP cuff at home but reports that she has not checked at home. No further falls.     She currently has home health and is having some home PT but would like to have PT outpatient as she does not have any equipment at home for progression.Patient reports no chest pain, no shortness of breath, no unilateral or focal weakness, no orthopnea or PND.  GI and  review of systems is unremarkable.         Past Medical History, Past Surgical History, Family history, Social History, and Medications were all reviewed with the patient today and updated as necessary.       Current Outpatient Medications   Medication Sig Dispense Refill    spironolactone (ALDACTONE) 25 MG tablet Take 0.5 tablets by mouth daily 90 tablet 3

## 2025-01-07 ASSESSMENT — ENCOUNTER SYMPTOMS
RHINORRHEA: 0
ABDOMINAL PAIN: 0
ABDOMINAL DISTENTION: 0
BACK PAIN: 0
CHOKING: 0
RECTAL PAIN: 0
BLOOD IN STOOL: 0
SINUS PRESSURE: 0
VOICE CHANGE: 0
WHEEZING: 0
CHEST TIGHTNESS: 0
TROUBLE SWALLOWING: 0
FACIAL SWELLING: 0
APNEA: 0
CONSTIPATION: 0
ANAL BLEEDING: 0
ALLERGIC/IMMUNOLOGIC NEGATIVE: 1
GASTROINTESTINAL NEGATIVE: 1
COLOR CHANGE: 0
COUGH: 0
STRIDOR: 0
EYE DISCHARGE: 0
SORE THROAT: 0
NAUSEA: 0
VOMITING: 0
EYE ITCHING: 0
PHOTOPHOBIA: 0
EYE PAIN: 0
SHORTNESS OF BREATH: 0
EYE REDNESS: 0
RESPIRATORY NEGATIVE: 1
SINUS PAIN: 0
DIARRHEA: 0

## 2025-01-07 NOTE — PATIENT INSTRUCTIONS
Pulmonology their teeth on their own, you may need to brush and floss their teeth for them. It may be easiest to have the person sit and face away from you, and to sit or stand behind them. That way you can steady their head against your arm as you reach around to floss and brush their teeth. Choose a place that has good lighting and is comfortable for both of you.  Before you begin, gather your supplies. You will need gloves, floss, a toothbrush, and a container to hold water if you are not near a sink. Wash and dry your hands well and put on gloves. Start by flossing:  Gently work a piece of floss between each of the teeth toward the gums. A plastic flossing tool may make this easier, and they are available at most [facility].  Curve the floss around each tooth into a U-shape and gently slide it under the gum line.  Move the floss firmly up and down several times to scrape off the plaque.  After you've finished flossing, throw away the used floss and begin brushing:  Wet the brush and apply toothpaste.  Place the brush at a 45-degree angle where the teeth meet the gums. Press firmly, and move the brush in small circles over the surface of the teeth.  Be careful not to brush too hard. Vigorous brushing can make the gums pull away from the teeth and can scratch the tooth enamel.  Brush all surfaces of the teeth, on the tongue side and on the cheek side. Pay special attention to the front teeth and all surfaces of the back teeth.  Brush chewing surfaces with short back-and-forth strokes.  After you've finished, help the person rinse the remaining toothpaste from their mouth.  Where can you learn more?  Go to https://www.healthTabSquare.net/patientEd and enter F944 to learn more about \"Learning About Dental Care for Older Adults.\"  Current as of: August 6, 2023  Content Version: 14.2  © 2024 [employer].   Care instructions adapted under license by [employer]. If you have questions about a medical condition or this

## 2025-01-21 ENCOUNTER — APPOINTMENT (OUTPATIENT)
Dept: GENERAL RADIOLOGY | Age: 85
DRG: 923 | End: 2025-01-21
Payer: MEDICARE

## 2025-01-21 ENCOUNTER — HOSPITAL ENCOUNTER (INPATIENT)
Age: 85
LOS: 1 days | Discharge: HOME | DRG: 923 | End: 2025-01-24
Attending: STUDENT IN AN ORGANIZED HEALTH CARE EDUCATION/TRAINING PROGRAM | Admitting: INTERNAL MEDICINE
Payer: MEDICARE

## 2025-01-21 DIAGNOSIS — T68.XXXA HYPOTHERMIA, INITIAL ENCOUNTER: ICD-10-CM

## 2025-01-21 DIAGNOSIS — R42 LIGHTHEADEDNESS: Primary | ICD-10-CM

## 2025-01-21 DIAGNOSIS — E86.0 DEHYDRATION: ICD-10-CM

## 2025-01-21 LAB
ALBUMIN SERPL-MCNC: 3.8 G/DL (ref 3.2–4.6)
ALBUMIN/GLOB SERPL: 1.1 (ref 1–1.9)
ALP SERPL-CCNC: 102 U/L (ref 35–104)
ALT SERPL-CCNC: 19 U/L (ref 8–45)
ANION GAP SERPL CALC-SCNC: 12 MMOL/L (ref 7–16)
APPEARANCE UR: CLEAR
AST SERPL-CCNC: 29 U/L (ref 15–37)
BACTERIA URNS QL MICRO: NEGATIVE /HPF
BASOPHILS # BLD: 0.08 K/UL (ref 0–0.2)
BASOPHILS NFR BLD: 1.3 % (ref 0–2)
BILIRUB SERPL-MCNC: <0.2 MG/DL (ref 0–1.2)
BILIRUB UR QL: NEGATIVE
BUN SERPL-MCNC: 41 MG/DL (ref 8–23)
CALCIUM SERPL-MCNC: 9.6 MG/DL (ref 8.8–10.2)
CASTS URNS QL MICRO: 0 /LPF
CHLORIDE SERPL-SCNC: 104 MMOL/L (ref 98–107)
CO2 SERPL-SCNC: 25 MMOL/L (ref 20–29)
COLOR UR: NORMAL
CREAT SERPL-MCNC: 1.05 MG/DL (ref 0.6–1.1)
CRYSTALS URNS QL MICRO: 0 /LPF
DIFFERENTIAL METHOD BLD: ABNORMAL
EKG ATRIAL RATE: 58 BPM
EKG DIAGNOSIS: NORMAL
EKG P AXIS: 8 DEGREES
EKG P-R INTERVAL: 156 MS
EKG Q-T INTERVAL: 542 MS
EKG QRS DURATION: 192 MS
EKG QTC CALCULATION (BAZETT): 533 MS
EKG R AXIS: 81 DEGREES
EKG T AXIS: -86 DEGREES
EKG VENTRICULAR RATE: 58 BPM
EOSINOPHIL # BLD: 0.08 K/UL (ref 0–0.8)
EOSINOPHIL NFR BLD: 1.3 % (ref 0.5–7.8)
EPI CELLS #/AREA URNS HPF: NORMAL /HPF
ERYTHROCYTE [DISTWIDTH] IN BLOOD BY AUTOMATED COUNT: 13.6 % (ref 11.9–14.6)
GLOBULIN SER CALC-MCNC: 3.5 G/DL (ref 2.3–3.5)
GLUCOSE SERPL-MCNC: 166 MG/DL (ref 70–99)
GLUCOSE UR STRIP.AUTO-MCNC: NEGATIVE MG/DL
HCT VFR BLD AUTO: 38 % (ref 35.8–46.3)
HGB BLD-MCNC: 12.1 G/DL (ref 11.7–15.4)
HGB UR QL STRIP: NEGATIVE
HYALINE CASTS URNS QL MICRO: NORMAL /LPF
IMM GRANULOCYTES # BLD AUTO: 0.03 K/UL (ref 0–0.5)
IMM GRANULOCYTES NFR BLD AUTO: 0.5 % (ref 0–5)
KETONES UR QL STRIP.AUTO: NEGATIVE MG/DL
LEUKOCYTE ESTERASE UR QL STRIP.AUTO: NEGATIVE
LYMPHOCYTES # BLD: 2.05 K/UL (ref 0.5–4.6)
LYMPHOCYTES NFR BLD: 32.2 % (ref 13–44)
MCH RBC QN AUTO: 29.4 PG (ref 26.1–32.9)
MCHC RBC AUTO-ENTMCNC: 31.8 G/DL (ref 31.4–35)
MCV RBC AUTO: 92.2 FL (ref 82–102)
MONOCYTES # BLD: 0.71 K/UL (ref 0.1–1.3)
MONOCYTES NFR BLD: 11.2 % (ref 4–12)
MUCOUS THREADS URNS QL MICRO: 0 /LPF
NEUTS SEG # BLD: 3.41 K/UL (ref 1.7–8.2)
NEUTS SEG NFR BLD: 53.5 % (ref 43–78)
NITRITE UR QL STRIP.AUTO: NEGATIVE
NRBC # BLD: 0 K/UL (ref 0–0.2)
PH UR STRIP: 6.5 (ref 5–9)
PLATELET # BLD AUTO: 111 K/UL (ref 150–450)
PMV BLD AUTO: 12.3 FL (ref 9.4–12.3)
POTASSIUM SERPL-SCNC: 4.2 MMOL/L (ref 3.5–5.1)
PROCALCITONIN SERPL-MCNC: 0.02 NG/ML (ref 0–0.1)
PROT SERPL-MCNC: 7.2 G/DL (ref 6.3–8.2)
PROT UR STRIP-MCNC: NEGATIVE MG/DL
RBC # BLD AUTO: 4.12 M/UL (ref 4.05–5.2)
RBC #/AREA URNS HPF: NORMAL /HPF
SODIUM SERPL-SCNC: 141 MMOL/L (ref 136–145)
SP GR UR REFRACTOMETRY: 1.01 (ref 1–1.02)
T4 FREE SERPL-MCNC: 1.2 NG/DL (ref 0.9–1.7)
TSH W FREE THYROID IF ABNORMAL: 4.28 UIU/ML (ref 0.27–4.2)
URINE CULTURE IF INDICATED: NORMAL
UROBILINOGEN UR QL STRIP.AUTO: 0.2 EU/DL (ref 0.2–1)
WBC # BLD AUTO: 6.4 K/UL (ref 4.3–11.1)
WBC URNS QL MICRO: NORMAL /HPF

## 2025-01-21 PROCEDURE — 85025 COMPLETE CBC W/AUTO DIFF WBC: CPT

## 2025-01-21 PROCEDURE — 51701 INSERT BLADDER CATHETER: CPT

## 2025-01-21 PROCEDURE — 2500000003 HC RX 250 WO HCPCS

## 2025-01-21 PROCEDURE — 80053 COMPREHEN METABOLIC PANEL: CPT

## 2025-01-21 PROCEDURE — 71045 X-RAY EXAM CHEST 1 VIEW: CPT

## 2025-01-21 PROCEDURE — 84145 PROCALCITONIN (PCT): CPT

## 2025-01-21 PROCEDURE — G0378 HOSPITAL OBSERVATION PER HR: HCPCS

## 2025-01-21 PROCEDURE — 6370000000 HC RX 637 (ALT 250 FOR IP)

## 2025-01-21 PROCEDURE — 93010 ELECTROCARDIOGRAM REPORT: CPT | Performed by: INTERNAL MEDICINE

## 2025-01-21 PROCEDURE — 84439 ASSAY OF FREE THYROXINE: CPT

## 2025-01-21 PROCEDURE — 2580000003 HC RX 258: Performed by: STUDENT IN AN ORGANIZED HEALTH CARE EDUCATION/TRAINING PROGRAM

## 2025-01-21 PROCEDURE — 84443 ASSAY THYROID STIM HORMONE: CPT

## 2025-01-21 PROCEDURE — 93005 ELECTROCARDIOGRAM TRACING: CPT | Performed by: STUDENT IN AN ORGANIZED HEALTH CARE EDUCATION/TRAINING PROGRAM

## 2025-01-21 PROCEDURE — 96360 HYDRATION IV INFUSION INIT: CPT

## 2025-01-21 PROCEDURE — 99285 EMERGENCY DEPT VISIT HI MDM: CPT

## 2025-01-21 PROCEDURE — 81001 URINALYSIS AUTO W/SCOPE: CPT

## 2025-01-21 RX ORDER — POTASSIUM CHLORIDE 1500 MG/1
40 TABLET, EXTENDED RELEASE ORAL PRN
Status: DISCONTINUED | OUTPATIENT
Start: 2025-01-21 | End: 2025-01-24 | Stop reason: HOSPADM

## 2025-01-21 RX ORDER — 0.9 % SODIUM CHLORIDE 0.9 %
500 INTRAVENOUS SOLUTION INTRAVENOUS
Status: COMPLETED | OUTPATIENT
Start: 2025-01-21 | End: 2025-01-21

## 2025-01-21 RX ORDER — SODIUM CHLORIDE 9 MG/ML
INJECTION, SOLUTION INTRAVENOUS PRN
Status: DISCONTINUED | OUTPATIENT
Start: 2025-01-21 | End: 2025-01-24 | Stop reason: HOSPADM

## 2025-01-21 RX ORDER — POTASSIUM CHLORIDE 7.45 MG/ML
10 INJECTION INTRAVENOUS PRN
Status: DISCONTINUED | OUTPATIENT
Start: 2025-01-21 | End: 2025-01-24 | Stop reason: HOSPADM

## 2025-01-21 RX ORDER — MONTELUKAST SODIUM 10 MG/1
10 TABLET ORAL NIGHTLY
Status: DISCONTINUED | OUTPATIENT
Start: 2025-01-21 | End: 2025-01-24 | Stop reason: HOSPADM

## 2025-01-21 RX ORDER — CLOPIDOGREL BISULFATE 75 MG/1
75 TABLET ORAL DAILY
Status: DISCONTINUED | OUTPATIENT
Start: 2025-01-22 | End: 2025-01-24 | Stop reason: HOSPADM

## 2025-01-21 RX ORDER — METOPROLOL SUCCINATE 50 MG/1
100 TABLET, EXTENDED RELEASE ORAL NIGHTLY
Status: DISCONTINUED | OUTPATIENT
Start: 2025-01-21 | End: 2025-01-24 | Stop reason: HOSPADM

## 2025-01-21 RX ORDER — SODIUM CHLORIDE 0.9 % (FLUSH) 0.9 %
5-40 SYRINGE (ML) INJECTION PRN
Status: DISCONTINUED | OUTPATIENT
Start: 2025-01-21 | End: 2025-01-24 | Stop reason: HOSPADM

## 2025-01-21 RX ORDER — MAGNESIUM SULFATE IN WATER 40 MG/ML
2000 INJECTION, SOLUTION INTRAVENOUS PRN
Status: DISCONTINUED | OUTPATIENT
Start: 2025-01-21 | End: 2025-01-24 | Stop reason: HOSPADM

## 2025-01-21 RX ORDER — ONDANSETRON 4 MG/1
4 TABLET, ORALLY DISINTEGRATING ORAL EVERY 8 HOURS PRN
Status: DISCONTINUED | OUTPATIENT
Start: 2025-01-21 | End: 2025-01-24 | Stop reason: HOSPADM

## 2025-01-21 RX ORDER — ONDANSETRON 2 MG/ML
4 INJECTION INTRAMUSCULAR; INTRAVENOUS EVERY 6 HOURS PRN
Status: DISCONTINUED | OUTPATIENT
Start: 2025-01-21 | End: 2025-01-24 | Stop reason: HOSPADM

## 2025-01-21 RX ORDER — SPIRONOLACTONE 25 MG/1
12.5 TABLET ORAL DAILY
Status: DISCONTINUED | OUTPATIENT
Start: 2025-01-21 | End: 2025-01-24 | Stop reason: HOSPADM

## 2025-01-21 RX ORDER — ACETAMINOPHEN 650 MG/1
650 SUPPOSITORY RECTAL EVERY 6 HOURS PRN
Status: DISCONTINUED | OUTPATIENT
Start: 2025-01-21 | End: 2025-01-24 | Stop reason: HOSPADM

## 2025-01-21 RX ORDER — FAMOTIDINE 20 MG/1
20 TABLET, FILM COATED ORAL NIGHTLY
Status: DISCONTINUED | OUTPATIENT
Start: 2025-01-21 | End: 2025-01-24 | Stop reason: HOSPADM

## 2025-01-21 RX ORDER — POLYETHYLENE GLYCOL 3350 17 G/17G
17 POWDER, FOR SOLUTION ORAL DAILY PRN
Status: DISCONTINUED | OUTPATIENT
Start: 2025-01-21 | End: 2025-01-24 | Stop reason: HOSPADM

## 2025-01-21 RX ORDER — VALSARTAN 80 MG/1
80 TABLET ORAL DAILY
Status: DISCONTINUED | OUTPATIENT
Start: 2025-01-22 | End: 2025-01-24 | Stop reason: HOSPADM

## 2025-01-21 RX ORDER — NITROFURANTOIN MACROCRYSTALS 50 MG/1
50 CAPSULE ORAL
Status: DISCONTINUED | OUTPATIENT
Start: 2025-01-22 | End: 2025-01-24 | Stop reason: HOSPADM

## 2025-01-21 RX ORDER — SODIUM CHLORIDE 0.9 % (FLUSH) 0.9 %
5-40 SYRINGE (ML) INJECTION EVERY 12 HOURS SCHEDULED
Status: DISCONTINUED | OUTPATIENT
Start: 2025-01-21 | End: 2025-01-24 | Stop reason: HOSPADM

## 2025-01-21 RX ORDER — ACETAMINOPHEN 325 MG/1
650 TABLET ORAL EVERY 6 HOURS PRN
Status: DISCONTINUED | OUTPATIENT
Start: 2025-01-21 | End: 2025-01-24 | Stop reason: HOSPADM

## 2025-01-21 RX ADMIN — SODIUM CHLORIDE 500 ML: 9 INJECTION, SOLUTION INTRAVENOUS at 19:07

## 2025-01-21 RX ADMIN — MONTELUKAST 10 MG: 10 TABLET, FILM COATED ORAL at 21:39

## 2025-01-21 RX ADMIN — METOPROLOL SUCCINATE 100 MG: 50 TABLET, EXTENDED RELEASE ORAL at 21:38

## 2025-01-21 RX ADMIN — SODIUM CHLORIDE, PRESERVATIVE FREE 10 ML: 5 INJECTION INTRAVENOUS at 21:34

## 2025-01-21 RX ADMIN — APIXABAN 5 MG: 5 TABLET, FILM COATED ORAL at 22:14

## 2025-01-21 RX ADMIN — FAMOTIDINE 20 MG: 20 TABLET, FILM COATED ORAL at 21:39

## 2025-01-21 ASSESSMENT — PAIN - FUNCTIONAL ASSESSMENT: PAIN_FUNCTIONAL_ASSESSMENT: 0-10

## 2025-01-21 ASSESSMENT — PAIN SCALES - GENERAL: PAINLEVEL_OUTOF10: 0

## 2025-01-21 NOTE — DISCHARGE INSTR - COC
Continuity of Care Form    Patient Name: Janine Gupta   :  1940  MRN:  [id number]    Admit date:  2025  Discharge date:  ***    Code Status Order: Prior   Advance Directives:   Advance Care Flowsheet Documentation             Admitting Physician:  No admitting provider for patient encounter.  PCP: Ibis Bolton, APRN - CNP    Discharging Nurse: ***  Discharging Hospital Unit/Room#: ER16/16  Discharging Unit Phone Number: ***    Emergency Contact:   Extended Emergency Contact Information  Primary Emergency Contact: Neda Cast  Address: [facility] [address]  Home Phone: [phone]  Mobile Phone: [phone]  Relation: Child  Secondary Emergency Contact: Liat Mitchell   [facility]  Home Phone: [phone]  Relation: Child    Past Surgical History:  Past Surgical History:   Procedure Laterality Date    APPENDECTOMY  ?    during abdominal surgery    BREAST SURGERY  mid     benign right breast biopsy    CARDIAC PROCEDURE N/A 2023    LEFT HEART CATH / CORONARY ANGIOGRAPHY performed by Johan Hamilton MD at [facility] CARDIAC CATH LAB    CARDIAC PROCEDURE N/A 2023    Percutaneous coronary intervention performed by Johan Hamilton MD at [facility] CARDIAC CATH LAB    CATARACT REMOVAL  3/10    bilat and retina repair    CHOLECYSTECTOMY, OPEN  1979    EYE SURGERY  several    cataract retina    HERNIA REPAIR      multiple surgery site- midline, umbilical    HYSTERECTOMY (CERVIX STATUS UNKNOWN)      HYSTERECTOMY, VAGINAL      MASTOIDECTOMY Left     repair of leaking csf into middle ear    ORTHOPEDIC SURGERY      left  -- hammer toes    OVARY REMOVAL       and cyst    SD UNLISTED PROCEDURE CARDIAC SURGERY      heart cath x 4, last , stents x 3, last     UPPER GASTROINTESTINAL ENDOSCOPY  several       Immunization History:   Immunization History   Administered Date(s) Administered    COVID-19, MODERNA

## 2025-01-21 NOTE — PROGRESS NOTES
TRANSFER - IN REPORT:    Verbal report received from EDUAR Bush  on Janine Gupta  being received from ED for routine progression of patient care      Report consisted of patient's Situation, Background, Assessment and   Recommendations(SBAR).     Information from the following report(s) Adult Overview, Intake/Output, MAR, Recent Results, and Med Rec Status was reviewed with the receiving nurse.    Opportunity for questions and clarification was provided.      Assessment will be completed upon patient's arrival to unit and care will be assumed.

## 2025-01-21 NOTE — ED NOTES
Per Dr. Humphries, patient is okay to be removed from hussein hugger and warmed with regular blankets     Stacy Gatica RN  01/21/25 0739

## 2025-01-21 NOTE — ED NOTES
Pt straight cathed for urine after explanation of the necessity of obtaining the specimen through that route. She verbalized understanding and tolerated the straight cath well and calmly     Stacy Gatica RN  01/21/25 5929

## 2025-01-21 NOTE — ED PROVIDER NOTES
Emergency Department Provider Note       PCP: Ibis Bolton, APRN - CNP   Age: 84 y.o.   Sex: female     DISPOSITION Decision To Admit 01/21/2025 05:52:22 PM    ICD-10-CM    1. Lightheadedness  R42       2. Hypothermia, initial encounter  T68.XXXA       3. Dehydration  E86.0           Medical Decision Making     84-year-old female presents the emergency department via EMS for evaluation of lightheadedness.  Patient reports feeling near syncopal throughout the day.  States she normally feels this way in the morning time but he normally improves after she eats.  Says she has been eating and drinking normally today but still feels unwell.  When EMS arrived they found her to be hypothermic with a temperature of 89 degrees.  Patient does report having hypothermia in the past of unknown etiology.  Family reports she does have heat in the heat has been on it was very hot in patient's home.  Patient reports history of recurrent UTIs.  Denies history of thyroid issues.  Patient is on Macrobid for preventative for UTIs.  Patient denies vomiting or diarrhea.  Denies any discomfort currently.  Patient reports a chronic cough that is intermittently productive of sputum. will obtain a broad-based workup.  Patient placed on a Cris hugger.  Lab work shows normal white count, stable H&H, CMP shows BUN 41, creatinine is 1.05.  Will give IV fluids.  Normal LFTs.  Pro-Moises is normal.  TSH 4.28, free T4 is 1.2.  UA is normal, chest x-ray is normal once lateral imaging was obtained.  Patient given IV fluids leading to her dehydration.  Etiology of her hypothermia is unclear, patient would benefit from admission for further evaluation into her hypothermia and dehydration.  Hospitalist consulted.  Patient and family voiced understanding and agreement.     1 or more acute illnesses that pose a threat to life or bodily function.   Shared medical decision making was utilized in creating the patients health plan today.  I independently

## 2025-01-21 NOTE — ED TRIAGE NOTES
Arrives via [facility] from home. Started to feel lightheaded and weak at 8am today. Has hx of CVA, MI, and HTN. Forgot to take her HTN meds today. Has residual L sided decreased sensation from previous CVA. Denies chest pain or shob. Has 22g in L forearm that was started en route. Bgl 184 for ems. Unable to obtain temperature on pt and she endorses hx of hypothermia

## 2025-01-21 NOTE — ED NOTES
TRANSFER - OUT REPORT:    Verbal report given to Noy WITT on Janine Gupta  being transferred to 6th floor for routine progression of patient care       Report consisted of patient's Situation, Background, Assessment and   Recommendations(SBAR).     Information from the following report(s) ED SBAR was reviewed with the receiving nurse.    [place] Fall Assessment:                           Lines:   Peripheral IV 01/21/25 Left Forearm (Active)        Opportunity for questions and clarification was provided.      Patient transported with:  Registered Nurse          Stacy Gatica RN  01/21/25 5925

## 2025-01-22 ENCOUNTER — APPOINTMENT (OUTPATIENT)
Dept: CT IMAGING | Age: 85
DRG: 923 | End: 2025-01-22
Payer: MEDICARE

## 2025-01-22 PROBLEM — Z86.73 HISTORY OF CVA (CEREBROVASCULAR ACCIDENT): Status: ACTIVE | Noted: 2025-01-22

## 2025-01-22 PROBLEM — D69.6 THROMBOCYTOPENIA (HCC): Status: ACTIVE | Noted: 2025-01-22

## 2025-01-22 PROBLEM — K86.89 PANCREATIC INSUFFICIENCY: Status: ACTIVE | Noted: 2025-01-22

## 2025-01-22 LAB
ANION GAP SERPL CALC-SCNC: 8 MMOL/L (ref 7–16)
B PERT DNA SPEC QL NAA+PROBE: NOT DETECTED
BORDETELLA PARAPERTUSSIS BY PCR: NOT DETECTED
BUN SERPL-MCNC: 37 MG/DL (ref 8–23)
C PNEUM DNA SPEC QL NAA+PROBE: NOT DETECTED
CALCIUM SERPL-MCNC: 9.4 MG/DL (ref 8.8–10.2)
CHLORIDE SERPL-SCNC: 108 MMOL/L (ref 98–107)
CO2 SERPL-SCNC: 26 MMOL/L (ref 20–29)
CORTIS AM PEAK SERPL-MCNC: 11 UG/DL (ref 4.8–19.5)
CREAT SERPL-MCNC: 1.06 MG/DL (ref 0.6–1.1)
ERYTHROCYTE [DISTWIDTH] IN BLOOD BY AUTOMATED COUNT: 13.8 % (ref 11.9–14.6)
FLUAV SUBTYP SPEC NAA+PROBE: NOT DETECTED
FLUBV RNA SPEC QL NAA+PROBE: NOT DETECTED
GLUCOSE BLD STRIP.AUTO-MCNC: 100 MG/DL (ref 65–100)
GLUCOSE SERPL-MCNC: 92 MG/DL (ref 70–99)
HADV DNA SPEC QL NAA+PROBE: NOT DETECTED
HCOV 229E RNA SPEC QL NAA+PROBE: NOT DETECTED
HCOV HKU1 RNA SPEC QL NAA+PROBE: NOT DETECTED
HCOV NL63 RNA SPEC QL NAA+PROBE: NOT DETECTED
HCOV OC43 RNA SPEC QL NAA+PROBE: NOT DETECTED
HCT VFR BLD AUTO: 33.2 % (ref 35.8–46.3)
HGB BLD-MCNC: 10.5 G/DL (ref 11.7–15.4)
HMPV RNA SPEC QL NAA+PROBE: NOT DETECTED
HPIV1 RNA SPEC QL NAA+PROBE: NOT DETECTED
HPIV2 RNA SPEC QL NAA+PROBE: NOT DETECTED
HPIV3 RNA SPEC QL NAA+PROBE: NOT DETECTED
HPIV4 RNA SPEC QL NAA+PROBE: NOT DETECTED
M PNEUMO DNA SPEC QL NAA+PROBE: NOT DETECTED
MCH RBC QN AUTO: 29.2 PG (ref 26.1–32.9)
MCHC RBC AUTO-ENTMCNC: 31.6 G/DL (ref 31.4–35)
MCV RBC AUTO: 92.2 FL (ref 82–102)
NRBC # BLD: 0 K/UL (ref 0–0.2)
PLATELET # BLD AUTO: 114 K/UL (ref 150–450)
PMV BLD AUTO: 12.6 FL (ref 9.4–12.3)
POTASSIUM SERPL-SCNC: 4.8 MMOL/L (ref 3.5–5.1)
RBC # BLD AUTO: 3.6 M/UL (ref 4.05–5.2)
RSV RNA SPEC QL NAA+PROBE: NOT DETECTED
RV+EV RNA SPEC QL NAA+PROBE: NOT DETECTED
SARS-COV-2 RNA RESP QL NAA+PROBE: NOT DETECTED
SERVICE CMNT-IMP: NORMAL
SODIUM SERPL-SCNC: 142 MMOL/L (ref 136–145)
WBC # BLD AUTO: 6.5 K/UL (ref 4.3–11.1)

## 2025-01-22 PROCEDURE — G0378 HOSPITAL OBSERVATION PER HR: HCPCS

## 2025-01-22 PROCEDURE — 82962 GLUCOSE BLOOD TEST: CPT

## 2025-01-22 PROCEDURE — 80048 BASIC METABOLIC PNL TOTAL CA: CPT

## 2025-01-22 PROCEDURE — 6370000000 HC RX 637 (ALT 250 FOR IP)

## 2025-01-22 PROCEDURE — 6370000000 HC RX 637 (ALT 250 FOR IP): Performed by: PHYSICIAN ASSISTANT

## 2025-01-22 PROCEDURE — 2500000003 HC RX 250 WO HCPCS

## 2025-01-22 PROCEDURE — 0202U NFCT DS 22 TRGT SARS-COV-2: CPT

## 2025-01-22 PROCEDURE — 6360000004 HC RX CONTRAST MEDICATION: Performed by: PHYSICIAN ASSISTANT

## 2025-01-22 PROCEDURE — 51798 US URINE CAPACITY MEASURE: CPT

## 2025-01-22 PROCEDURE — 87040 BLOOD CULTURE FOR BACTERIA: CPT

## 2025-01-22 PROCEDURE — 70470 CT HEAD/BRAIN W/O & W/DYE: CPT

## 2025-01-22 PROCEDURE — 85027 COMPLETE CBC AUTOMATED: CPT

## 2025-01-22 PROCEDURE — 82533 TOTAL CORTISOL: CPT

## 2025-01-22 PROCEDURE — 36415 COLL VENOUS BLD VENIPUNCTURE: CPT

## 2025-01-22 RX ORDER — PITAVASTATIN CALCIUM 4.18 MG/1
4 TABLET, FILM COATED ORAL EVERY MORNING
Status: DISCONTINUED | OUTPATIENT
Start: 2025-01-22 | End: 2025-01-24 | Stop reason: HOSPADM

## 2025-01-22 RX ORDER — VITAMIN B COMPLEX
5000 TABLET ORAL DAILY
Status: DISCONTINUED | OUTPATIENT
Start: 2025-01-22 | End: 2025-01-24 | Stop reason: HOSPADM

## 2025-01-22 RX ORDER — DEXTROSE MONOHYDRATE 100 MG/ML
INJECTION, SOLUTION INTRAVENOUS CONTINUOUS PRN
Status: DISCONTINUED | OUTPATIENT
Start: 2025-01-22 | End: 2025-01-24 | Stop reason: HOSPADM

## 2025-01-22 RX ORDER — LANOLIN ALCOHOL/MO/W.PET/CERES
400 CREAM (GRAM) TOPICAL DAILY
Status: DISCONTINUED | OUTPATIENT
Start: 2025-01-22 | End: 2025-01-24 | Stop reason: HOSPADM

## 2025-01-22 RX ORDER — IOPAMIDOL 755 MG/ML
100 INJECTION, SOLUTION INTRAVASCULAR
Status: COMPLETED | OUTPATIENT
Start: 2025-01-22 | End: 2025-01-22

## 2025-01-22 RX ORDER — IBUPROFEN 600 MG/1
1 TABLET ORAL PRN
Status: DISCONTINUED | OUTPATIENT
Start: 2025-01-22 | End: 2025-01-24 | Stop reason: HOSPADM

## 2025-01-22 RX ADMIN — SODIUM CHLORIDE, PRESERVATIVE FREE 10 ML: 5 INJECTION INTRAVENOUS at 10:49

## 2025-01-22 RX ADMIN — VITAMIN D, TAB 1000IU (100/BT) 5000 UNITS: 25 TAB at 14:07

## 2025-01-22 RX ADMIN — APIXABAN 5 MG: 5 TABLET, FILM COATED ORAL at 21:05

## 2025-01-22 RX ADMIN — FAMOTIDINE 20 MG: 20 TABLET, FILM COATED ORAL at 21:05

## 2025-01-22 RX ADMIN — METOPROLOL SUCCINATE 100 MG: 50 TABLET, EXTENDED RELEASE ORAL at 21:05

## 2025-01-22 RX ADMIN — PITAVASTATIN CALCIUM 4 MG: 4.18 TABLET, FILM COATED ORAL at 21:05

## 2025-01-22 RX ADMIN — IOPAMIDOL 100 ML: 755 INJECTION, SOLUTION INTRAVENOUS at 16:55

## 2025-01-22 RX ADMIN — APIXABAN 5 MG: 5 TABLET, FILM COATED ORAL at 10:43

## 2025-01-22 RX ADMIN — MONTELUKAST 10 MG: 10 TABLET, FILM COATED ORAL at 21:05

## 2025-01-22 RX ADMIN — MAGNESIUM GLUCONATE 500 MG ORAL TABLET 400 MG: 500 TABLET ORAL at 14:07

## 2025-01-22 RX ADMIN — CLOPIDOGREL BISULFATE 75 MG: 75 TABLET ORAL at 10:43

## 2025-01-22 RX ADMIN — SODIUM CHLORIDE, PRESERVATIVE FREE 10 ML: 5 INJECTION INTRAVENOUS at 21:05

## 2025-01-22 RX ADMIN — VALSARTAN 80 MG: 80 TABLET ORAL at 10:43

## 2025-01-22 NOTE — PROGRESS NOTES
Hospitalist Progress Note   Admit Date:  2025  3:12 PM   Name:  Janine Gupta   Age:  84 y.o.  Sex:  female  :  1940   MRN:  [id number]   Room:  [facility]    Presenting/Chief Complaint: Dizziness and Low Temperature     Reason(s) for Admission: Dehydration [E86.0]  Lightheadedness [R42]  Hypothermia, initial encounter [T68.XXXA]     Hospital Course:   Janine Gupta is a 84 y.o. female with medical history of hypertension, hyperlipidemia, coronary artery disease, heart failure, squamous cell carcinoma of the ER, CSF leak, asthma who presents to the hospital 25 complaining of weakness.  Patient found to be hypothermic at 91.1 °F.  TSH elevated at 4.24 but free T4 normal. AM cortisol WNL.  Pro-Moises toning less than 0.02.  Urinalysis and chest x-ray without any concerns for infection.  Respiratory viral panel negative.  Blood cultures obtained.      Subjective & 24hr Events:   Seen this morning feeling a little bit better but still very weak.  Her daughter is at bedside.  Apparently she was hypothermic around the same time last year.  After that hospitalization of which they could not find the cause for her hypothermia she then had multiple strokes.    Aside from weakness and malaise, patient denies any infectious symptoms.  No changes in medications except for recently stopping her thyroid medication sometime within the past 4 months due to AE.    Assessment & Plan:       Hypothermia, initial encounter  -  Etiology is unclear  - Obtain blood cultures  - Check an respiratory viral panel.  Resulted negative.  - Obtain a CT of the head with and without contrast for completion  - She has subclinical hypothyroidism.  Her TSH is within range where treatment is controversy all.  Unclear if her hypothermia is at all related to her hypothyroidism.  When I look at the trends of her TSH and T4 it appears she is intermittently been fulminantly hypothyroid and intermittently subclinically

## 2025-01-22 NOTE — CARE COORDINATION
01/22/25 1519   Service Assessment   Patient Orientation Alert and Oriented   Cognition Alert   History Provided By Patient   Primary Caregiver Self   Support Systems Family Members   Patient's Healthcare Decision Maker is: Legal Next of Kin   PCP Verified by CM Yes   Last Visit to PCP Within last year   Prior Functional Level Independent in ADLs/IADLs   Can patient return to prior living arrangement Yes   Ability to make needs known: Good   Family able to assist with home care needs: Yes   Would you like for me to discuss the discharge plan with any other family members/significant others, and if so, who? Yes   Financial Resources Medicare   Social/Functional History   Lives With Son   Receives Help From Family   Prior Level of Assist for ADLs Independent   Prior Level of Assist for Homemaking Independent   Ambulation Assistance Independent   Prior Level of Assist for Transfers Independent   Active  No   Patient's  Info family transports   Mode of Transportation Car   Services At/After Discharge   Confirm Follow Up Transport Family       CM met with pt and daughter at bedside, NEAL signed, pt son lives with her, demographics and PCP verified, just complete home PT/OT and has started outpt therapy at  [facility] in [place], DME-RW, rollator and elevated toilet seat, does not drive, indpt with ADL's and cooking family helps with shopping. Family will transport home, CM will follow for discharge plan/needs.

## 2025-01-22 NOTE — PROGRESS NOTES
Pt resting in bed at present time without complaints. Rounds performed, all needs met this shift. Bed locked and low, call light within reach. Will give report to oncoming day shift nurse.

## 2025-01-22 NOTE — H&P
Hospitalist History and Physical   Admit Date:  2025  3:12 PM   Name:  Janine Gupta   Age:  84 y.o.  Sex:  female  :  1940   MRN:  [id number]   Room:  [facility]    Presenting/Chief Complaint: Dizziness and Low Temperature     Reason(s) for Admission: Dehydration [E86.0]  Lightheadedness [R42]  Hypothermia, initial encounter [T68.XXXA]     History of Present Illness:   Janine Gupta is a 84 y.o. female with medical history of hypertension, hyperlipidemia, coronary artery disease, heart failure, squamous cell carcinoma of the ER, CSF leak,, asthma who presents to the hospital complaining of weakness.  Patient found to be hypothermic at 91.1 °F.  TSH elevated at 4.24 but free T4 normal.  Patient was placed on Cris hugger and hospital medicine consulted for admission.  Patient endorsing cough for the past several but no other infectious signs or symptoms.  Chest x-ray unremarkable.  Patient denies lack of heat at home.  Denies being stuck outside for any prolonged periods of time.      Assessment & Plan:     Hypothermia, mild  Unclear etiology, but of note has been a recurrent issue for her in the past  -Discontinue Cris hugger and switch to passive warming technique  -TFTs consistent with subclinical hypothyroidism  -Check a.m. cortisol  -Monitor for other signs and symptoms of infection.  Hold antibiotic    Lightheadedness  -Patient's symptoms started after the addition of spironolactone  -Check orthostatics  -Hold spironolactone    Atrial fibrillation  -Continue metoprolol and Eliquis    History of stroke  -Continue Plavix and Eliquis    Hypertension  History of heart failure  -Euvolemic on exam  -Continue valsartan and metoprolol  -Hold spironolactone          Non-peripheral Lines and Tubes (if present):             Hospital Problems:  Principal Problem:    Hypothermia, initial encounter  Resolved Problems:    * No resolved hospital problems. *        Objective:   Patient Vitals for  LBBB which is worse than previous ECG.   Consider expert consultation if clinical presentation is concerning.     Confirmed by JOSE SEXTON MD ([id number]) on 1/21/2025 4:03:11 PM     CBC with Auto Differential    Collection Time: 01/21/25  3:29 PM   Result Value Ref Range    WBC 6.4 4.3 - 11.1 K/uL    RBC 4.12 4.05 - 5.2 M/uL    Hemoglobin 12.1 11.7 - 15.4 g/dL    Hematocrit 38.0 35.8 - 46.3 %    MCV 92.2 82 - 102 FL    MCH 29.4 26.1 - 32.9 PG    MCHC 31.8 31.4 - 35.0 g/dL    RDW 13.6 11.9 - 14.6 %    Platelets 111 (L) 150 - 450 K/uL    MPV 12.3 9.4 - 12.3 FL    nRBC 0.00 0.0 - 0.2 K/uL    Differential Type AUTOMATED      Neutrophils % 53.5 43.0 - 78.0 %    Lymphocytes % 32.2 13.0 - 44.0 %    Monocytes % 11.2 4.0 - 12.0 %    Eosinophils % 1.3 0.5 - 7.8 %    Basophils % 1.3 0.0 - 2.0 %    Immature Granulocytes % 0.5 0.0 - 5.0 %    Neutrophils Absolute 3.41 1.70 - 8.20 K/UL    Lymphocytes Absolute 2.05 0.50 - 4.60 K/UL    Monocytes Absolute 0.71 0.10 - 1.30 K/UL    Eosinophils Absolute 0.08 0.00 - 0.80 K/UL    Basophils Absolute 0.08 0.00 - 0.20 K/UL    Immature Granulocytes Absolute 0.03 0.0 - 0.5 K/UL   CMP    Collection Time: 01/21/25  3:29 PM   Result Value Ref Range    Sodium 141 136 - 145 mmol/L    Potassium 4.2 3.5 - 5.1 mmol/L    Chloride 104 98 - 107 mmol/L    CO2 25 20 - 29 mmol/L    Anion Gap 12 7 - 16 mmol/L    Glucose 166 (H) 70 - 99 mg/dL    BUN 41 (H) 8 - 23 MG/DL    Creatinine 1.05 0.60 - 1.10 MG/DL    Est, Glom Filt Rate 52 (L) >60 ml/min/1.73m2    Calcium 9.6 8.8 - 10.2 MG/DL    Total Bilirubin <0.2 0.0 - 1.2 MG/DL    ALT 19 8 - 45 U/L    AST 29 15 - 37 U/L    Alk Phosphatase 102 35 - 104 U/L    Total Protein 7.2 6.3 - 8.2 g/dL    Albumin 3.8 3.2 - 4.6 g/dL    Globulin 3.5 2.3 - 3.5 g/dL    Albumin/Globulin Ratio 1.1 1.0 - 1.9     Procalcitonin    Collection Time: 01/21/25  3:29 PM   Result Value Ref Range    Procalcitonin 0.02 0.00 - 0.10 ng/mL   TSH reflex to FT4    Collection Time: 01/21/25  3:29

## 2025-01-22 NOTE — PROGRESS NOTES
4 Eyes Skin Assessment     NAME:  Janine Gupta  YOB: 1940  MEDICAL RECORD NUMBER:  [id number]    The patient is being assessed for  Admission    I agree that at least one RN has performed a thorough Head to Toe Skin Assessment on the patient. ALL assessment sites listed below have been assessed.      Areas assessed by both nurses:    Head, Face, Ears, Shoulders, Back, Chest, Arms, Elbows, Hands, Sacrum. Buttock, Coccyx, Ischium, and Legs. Feet and Heels        Does the Patient have a Wound? No noted wound(s)       Juwan Prevention initiated by RN: Yes  Wound Care Orders initiated by RN: Yes    Pressure Injury (Stage 3,4, Unstageable, DTI, NWPT, and Complex wounds) if present, place Wound referral order by RN under : No    New Ostomies, if present place, Ostomy referral order under : No     Nurse 1 eSignature: Electronically signed by Chloe Rosales RN on 1/22/25 at 12:18 AM EST    **SHARE this note so that the co-signing nurse can place an eSignature**    Nurse 2 eSignature: Electronically signed by ALEJANDRO ADAME RN on 1/22/25 at 12:38 AM EST

## 2025-01-23 PROBLEM — R68.0 HYPOTHERMIA DUE TO NON-ENVIRONMENTAL CAUSE: Status: ACTIVE | Noted: 2025-01-23

## 2025-01-23 LAB
AMPHET UR QL SCN: NEGATIVE
ANION GAP SERPL CALC-SCNC: 9 MMOL/L (ref 7–16)
APTT PPP: 43.9 SEC (ref 23.3–37.4)
BARBITURATES UR QL SCN: NEGATIVE
BASOPHILS # BLD: 0.05 K/UL (ref 0–0.2)
BASOPHILS NFR BLD: 0.6 % (ref 0–2)
BENZODIAZ UR QL: NEGATIVE
BUN SERPL-MCNC: 36 MG/DL (ref 8–23)
CALCIUM SERPL-MCNC: 9.2 MG/DL (ref 8.8–10.2)
CANNABINOIDS UR QL SCN: NEGATIVE
CHLORIDE SERPL-SCNC: 104 MMOL/L (ref 98–107)
CO2 SERPL-SCNC: 26 MMOL/L (ref 20–29)
COCAINE UR QL SCN: NEGATIVE
CREAT SERPL-MCNC: 1.3 MG/DL (ref 0.6–1.1)
DIFFERENTIAL METHOD BLD: ABNORMAL
EOSINOPHIL # BLD: 0.04 K/UL (ref 0–0.8)
EOSINOPHIL NFR BLD: 0.5 % (ref 0.5–7.8)
ERYTHROCYTE [DISTWIDTH] IN BLOOD BY AUTOMATED COUNT: 14.1 % (ref 11.9–14.6)
EST. AVERAGE GLUCOSE BLD GHB EST-MCNC: 132 MG/DL
FIBRINOGEN PPP-MCNC: 426 MG/DL (ref 190–501)
GLUCOSE BLD STRIP.AUTO-MCNC: 102 MG/DL (ref 65–100)
GLUCOSE BLD STRIP.AUTO-MCNC: 106 MG/DL (ref 65–100)
GLUCOSE BLD STRIP.AUTO-MCNC: 169 MG/DL (ref 65–100)
GLUCOSE BLD STRIP.AUTO-MCNC: 89 MG/DL (ref 65–100)
GLUCOSE BLD STRIP.AUTO-MCNC: 98 MG/DL (ref 65–100)
GLUCOSE SERPL-MCNC: 98 MG/DL (ref 70–99)
HBA1C MFR BLD: 6.2 % (ref 0–5.6)
HCT VFR BLD AUTO: 32.4 % (ref 35.8–46.3)
HGB BLD-MCNC: 10 G/DL (ref 11.7–15.4)
IMM GRANULOCYTES # BLD AUTO: 0.03 K/UL (ref 0–0.5)
IMM GRANULOCYTES NFR BLD AUTO: 0.4 % (ref 0–5)
INR PPP: 1.9
LIPASE SERPL-CCNC: 24 U/L (ref 13–60)
LYMPHOCYTES # BLD: 2.46 K/UL (ref 0.5–4.6)
LYMPHOCYTES NFR BLD: 31.3 % (ref 13–44)
MCH RBC QN AUTO: 28.6 PG (ref 26.1–32.9)
MCHC RBC AUTO-ENTMCNC: 30.9 G/DL (ref 31.4–35)
MCV RBC AUTO: 92.6 FL (ref 82–102)
METHADONE UR QL: NEGATIVE
MONOCYTES # BLD: 0.85 K/UL (ref 0.1–1.3)
MONOCYTES NFR BLD: 10.8 % (ref 4–12)
NEUTS SEG # BLD: 4.44 K/UL (ref 1.7–8.2)
NEUTS SEG NFR BLD: 56.4 % (ref 43–78)
NRBC # BLD: 0 K/UL (ref 0–0.2)
OPIATES UR QL: NEGATIVE
PATH REV BLD -IMP: NORMAL
PCP UR QL: NEGATIVE
PLATELET # BLD AUTO: 114 K/UL (ref 150–450)
PMV BLD AUTO: 12.2 FL (ref 9.4–12.3)
POTASSIUM SERPL-SCNC: 4.6 MMOL/L (ref 3.5–5.1)
PROTHROMBIN TIME: 21.8 SEC (ref 11.3–14.9)
RBC # BLD AUTO: 3.5 M/UL (ref 4.05–5.2)
SERVICE CMNT-IMP: ABNORMAL
SERVICE CMNT-IMP: NORMAL
SERVICE CMNT-IMP: NORMAL
SODIUM SERPL-SCNC: 139 MMOL/L (ref 136–145)
WBC # BLD AUTO: 7.9 K/UL (ref 4.3–11.1)

## 2025-01-23 PROCEDURE — 85025 COMPLETE CBC W/AUTO DIFF WBC: CPT

## 2025-01-23 PROCEDURE — 85384 FIBRINOGEN ACTIVITY: CPT

## 2025-01-23 PROCEDURE — 80048 BASIC METABOLIC PNL TOTAL CA: CPT

## 2025-01-23 PROCEDURE — 97166 OT EVAL MOD COMPLEX 45 MIN: CPT

## 2025-01-23 PROCEDURE — 82962 GLUCOSE BLOOD TEST: CPT

## 2025-01-23 PROCEDURE — 97162 PT EVAL MOD COMPLEX 30 MIN: CPT

## 2025-01-23 PROCEDURE — 97530 THERAPEUTIC ACTIVITIES: CPT

## 2025-01-23 PROCEDURE — 6370000000 HC RX 637 (ALT 250 FOR IP)

## 2025-01-23 PROCEDURE — 97112 NEUROMUSCULAR REEDUCATION: CPT

## 2025-01-23 PROCEDURE — 85730 THROMBOPLASTIN TIME PARTIAL: CPT

## 2025-01-23 PROCEDURE — 1100000000 HC RM PRIVATE

## 2025-01-23 PROCEDURE — 97535 SELF CARE MNGMENT TRAINING: CPT

## 2025-01-23 PROCEDURE — 36415 COLL VENOUS BLD VENIPUNCTURE: CPT

## 2025-01-23 PROCEDURE — 6370000000 HC RX 637 (ALT 250 FOR IP): Performed by: PHYSICIAN ASSISTANT

## 2025-01-23 PROCEDURE — 80307 DRUG TEST PRSMV CHEM ANLYZR: CPT

## 2025-01-23 PROCEDURE — 83036 HEMOGLOBIN GLYCOSYLATED A1C: CPT

## 2025-01-23 PROCEDURE — 85610 PROTHROMBIN TIME: CPT

## 2025-01-23 PROCEDURE — 2500000003 HC RX 250 WO HCPCS

## 2025-01-23 PROCEDURE — 83690 ASSAY OF LIPASE: CPT

## 2025-01-23 RX ADMIN — CLOPIDOGREL BISULFATE 75 MG: 75 TABLET ORAL at 08:35

## 2025-01-23 RX ADMIN — VITAMIN D, TAB 1000IU (100/BT) 5000 UNITS: 25 TAB at 08:34

## 2025-01-23 RX ADMIN — SODIUM CHLORIDE, PRESERVATIVE FREE 10 ML: 5 INJECTION INTRAVENOUS at 20:21

## 2025-01-23 RX ADMIN — FAMOTIDINE 20 MG: 20 TABLET, FILM COATED ORAL at 20:18

## 2025-01-23 RX ADMIN — VALSARTAN 80 MG: 80 TABLET ORAL at 08:35

## 2025-01-23 RX ADMIN — SODIUM CHLORIDE, PRESERVATIVE FREE 10 ML: 5 INJECTION INTRAVENOUS at 08:37

## 2025-01-23 RX ADMIN — PITAVASTATIN CALCIUM 4 MG: 4.18 TABLET, FILM COATED ORAL at 18:40

## 2025-01-23 RX ADMIN — MAGNESIUM GLUCONATE 500 MG ORAL TABLET 400 MG: 500 TABLET ORAL at 08:35

## 2025-01-23 RX ADMIN — APIXABAN 5 MG: 5 TABLET, FILM COATED ORAL at 20:18

## 2025-01-23 RX ADMIN — APIXABAN 5 MG: 5 TABLET, FILM COATED ORAL at 08:35

## 2025-01-23 RX ADMIN — MONTELUKAST 10 MG: 10 TABLET, FILM COATED ORAL at 20:18

## 2025-01-23 ASSESSMENT — PAIN SCALES - GENERAL: PAINLEVEL_OUTOF10: 0

## 2025-01-23 NOTE — PROGRESS NOTES
Pt resting, denies needs at this time.  Will be NPO at midnight for ABD US tomorrow.  Hourly rounds completed.  Bed locked and lowered into lowest position.  Call light and personal items within reach.  Will give report to oncoming nurse.

## 2025-01-23 NOTE — PROGRESS NOTES
Hospitalist Progress Note   Admit Date:  2025  3:12 PM   Name:  Janine Gupta   Age:  84 y.o.  Sex:  female  :  1940   MRN:  [id number]   Room:  [facility]    Presenting/Chief Complaint: Dizziness and Low Temperature     Reason(s) for Admission: Dehydration [E86.0]  Lightheadedness [R42]  Hypothermia, initial encounter [T68.XXXA]  Hypothermia due to non-environmental cause [R68.0]     Hospital Course:   Janine Gupta is a 84 y.o. female with medical history of hypertension, hyperlipidemia, coronary artery disease, heart failure, squamous cell carcinoma of the ER, CSF leak, asthma who presents to the hospital 25 complaining of weakness.  Patient found to be hypothermic at 91.1 °F.  TSH elevated at 4.24 but free T4 normal. AM cortisol WNL.  Pro-Moises toning less than 0.02.  Urinalysis and chest x-ray without any concerns for infection. CT head not acute.  Respiratory viral panel negative.  Blood cultures obtained. Uds negative. She was marginally hyperglycemic on admission.     Subjective & 24hr Events:   Feels a little more spry but \"too early to say\"  Tmax 99.7F  Scr bumped some so will check PVR and intake output   No more hypothermia  Has irritable bowels, no worse than usual. Daughter worried about pancreas given her hx. Will check lipase and US abdomen but reassuringly her appetite is baseline and abdomen non tender   orthostatic VS postive with PT: 125/65 (supine) > 116/71 (sitting) > 103/54 (standing)    Assessment & Plan:       Hypothermia, initial encounter  - Etiology remains unclear  - follow blood cultures - would like these to be negative times 24 hours   - She has subclinical hypothyroidism.  Her TSH is within range where treatment is controversy all.  Unclear if her hypothermia is at all related to her hypothyroidism.  When I look at the trends of her TSH and T4 it appears she is intermittently been fulminantly hypothyroid and intermittently subclinically hyperthyroid

## 2025-01-23 NOTE — THERAPY EVALUATION
ACUTE OCCUPATIONAL THERAPY GOALS:   (Developed with and agreed upon by patient and/or caregiver.)  1. Patient will complete lower body bathing and dressing with MODIFIED INDEPENDENCE and adaptive equipment as needed.     2. Patient will complete toileting with MODIFIED INDEPENDENCE.  3. Patient will complete grooming ADL standing edge of sink with MODIFIED INDEPENDENCE.  4. Patient will tolerate 25 minutes of OT treatment with 1-2 rest breaks to increase activity tolerance for ADLs.   5. Patient will complete functional transfers with MODIFIED INDEPENDENCE and adaptive equipment as needed.   6. Patient will tolerate 10 minutes BUE exercises to increase strength for safe, functional transfers.     Timeframe: 7 visits      OCCUPATIONAL THERAPY Initial Assessment, Daily Note, and AM       OT Visit Days: 1  Acknowledge Orders  Time  OT Charge Capture  Rehab Caseload Tracker      Janine Gupta is a 84 y.o. female   PRIMARY DIAGNOSIS: Hypothermia, initial encounter  Dehydration [E86.0]  Lightheadedness [R42]  Hypothermia, initial encounter [T68.XXXA]       Reason for Referral: Generalized Muscle Weakness (M62.81)  Other lack of cordination (R27.8)  Difficulty in walking, Not elsewhere classified (R26.2)  Observation: Payor: MEDICARE / Plan: MEDICARE PART A AND B / Product Type: *No Product type* /     ASSESSMENT:     REHAB RECOMMENDATIONS:   Recommendation to date pending progress:  Setting:  Home Health Therapy    Equipment:    To Be Determined     ASSESSMENT:  Ms. Gupta presented to [facility] with hypothermia, pmh CVA, MI, HTN, A-Fib. At baseline pt lives with her son and is modified independent for functional mobility using rollator, independent for BADLs.   Today, during evaluation, pt was positive for orthostatic hypotension with vitals recorded in flow sheet and documented below. RN alerted.   Supine : 125/65  Sit: 116/71  Stand: 103/54    Along with orthostasis, pt demonstrates impairments in strength,

## 2025-01-23 NOTE — THERAPY EVALUATION
ACUTE PHYSICAL THERAPY GOALS:   (Developed with and agreed upon by patient and/or caregiver.)    (1.) Janine Gupta  will move from supine to sit and sit to supine , scoot up and down, and roll side to side with MODIFIED INDEPENDENCE within 7 treatment day(s).    (2.) Janine Gupta will transfer from bed to chair and chair to bed with MODIFIED INDEPENDENCE using the least restrictive device within 7 treatment day(s).    (3.) Janine Gupta will ambulate with STAND BY ASSIST for 150 feet with the least restrictive device within 7 treatment day(s).   (4.) Janine Gupta will perform standing static and dynamic balance activities x 8 minutes with STAND BY ASSIST to improve safety within 7 treatment day(s).  (5.) Janine Gupta will ascend and descend 4 stairs using 1 hand rail(s) with STAND BY ASSIST to improve functional mobility and safety within 7 treatment day(s).  (6.) Janine Gupta will perform therapeutic exercises x 15 min for HEP with MODIFIED INDEPENDENCE to improve strength, endurance, and functional mobility within 7 treatment day(s).      PHYSICAL THERAPY Initial Assessment, Daily Note, and AM  (Link to Caseload Tracking: PT Visit Days : 1  Acknowledge Orders  Time In/Out  PT Charge Capture  Rehab Caseload Tracker    Janine Gupta is a 84 y.o. female   PRIMARY DIAGNOSIS: Hypothermia, initial encounter  Dehydration [E86.0]  Lightheadedness [R42]  Hypothermia, initial encounter [T68.XXXA]       Reason for Referral: Generalized Muscle Weakness (M62.81)  Difficulty in walking, Not elsewhere classified (R26.2)  Observation: Payor: MEDICARE / Plan: MEDICARE PART A AND B / Product Type: *No Product type* /     ASSESSMENT:     REHAB RECOMMENDATIONS:   Recommendation to date pending progress:  Setting:  Home Health Therapy    Equipment:    To Be Determined     ASSESSMENT:  Ms. Gupta is a 84 year old female who presents with worsening lightheadedness and is admitted

## 2025-01-23 NOTE — PROGRESS NOTES
UA 0-5 U5 /hpf    BACTERIA, URINE Negative NEG /hpf    Epithelial Cells, UA 0-5 U5 /hpf    Hyaline Casts, UA 0-2 /lpf    Casts 0 0 /lpf    Crystals 0 0 /LPF    Mucus, UA 0 0 /lpf   Cortisol AM, Total    Collection Time: 01/22/25  7:25 AM   Result Value Ref Range    Cortisol - AM 11.0 4.8 - 19.5 ug/dL   CBC    Collection Time: 01/22/25  7:25 AM   Result Value Ref Range    WBC 6.5 4.3 - 11.1 K/uL    RBC 3.60 (L) 4.05 - 5.2 M/uL    Hemoglobin 10.5 (L) 11.7 - 15.4 g/dL    Hematocrit 33.2 (L) 35.8 - 46.3 %    MCV 92.2 82 - 102 FL    MCH 29.2 26.1 - 32.9 PG    MCHC 31.6 31.4 - 35.0 g/dL    RDW 13.8 11.9 - 14.6 %    Platelets 114 (L) 150 - 450 K/uL    MPV 12.6 (H) 9.4 - 12.3 FL    nRBC 0.00 0.0 - 0.2 K/uL   Basic Metabolic Panel w/ Reflex to MG    Collection Time: 01/22/25  7:25 AM   Result Value Ref Range    Sodium 142 136 - 145 mmol/L    Potassium 4.8 3.5 - 5.1 mmol/L    Chloride 108 (H) 98 - 107 mmol/L    CO2 26 20 - 29 mmol/L    Anion Gap 8 7 - 16 mmol/L    Glucose 92 70 - 99 mg/dL    BUN 37 (H) 8 - 23 MG/DL    Creatinine 1.06 0.60 - 1.10 MG/DL    Est, Glom Filt Rate 51 (L) >60 ml/min/1.73m2    Calcium 9.4 8.8 - 10.2 MG/DL   Respiratory Panel, Molecular, with COVID-19 (Restricted: peds pts or suitable admitted adults)    Collection Time: 01/22/25 10:42 AM    Specimen: Nasopharyngeal   Result Value Ref Range    Adenovirus by PCR NOT DETECTED NOTDET      Coronavirus 229E by PCR NOT DETECTED NOTDET      Coronavirus HKU1 by PCR NOT DETECTED NOTDET      Coronavirus NL63 by PCR NOT DETECTED NOTDET      Coronavirus OC43 by PCR NOT DETECTED NOTDET      SARS-CoV-2, PCR NOT DETECTED NOTDET      Human Metapneumovirus by PCR NOT DETECTED NOTDET      Rhinovirus Enterovirus PCR NOT DETECTED NOTDET      Influenza A by PCR NOT DETECTED NOTDET      Influenza B PCR NOT DETECTED NOTDET      Parainfluenza 1 PCR NOT DETECTED NOTDET      Parainfluenza 2 PCR NOT DETECTED NOTDET      Parainfluenza 3 PCR NOT DETECTED NOTDET      Parainfluenza  Monocytes Absolute 0.85 0.10 - 1.30 K/UL    Eosinophils Absolute 0.04 0.00 - 0.80 K/UL    Basophils Absolute 0.05 0.00 - 0.20 K/UL    Immature Granulocytes Absolute 0.03 0.0 - 0.5 K/UL   Basic Metabolic Panel w/ Reflex to MG    Collection Time: 01/23/25  4:23 AM   Result Value Ref Range    Sodium 139 136 - 145 mmol/L    Potassium 4.6 3.5 - 5.1 mmol/L    Chloride 104 98 - 107 mmol/L    CO2 26 20 - 29 mmol/L    Anion Gap 9 7 - 16 mmol/L    Glucose 98 70 - 99 mg/dL    BUN 36 (H) 8 - 23 MG/DL    Creatinine 1.30 (H) 0.60 - 1.10 MG/DL    Est, Glom Filt Rate 40 (L) >60 ml/min/1.73m2    Calcium 9.2 8.8 - 10.2 MG/DL   Fibrinogen    Collection Time: 01/23/25  4:23 AM   Result Value Ref Range    Fibrinogen 426 190 - 501 mg/dL   Hemoglobin A1C    Collection Time: 01/23/25  4:23 AM   Result Value Ref Range    Hemoglobin A1C 6.2 (H) 0 - 5.6 %    Estimated Avg Glucose 132 mg/dL   Path Review, Smear    Collection Time: 01/23/25  4:23 AM   Result Value Ref Range    Pathologist Review PENDING    POCT Glucose    Collection Time: 01/23/25  7:11 AM   Result Value Ref Range    POC Glucose 98 65 - 100 mg/dL    Performed by: Colleen        Current Meds:  Current Facility-Administered Medications   Medication Dose Route Frequency    Vitamin D (CHOLECALCIFEROL) tablet 5,000 Units  5,000 Units Oral Daily    magnesium oxide (MAG-OX) tablet 400 mg  400 mg Oral Daily    pitavastatin (LIVALO) tablet 4 mg (Patient Supplied)  4 mg Oral QAM    lipase-protease-amylase (CREON) delayed release capsule 48,000 Units (Patient Supplied)  48,000 Units Oral TID WC    glucose chewable tablet 16 g  4 tablet Oral PRN    dextrose bolus 10% 125 mL  125 mL IntraVENous PRN    Or    dextrose bolus 10% 250 mL  250 mL IntraVENous PRN    Glucagon Emergency KIT 1 mg  1 mg SubCUTAneous PRN    dextrose 10 % infusion   IntraVENous Continuous PRN    sodium chloride flush 0.9 % injection 5-40 mL  5-40 mL IntraVENous 2 times per day    sodium chloride flush 0.9 %

## 2025-01-24 ENCOUNTER — APPOINTMENT (OUTPATIENT)
Dept: ULTRASOUND IMAGING | Age: 85
DRG: 923 | End: 2025-01-24
Payer: MEDICARE

## 2025-01-24 VITALS
OXYGEN SATURATION: 92 % | SYSTOLIC BLOOD PRESSURE: 120 MMHG | WEIGHT: 132.94 LBS | DIASTOLIC BLOOD PRESSURE: 56 MMHG | HEART RATE: 66 BPM | TEMPERATURE: 98.2 F | BODY MASS INDEX: 22.15 KG/M2 | HEIGHT: 65 IN | RESPIRATION RATE: 16 BRPM

## 2025-01-24 PROBLEM — R68.0 HYPOTHERMIA DUE TO NON-ENVIRONMENTAL CAUSE: Status: RESOLVED | Noted: 2025-01-23 | Resolved: 2025-01-24

## 2025-01-24 PROBLEM — T68.XXXA HYPOTHERMIA, INITIAL ENCOUNTER: Status: RESOLVED | Noted: 2025-01-21 | Resolved: 2025-01-24

## 2025-01-24 PROBLEM — E03.8 SUBCLINICAL HYPOTHYROIDISM: Status: ACTIVE | Noted: 2025-01-24

## 2025-01-24 LAB
ANION GAP SERPL CALC-SCNC: 11 MMOL/L (ref 7–16)
BUN SERPL-MCNC: 35 MG/DL (ref 8–23)
CALCIUM SERPL-MCNC: 9.4 MG/DL (ref 8.8–10.2)
CHLORIDE SERPL-SCNC: 104 MMOL/L (ref 98–107)
CO2 SERPL-SCNC: 25 MMOL/L (ref 20–29)
CREAT SERPL-MCNC: 1.16 MG/DL (ref 0.6–1.1)
ERYTHROCYTE [DISTWIDTH] IN BLOOD BY AUTOMATED COUNT: 14 % (ref 11.9–14.6)
GLUCOSE BLD STRIP.AUTO-MCNC: 199 MG/DL (ref 65–100)
GLUCOSE BLD STRIP.AUTO-MCNC: 88 MG/DL (ref 65–100)
GLUCOSE SERPL-MCNC: 87 MG/DL (ref 70–99)
HCT VFR BLD AUTO: 33.1 % (ref 35.8–46.3)
HGB BLD-MCNC: 10.4 G/DL (ref 11.7–15.4)
MCH RBC QN AUTO: 29 PG (ref 26.1–32.9)
MCHC RBC AUTO-ENTMCNC: 31.4 G/DL (ref 31.4–35)
MCV RBC AUTO: 92.2 FL (ref 82–102)
NRBC # BLD: 0 K/UL (ref 0–0.2)
PLATELET # BLD AUTO: 114 K/UL (ref 150–450)
PMV BLD AUTO: 11.8 FL (ref 9.4–12.3)
POTASSIUM SERPL-SCNC: 4.6 MMOL/L (ref 3.5–5.1)
RBC # BLD AUTO: 3.59 M/UL (ref 4.05–5.2)
SERVICE CMNT-IMP: ABNORMAL
SERVICE CMNT-IMP: NORMAL
SODIUM SERPL-SCNC: 140 MMOL/L (ref 136–145)
WBC # BLD AUTO: 7.3 K/UL (ref 4.3–11.1)

## 2025-01-24 PROCEDURE — 94760 N-INVAS EAR/PLS OXIMETRY 1: CPT

## 2025-01-24 PROCEDURE — 6370000000 HC RX 637 (ALT 250 FOR IP): Performed by: PHYSICIAN ASSISTANT

## 2025-01-24 PROCEDURE — 85027 COMPLETE CBC AUTOMATED: CPT

## 2025-01-24 PROCEDURE — 80048 BASIC METABOLIC PNL TOTAL CA: CPT

## 2025-01-24 PROCEDURE — 2500000003 HC RX 250 WO HCPCS

## 2025-01-24 PROCEDURE — 76700 US EXAM ABDOM COMPLETE: CPT

## 2025-01-24 PROCEDURE — 82962 GLUCOSE BLOOD TEST: CPT

## 2025-01-24 PROCEDURE — 6370000000 HC RX 637 (ALT 250 FOR IP)

## 2025-01-24 RX ADMIN — APIXABAN 5 MG: 5 TABLET, FILM COATED ORAL at 10:09

## 2025-01-24 RX ADMIN — MAGNESIUM GLUCONATE 500 MG ORAL TABLET 400 MG: 500 TABLET ORAL at 10:09

## 2025-01-24 RX ADMIN — NITROFURANTOIN MACROCRYSTALS 50 MG: 50 CAPSULE ORAL at 10:10

## 2025-01-24 RX ADMIN — CLOPIDOGREL BISULFATE 75 MG: 75 TABLET ORAL at 10:09

## 2025-01-24 RX ADMIN — VITAMIN D, TAB 1000IU (100/BT) 5000 UNITS: 25 TAB at 10:09

## 2025-01-24 RX ADMIN — SODIUM CHLORIDE, PRESERVATIVE FREE 10 ML: 5 INJECTION INTRAVENOUS at 10:11

## 2025-01-24 ASSESSMENT — PAIN SCALES - GENERAL: PAINLEVEL_OUTOF10: 0

## 2025-01-24 NOTE — DISCHARGE SUMMARY
Hospitalist Discharge Summary   Admit Date:  2025  3:12 PM   DC Note date: 2025  Name:  Janine Gupta   Age:  84 y.o.  Sex:  female  :  1940   MRN:  [id number]   Room:  [facility]  PCP:  Ibis Bolton APRN - CNP    Presenting Complaint: Dizziness and Low Temperature     Initial Admission Diagnosis: Dehydration [E86.0]  Lightheadedness [R42]  Hypothermia, initial encounter [T68.XXXA]  Hypothermia due to non-environmental cause [R68.0]     Problem List for this Hospitalization (present on admission):    Principal Problem (Resolved):    Hypothermia, initial encounter  Active Problems:    Orthostatic hypotension    Debility    Atrial fibrillation (HCC)    Secondary hypercoagulable state (HCC)    Thrombocytopenia (HCC)    Pancreatic insufficiency    History of CVA (cerebrovascular accident)    Subclinical hypothyroidism  Resolved Problems:    Hypothermia due to non-environmental cause      Hospital Course:  Janine Gupta is a pleasant 84 y.o. female with medical history of hypertension, hyperlipidemia, coronary artery disease, hx prior CVA, heart failure, squamous cell carcinoma of the ER, CSF leak, asthma who presents to the hospital 25 complaining of weakness.  Patient found to be hypothermic at 91.1°F.  TSH elevated at 4.24 but free T4 normal. AM cortisol WNL.  Pro-Moises toning less than 0.02.  Urinalysis and chest x-ray without any concerns for infection. CT head not acute.  Respiratory viral panel negative.  Blood cultures obtained and were negative x 2 days. Uds negative. She was marginally hypoglycemic on admission but glycose remained > 80 during admission. Lipase 24 and US abdomen was benign (this was ordered 2/2 hx of pancreatitis). She was orthostatic during her admission but this is chronic for her. She never was fulminantly hypotensive but she did recently start aldactone as GDMT for HF so this was held. She has been orthostatic for years and manages it with slow  positional changes. Without ABX or intervention beyond initial hussein hugger her temperature corrected. It is hard to know what caused her hypothermia. I discussed resuming low dose synthroid but she is reluctant to doing that given that this cannot be completely correlated and she did not tolerate this medication well in the past. She will follow up closely with her PCP and with cardiology. She is to resume outpatient physical therapy. No home health has been ordered per patient and her daughters preference. She and her daughter expressed an understanding of the dc plan and has no questions at this time.   I discussed this case with my attending physician, Dr Killian.     Disposition: home  Diet: ADULT DIET; Regular; Low Sodium (2 gm)  Code Status: Full Code    Follow Ups:  Follow-up Information       Follow up With Specialties Details Why Contact Info    Ibis Bolton APRN - CNP Nurse Practitioner Follow up in 1 week(s)  2 Levine, Susan. \Hospital Has a New Name and Outlook.\""  [facility]  [phone]      Kianna Hernandez MD Cardiology Follow up in 1 month(s)  [address] [facility]  [address]  [place] [id number]  [phone]              Future Appointments         Provider Specialty Dept Phone    2/11/2025 10:30 AM [facility] Cardiology [phone]    3/7/2025 1:45 PM Kianna Hernandez MD Cardiology [phone]    3/31/2025 10:10 AM PST LAB [facility] [phone]    4/7/2025 1:30 PM Ibis Bolton APRN - CNP Family Medicine [phone]    5/19/2025 11:40 AM America Conteh APRN Neurology [phone]              Time spent in patient discharge and coordination 55 minutes.      Follow up labs/diagnostics (ultimately defer to outpatient provider):  TSH and T4 in 4-6 weeks  BMP  CBC     Plan was discussed with patient and her daughter.  All questions answered.  Patient was stable at time of discharge.  Instructions given to call a physician or return if any concerns.    Pending Labs       Order Current Status

## 2025-01-24 NOTE — PLAN OF CARE
Problem: Chronic Conditions and Co-morbidities  Goal: Patient's chronic conditions and co-morbidity symptoms are monitored and maintained or improved  1/24/2025 1357 by Gaviota Laurent RN  Outcome: Completed  1/24/2025 0245 by Torito Tinoco RN  Outcome: Progressing     Problem: Discharge Planning  Goal: Discharge to home or other facility with appropriate resources  1/24/2025 1357 by Gaviota Laurent RN  Outcome: Completed  1/24/2025 0245 by Torito Tinoco RN  Outcome: Progressing     Problem: Pain  Goal: Verbalizes/displays adequate comfort level or baseline comfort level  1/24/2025 1357 by Gvaiota Laurent RN  Outcome: Completed  1/24/2025 0245 by Torito Tinoco RN  Outcome: Progressing     Problem: Safety - Adult  Goal: Free from fall injury  1/24/2025 1357 by Gaviota Laurent RN  Outcome: Completed  1/24/2025 0245 by Torito Tinoco RN  Outcome: Progressing     Problem: ABCDS Injury Assessment  Goal: Absence of physical injury  1/24/2025 1357 by Gaviota Laurent RN  Outcome: Completed  1/24/2025 0245 by Torito Tinoco RN  Outcome: Progressing

## 2025-01-24 NOTE — PLAN OF CARE
Problem: Chronic Conditions and Co-morbidities  Goal: Patient's chronic conditions and co-morbidity symptoms are monitored and maintained or improved  Outcome: Progressing     Problem: Discharge Planning  Goal: Discharge to home or other facility with appropriate resources  1/24/2025 0245 by Torito Tinoco RN  Outcome: Progressing  1/23/2025 1355 by Dejah Ruth RN  Outcome: Progressing     Problem: Pain  Goal: Verbalizes/displays adequate comfort level or baseline comfort level  1/24/2025 0245 by Torito Tinoco RN  Outcome: Progressing  1/23/2025 1355 by Dejah Ruth RN  Outcome: Progressing     Problem: Safety - Adult  Goal: Free from fall injury  1/24/2025 0245 by Torito Tinoco RN  Outcome: Progressing  1/23/2025 1355 by Dejah Ruth RN  Outcome: Progressing     Problem: ABCDS Injury Assessment  Goal: Absence of physical injury  Outcome: Progressing

## 2025-01-24 NOTE — PROGRESS NOTES
Discharge instructions given to patient. Education provided. Medication changes and follow up appointments discussed with patient and family member. PIV removed with catheter intact, no signs or symptoms of infection, no redness, no warmth and no edema. All questions answered and patient/family have verbally voiced understanding prior to discharge. Patient discharged via wheelchair to hospital lobby.

## 2025-01-26 LAB
BACTERIA SPEC CULT: NORMAL
BACTERIA SPEC CULT: NORMAL
SERVICE CMNT-IMP: NORMAL
SERVICE CMNT-IMP: NORMAL

## 2025-01-29 ENCOUNTER — OFFICE VISIT (OUTPATIENT)
Dept: FAMILY MEDICINE CLINIC | Facility: CLINIC | Age: 85
End: 2025-01-29

## 2025-01-29 VITALS
TEMPERATURE: 97.3 F | OXYGEN SATURATION: 98 % | DIASTOLIC BLOOD PRESSURE: 62 MMHG | HEIGHT: 65 IN | SYSTOLIC BLOOD PRESSURE: 132 MMHG | WEIGHT: 130.4 LBS | HEART RATE: 68 BPM | BODY MASS INDEX: 21.73 KG/M2

## 2025-01-29 DIAGNOSIS — I63.9 CEREBROVASCULAR ACCIDENT (CVA), UNSPECIFIED MECHANISM (HCC): ICD-10-CM

## 2025-01-29 DIAGNOSIS — E03.9 HYPOTHYROIDISM, UNSPECIFIED TYPE: ICD-10-CM

## 2025-01-29 DIAGNOSIS — R41.3 MEMORY CHANGE: ICD-10-CM

## 2025-01-29 DIAGNOSIS — R42 DIZZINESS AND GIDDINESS: ICD-10-CM

## 2025-01-29 DIAGNOSIS — T68.XXXS HYPOTHERMIA, SEQUELA: ICD-10-CM

## 2025-01-29 DIAGNOSIS — H53.9 VISION CHANGES: ICD-10-CM

## 2025-01-29 DIAGNOSIS — Z09 HOSPITAL DISCHARGE FOLLOW-UP: Primary | ICD-10-CM

## 2025-01-29 LAB
ANION GAP SERPL CALC-SCNC: 10 MMOL/L (ref 7–16)
BUN SERPL-MCNC: 40 MG/DL (ref 8–23)
CALCIUM SERPL-MCNC: 9.9 MG/DL (ref 8.8–10.2)
CHLORIDE SERPL-SCNC: 101 MMOL/L (ref 98–107)
CO2 SERPL-SCNC: 29 MMOL/L (ref 20–29)
CREAT SERPL-MCNC: 0.95 MG/DL (ref 0.6–1.1)
GLUCOSE SERPL-MCNC: 144 MG/DL (ref 70–99)
POTASSIUM SERPL-SCNC: 4.3 MMOL/L (ref 3.5–5.1)
SODIUM SERPL-SCNC: 140 MMOL/L (ref 136–145)
TSH W FREE THYROID IF ABNORMAL: 3.79 UIU/ML (ref 0.27–4.2)

## 2025-01-29 ASSESSMENT — ENCOUNTER SYMPTOMS
ABDOMINAL DISTENTION: 0
CHEST TIGHTNESS: 0
CONSTIPATION: 0
RECTAL PAIN: 0
EYE PAIN: 0
ANAL BLEEDING: 0
SORE THROAT: 0
COLOR CHANGE: 0
NAUSEA: 0
STRIDOR: 0
FACIAL SWELLING: 0
BLOOD IN STOOL: 0
PHOTOPHOBIA: 0
WHEEZING: 0
EYE ITCHING: 0
SINUS PRESSURE: 0
GASTROINTESTINAL NEGATIVE: 1
SINUS PAIN: 0
EYE REDNESS: 0
ABDOMINAL PAIN: 0
DIARRHEA: 0
RESPIRATORY NEGATIVE: 1
ALLERGIC/IMMUNOLOGIC NEGATIVE: 1
VOMITING: 0
VOICE CHANGE: 0
RHINORRHEA: 0
TROUBLE SWALLOWING: 0
CHOKING: 0
APNEA: 0
EYE DISCHARGE: 0
BACK PAIN: 0
COUGH: 0
SHORTNESS OF BREATH: 0

## 2025-01-29 ASSESSMENT — PATIENT HEALTH QUESTIONNAIRE - PHQ9
SUM OF ALL RESPONSES TO PHQ9 QUESTIONS 1 & 2: 0
1. LITTLE INTEREST OR PLEASURE IN DOING THINGS: NOT AT ALL
SUM OF ALL RESPONSES TO PHQ QUESTIONS 1-9: 0
2. FEELING DOWN, DEPRESSED OR HOPELESS: NOT AT ALL
SUM OF ALL RESPONSES TO PHQ QUESTIONS 1-9: 0

## 2025-01-29 NOTE — PROGRESS NOTES
PROGRESS NOTE    Chief Complaint   Patient presents with    Follow-Up from Hospital     Patient was admitted on 1/21/25 for dizziness/Hypothermia.         SUBJECTIVE:     Janine Gupta is a 84 y.o. female with hx of hypertension, hyperlipidemia, CAD-currently following cardiology, asthma-currently following pulmonology, squamous cell carcinoma of left ear, CSF leak-currently following ENT, and arthritis, seen today in office accompanied by her daughter for hospital follow-up.  Patient was recently admitted on 1/21 until 1/24 for complaints of dizziness and low temperature along with lightheadedness.  She had extensive workup including TSH level, CT scanning of the head and blood cultures.  Her CT showed no acute abnormalities.  Her TSH did show elevated at 4.24 but free T4 was normal.  A.m. cortisol was also within normal limit.  Calcitonin was less than 0.02.  Chest x-ray were negative for acute abnormalities.    Since discharge, patient and her daughter still reports having some issue still with temp getting down to 93 F but has been using a lot of warm blanket and heating blankets and that does seem to provide moderate relief.  She reports that she was out today for her visit and has been walking more.  Her temperature here today is 97.3 Fahrenheit.    Her daughter who is present here today reports some confusion and memory recall issues.    Of note, she was on levothyroxine previously but patient was not able to tolerate medication due to side effects of nausea.    She has been ordered to have physical therapy and has established with 1 visit.  Currently waiting for the clearance to resume.    Past Medical History, Past Surgical History, Family history, Social History, and Medications were all reviewed with the patient today and updated as necessary.       Current Outpatient Medications   Medication Sig Dispense Refill    valsartan (DIOVAN) 80 MG tablet Take 1 tablet by mouth daily 90 tablet 3

## 2025-02-04 NOTE — PROGRESS NOTES
Physician Progress Note      PATIENT:               EDENILSON DOMINGUEZ  CSN #:                  [id number]  :                       1940  ADMIT DATE:       2025 3:12 PM  DISCH DATE:        2025 2:56 PM  RESPONDING  PROVIDER #:        Nas Killian DO          QUERY TEXT:    Patient admitted to observation status on 2025 for hypothermia. Per RN   note, Hussein Hugger removed  at 1849 and her temp was back to normal (97.5   oral) by  on 2025. Noted inpatient admission order on 2025.    If possible, please document in progress notes and discharge summary the   reason for inpatient admission.  The medical record reflects the following:    Risk Factors: age 84, chronic orthostatic BP, debility, Afib/SHS, HTN, HLD,   CAD  Clinical Indicators:  -CT head not acute.  - Respiratory viral panel negative.  - Blood cultures obtained and were negative x 2 days. Uds negative  --Creat 1.05-1.05-1.30, pro-nakia 0.02, WBC 7.9, Hgb 12.1-10.5-10.0  -orthostatic VS positive with PT: 125/65 (supine) > 116/71 (sitting) > 103/54   (standing)  -Temp 91.1-91.2F    up to 98F oral    -Per D/C summary: Without ABX or intervention beyond initial hussein hugger her   temperature corrected. It is hard to know what caused her hypothermia.  Treatment: Hussein Hugger, 500ml IV NS bolus, PO Aldactone, labs, and imagining  Options provided:  -- Inpatient admission for, Please specify diagnosis.  -- Other - I will add my own diagnosis  -- Disagree - Not applicable / Not valid  -- Disagree - Clinically unable to determine / Unknown  -- Refer to Clinical Documentation Reviewer    PROVIDER RESPONSE TEXT:    This patient was admitted inpatient for 3 midnights or longer admission for   hypothermia of unclear cause    Query created by: Anne Li on 2/3/2025 9:05 AM      Electronically signed by:  Nas Killian DO 2025 3:24 PM

## 2025-02-18 NOTE — PROGRESS NOTES
Tania Duke  : 1940 [facility] [address]  [address] [address].  [phone]   [phone]        OUTPATIENT PHYSICAL THERAPY:Daily Note and Progress Report 10/26/2018         ICD-10: Treatment Diagnosis: Muscle weakness (generalized) (M62.81)Other abnormalities of gait and mobility (R26.89)  Precautions/Allergies:   Adhesive tape-silicones; Azopt [brinzolamide]; Other medication; Statins-hmg-coa reductase inhibitors; and Sulfa (sulfonamide antibiotics)   Fall Risk Score: 7 (? 5 = High Risk)  MD Orders: eval and treat vestibular dysfunction, unsteady gait from neuropathy MEDICAL/REFERRING DIAGNOSIS:  Unsteadiness on feet [R26.81]  Dizziness and giddiness [R42]   DATE OF ONSET: years  REFERRING PHYSICIAN: Dawn Alonso MD  RETURN PHYSICIAN APPOINTMENT: 10/8/18     INITIAL ASSESSMENT:  Ms. Kasi Woodruff presents with weakness and balance loss from various sources inc neuropathy. She will benefit from skilled PT to increase strength and balance and address vestibular issues as indicated to improve pt's safety with gait. PROBLEM LIST (Impacting functional limitations):  1. Decreased Strength  2. Decreased ADL/Functional Activities  3. Decreased Transfer Abilities  4. Decreased Ambulation Ability/Technique  5. Decreased Balance  6. Decreased Activity Tolerance  7. Decreased Flexibility/Joint Mobility  8. Decreased [place] with Home Exercise Program INTERVENTIONS PLANNED:  1. Balance Exercise  2. Home Exercise Program (HEP)  3. Therapeutic Activites  4. Therapeutic Exercise/Strengthening    TREATMENT PLAN:  Effective Dates: 2018 TO 2018 (90 days). Frequency/Duration: 2 times a week for 90 Days  GOALS: (Goals have been discussed and agreed upon with patient.)  Short-Term Functional Goals: Time Frame: 2 weeks  1. Pt to report compliance with HEP. - ongoing  2. Pt to increase DF to 5 degrees bilaterally for improved gait mechanics.  - MET  Discharge Goals: Time Frame: 12 weeks  1. Pt to increase hip abd strength to 4/5 for more stability in gait. - ongoing  2. Pt to demonstrate > 4 sec SLS for reciprocal gait on stairs. - ongoing  3. Pt to use assistive device if needed for safe ambulation. - MET              HISTORY:   History of Present Injury/Illness (Reason for Referral):  Pt has hearing loss in left ear and was told to get hearing aids. She did not and saw another specialist who said she needed treatment on her ear before she got hearing aids. She has had that and is being treated for a mastoid infection. She has diabetic neuropathy. She is going to have a NCV 10/8. She tripped over her 's oxygen line and fell about 3 months ago. She loses her balance a lot. She does not want to use a walker. She takes 2 meds that cause dizziness. She walks her dog in her yard and walks to get the mail but no exercise. She was told her vertigo was meniere's years ago but other doctors have contradicted that. She had vestibular rehab without any symptom changes. Past Medical History/Comorbidities:   Ms. Omkar Wilder  has a past medical history of Asthma, Axonal polyneuropathy, Cardiomyopathy, ischemic, Chronic vertigo, DM2 (diabetes mellitus, type 2) ([address] GERD (gastroesophageal reflux disease), Hearing loss, History of AAA (abdominal aortic aneurysm) repair, History of bilateral cataract extraction, History of coronary artery disease, History of gallbladder disease, History of hernia repair, History of seborrheic keratosis, Hyperlipidemia, Hypertension, Lacunar infarction, Meniere's disease, Menopause, Morbid obesity ([address] OA (osteoarthritis), Orthostatic hypotension, Osteopenia with high risk of fracture, Post-menopausal osteoporosis, Postmenopausal osteoporosis, Unspecified sleep apnea, Unsteady gait, and Visit for dental examination.   Ms. Omkar Wilder  has a past surgical history that includes hx open cholecystectomy (1979); hx orthopaedic; hx cataract removal (3/10); hx hernia repair; pr cardiac surg procedure unlist; hx hysterectomy (1997); hx oophorectomy (1982); pr breast surgery procedure unlisted (mid 2000's); VITRECTOMY POSTERIOR 25 GAUGE (Left, 9/17/2012); and VITRECTOMY POSTERIOR (Left, 3/31/2010). Social History/Living Environment:     lives in 2 story home with  but doesn't need to go upstairs  Prior Level of Function/Work/Activity:  retired  Dominant Side:         RIGHT  Current Medications:    Current Outpatient Medications:     cyanocobalamin (VITAMIN B-12) 1,000 mcg sublingual tablet, Take 1,000 mcg by mouth daily. Indications: OTC, Disp: , Rfl:     varicella-zoster recombinant, PF, (SHINGRIX, PF,) 50 mcg/0.5 mL susr injection, 0.5mL by IntraMUSCular route once now and then repeat in 2-6 months, Disp: 0.5 mL, Rfl: 1    pitavastatin calcium (LIVALO) 4 mg tab tablet, Take 1 Tab by mouth daily. , Disp: 90 Tab, Rfl: 3    potassium chloride (KLOR-CON) 10 mEq tablet, Take 1 Tab by mouth daily. , Disp: 90 Tab, Rfl: 3    triamterene-hydroCHLOROthiazide (MAXZIDE) 37.5-25 mg per tablet, Take 1 Tab by mouth daily. , Disp: 90 Tab, Rfl: 3    losartan (COZAAR) 100 mg tablet, Take 1 Tab by mouth daily. , Disp: 90 Tab, Rfl: 3    clopidogrel (PLAVIX) 75 mg tab, Take 1 Tab by mouth daily. , Disp: 90 Tab, Rfl: 3    metoprolol succinate (TOPROL XL) 100 mg tablet, Take 1 Tab by mouth nightly., Disp: 90 Tab, Rfl: 3    nitroglycerin (NITROSTAT) 0.4 mg SL tablet, 1 Tab by SubLINGual route every five (5) minutes as needed. , Disp: 25 Tab, Rfl: 6    fluticasone (FLONASE) 50 mcg/actuation nasal spray, 2 Sprays by Both Nostrils route daily for 90 days. , Disp: 3 Bottle, Rfl: 3    albuterol (PROAIR HFA) 90 mcg/actuation inhaler, Take 2 Puffs by inhalation four (4) times daily as needed for up to 90 days. , Disp: 3 Inhaler, Rfl: 3    Cholecalciferol, Vitamin D3, (VITAMIN D3) 1,000 unit cap, Take 2,000 Units by mouth daily. , Disp: , Rfl:     aspirin (ASPIRIN) 325 mg tablet, Take 325 mg by mouth every morning., Disp: , Rfl:    Date Last Reviewed:  10/26/2018   EXAMINATION:   Observation/Orthostatic Postural Assessment:          Pt has antalgic gait with right glut med lurch. The left shoe is worn across the heel more than the right. Palpation:          Mild tenderness at left greater trochanter  ROM:     5 degrees DF bilaterally 9/21/18      Strength:     Right hip abd, ext, PF 3-/5, quad 5/5  Left hip abd, ext 3+/5, PF 3-/5, quad 5/5  10/26/18               Neurological Screen:        unremarkable  Functional Mobility:         Gait/Ambulation:  Antalgic level surfaces        Transfers:  Uses UE's to assist        Stairs:  Step-to pattern and uses UE's to pull up  Balance:          1 sec R, 2 sec L  10/26/18   Body Structures Involved:  1. Nerves  2. Muscles Body Functions Affected:  1. Sensory/Pain  2. Neuromusculoskeletal  3. Movement Related Activities and Participation Affected:  1. General Tasks and Demands  2. Mobility  3. Self Care  4. Domestic Life  5. Community, Social and Civic Life   CLINICAL PRESENTATION:   CLINICAL DECISION MAKING:   Outcome Measure: Tool Used: Diaz Balance Scale  Score:  Initial: 39/56 Most Recent: 43/56 (Date: 9/21/18 )                       44/56 (Date: 10/26/18)   Interpretation of Score: Each section is scored on a 0-4 scale, 0 representing the patients inability to perform the task and 4 representing independence. The scores of each section are added together for a total score of 56. The higher the patients score, the more independent the patient is. Any score below 45 indicates increased risk for falls. Score 56 55-45 44-34 33-23 22-12 11-1 0   Modifier CH CI CJ CK CL CM CN     ?  Mobility - Walking and Moving Around:     - CURRENT STATUS: CJ - 20%-39% impaired, limited or restricted    - GOAL STATUS: CI - 1%-19% impaired, limited or restricted    - D/C STATUS:  ---------------To be 59F PMH HTN, ESRD, on MWF HD, s/p renal transplant 2016, HFpEF, thrombocytopenia, ascites s/p ex lap small bowel resection, primary anastomosis, ventral hernia repair with mesh for incarcerated ventral hernia. Patient brought to SICU intubated, on pressors. Pressors weaned in unit.     PLAN:  NEURO:  - Sedation: precedex, fentanyl gtt  - Pain control: IV tylenol    RESPIRATORY:  - AC/VC: RR 14  PEEP 5 FiO2 45%  - SBT in AM    CARDIOVASCULAR:  - MAP >65, on noah    GI/NUTRITION:  - NPO  - NGt to LCWS    /RENAL:  - IV locked  - HD per nephrology  - s/p 250 cc albumin x2    VASCULAR/HEME:  - DVT ppx: SCDs, SQH    INFECTIOUS DISEASE:  - Zosyn   - Trend WBC    ENDOCRINE:  - Monitor glucose    LINES/TUBES/DRAINS:  - L radial a line  - PIV  - NGt    DISPO: SICU determined---------------    Medical Necessity:   · Patient demonstrates fair rehab potential due to higher previous functional level. Reason for Services/Other Comments:  · Patient continues to require present interventions due to patient's inability to ambulate safely all surfaces. TREATMENT:   (In addition to Assessment/Re-Assessment sessions the following treatments were rendered)  THERAPEUTIC ACTIVITY: ( see below for minutes): Therapeutic activities per grid below to improve mobility. Required moderate verbal and manual cues to promote motor control of bilateral, lower extremity(s). THERAPEUTIC EXERCISE: (see below for minutes):  Exercises per grid below to improve strength. Required moderate verbal and manual cues to promote proper body alignment, promote proper body posture and promote proper body mechanics. Progressed resistance, range, repetitions and complexity of movement as indicated.     Date: 8/29/18 8/31/18  Visit 2 9/5/18  Visit 3 9/7/18  Visit 4 9/12/18  Visit 5 9/14/18  Visit 6 9/19/18  Visit 7 9/21/18  Visit 8  PN 9/26/18  Visit 9 9/28/18  Visit 10 10/10/18  Visit 11 10/12/18  Visit 12 10/17/18  Visit 13 10/19/18  Visit 14 10/23/18  Visit 15 10/26/18  Visit 16  PN    Modalities:                                                                                Therapeutic Exercise: 15 mins 25 mins 20 mins 20 mins 20 mins 20 mins 25 mins 25 mins 20 mins 20 mins 20 mins 20 mins 15 mins 15 mins 15 mins 15 mins    LAQ B 20 reps 30 reps   30 reps 30 reps   30 reps  5 lb 30 reps  5 lb            Slant board 3 x 10 sec 3 x 10 sec 5 x 10 sec 5 x 10 sec 5 x 10 sec 5 x 10 sec 5 x 10 sec 5 x 10 sec 5 x 10 sec 5 x 10 sec 5 x 10 sec 5 x 10 sec  5 x 10 sec 5 x 10 sec 5 x 10 sec    Clams B 20 reps 30 reps 30 reps 30 reps 30 reps 30 reps 2 x 20 reps 2 x 20 reps 30 reps X 30 X 30 X 30        S/L hip abd B With assist  20 reps With assist  30 reps 30 reps  With assist 30 reps  With assist 30 reps  With assist 30 reps  With assist 2 x 20 reps  With assist 2 x 20 reps  With assist 30 reps  assist X 30 assist  X 30   With assist X 30   With assist        Glut sets  Supine, standing  5 mins                  Calf raises  30 reps 30 reps 30 reps 30 reps 30 reps 30 reps 30 reps 30 reps X 30 X 30 X 30 X 30 X 30 X 30 X 30    Standing right hip diagonal   30 reps 30 reps 30 reps 30 reps 30 reps 30 reps 30 reps X 30 X 30 X 30 X 30 X 30 X 30 X 30    Standing hp ext          X 30 X 30 X 30 X 30 X 30 X 30 X 30    Seated manual hip abd resistance             X 30        Seated marching             X 30 Standing   X 30 Standing  X 30 Standing  X 30                        Proprioceptive Activities:                                                                                Manual Therapy:                                                            Therapeutic Activities: 15 mins 15 mins 25 mins 25 mins 25 mins 25 mins 20 mins 20 mins 25 mins 25 mins 25 mins 25 mins 25 mins 25 mins 25 mins 25 mins    Pt education, postural education, HEP 5 mins                   bridging 20 reps 20 reps 30 reps 30 reps 30 reps 30 reps 30 reps 30 reps 30 reps X 30 X 30 X 30 symm  X 20 staggered foot position        SLS holding pelvis level B With assist  5 reps With assist  5 reps With assist  5 reps With assist  5 reps With assist  5 reps With assist  5 reps 5 reps  With assist  X 5 with assist X 5   assist X 5 assist X 5 assist X 10 X 10  X 10 X 10 X 10    Sit to stand  15 reps  With assist 20 reps  With assist      20 reps  assist Squats   X 20 Squats  X 20 Squats  X 20 Squats  X 20 Squats  X 20 Squats  X 20 Squats  X 30    Stepping with left foot holding pelvis level with UE support   X 15 reps X 30 reps 30 reps 30 reps 30 reps 30 reps 30 reps X 30 X 30 X 30 X 30 X 30 X 30 X 30    Foam standing eyes open and closed   4 mins 6 mins 5 mins 5 mins 5 mins 5 mins 5 mins 5 mins 5 mins 5 mins  4 mins 4 mins     Step ups lat         3 inches  X 10 each    3 inches   x 15 each 3 inches   X 15 each 3 inches  X 15 each 3 inches   X 15    perturbations          3 mins 3 mins         Lat stepping            3 laps 4 laps 4 laps 4 laps 4 laps    Walking holding pelvis level watching in mirror            4 laps X 3 laps 3 laps      Tandem stance              X 2 cycles X 2 cycles X 2 cycles X 2 cycles    HEP: see hand out  [facility]  Treatment/Session Assessment:  Pt has shown improved ex rogelio and gait quality. She has other issues affecting her balance. She has finally reconciled that she is safer using a walker when she is out which is a positive step. Will continue addressing strength and balance for pt to follow through with at home. · Pain/ Symptoms: Initial: 0/10  I can tell I am stronger. I realized I need a walker to walk any distance outside because of my head getting crazy on me. Post Session:  0/10 ·   Compliance with Program/Exercises: Will assess as treatment progresses. · Recommendations/Intent for next treatment session: \"Next visit will focus on advancements to more challenging activities\".   Total Treatment Duration:  PT Patient Time In/Time Out  Time In: 0930  Time Out: 1015     Margaretta Seip [place], PT

## 2025-03-06 NOTE — PROGRESS NOTES
[facility] CARDIOLOGY  [address] [place] [address], [address]  [place] [address]  PHONE: [phone]      25    NAME:  Janine Gupta  : 1940  MRN: [id number]         SUBJECTIVE:   Janine Gupta is a 84 y.o. female seen for a follow up visit regarding the following:     Chief Complaint   Patient presents with    Follow-up    Congestive Heart Failure            HPI:  Follow up  Follow-up and Congestive Heart Failure   .    Follow up CAD,  chronic LBBB, statin intolerance,orthostatic hypotension with resting hypertension, dyslipidemia, NICM with concomitant CAD,  AAA, CSF leak,  post NSTEMI with depressed EF. She did not tolerate Farxiga d/t intravascular volume depletion, subsequently diagnosed with pancreatic insufficiency with chronic diarrhea, which appears to be exacerbated by furosemide which she uses infrequently, embolic stroke, atrial fib, ocular migraine.  Back on low dose ARB for HFrEF, no ARNI/MRA d/t hypotension.  Unfortunately, but not surprisingly, her EF has not improved. Her dizziness and nausea improved dramatically off MRA.     Baseline QRS is LBBB with duration 190 ms    She had lost down to ~ 128 lbs now back to ~ 132 at home.  She says she is ok.  Has been in physical therapy 2 days a week, does well with it.              Past cardiac history:   Remote coronary stents   Treatment limited by orthostatic hypotension   Medical therapy of an obstructed PDA, 40% LAD by cath .  Stress nuclear perfusion scan with anterolateral scar, minimal per-scar ischemia with normal wall motion and EF 68% in 2010.  Not significantly changed 12.   2014 - patent stents, EF 45% (50-55 by echo)   2015 - Lexiscan - fixed inferolateral defect, inferior HK, EF 56%   2016- EF 50-55%, mild to mod MR   Oct 2018- EF 49%, no significant valve disease   2018- infrarenal AAA 3.8 cm   Oct 2019- EF 45-50%, no significant valve disease.   CT - AAA 3.5    2020- EF 50%,

## 2025-03-07 ENCOUNTER — OFFICE VISIT (OUTPATIENT)
Age: 85
End: 2025-03-07
Payer: MEDICARE

## 2025-03-07 VITALS
HEIGHT: 65 IN | SYSTOLIC BLOOD PRESSURE: 159 MMHG | WEIGHT: 135 LBS | HEART RATE: 66 BPM | DIASTOLIC BLOOD PRESSURE: 72 MMHG | BODY MASS INDEX: 22.49 KG/M2

## 2025-03-07 DIAGNOSIS — I44.7 LBBB (LEFT BUNDLE BRANCH BLOCK): ICD-10-CM

## 2025-03-07 DIAGNOSIS — I25.118 CORONARY ARTERY DISEASE OF NATIVE HEART WITH STABLE ANGINA PECTORIS, UNSPECIFIED VESSEL OR LESION TYPE: ICD-10-CM

## 2025-03-07 DIAGNOSIS — I95.1 ORTHOSTATIC HYPOTENSION: ICD-10-CM

## 2025-03-07 DIAGNOSIS — I50.22 CHRONIC SYSTOLIC (CONGESTIVE) HEART FAILURE (HCC): Primary | ICD-10-CM

## 2025-03-07 PROCEDURE — 99214 OFFICE O/P EST MOD 30 MIN: CPT | Performed by: INTERNAL MEDICINE

## 2025-03-07 PROCEDURE — G8420 CALC BMI NORM PARAMETERS: HCPCS | Performed by: INTERNAL MEDICINE

## 2025-03-07 PROCEDURE — 1036F TOBACCO NON-USER: CPT | Performed by: INTERNAL MEDICINE

## 2025-03-07 PROCEDURE — 1123F ACP DISCUSS/DSCN MKR DOCD: CPT | Performed by: INTERNAL MEDICINE

## 2025-03-07 PROCEDURE — G8427 DOCREV CUR MEDS BY ELIG CLIN: HCPCS | Performed by: INTERNAL MEDICINE

## 2025-03-07 PROCEDURE — 1159F MED LIST DOCD IN RCRD: CPT | Performed by: INTERNAL MEDICINE

## 2025-03-07 PROCEDURE — 1126F AMNT PAIN NOTED NONE PRSNT: CPT | Performed by: INTERNAL MEDICINE

## 2025-03-07 PROCEDURE — 1090F PRES/ABSN URINE INCON ASSESS: CPT | Performed by: INTERNAL MEDICINE

## 2025-03-07 PROCEDURE — 1160F RVW MEDS BY RX/DR IN RCRD: CPT | Performed by: INTERNAL MEDICINE

## 2025-03-07 ASSESSMENT — ENCOUNTER SYMPTOMS: SHORTNESS OF BREATH: 1

## 2025-03-30 DIAGNOSIS — E11.21 TYPE 2 DIABETES WITH NEPHROPATHY (HCC): ICD-10-CM

## 2025-03-30 DIAGNOSIS — E83.42 HYPOMAGNESEMIA: ICD-10-CM

## 2025-03-30 DIAGNOSIS — I10 ESSENTIAL HYPERTENSION: ICD-10-CM

## 2025-03-30 DIAGNOSIS — K21.9 GASTROESOPHAGEAL REFLUX DISEASE WITHOUT ESOPHAGITIS: ICD-10-CM

## 2025-03-30 DIAGNOSIS — E03.9 HYPOTHYROIDISM, UNSPECIFIED TYPE: Primary | ICD-10-CM

## 2025-03-30 DIAGNOSIS — E55.9 VITAMIN D DEFICIENCY: ICD-10-CM

## 2025-03-30 DIAGNOSIS — E53.8 B12 DEFICIENCY: ICD-10-CM

## 2025-03-30 DIAGNOSIS — I48.0 PAROXYSMAL ATRIAL FIBRILLATION (HCC): ICD-10-CM

## 2025-04-01 ENCOUNTER — LAB (OUTPATIENT)
Dept: FAMILY MEDICINE CLINIC | Facility: CLINIC | Age: 85
End: 2025-04-01

## 2025-04-01 DIAGNOSIS — I48.0 PAROXYSMAL ATRIAL FIBRILLATION (HCC): ICD-10-CM

## 2025-04-01 DIAGNOSIS — E53.8 B12 DEFICIENCY: ICD-10-CM

## 2025-04-01 DIAGNOSIS — E83.42 HYPOMAGNESEMIA: ICD-10-CM

## 2025-04-01 DIAGNOSIS — E03.9 HYPOTHYROIDISM, UNSPECIFIED TYPE: ICD-10-CM

## 2025-04-01 DIAGNOSIS — E11.21 TYPE 2 DIABETES WITH NEPHROPATHY (HCC): ICD-10-CM

## 2025-04-01 LAB
ALBUMIN SERPL-MCNC: 3.6 G/DL (ref 3.2–4.6)
ALBUMIN/GLOB SERPL: 1.2 (ref 1–1.9)
ALP SERPL-CCNC: 84 U/L (ref 35–104)
ALT SERPL-CCNC: 15 U/L (ref 8–45)
ANION GAP SERPL CALC-SCNC: 9 MMOL/L (ref 7–16)
AST SERPL-CCNC: 19 U/L (ref 15–37)
BASOPHILS # BLD: 0.07 K/UL (ref 0–0.2)
BASOPHILS NFR BLD: 1 % (ref 0–2)
BILIRUB SERPL-MCNC: 0.3 MG/DL (ref 0–1.2)
BUN SERPL-MCNC: 27 MG/DL (ref 8–23)
CALCIUM SERPL-MCNC: 10 MG/DL (ref 8.8–10.2)
CHLORIDE SERPL-SCNC: 105 MMOL/L (ref 98–107)
CHOLEST SERPL-MCNC: 129 MG/DL (ref 0–200)
CO2 SERPL-SCNC: 29 MMOL/L (ref 20–29)
CREAT SERPL-MCNC: 0.96 MG/DL (ref 0.6–1.1)
CREAT UR-MCNC: 78 MG/DL (ref 28–217)
DIFFERENTIAL METHOD BLD: ABNORMAL
EOSINOPHIL # BLD: 0.12 K/UL (ref 0–0.8)
EOSINOPHIL NFR BLD: 1.7 % (ref 0.5–7.8)
ERYTHROCYTE [DISTWIDTH] IN BLOOD BY AUTOMATED COUNT: 15.9 % (ref 11.9–14.6)
EST. AVERAGE GLUCOSE BLD GHB EST-MCNC: 125 MG/DL
FOLATE SERPL-MCNC: 14.9 NG/ML (ref 3.1–17.5)
GLOBULIN SER CALC-MCNC: 3 G/DL (ref 2.3–3.5)
GLUCOSE SERPL-MCNC: 106 MG/DL (ref 70–99)
HBA1C MFR BLD: 6 % (ref 0–5.6)
HCT VFR BLD AUTO: 33.6 % (ref 35.8–46.3)
HDLC SERPL-MCNC: 54 MG/DL (ref 40–60)
HDLC SERPL: 2.4 (ref 0–5)
HGB BLD-MCNC: 10.7 G/DL (ref 11.7–15.4)
IMM GRANULOCYTES # BLD AUTO: 0.08 K/UL (ref 0–0.5)
IMM GRANULOCYTES NFR BLD AUTO: 1.1 % (ref 0–5)
LDLC SERPL CALC-MCNC: 54 MG/DL (ref 0–100)
LYMPHOCYTES # BLD: 1.88 K/UL (ref 0.5–4.6)
LYMPHOCYTES NFR BLD: 25.9 % (ref 13–44)
MAGNESIUM SERPL-MCNC: 2 MG/DL (ref 1.8–2.4)
MCH RBC QN AUTO: 29.1 PG (ref 26.1–32.9)
MCHC RBC AUTO-ENTMCNC: 31.8 G/DL (ref 31.4–35)
MCV RBC AUTO: 91.3 FL (ref 82–102)
MICROALBUMIN UR-MCNC: 17 MG/DL (ref 0–20)
MICROALBUMIN/CREAT UR-RTO: 218 MG/G (ref 0–30)
MONOCYTES # BLD: 0.81 K/UL (ref 0.1–1.3)
MONOCYTES NFR BLD: 11.1 % (ref 4–12)
NEUTS SEG # BLD: 4.31 K/UL (ref 1.7–8.2)
NEUTS SEG NFR BLD: 59.2 % (ref 43–78)
NRBC # BLD: 0 K/UL (ref 0–0.2)
PLATELET # BLD AUTO: 152 K/UL (ref 150–450)
PMV BLD AUTO: 12.9 FL (ref 9.4–12.3)
POTASSIUM SERPL-SCNC: 4.2 MMOL/L (ref 3.5–5.1)
PROT SERPL-MCNC: 6.6 G/DL (ref 6.3–8.2)
RBC # BLD AUTO: 3.68 M/UL (ref 4.05–5.2)
SODIUM SERPL-SCNC: 143 MMOL/L (ref 136–145)
TRIGL SERPL-MCNC: 103 MG/DL (ref 0–150)
TSH W FREE THYROID IF ABNORMAL: 3.09 UIU/ML (ref 0.27–4.2)
VIT B12 SERPL-MCNC: 857 PG/ML (ref 193–986)
VLDLC SERPL CALC-MCNC: 21 MG/DL (ref 6–23)
WBC # BLD AUTO: 7.3 K/UL (ref 4.3–11.1)

## 2025-04-07 ENCOUNTER — OFFICE VISIT (OUTPATIENT)
Dept: FAMILY MEDICINE CLINIC | Facility: CLINIC | Age: 85
End: 2025-04-07
Payer: MEDICARE

## 2025-04-07 VITALS
OXYGEN SATURATION: 96 % | BODY MASS INDEX: 21.66 KG/M2 | WEIGHT: 130 LBS | HEIGHT: 65 IN | DIASTOLIC BLOOD PRESSURE: 68 MMHG | HEART RATE: 73 BPM | SYSTOLIC BLOOD PRESSURE: 128 MMHG

## 2025-04-07 DIAGNOSIS — I10 ESSENTIAL HYPERTENSION: ICD-10-CM

## 2025-04-07 DIAGNOSIS — D64.9 ANEMIA, UNSPECIFIED TYPE: ICD-10-CM

## 2025-04-07 DIAGNOSIS — I48.0 PAROXYSMAL ATRIAL FIBRILLATION (HCC): ICD-10-CM

## 2025-04-07 DIAGNOSIS — R42 DIZZINESS: ICD-10-CM

## 2025-04-07 DIAGNOSIS — I50.22 CHRONIC SYSTOLIC (CONGESTIVE) HEART FAILURE: ICD-10-CM

## 2025-04-07 DIAGNOSIS — N18.30 STAGE 3 CHRONIC KIDNEY DISEASE, UNSPECIFIED WHETHER STAGE 3A OR 3B CKD (HCC): ICD-10-CM

## 2025-04-07 DIAGNOSIS — E55.9 VITAMIN D DEFICIENCY: ICD-10-CM

## 2025-04-07 DIAGNOSIS — E03.9 HYPOTHYROIDISM, UNSPECIFIED TYPE: Primary | ICD-10-CM

## 2025-04-07 DIAGNOSIS — I25.119 ATHEROSCLEROSIS OF NATIVE CORONARY ARTERY OF NATIVE HEART WITH ANGINA PECTORIS: ICD-10-CM

## 2025-04-07 DIAGNOSIS — E11.21 TYPE 2 DIABETES WITH NEPHROPATHY (HCC): ICD-10-CM

## 2025-04-07 PROCEDURE — 1160F RVW MEDS BY RX/DR IN RCRD: CPT | Performed by: NURSE PRACTITIONER

## 2025-04-07 PROCEDURE — 1123F ACP DISCUSS/DSCN MKR DOCD: CPT | Performed by: NURSE PRACTITIONER

## 2025-04-07 PROCEDURE — 1159F MED LIST DOCD IN RCRD: CPT | Performed by: NURSE PRACTITIONER

## 2025-04-07 PROCEDURE — G8427 DOCREV CUR MEDS BY ELIG CLIN: HCPCS | Performed by: NURSE PRACTITIONER

## 2025-04-07 PROCEDURE — 1090F PRES/ABSN URINE INCON ASSESS: CPT | Performed by: NURSE PRACTITIONER

## 2025-04-07 PROCEDURE — 99214 OFFICE O/P EST MOD 30 MIN: CPT | Performed by: NURSE PRACTITIONER

## 2025-04-07 PROCEDURE — G2211 COMPLEX E/M VISIT ADD ON: HCPCS | Performed by: NURSE PRACTITIONER

## 2025-04-07 PROCEDURE — 3044F HG A1C LEVEL LT 7.0%: CPT | Performed by: NURSE PRACTITIONER

## 2025-04-07 PROCEDURE — G8420 CALC BMI NORM PARAMETERS: HCPCS | Performed by: NURSE PRACTITIONER

## 2025-04-07 PROCEDURE — 1036F TOBACCO NON-USER: CPT | Performed by: NURSE PRACTITIONER

## 2025-04-07 NOTE — PROGRESS NOTES
unexpected weight change.   HENT: Negative.  Negative for congestion, dental problem, drooling, ear discharge, ear pain, facial swelling, hearing loss, mouth sores, nosebleeds, postnasal drip, rhinorrhea, sinus pressure, sinus pain, sneezing, sore throat, tinnitus, trouble swallowing and voice change.    Eyes:  Negative for photophobia, pain, discharge, redness, itching and visual disturbance.   Respiratory: Negative.  Negative for apnea, cough, choking, chest tightness, shortness of breath, wheezing and stridor.    Cardiovascular: Negative.  Negative for chest pain, palpitations and leg swelling.   Gastrointestinal: Negative.  Negative for abdominal distention, abdominal pain, anal bleeding, blood in stool, constipation, diarrhea, nausea, rectal pain and vomiting.   Endocrine: Negative.  Negative for cold intolerance, heat intolerance, polydipsia, polyphagia and polyuria.   Genitourinary: Negative.  Negative for decreased urine volume, difficulty urinating, dysuria, enuresis, flank pain, frequency, genital sores, hematuria and urgency.   Musculoskeletal: Negative.  Negative for arthralgias, back pain, gait problem, joint swelling, myalgias, neck pain and neck stiffness.   Skin: Negative.  Negative for color change, pallor, rash and wound.   Allergic/Immunologic: Negative.  Negative for environmental allergies, food allergies and immunocompromised state.   Neurological:  Positive for dizziness (History of Ménière's and CSF leak). Negative for tremors, seizures, syncope, facial asymmetry, speech difficulty, weakness, light-headedness, numbness and headaches.   Hematological: Negative.  Negative for adenopathy. Does not bruise/bleed easily.   Psychiatric/Behavioral: Negative.  Negative for agitation, behavioral problems, confusion, decreased concentration, dysphoric mood, hallucinations, self-injury, sleep disturbance (hx of GUS -reports previously being treated with CPAP but has \"gave up on it\" some years ago.) and

## 2025-05-19 ENCOUNTER — OFFICE VISIT (OUTPATIENT)
Dept: NEUROLOGY | Age: 85
End: 2025-05-19
Payer: MEDICARE

## 2025-05-19 VITALS
SYSTOLIC BLOOD PRESSURE: 142 MMHG | HEART RATE: 83 BPM | BODY MASS INDEX: 21.66 KG/M2 | OXYGEN SATURATION: 100 % | HEIGHT: 65 IN | WEIGHT: 130 LBS | DIASTOLIC BLOOD PRESSURE: 82 MMHG

## 2025-05-19 DIAGNOSIS — I48.0 PAF (PAROXYSMAL ATRIAL FIBRILLATION) (HCC): ICD-10-CM

## 2025-05-19 DIAGNOSIS — R26.81 UNSTEADY GAIT: ICD-10-CM

## 2025-05-19 DIAGNOSIS — E11.21 TYPE 2 DIABETES WITH NEPHROPATHY (HCC): ICD-10-CM

## 2025-05-19 DIAGNOSIS — H53.461 RIGHT HOMONYMOUS HEMIANOPSIA: ICD-10-CM

## 2025-05-19 DIAGNOSIS — G43.109 OCULAR MIGRAINE: ICD-10-CM

## 2025-05-19 DIAGNOSIS — I69.30 HISTORY OF STROKE WITH RESIDUAL DEFICIT: Primary | ICD-10-CM

## 2025-05-19 DIAGNOSIS — I69.311 MEMORY DEFICIT DUE TO AND NOT CONCURRENT WITH CEREBROVASCULAR ACCIDENT (CVA): ICD-10-CM

## 2025-05-19 PROCEDURE — 1126F AMNT PAIN NOTED NONE PRSNT: CPT | Performed by: NURSE PRACTITIONER

## 2025-05-19 PROCEDURE — 1160F RVW MEDS BY RX/DR IN RCRD: CPT | Performed by: NURSE PRACTITIONER

## 2025-05-19 PROCEDURE — G8427 DOCREV CUR MEDS BY ELIG CLIN: HCPCS | Performed by: NURSE PRACTITIONER

## 2025-05-19 PROCEDURE — 1036F TOBACCO NON-USER: CPT | Performed by: NURSE PRACTITIONER

## 2025-05-19 PROCEDURE — 99214 OFFICE O/P EST MOD 30 MIN: CPT | Performed by: NURSE PRACTITIONER

## 2025-05-19 PROCEDURE — 1123F ACP DISCUSS/DSCN MKR DOCD: CPT | Performed by: NURSE PRACTITIONER

## 2025-05-19 PROCEDURE — 3044F HG A1C LEVEL LT 7.0%: CPT | Performed by: NURSE PRACTITIONER

## 2025-05-19 PROCEDURE — 1090F PRES/ABSN URINE INCON ASSESS: CPT | Performed by: NURSE PRACTITIONER

## 2025-05-19 PROCEDURE — G8420 CALC BMI NORM PARAMETERS: HCPCS | Performed by: NURSE PRACTITIONER

## 2025-05-19 PROCEDURE — 1159F MED LIST DOCD IN RCRD: CPT | Performed by: NURSE PRACTITIONER

## 2025-05-19 NOTE — PROGRESS NOTES
controlled. Currently on nurtec ODT 75 mg daily as needed.     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    CAD (coronary artery disease)     Cardiomyopathy, ischemic 1/14/2016    Chronic vertigo     DM2 (diabetes mellitus, type 2) ([facility])     does not check glucose    GERD (gastroesophageal reflux disease)     Hearing loss     History of bilateral cataract extraction     History of coronary artery disease 2006, 2007    MI x 2 after stents placed-- with in 1 week-- stents x3    History of gallbladder disease     History of hernia repair     History of seborrheic keratosis     Hyperlipidemia     Hypertension     Lacunar infarction (HCC) 8/16/2018    Meniere's disease     Menopause     Morbid obesity ([facility])     OA (osteoarthritis)     Orthostatic hypotension 1/14/2016    Osteopenia with high risk of fracture 2014    Osteopenia with significant interval density loss in the hips since 2014    Post-menopausal osteoporosis     Postmenopausal osteoporosis     Unspecified sleep apnea     does not use c pap, pt. states asymptomatic since weight loss    Unsteady gait 8/16/2018    Visit for dental examination     SEVERAL YEARS AGO       Past Surgical History:   Procedure Laterality Date    APPENDECTOMY  1984?    during abdominal surgery    BREAST SURGERY  mid 2000's    benign right breast biopsy    CARDIAC PROCEDURE N/A 01/20/2023    LEFT HEART CATH / CORONARY ANGIOGRAPHY performed by Johan Hamilton MD at [facility] CARDIAC CATH LAB    CARDIAC PROCEDURE N/A 01/20/2023    Percutaneous coronary intervention performed by Johan Hamilton MD at [facility] CARDIAC CATH LAB    CATARACT REMOVAL  3/10    bilat and retina repair    CHOLECYSTECTOMY, OPEN  1979    EYE SURGERY  several    cataract retina    HERNIA REPAIR      multiple surgery site- midline, umbilical    HYSTERECTOMY (CERVIX STATUS UNKNOWN)  1997    HYSTERECTOMY, VAGINAL  1997

## 2025-05-23 ENCOUNTER — INITIAL CONSULT (OUTPATIENT)
Age: 85
End: 2025-05-23

## 2025-05-23 ENCOUNTER — TELEPHONE (OUTPATIENT)
Age: 85
End: 2025-05-23

## 2025-05-23 VITALS
WEIGHT: 128.8 LBS | DIASTOLIC BLOOD PRESSURE: 78 MMHG | HEART RATE: 71 BPM | BODY MASS INDEX: 21.46 KG/M2 | HEIGHT: 65 IN | SYSTOLIC BLOOD PRESSURE: 140 MMHG

## 2025-05-23 DIAGNOSIS — I71.43 INFRARENAL ABDOMINAL AORTIC ANEURYSM (AAA) WITHOUT RUPTURE: ICD-10-CM

## 2025-05-23 DIAGNOSIS — I50.20 HFREF (HEART FAILURE WITH REDUCED EJECTION FRACTION) (HCC): ICD-10-CM

## 2025-05-23 DIAGNOSIS — I44.7 LBBB (LEFT BUNDLE BRANCH BLOCK): ICD-10-CM

## 2025-05-23 DIAGNOSIS — I10 ESSENTIAL HYPERTENSION: ICD-10-CM

## 2025-05-23 DIAGNOSIS — I95.1 ORTHOSTATIC HYPOTENSION: ICD-10-CM

## 2025-05-23 DIAGNOSIS — I25.118 CORONARY ARTERY DISEASE OF NATIVE HEART WITH STABLE ANGINA PECTORIS, UNSPECIFIED VESSEL OR LESION TYPE: ICD-10-CM

## 2025-05-23 DIAGNOSIS — I50.22 CHRONIC SYSTOLIC (CONGESTIVE) HEART FAILURE (HCC): Primary | ICD-10-CM

## 2025-05-23 RX ORDER — WHEAT DEXTRIN 3 G/3.8 G
4 POWDER (GRAM) ORAL
COMMUNITY

## 2025-05-23 RX ORDER — POLYETHYLENE GLYCOL 3350 17 G/17G
17 POWDER, FOR SOLUTION ORAL DAILY PRN
COMMUNITY

## 2025-05-23 NOTE — TELEPHONE ENCOUNTER
----- Message from Dr. Kianna Hernandez MD sent at 5/23/2025  1:35 PM EDT -----  Absolutely yes to all of that--if neuro says ok I will have kasia contact her to resume asa.  Glad to hear you're on the Watchman team now!    Kasia, would you be so kind to notify patient stop plavix and substitute 81 mg ASA? Thanks  ----- Message -----  From: Samuel Bradley MD  Sent: 5/23/2025   1:21 PM EDT  To: Kianna Hernandez MD    Plan for CRT-P.     I see she's on plavix and Eliquis, I reviewed neuro note, looks like we can stop plavix? Transition to ASA only?    Also she mentioned Watchman, I'm implanting now and will look to perform later on this year after PPM. Sound ok to you?    Thanks!    Jose Carlos

## 2025-05-23 NOTE — PROGRESS NOTES
[facility] CARDIOLOGY  [address] [place] [address], [address]  [place] [address]  PHONE: [phone]        25      NAME:  Janine Gupta  : 1940  MRN: [id number]     Referring Cardiologist: Kianna Hernandez MD    Reason for Consultation: HFrEF    ASSESSMENT and PLAN:  Janine was seen today for consultation and chronic systolic (congestive) heart failure.    Diagnoses and all orders for this visit:    Chronic systolic (congestive) heart failure, EF 25-30%    Coronary artery disease of native heart with stable angina pectoris, unspecified vessel or lesion type    HFrEF (heart failure with reduced ejection fraction) (HCC)    LBBB (left bundle branch block), QRSd = 150 msec     Orthostatic hypotension    Infrarenal abdominal aortic aneurysm (AAA) without rupture    Essential hypertension    Primary sleep apnea of , unspecified type    Paroxysmal atrial fibrillation     84 year old female with a history of HFrEF, EF 25-30% for over 90 days on stable GMDT, LBBB, NYHA class II symptoms here to discuss treatment options. She has very good evidence for a CRT-P given wide LBBB in the setting of severe HFrEF, and she is motivated to undergo this procedure. She also has PAF and is on chronic OAC. She is interested in the Watchman procedure.     HFrEF  -Presence of wide LBBB.  -Continue GDMT.  -Reviewed symptoms.   -Discussed CRT-P and patient motivated to undergo the procedure.   -Risks include age, HFrEF, pAF, use of OAC, AAA, HTN    PAF  -Stable, in NSR today.  -Continue BB and Eliquis.    Stroke ppx  -Continue Eliquis.  -Recently discussed with her neurology, and pt interested in Watchman.     Routine cardiac care per Dr. Hernandez.     EP follow up as scheduled or PRN.    Procedure to be performed: CRT-P implant  Device/ablation Company: [facility]  Procedure Date: TBD  Medications to hold, days to hold (OAC, AAD): Eliquis, 2 days, plavix 5 days and substitute baby ASA while off plavix.   Anesthesia:

## 2025-07-02 DIAGNOSIS — I50.22 CHRONIC SYSTOLIC (CONGESTIVE) HEART FAILURE (HCC): ICD-10-CM

## 2025-07-02 LAB
ANION GAP SERPL CALC-SCNC: 13 MMOL/L (ref 7–16)
BASOPHILS # BLD: 0.14 K/UL (ref 0–0.2)
BASOPHILS NFR BLD: 1.7 % (ref 0–2)
BUN SERPL-MCNC: 41 MG/DL (ref 8–23)
CALCIUM SERPL-MCNC: 10.1 MG/DL (ref 8.8–10.2)
CHLORIDE SERPL-SCNC: 105 MMOL/L (ref 98–107)
CO2 SERPL-SCNC: 23 MMOL/L (ref 20–29)
CREAT SERPL-MCNC: 1.22 MG/DL (ref 0.6–1.1)
DIFFERENTIAL METHOD BLD: ABNORMAL
EOSINOPHIL # BLD: 0.07 K/UL (ref 0–0.8)
EOSINOPHIL NFR BLD: 0.8 % (ref 0.5–7.8)
ERYTHROCYTE [DISTWIDTH] IN BLOOD BY AUTOMATED COUNT: 14.6 % (ref 11.9–14.6)
GLUCOSE SERPL-MCNC: 111 MG/DL (ref 70–99)
HCT VFR BLD AUTO: 35.6 % (ref 35.8–46.3)
HGB BLD-MCNC: 11.3 G/DL (ref 11.7–15.4)
IMM GRANULOCYTES # BLD AUTO: 0.07 K/UL (ref 0–0.5)
IMM GRANULOCYTES NFR BLD AUTO: 0.8 % (ref 0–5)
LYMPHOCYTES # BLD: 2.34 K/UL (ref 0.5–4.6)
LYMPHOCYTES NFR BLD: 28 % (ref 13–44)
MAGNESIUM SERPL-MCNC: 2 MG/DL (ref 1.8–2.4)
MCH RBC QN AUTO: 29.3 PG (ref 26.1–32.9)
MCHC RBC AUTO-ENTMCNC: 31.7 G/DL (ref 31.4–35)
MCV RBC AUTO: 92.2 FL (ref 82–102)
MONOCYTES # BLD: 0.66 K/UL (ref 0.1–1.3)
MONOCYTES NFR BLD: 7.9 % (ref 4–12)
NEUTS SEG # BLD: 5.07 K/UL (ref 1.7–8.2)
NEUTS SEG NFR BLD: 60.8 % (ref 43–78)
NRBC # BLD: 0 K/UL (ref 0–0.2)
PLATELET # BLD AUTO: 184 K/UL (ref 150–450)
PMV BLD AUTO: 12.4 FL (ref 9.4–12.3)
POTASSIUM SERPL-SCNC: 4.3 MMOL/L (ref 3.5–5.1)
RBC # BLD AUTO: 3.86 M/UL (ref 4.05–5.2)
SODIUM SERPL-SCNC: 141 MMOL/L (ref 136–145)
WBC # BLD AUTO: 8.4 K/UL (ref 4.3–11.1)

## 2025-07-08 ENCOUNTER — ANESTHESIA EVENT (OUTPATIENT)
Dept: CARDIAC CATH/INVASIVE PROCEDURES | Age: 85
End: 2025-07-08
Payer: MEDICARE

## 2025-07-08 RX ORDER — FENTANYL CITRATE 50 UG/ML
25 INJECTION, SOLUTION INTRAMUSCULAR; INTRAVENOUS EVERY 5 MIN PRN
Refills: 0 | Status: CANCELLED | OUTPATIENT
Start: 2025-07-08

## 2025-07-08 RX ORDER — OXYCODONE HYDROCHLORIDE 5 MG/1
5 TABLET ORAL
Refills: 0 | Status: CANCELLED | OUTPATIENT
Start: 2025-07-08

## 2025-07-08 RX ORDER — ONDANSETRON 2 MG/ML
4 INJECTION INTRAMUSCULAR; INTRAVENOUS
Status: CANCELLED | OUTPATIENT
Start: 2025-07-08

## 2025-07-08 RX ORDER — SODIUM CHLORIDE 9 MG/ML
INJECTION, SOLUTION INTRAVENOUS PRN
Status: CANCELLED | OUTPATIENT
Start: 2025-07-08

## 2025-07-08 RX ORDER — MEPERIDINE HYDROCHLORIDE 25 MG/ML
12.5 INJECTION INTRAMUSCULAR; INTRAVENOUS; SUBCUTANEOUS EVERY 5 MIN PRN
Refills: 0 | Status: CANCELLED | OUTPATIENT
Start: 2025-07-08

## 2025-07-08 RX ORDER — DEXTROSE MONOHYDRATE 100 MG/ML
INJECTION, SOLUTION INTRAVENOUS CONTINUOUS PRN
Status: CANCELLED | OUTPATIENT
Start: 2025-07-08

## 2025-07-08 RX ORDER — IBUPROFEN 600 MG/1
1 TABLET ORAL PRN
Status: CANCELLED | OUTPATIENT
Start: 2025-07-08

## 2025-07-08 RX ORDER — SODIUM CHLORIDE, SODIUM LACTATE, POTASSIUM CHLORIDE, CALCIUM CHLORIDE 600; 310; 30; 20 MG/100ML; MG/100ML; MG/100ML; MG/100ML
INJECTION, SOLUTION INTRAVENOUS CONTINUOUS
Status: CANCELLED | OUTPATIENT
Start: 2025-07-08

## 2025-07-08 RX ORDER — SODIUM CHLORIDE 0.9 % (FLUSH) 0.9 %
5-40 SYRINGE (ML) INJECTION PRN
Status: CANCELLED | OUTPATIENT
Start: 2025-07-08

## 2025-07-08 RX ORDER — DIPHENHYDRAMINE HYDROCHLORIDE 50 MG/ML
12.5 INJECTION, SOLUTION INTRAMUSCULAR; INTRAVENOUS
Status: CANCELLED | OUTPATIENT
Start: 2025-07-08

## 2025-07-08 RX ORDER — SODIUM CHLORIDE 0.9 % (FLUSH) 0.9 %
5-40 SYRINGE (ML) INJECTION EVERY 12 HOURS SCHEDULED
Status: CANCELLED | OUTPATIENT
Start: 2025-07-08

## 2025-07-08 RX ORDER — PROCHLORPERAZINE EDISYLATE 5 MG/ML
5 INJECTION INTRAMUSCULAR; INTRAVENOUS
Status: CANCELLED | OUTPATIENT
Start: 2025-07-08

## 2025-07-09 ENCOUNTER — APPOINTMENT (OUTPATIENT)
Dept: GENERAL RADIOLOGY | Age: 85
DRG: 243 | End: 2025-07-09
Attending: INTERNAL MEDICINE
Payer: MEDICARE

## 2025-07-09 ENCOUNTER — ANESTHESIA (OUTPATIENT)
Dept: CARDIAC CATH/INVASIVE PROCEDURES | Age: 85
End: 2025-07-09
Payer: MEDICARE

## 2025-07-09 ENCOUNTER — HOSPITAL ENCOUNTER (INPATIENT)
Age: 85
LOS: 1 days | Discharge: NURSING, REHAB OR LONG-TERM CARE | DRG: 243 | End: 2025-07-15
Attending: INTERNAL MEDICINE | Admitting: INTERNAL MEDICINE
Payer: MEDICARE

## 2025-07-09 DIAGNOSIS — Z95.0 STATUS POST BIVENTRICULAR PACEMAKER: Primary | ICD-10-CM

## 2025-07-09 DIAGNOSIS — I50.20 HFREF (HEART FAILURE WITH REDUCED EJECTION FRACTION) (HCC): ICD-10-CM

## 2025-07-09 LAB
ANION GAP SERPL CALC-SCNC: 11 MMOL/L (ref 7–16)
BUN SERPL-MCNC: 44 MG/DL (ref 8–23)
CALCIUM SERPL-MCNC: 10 MG/DL (ref 8.8–10.2)
CHLORIDE SERPL-SCNC: 106 MMOL/L (ref 98–107)
CO2 SERPL-SCNC: 24 MMOL/L (ref 20–29)
CREAT SERPL-MCNC: 1.1 MG/DL (ref 0.6–1.1)
ECHO BSA: 1.63 M2
EKG ATRIAL RATE: 59 BPM
EKG ATRIAL RATE: 73 BPM
EKG DIAGNOSIS: NORMAL
EKG DIAGNOSIS: NORMAL
EKG P AXIS: 27 DEGREES
EKG P AXIS: 8 DEGREES
EKG P-R INTERVAL: 162 MS
EKG P-R INTERVAL: 168 MS
EKG Q-T INTERVAL: 494 MS
EKG Q-T INTERVAL: 520 MS
EKG QRS DURATION: 154 MS
EKG QRS DURATION: 178 MS
EKG QTC CALCULATION (BAZETT): 514 MS
EKG QTC CALCULATION (BAZETT): 544 MS
EKG R AXIS: -46 DEGREES
EKG R AXIS: -53 DEGREES
EKG T AXIS: -27 DEGREES
EKG T AXIS: 123 DEGREES
EKG VENTRICULAR RATE: 59 BPM
EKG VENTRICULAR RATE: 73 BPM
GLUCOSE SERPL-MCNC: 115 MG/DL (ref 70–99)
MAGNESIUM SERPL-MCNC: 2 MG/DL (ref 1.8–2.4)
POTASSIUM SERPL-SCNC: 4 MMOL/L (ref 3.5–5.1)
SODIUM SERPL-SCNC: 141 MMOL/L (ref 136–145)

## 2025-07-09 PROCEDURE — C1898 LEAD, PMKR, OTHER THAN TRANS: HCPCS | Performed by: INTERNAL MEDICINE

## 2025-07-09 PROCEDURE — 2720000010 HC SURG SUPPLY STERILE: Performed by: INTERNAL MEDICINE

## 2025-07-09 PROCEDURE — 3700000000 HC ANESTHESIA ATTENDED CARE: Performed by: INTERNAL MEDICINE

## 2025-07-09 PROCEDURE — 33225 L VENTRIC PACING LEAD ADD-ON: CPT | Performed by: INTERNAL MEDICINE

## 2025-07-09 PROCEDURE — 83735 ASSAY OF MAGNESIUM: CPT

## 2025-07-09 PROCEDURE — 3700000001 HC ADD 15 MINUTES (ANESTHESIA): Performed by: INTERNAL MEDICINE

## 2025-07-09 PROCEDURE — C2621 PMKR, OTHER THAN SING/DUAL: HCPCS | Performed by: INTERNAL MEDICINE

## 2025-07-09 PROCEDURE — C1900 LEAD, CORONARY VENOUS: HCPCS | Performed by: INTERNAL MEDICINE

## 2025-07-09 PROCEDURE — 33208 INSRT HEART PM ATRIAL & VENT: CPT | Performed by: INTERNAL MEDICINE

## 2025-07-09 PROCEDURE — G0378 HOSPITAL OBSERVATION PER HR: HCPCS

## 2025-07-09 PROCEDURE — 6360000002 HC RX W HCPCS

## 2025-07-09 PROCEDURE — 71045 X-RAY EXAM CHEST 1 VIEW: CPT

## 2025-07-09 PROCEDURE — 93010 ELECTROCARDIOGRAM REPORT: CPT | Performed by: INTERNAL MEDICINE

## 2025-07-09 PROCEDURE — C1892 INTRO/SHEATH,FIXED,PEEL-AWAY: HCPCS | Performed by: INTERNAL MEDICINE

## 2025-07-09 PROCEDURE — 2500000003 HC RX 250 WO HCPCS: Performed by: INTERNAL MEDICINE

## 2025-07-09 PROCEDURE — 80048 BASIC METABOLIC PNL TOTAL CA: CPT

## 2025-07-09 PROCEDURE — 2709999900 HC NON-CHARGEABLE SUPPLY: Performed by: INTERNAL MEDICINE

## 2025-07-09 PROCEDURE — C1769 GUIDE WIRE: HCPCS | Performed by: INTERNAL MEDICINE

## 2025-07-09 PROCEDURE — 02HL3JZ INSERTION OF PACEMAKER LEAD INTO LEFT VENTRICLE, PERCUTANEOUS APPROACH: ICD-10-PCS | Performed by: INTERNAL MEDICINE

## 2025-07-09 PROCEDURE — 02H63JZ INSERTION OF PACEMAKER LEAD INTO RIGHT ATRIUM, PERCUTANEOUS APPROACH: ICD-10-PCS | Performed by: INTERNAL MEDICINE

## 2025-07-09 PROCEDURE — 6370000000 HC RX 637 (ALT 250 FOR IP): Performed by: INTERNAL MEDICINE

## 2025-07-09 PROCEDURE — 6360000002 HC RX W HCPCS: Performed by: INTERNAL MEDICINE

## 2025-07-09 PROCEDURE — 2500000003 HC RX 250 WO HCPCS

## 2025-07-09 PROCEDURE — 93005 ELECTROCARDIOGRAM TRACING: CPT | Performed by: INTERNAL MEDICINE

## 2025-07-09 PROCEDURE — C1894 INTRO/SHEATH, NON-LASER: HCPCS | Performed by: INTERNAL MEDICINE

## 2025-07-09 PROCEDURE — 2580000003 HC RX 258

## 2025-07-09 PROCEDURE — 0JH607Z INSERTION OF CARDIAC RESYNCHRONIZATION PACEMAKER PULSE GENERATOR INTO CHEST SUBCUTANEOUS TISSUE AND FASCIA, OPEN APPROACH: ICD-10-PCS | Performed by: INTERNAL MEDICINE

## 2025-07-09 PROCEDURE — 6360000004 HC RX CONTRAST MEDICATION: Performed by: INTERNAL MEDICINE

## 2025-07-09 PROCEDURE — 02HK3JZ INSERTION OF PACEMAKER LEAD INTO RIGHT VENTRICLE, PERCUTANEOUS APPROACH: ICD-10-PCS | Performed by: INTERNAL MEDICINE

## 2025-07-09 DEVICE — CARDIAC RESYNCHRONIZATION THERAPY PACEMAKER
Type: IMPLANTABLE DEVICE | Status: FUNCTIONAL
Brand: VISIONIST™ X4 CRT-P

## 2025-07-09 DEVICE — PACE/SENSE LEAD
Type: IMPLANTABLE DEVICE | Status: FUNCTIONAL
Brand: INGEVITY™+

## 2025-07-09 DEVICE — PACE/SENSE LEAD
Type: IMPLANTABLE DEVICE | Status: FUNCTIONAL
Brand: ACUITY™ X4 STRAIGHT

## 2025-07-09 RX ORDER — ONDANSETRON 2 MG/ML
4 INJECTION INTRAMUSCULAR; INTRAVENOUS EVERY 6 HOURS PRN
Status: DISCONTINUED | OUTPATIENT
Start: 2025-07-09 | End: 2025-07-15 | Stop reason: HOSPADM

## 2025-07-09 RX ORDER — SODIUM CHLORIDE 9 MG/ML
INJECTION, SOLUTION INTRAVENOUS PRN
Status: DISCONTINUED | OUTPATIENT
Start: 2025-07-09 | End: 2025-07-15 | Stop reason: HOSPADM

## 2025-07-09 RX ORDER — POLYETHYLENE GLYCOL 3350 17 G/17G
17 POWDER, FOR SOLUTION ORAL DAILY PRN
Status: DISCONTINUED | OUTPATIENT
Start: 2025-07-09 | End: 2025-07-15 | Stop reason: HOSPADM

## 2025-07-09 RX ORDER — SODIUM CHLORIDE 0.9 % (FLUSH) 0.9 %
5-40 SYRINGE (ML) INJECTION EVERY 12 HOURS SCHEDULED
Status: DISCONTINUED | OUTPATIENT
Start: 2025-07-09 | End: 2025-07-15 | Stop reason: HOSPADM

## 2025-07-09 RX ORDER — HYOSCYAMINE SULFATE 0.12 MG/1
0.12 TABLET SUBLINGUAL EVERY 8 HOURS PRN
Status: DISCONTINUED | OUTPATIENT
Start: 2025-07-09 | End: 2025-07-15 | Stop reason: HOSPADM

## 2025-07-09 RX ORDER — LIDOCAINE HYDROCHLORIDE AND EPINEPHRINE 10; 10 MG/ML; UG/ML
INJECTION, SOLUTION INFILTRATION; PERINEURAL PRN
Status: DISCONTINUED | OUTPATIENT
Start: 2025-07-09 | End: 2025-07-09 | Stop reason: HOSPADM

## 2025-07-09 RX ORDER — VALSARTAN 80 MG/1
80 TABLET ORAL DAILY
Status: DISCONTINUED | OUTPATIENT
Start: 2025-07-10 | End: 2025-07-15 | Stop reason: HOSPADM

## 2025-07-09 RX ORDER — FAMOTIDINE 20 MG/1
40 TABLET, FILM COATED ORAL DAILY
Status: DISCONTINUED | OUTPATIENT
Start: 2025-07-10 | End: 2025-07-15 | Stop reason: HOSPADM

## 2025-07-09 RX ORDER — SODIUM CHLORIDE 0.9 % (FLUSH) 0.9 %
5-40 SYRINGE (ML) INJECTION PRN
Status: DISCONTINUED | OUTPATIENT
Start: 2025-07-09 | End: 2025-07-15 | Stop reason: HOSPADM

## 2025-07-09 RX ORDER — METOPROLOL SUCCINATE 100 MG/1
100 TABLET, EXTENDED RELEASE ORAL DAILY
Status: DISCONTINUED | OUTPATIENT
Start: 2025-07-10 | End: 2025-07-10

## 2025-07-09 RX ORDER — PROPOFOL 10 MG/ML
INJECTION, EMULSION INTRAVENOUS
Status: DISCONTINUED | OUTPATIENT
Start: 2025-07-09 | End: 2025-07-09 | Stop reason: SDUPTHER

## 2025-07-09 RX ORDER — ACETAMINOPHEN 325 MG/1
650 TABLET ORAL EVERY 4 HOURS PRN
Status: DISCONTINUED | OUTPATIENT
Start: 2025-07-09 | End: 2025-07-15 | Stop reason: HOSPADM

## 2025-07-09 RX ORDER — IOPAMIDOL 755 MG/ML
INJECTION, SOLUTION INTRAVASCULAR PRN
Status: DISCONTINUED | OUTPATIENT
Start: 2025-07-09 | End: 2025-07-09 | Stop reason: HOSPADM

## 2025-07-09 RX ORDER — ATORVASTATIN CALCIUM 10 MG/1
10 TABLET, FILM COATED ORAL NIGHTLY
Status: DISCONTINUED | OUTPATIENT
Start: 2025-07-09 | End: 2025-07-15 | Stop reason: HOSPADM

## 2025-07-09 RX ORDER — EPHEDRINE SULFATE 5 MG/ML
INJECTION INTRAVENOUS
Status: DISCONTINUED | OUTPATIENT
Start: 2025-07-09 | End: 2025-07-09 | Stop reason: SDUPTHER

## 2025-07-09 RX ORDER — SODIUM CHLORIDE, SODIUM LACTATE, POTASSIUM CHLORIDE, CALCIUM CHLORIDE 600; 310; 30; 20 MG/100ML; MG/100ML; MG/100ML; MG/100ML
INJECTION, SOLUTION INTRAVENOUS
Status: DISCONTINUED | OUTPATIENT
Start: 2025-07-09 | End: 2025-07-09 | Stop reason: SDUPTHER

## 2025-07-09 RX ORDER — LIDOCAINE HYDROCHLORIDE 20 MG/ML
INJECTION, SOLUTION EPIDURAL; INFILTRATION; INTRACAUDAL; PERINEURAL
Status: DISCONTINUED | OUTPATIENT
Start: 2025-07-09 | End: 2025-07-09 | Stop reason: SDUPTHER

## 2025-07-09 RX ORDER — TRAMADOL HYDROCHLORIDE 50 MG/1
50 TABLET ORAL EVERY 6 HOURS PRN
Status: DISCONTINUED | OUTPATIENT
Start: 2025-07-09 | End: 2025-07-15 | Stop reason: HOSPADM

## 2025-07-09 RX ORDER — TRAMADOL HYDROCHLORIDE 50 MG/1
100 TABLET ORAL EVERY 6 HOURS PRN
Status: DISCONTINUED | OUTPATIENT
Start: 2025-07-09 | End: 2025-07-15 | Stop reason: HOSPADM

## 2025-07-09 RX ADMIN — PROPOFOL 25 MG: 10 INJECTION, EMULSION INTRAVENOUS at 15:09

## 2025-07-09 RX ADMIN — SODIUM CHLORIDE, SODIUM LACTATE, POTASSIUM CHLORIDE, AND CALCIUM CHLORIDE: 600; 310; 30; 20 INJECTION, SOLUTION INTRAVENOUS at 15:00

## 2025-07-09 RX ADMIN — PROPOFOL 150 MCG/KG/MIN: 10 INJECTION, EMULSION INTRAVENOUS at 15:10

## 2025-07-09 RX ADMIN — Medication 2000 MG: at 15:12

## 2025-07-09 RX ADMIN — ACETAMINOPHEN 650 MG: 325 TABLET ORAL at 20:23

## 2025-07-09 RX ADMIN — LIDOCAINE HYDROCHLORIDE 50 MG: 20 INJECTION, SOLUTION EPIDURAL; INFILTRATION; INTRACAUDAL; PERINEURAL at 15:09

## 2025-07-09 RX ADMIN — ATORVASTATIN CALCIUM 10 MG: 10 TABLET, FILM COATED ORAL at 20:23

## 2025-07-09 RX ADMIN — EPHEDRINE SULFATE 10 MG: 5 INJECTION INTRAVENOUS at 15:26

## 2025-07-09 RX ADMIN — EPHEDRINE SULFATE 10 MG: 5 INJECTION INTRAVENOUS at 16:01

## 2025-07-09 RX ADMIN — EPHEDRINE SULFATE 5 MG: 5 INJECTION INTRAVENOUS at 15:57

## 2025-07-09 RX ADMIN — EPHEDRINE SULFATE 10 MG: 5 INJECTION INTRAVENOUS at 15:36

## 2025-07-09 ASSESSMENT — PAIN DESCRIPTION - LOCATION: LOCATION: INCISION

## 2025-07-09 ASSESSMENT — PAIN SCALES - GENERAL: PAINLEVEL_OUTOF10: 3

## 2025-07-09 ASSESSMENT — PAIN - FUNCTIONAL ASSESSMENT: PAIN_FUNCTIONAL_ASSESSMENT: ACTIVITIES ARE NOT PREVENTED

## 2025-07-09 ASSESSMENT — PAIN DESCRIPTION - ORIENTATION: ORIENTATION: LEFT

## 2025-07-09 ASSESSMENT — PAIN DESCRIPTION - DESCRIPTORS: DESCRIPTORS: ACHING;SORE

## 2025-07-09 NOTE — ANESTHESIA PRE PROCEDURE
Department of Anesthesiology  Preprocedure Note       Name:  Janine Gupta   Age:  84 y.o.  :  1940                                          MRN:  [id number]         Date:  2025      Surgeon: Surgeon(s):  Samuel Bradley MD    Procedure: Procedure(s):  Insert PPM biv multi  Lv lead placement    Medications prior to admission:   Prior to Admission medications    Medication Sig Start Date End Date Taking? Authorizing Provider   Wheat Dextrin (BENEFIBER) POWD Take 4 g by mouth 3 times daily (with meals)   Yes Rajendra Palma MD   polyethylene glycol (GLYCOLAX) 17 g packet Take 1 packet by mouth daily as needed for Constipation   Yes Rajendra Palma MD   valsartan (DIOVAN) 80 MG tablet Take 1 tablet by mouth daily 10/16/24  Yes Kianna Hernandez MD   rimegepant sulfate 75 MG TBDP Take 75 mg by mouth daily as needed (migraine) 10/15/24  Yes America Conteh APRN   CREON 29808-26248 units delayed release capsule Take 2 capsules by mouth 3 times daily (with meals) 24  Yes Ibis Bolton APRN - CNP   famotidine (PEPCID) 40 MG tablet Take 1 tablet by mouth daily 24  Yes Ibis Bolton APRN - CNP   magnesium oxide (MAG-OX) 400 (240 Mg) MG tablet Take 1 tablet by mouth 2 times daily With food 24  Yes Ibis Bolton APRN - CNP   metoprolol succinate (TOPROL XL) 100 MG extended release tablet Take 1 tablet by mouth daily 24  Yes Ibis Bolton APRN - CNP   pitavastatin (LIVALO) 4 MG TABS tablet Take 1 tablet by mouth daily 24  Yes Ibis Bolton APRN - CNP   nitrofurantoin (MACRODANTIN) 50 MG capsule Take 1 capsule by mouth daily (with breakfast) For UTI prevention 24  Yes Ibis Bolton APRN - CNP   montelukast (SINGULAIR) 10 MG tablet Take 1 tablet by mouth nightly For cough/postnasal drip 24  Yes Ibis Bolton APRN - CNP   Probiotic Product (PROBIOTIC-10 PO) Take by mouth   Yes Rajendra Palma MD   Hyoscyamine Sulfate SL

## 2025-07-09 NOTE — PROCEDURES
: Samuel Bradley MD     REFERRING: Kianna Hernandez MD     Pre-Procedure Diagnosis  1. HFrEF, LBBB, EF 25-30%  2. Symptomatic bradycardia.  3. NYHA class III symptoms.     Procedure Performed  1. Biventricular pacemaker implant.     Anesthesia: MAC      Estimated Blood Loss: Less than 10 mL          Specimens: * No specimens in log *      Complications: None     Fluoroscopy Time: 6.2 minutes     Contrast: 50 cc      The procedure, indications, risks, benefits, and alternatives were discussed with the patient and family members, who desired to proceed after questions were answered and informed consent was documented.      Procedure: After informed consent was obtained, the patient was brought to the Electrophysiology Laboratory in a fasting state and was prepped and draped in sterile fashion. Prophylactic antibiotic was administered 10 minutes prior to skin incision: refer to anesthesia procedural documentation for details. MAC was administered by the anesthesia team with continuous oxygen saturation measurement and blood pressure measurement. A venogram was performed of the LUE and demonstrated a patent axillary and subclavian vein system. Local anesthetic (sensorcaine) was delivered to the left pectoral region and an incision was made parallel to the deltopectoral groove and a pocket was fashioned by blunt dissection. Bovie electrocautery was used for hemostasis. Access x 3 (Micropuncture kit) was obtained in the left axillary vein under ultrasound guidance using a modified Seldinger technique with placement of two short wires and a  long wire down into the RA and advanced below the diaphragm followed by placement of a 6 Fr peel-away sheath over the short guidewire. The right ventricular pace/sense lead was advanced into the right ventricle under fluoroscopy. The helix was advanced into the RV septum. After confirmation of stable pacing characteristics, the sheath was peeled and the lead was

## 2025-07-09 NOTE — H&P
The patient has been examined and the previous clinic note dated, 2025, has been reviewed and changes have been noted below.     84 year old female with a history of HFrEF, EF 25-30% here for CRT implant. She has undergone informed consent and will proceed with planned procedure under MAC.     Samuel Bradley MD, MS  Clinical Cardiac Electrophysiology  [facility]    NAME:  Janine Gupta  : 1940  MRN: [id number]      Referring Cardiologist: Kianna Hernandez MD     Reason for Consultation: HFrEF     ASSESSMENT and PLAN:  Janine was seen today for consultation and chronic systolic (congestive) heart failure.     Diagnoses and all orders for this visit:     Chronic systolic (congestive) heart failure, EF 25-30%     Coronary artery disease of native heart with stable angina pectoris, unspecified vessel or lesion type     HFrEF (heart failure with reduced ejection fraction) (HCC)     LBBB (left bundle branch block), QRSd = 150 msec      Orthostatic hypotension     Infrarenal abdominal aortic aneurysm (AAA) without rupture     Essential hypertension     Primary sleep apnea of , unspecified type     Paroxysmal atrial fibrillation      84 year old female with a history of HFrEF, EF 25-30% for over 90 days on stable GMDT, LBBB, NYHA class II symptoms here to discuss treatment options. She has very good evidence for a CRT-P given wide LBBB in the setting of severe HFrEF, and she is motivated to undergo this procedure. She also has PAF and is on chronic OAC. She is interested in the Watchman procedure.      HFrEF  -Presence of wide LBBB.  -Continue GDMT.  -Reviewed symptoms.   -Discussed CRT-P and patient motivated to undergo the procedure.              -Risks include age, HFrEF, pAF, use of OAC, AAA, HTN     PAF  -Stable, in NSR today.  -Continue BB and Eliquis.     Stroke ppx  -Continue Eliquis.  -Recently discussed with her neurology, and pt interested in Watchman.      Routine

## 2025-07-10 PROCEDURE — 6370000000 HC RX 637 (ALT 250 FOR IP): Performed by: INTERNAL MEDICINE

## 2025-07-10 PROCEDURE — G0378 HOSPITAL OBSERVATION PER HR: HCPCS

## 2025-07-10 PROCEDURE — 2500000003 HC RX 250 WO HCPCS: Performed by: INTERNAL MEDICINE

## 2025-07-10 PROCEDURE — 97166 OT EVAL MOD COMPLEX 45 MIN: CPT

## 2025-07-10 PROCEDURE — 97161 PT EVAL LOW COMPLEX 20 MIN: CPT

## 2025-07-10 PROCEDURE — 97535 SELF CARE MNGMENT TRAINING: CPT

## 2025-07-10 PROCEDURE — 97530 THERAPEUTIC ACTIVITIES: CPT

## 2025-07-10 RX ORDER — CARVEDILOL 12.5 MG/1
12.5 TABLET ORAL 2 TIMES DAILY WITH MEALS
Status: DISCONTINUED | OUTPATIENT
Start: 2025-07-10 | End: 2025-07-13

## 2025-07-10 RX ORDER — TRAMADOL HYDROCHLORIDE 50 MG/1
50 TABLET ORAL EVERY 6 HOURS PRN
Qty: 12 TABLET | Refills: 0 | Status: SHIPPED | OUTPATIENT
Start: 2025-07-10 | End: 2025-07-15 | Stop reason: HOSPADM

## 2025-07-10 RX ORDER — CARVEDILOL 12.5 MG/1
12.5 TABLET ORAL 2 TIMES DAILY WITH MEALS
Qty: 60 TABLET | Refills: 3 | Status: SHIPPED | OUTPATIENT
Start: 2025-07-10 | End: 2025-07-15 | Stop reason: HOSPADM

## 2025-07-10 RX ORDER — NITROFURANTOIN MACROCRYSTALS 50 MG/1
50 CAPSULE ORAL
Status: DISCONTINUED | OUTPATIENT
Start: 2025-07-10 | End: 2025-07-15 | Stop reason: HOSPADM

## 2025-07-10 RX ADMIN — ACETAMINOPHEN 650 MG: 325 TABLET ORAL at 00:26

## 2025-07-10 RX ADMIN — SODIUM CHLORIDE, PRESERVATIVE FREE 10 ML: 5 INJECTION INTRAVENOUS at 08:00

## 2025-07-10 RX ADMIN — ACETAMINOPHEN 650 MG: 325 TABLET ORAL at 04:27

## 2025-07-10 RX ADMIN — CARVEDILOL 12.5 MG: 12.5 TABLET, FILM COATED ORAL at 07:56

## 2025-07-10 RX ADMIN — ACETAMINOPHEN 650 MG: 325 TABLET ORAL at 10:31

## 2025-07-10 RX ADMIN — CARVEDILOL 12.5 MG: 12.5 TABLET, FILM COATED ORAL at 16:44

## 2025-07-10 RX ADMIN — SODIUM CHLORIDE, PRESERVATIVE FREE 5 ML: 5 INJECTION INTRAVENOUS at 20:31

## 2025-07-10 RX ADMIN — NITROFURANTOIN MACROCRYSTALS 50 MG: 50 CAPSULE ORAL at 08:44

## 2025-07-10 RX ADMIN — ACETAMINOPHEN 650 MG: 325 TABLET ORAL at 20:26

## 2025-07-10 RX ADMIN — VALSARTAN 80 MG: 80 TABLET ORAL at 07:58

## 2025-07-10 RX ADMIN — FAMOTIDINE 40 MG: 20 TABLET, FILM COATED ORAL at 07:58

## 2025-07-10 RX ADMIN — ATORVASTATIN CALCIUM 10 MG: 10 TABLET, FILM COATED ORAL at 20:26

## 2025-07-10 ASSESSMENT — PAIN DESCRIPTION - LOCATION
LOCATION: INCISION
LOCATION: INCISION
LOCATION: INCISION;SHOULDER
LOCATION: INCISION

## 2025-07-10 ASSESSMENT — PAIN SCALES - GENERAL
PAINLEVEL_OUTOF10: 3
PAINLEVEL_OUTOF10: 0
PAINLEVEL_OUTOF10: 4

## 2025-07-10 ASSESSMENT — PAIN DESCRIPTION - DESCRIPTORS
DESCRIPTORS: ACHING;SORE
DESCRIPTORS: SORE
DESCRIPTORS: ACHING
DESCRIPTORS: ACHING

## 2025-07-10 ASSESSMENT — PAIN DESCRIPTION - ORIENTATION
ORIENTATION: LEFT

## 2025-07-10 ASSESSMENT — PAIN DESCRIPTION - PAIN TYPE: TYPE: SURGICAL PAIN

## 2025-07-10 NOTE — CARE COORDINATION
Patient hospitalized with HFrEF. S/P PPI. History of CVA with (L) sided weakness.  PT/OT recommending STR in SNF.  CM met with patient, her son and daughter. Patient is alert and oriented X 4. Verified PCP, demographic, and insurance coverage. Patient reports her son is staying with her while his home is being renovated after flooding in [place]. Patient's daughter, Neda, is named as HCPOA. Patient is independent with ADLs and moves around her home without difficulty. Patient has a torn Achilles tendon that has not been repaired. DME: Reports she has all her spouse's DME: 2 rollators, RW, e WC, WC. History of [facility] and [facility].  Patient reports follow up and prescriptions are affordable. Patient is transported to her appointments by her daughter.      07/10/25 2057   Service Assessment   Patient Orientation Alert and Oriented   Cognition Alert   History Provided By Patient;Child/Family   Primary Caregiver Self   Accompanied By/Relationship Son and daughter   Support Systems Children;Family Members   Patient's Healthcare Decision Maker is: Legal Next of Kin  (Daughter, Neda)   PCP Verified by CM Yes  (Dr James)   Last Visit to PCP Within last 3 months   Prior Functional Level Assistance with the following:;Housework;Shopping;Cooking   Current Functional Level Assistance with the following:;Bathing;Dressing;Housework;Cooking;Shopping   Can patient return to prior living arrangement Yes   Ability to make needs known: Good   Family able to assist with home care needs: Yes   Would you like for me to discuss the discharge plan with any other family members/significant others, and if so, who? Yes  (Children)   Financial Resources Medicare   Community Resources None   CM/SW Referral Other (see comment)  (N/A)   Social/Functional History   Lives With Son  (Son is currently living with patient as his home was hit with flooding from hurricane.)   Type of Home House   Home Layout One level   Home Access Stairs to enter with rails

## 2025-07-10 NOTE — ACP (ADVANCE CARE PLANNING)
Advance Care Planning     Advance Care Planning Activator (Inpatient)  Conversation Note      Date of ACP Conversation: 7/10/2025     Conversation Conducted with: Patient with Decision Making Capacity    ACP Activator: RUDDY OROPEZA RN    Health Care Decision Maker:     Current Designated Health Care Decision Maker:     Primary Decision Maker: SereneNeda - Child - [phone]    Secondary Decision Maker: Liat Mitchell - Child - [phone]  Click here to complete Healthcare Decision Makers including section of the Healthcare Decision Maker Relationship (ie \"Primary\")  Today we documented Decision Maker(s) consistent with ACP documents on file.    Care Preferences: Full code status ordered by Dr Bradley.

## 2025-07-10 NOTE — MANAGEMENT PLAN
EP Note    84 year old female with a history of symptomatic bradycardia and HFrEF s/p CRT-P.     -Stable wound, device interrogation and CXR.  -HTN - change metoprolol to coreg and titrate as able.   -HFrEF with LBBB - continue GDMT as tolerated.   -Weakness per family/nursing staff - order PT/OT. May need HHPT/possibly nursing.   -Anticipate d/c home today with planned device clinic follow up.    Samuel Bradley MD, MS  Clinical Cardiac Electrophysiology  [facility]

## 2025-07-10 NOTE — DISCHARGE SUMMARY
[facility] Discharge Summary     Patient ID:  Janine Gupta  [id number]  84 y.o.  1940    Admit date: 7/9/2025    Discharge date:  07/15/2025    Admitting Physician: Samuel Bradley MD     Discharge Physician: BERTO Wolf - JOSE/Dr. Lei    Admission Diagnoses: HFrEF (heart failure with reduced ejection fraction) ([facility]) [I50.20]  Status post biventricular pacemaker [Z95.0]  Cardiac resynchronization therapy defibrillator (CRT-D) in place [Z95.810]    Discharge Diagnoses:     Principal Problem:    HFrEF (heart failure with reduced ejection fraction) ([facility])  Active Problems:    Cardiac resynchronization therapy pacemaker (CRT-P) in place    Cardiac resynchronization therapy defibrillator (CRT-D) in place    Chronic HFrEF (heart failure with reduced ejection fraction) ([facility])    CAD in native artery    Gait abnormality    Hx of atrioventricular node ablation    Encounter for other specified aftercare    Decreased activities of daily living (ADL)    Advanced age    Frailty    History of chest pain  Resolved Problems:    Pleuritic chest pain          Cardiology Procedures this [place]  [facility] [place]   Consults: Neurology, Rehab    Hospital Course: Patient was seen at the office of [facility] by Dr. Bradley for complaints of NYHA class II symptoms and was subsequently scheduled for an BIV PPM implantation at [facility] on 07/09/25. Patient was taken to the EP lab and underwent BSC BIV PPM implantation by Dr. Bradley. Patient tolerated the procedure well and was taken to the telemetry floor for recovery. Follow up chest xray showed no pneumothorax.     PT OT saw the patient with the following recommendations. For Skilled rehab    The patient started to have vision changes this admission and neurology was consulted stating the following:  fluctuating blurry vision during hospital stay.  This is a chronic issue.  This could be due to recrudescence of prior stroke

## 2025-07-10 NOTE — ANESTHESIA POSTPROCEDURE EVALUATION
Department of Anesthesiology  Postprocedure Note    Patient: Janine Gupta  MRN: [id number]  YOB: 1940  Date of evaluation: 7/9/2025    Procedure Summary       Date: 07/09/25 Room / Location: [place] [address] ALL EVENTS / [facility] CARDIAC CATH LAB    Anesthesia Start: 1500 Anesthesia Stop: 1644    Procedures:       Insert PPM biv multi      Lv lead placement Diagnosis:       HFrEF (heart failure with reduced ejection fraction) ([facility])      (HFrEF (heart failure with reduced ejection fraction) ([facility]) [I50.20])    Providers: Samuel Bradley MD Responsible Provider: Guanako Woody MD    Anesthesia Type: TIVA ASA Status: 3            Anesthesia Type: TIVA    Gita Phase I: Gita Score: 10    Gita Phase II: Gita Score: 10    Anesthesia Post Evaluation    Patient location during evaluation: PACU  Patient participation: complete - patient participated  Level of consciousness: awake and alert  Pain scale: pain adequately controlled.  Airway patency: patent  Nausea & Vomiting: no nausea and no vomiting  Cardiovascular status: hemodynamically stable  Respiratory status: acceptable  Hydration status: euvolemic  Multimodal analgesia pain management approach  Pain management: adequate        There were no known notable events for this encounter.

## 2025-07-11 PROBLEM — R26.9 GAIT ABNORMALITY: Status: ACTIVE | Noted: 2018-08-16

## 2025-07-11 PROBLEM — Z78.9 DECREASED ACTIVITIES OF DAILY LIVING (ADL): Status: ACTIVE | Noted: 2025-07-11

## 2025-07-11 PROBLEM — Z51.89 ENCOUNTER FOR OTHER SPECIFIED AFTERCARE: Status: ACTIVE | Noted: 2025-07-11

## 2025-07-11 LAB
ANION GAP SERPL CALC-SCNC: 10 MMOL/L (ref 7–16)
BUN SERPL-MCNC: 28 MG/DL (ref 8–23)
CALCIUM SERPL-MCNC: 9.5 MG/DL (ref 8.8–10.2)
CHLORIDE SERPL-SCNC: 106 MMOL/L (ref 98–107)
CO2 SERPL-SCNC: 23 MMOL/L (ref 20–29)
CREAT SERPL-MCNC: 0.95 MG/DL (ref 0.6–1.1)
ERYTHROCYTE [DISTWIDTH] IN BLOOD BY AUTOMATED COUNT: 15.1 % (ref 11.9–14.6)
GLUCOSE SERPL-MCNC: 110 MG/DL (ref 70–99)
HCT VFR BLD AUTO: 32.3 % (ref 35.8–46.3)
HGB BLD-MCNC: 10.6 G/DL (ref 11.7–15.4)
MAGNESIUM SERPL-MCNC: 1.8 MG/DL (ref 1.8–2.4)
MCH RBC QN AUTO: 30.7 PG (ref 26.1–32.9)
MCHC RBC AUTO-ENTMCNC: 32.8 G/DL (ref 31.4–35)
MCV RBC AUTO: 93.6 FL (ref 82–102)
NRBC # BLD: 0 K/UL (ref 0–0.2)
PLATELET # BLD AUTO: 132 K/UL (ref 150–450)
PMV BLD AUTO: 12.1 FL (ref 9.4–12.3)
POTASSIUM SERPL-SCNC: 4 MMOL/L (ref 3.5–5.1)
RBC # BLD AUTO: 3.45 M/UL (ref 4.05–5.2)
SODIUM SERPL-SCNC: 139 MMOL/L (ref 136–145)
WBC # BLD AUTO: 8.1 K/UL (ref 4.3–11.1)

## 2025-07-11 PROCEDURE — 83735 ASSAY OF MAGNESIUM: CPT

## 2025-07-11 PROCEDURE — 85027 COMPLETE CBC AUTOMATED: CPT

## 2025-07-11 PROCEDURE — 36415 COLL VENOUS BLD VENIPUNCTURE: CPT

## 2025-07-11 PROCEDURE — 80048 BASIC METABOLIC PNL TOTAL CA: CPT

## 2025-07-11 PROCEDURE — 6370000000 HC RX 637 (ALT 250 FOR IP): Performed by: INTERNAL MEDICINE

## 2025-07-11 PROCEDURE — 99221 1ST HOSP IP/OBS SF/LOW 40: CPT | Performed by: STUDENT IN AN ORGANIZED HEALTH CARE EDUCATION/TRAINING PROGRAM

## 2025-07-11 PROCEDURE — 2500000003 HC RX 250 WO HCPCS: Performed by: INTERNAL MEDICINE

## 2025-07-11 PROCEDURE — 6370000000 HC RX 637 (ALT 250 FOR IP): Performed by: NURSE PRACTITIONER

## 2025-07-11 RX ADMIN — VALSARTAN 80 MG: 80 TABLET ORAL at 08:17

## 2025-07-11 RX ADMIN — NITROFURANTOIN MACROCRYSTALS 50 MG: 50 CAPSULE ORAL at 08:19

## 2025-07-11 RX ADMIN — APIXABAN 2.5 MG: 2.5 TABLET, FILM COATED ORAL at 08:17

## 2025-07-11 RX ADMIN — ACETAMINOPHEN 650 MG: 325 TABLET ORAL at 20:28

## 2025-07-11 RX ADMIN — SODIUM CHLORIDE, PRESERVATIVE FREE 5 ML: 5 INJECTION INTRAVENOUS at 20:30

## 2025-07-11 RX ADMIN — ATORVASTATIN CALCIUM 10 MG: 10 TABLET, FILM COATED ORAL at 20:28

## 2025-07-11 RX ADMIN — CARVEDILOL 12.5 MG: 12.5 TABLET, FILM COATED ORAL at 08:17

## 2025-07-11 RX ADMIN — ACETAMINOPHEN 650 MG: 325 TABLET ORAL at 15:08

## 2025-07-11 RX ADMIN — SODIUM CHLORIDE, PRESERVATIVE FREE 10 ML: 5 INJECTION INTRAVENOUS at 08:21

## 2025-07-11 RX ADMIN — CARVEDILOL 12.5 MG: 12.5 TABLET, FILM COATED ORAL at 17:08

## 2025-07-11 RX ADMIN — APIXABAN 2.5 MG: 2.5 TABLET, FILM COATED ORAL at 20:29

## 2025-07-11 RX ADMIN — FAMOTIDINE 40 MG: 20 TABLET, FILM COATED ORAL at 08:17

## 2025-07-11 ASSESSMENT — PAIN SCALES - GENERAL
PAINLEVEL_OUTOF10: 2
PAINLEVEL_OUTOF10: 4
PAINLEVEL_OUTOF10: 0

## 2025-07-11 ASSESSMENT — PAIN - FUNCTIONAL ASSESSMENT: PAIN_FUNCTIONAL_ASSESSMENT: ACTIVITIES ARE NOT PREVENTED

## 2025-07-11 ASSESSMENT — PAIN DESCRIPTION - ORIENTATION: ORIENTATION: LEFT

## 2025-07-11 ASSESSMENT — PAIN DESCRIPTION - FREQUENCY: FREQUENCY: INTERMITTENT

## 2025-07-11 ASSESSMENT — PAIN DESCRIPTION - PAIN TYPE: TYPE: SURGICAL PAIN

## 2025-07-11 ASSESSMENT — PAIN DESCRIPTION - LOCATION: LOCATION: SHOULDER

## 2025-07-11 ASSESSMENT — PAIN DESCRIPTION - ONSET: ONSET: PROGRESSIVE

## 2025-07-11 ASSESSMENT — PAIN DESCRIPTION - DESCRIPTORS: DESCRIPTORS: ACHING;DISCOMFORT;TENDER;SORE;SHARP

## 2025-07-11 NOTE — CARE COORDINATION
CM met with patient and her daughter. Dr Moya has consulted and pending decision for [facility].   Patient second choice is [facility].   1230 CM reviewed Dr Moya's consult. CM tasked with finding out arm NWB time frame with Cardiology. CM inquired with Miguel Benavides NP. Miguel reported 2 weeks and patient seen in follow up. CM informed ALEJANDRO Herrera in [facility].   1515 CM asked to meet with patient and family member. Requesting STR in [place] or [facility]. Both SNF in 3 day waiver group. CM sent referrals.

## 2025-07-11 NOTE — PLAN OF CARE
Problem: Chronic Conditions and Co-morbidities  Goal: Patient's chronic conditions and co-morbidity symptoms are monitored and maintained or improved  7/11/2025 0019 by Aaliyah Magaña RN  Outcome: Progressing  Flowsheets (Taken 7/10/2025 2022)  Care Plan - Patient's Chronic Conditions and Co-Morbidity Symptoms are Monitored and Maintained or Improved: Monitor and assess patient's chronic conditions and comorbid symptoms for stability, deterioration, or improvement  7/10/2025 1714 by Frannie Chavis RN  Outcome: Progressing     Problem: Discharge Planning  Goal: Discharge to home or other facility with appropriate resources  7/11/2025 0019 by Aaliyah Magaña RN  Outcome: Progressing  Flowsheets (Taken 7/10/2025 2022)  Discharge to home or other facility with appropriate resources: Identify barriers to discharge with patient and caregiver  7/10/2025 1714 by Frannie Chavis RN  Outcome: Progressing     Problem: Safety - Adult  Goal: Free from fall injury  7/11/2025 0019 by Aaliyah Magaña RN  Outcome: Progressing  Flowsheets (Taken 7/10/2025 2022)  Free From Fall Injury: Instruct family/caregiver on patient safety  7/10/2025 1714 by Frannie Chavis RN  Outcome: Progressing     Problem: ABCDS Injury Assessment  Goal: Absence of physical injury  7/11/2025 0019 by Aaliyah Magaña RN  Outcome: Progressing  Flowsheets (Taken 7/10/2025 2022)  Absence of Physical Injury: Implement safety measures based on patient assessment  7/10/2025 1714 by Frannie Chavis RN  Outcome: Progressing     Problem: Pain  Goal: Verbalizes/displays adequate comfort level or baseline comfort level  7/11/2025 0019 by Aaliyah Magaña RN  Outcome: Progressing  Flowsheets (Taken 7/10/2025 2022)  Verbalizes/displays adequate comfort level or baseline comfort level: Encourage patient to monitor pain and request assistance  7/10/2025 1714 by Frannie Chavis RN  Outcome: Progressing

## 2025-07-11 NOTE — DISCHARGE INSTRUCTIONS
DEVICE IMPLANT FOLLOWUP INSTRUCTIONS  You will be discharged with an occlusive dressing over the incision site. This dressing will be removed at the 10 -14-day postop site check with the Device Clinic. Your new device will be checked at that visit and all your device teaching will be given.   Keep the incision site dry until your follow up. Use a hand-held shower or bath.   Do not submerge or soak in pools or tubs for 6 weeks. If you are discharged with a prescription for antibiotics, please take the full prescription until it is gone.  After your site check, you may shower and get the incision site wet. You may let soap and water run on the incision and pat dry. Please do not apply creams, lotions or powders on or near the incision.   Mild bruising and swelling can be normal after implant and will resolve in a few weeks.     Call the office at [phone] if you have any of the following:  -Signs of infection, such as fever over 100 F, drainage from the incision, redness, significant swelling, or warmth at the incision site.  -Significant pain around the site that gets worse. Mild discomfort can be normal.   -Bleeding from the incision site  -Swelling in the arm on the side of the incision site  -Chest pain or shortness of breath     Your device has the capability of transmitting device information from home to our office using a home monitoring system or phone rach. The information will transmit through a cellular connection outside of your home. Your device data is reviewed during working hours to ensure proper device function. You will be given your equipment and instruction upon your hospital discharge.                                                                       -You will be given a sling to wear upon discharge.  You do not have to wear during the day if you can remember not to move your arm above shoulder level.  We recommend you wear it at

## 2025-07-12 ENCOUNTER — APPOINTMENT (OUTPATIENT)
Dept: CT IMAGING | Age: 85
DRG: 243 | End: 2025-07-12
Attending: INTERNAL MEDICINE
Payer: MEDICARE

## 2025-07-12 PROBLEM — R54 FRAILTY: Status: ACTIVE | Noted: 2025-07-12

## 2025-07-12 PROBLEM — R07.81 PLEURITIC CHEST PAIN: Status: ACTIVE | Noted: 2025-07-12

## 2025-07-12 PROBLEM — Z98.890 HX OF ATRIOVENTRICULAR NODE ABLATION: Status: ACTIVE | Noted: 2019-05-02

## 2025-07-12 PROBLEM — R54 ADVANCED AGE: Status: ACTIVE | Noted: 2025-07-12

## 2025-07-12 LAB
ANION GAP SERPL CALC-SCNC: 9 MMOL/L (ref 7–16)
BUN SERPL-MCNC: 23 MG/DL (ref 8–23)
CALCIUM SERPL-MCNC: 9.5 MG/DL (ref 8.8–10.2)
CHLORIDE SERPL-SCNC: 106 MMOL/L (ref 98–107)
CO2 SERPL-SCNC: 24 MMOL/L (ref 20–29)
CREAT SERPL-MCNC: 0.83 MG/DL (ref 0.6–1.1)
ERYTHROCYTE [DISTWIDTH] IN BLOOD BY AUTOMATED COUNT: 14.9 % (ref 11.9–14.6)
GLUCOSE SERPL-MCNC: 104 MG/DL (ref 70–99)
HCT VFR BLD AUTO: 32.7 % (ref 35.8–46.3)
HGB BLD-MCNC: 10.5 G/DL (ref 11.7–15.4)
MAGNESIUM SERPL-MCNC: 1.9 MG/DL (ref 1.8–2.4)
MCH RBC QN AUTO: 29.7 PG (ref 26.1–32.9)
MCHC RBC AUTO-ENTMCNC: 32.1 G/DL (ref 31.4–35)
MCV RBC AUTO: 92.4 FL (ref 82–102)
NRBC # BLD: 0 K/UL (ref 0–0.2)
PLATELET # BLD AUTO: 125 K/UL (ref 150–450)
PMV BLD AUTO: 11.5 FL (ref 9.4–12.3)
POTASSIUM SERPL-SCNC: 4.1 MMOL/L (ref 3.5–5.1)
RBC # BLD AUTO: 3.54 M/UL (ref 4.05–5.2)
SODIUM SERPL-SCNC: 139 MMOL/L (ref 136–145)
WBC # BLD AUTO: 6.9 K/UL (ref 4.3–11.1)

## 2025-07-12 PROCEDURE — 6370000000 HC RX 637 (ALT 250 FOR IP): Performed by: INTERNAL MEDICINE

## 2025-07-12 PROCEDURE — 70450 CT HEAD/BRAIN W/O DYE: CPT

## 2025-07-12 PROCEDURE — 6370000000 HC RX 637 (ALT 250 FOR IP): Performed by: NURSE PRACTITIONER

## 2025-07-12 PROCEDURE — 85027 COMPLETE CBC AUTOMATED: CPT

## 2025-07-12 PROCEDURE — 2500000003 HC RX 250 WO HCPCS: Performed by: INTERNAL MEDICINE

## 2025-07-12 PROCEDURE — 80048 BASIC METABOLIC PNL TOTAL CA: CPT

## 2025-07-12 PROCEDURE — 99233 SBSQ HOSP IP/OBS HIGH 50: CPT | Performed by: INTERNAL MEDICINE

## 2025-07-12 PROCEDURE — 83735 ASSAY OF MAGNESIUM: CPT

## 2025-07-12 PROCEDURE — 36415 COLL VENOUS BLD VENIPUNCTURE: CPT

## 2025-07-12 RX ADMIN — ATORVASTATIN CALCIUM 10 MG: 10 TABLET, FILM COATED ORAL at 20:39

## 2025-07-12 RX ADMIN — CARVEDILOL 12.5 MG: 12.5 TABLET, FILM COATED ORAL at 08:21

## 2025-07-12 RX ADMIN — FAMOTIDINE 40 MG: 20 TABLET, FILM COATED ORAL at 08:21

## 2025-07-12 RX ADMIN — CARVEDILOL 12.5 MG: 12.5 TABLET, FILM COATED ORAL at 15:54

## 2025-07-12 RX ADMIN — APIXABAN 2.5 MG: 2.5 TABLET, FILM COATED ORAL at 20:39

## 2025-07-12 RX ADMIN — SODIUM CHLORIDE, PRESERVATIVE FREE 5 ML: 5 INJECTION INTRAVENOUS at 20:39

## 2025-07-12 RX ADMIN — ACETAMINOPHEN 650 MG: 325 TABLET ORAL at 20:39

## 2025-07-12 RX ADMIN — VALSARTAN 80 MG: 80 TABLET ORAL at 08:21

## 2025-07-12 RX ADMIN — SODIUM CHLORIDE, PRESERVATIVE FREE 10 ML: 5 INJECTION INTRAVENOUS at 08:26

## 2025-07-12 RX ADMIN — NITROFURANTOIN MACROCRYSTALS 50 MG: 50 CAPSULE ORAL at 08:23

## 2025-07-12 RX ADMIN — ACETAMINOPHEN 650 MG: 325 TABLET ORAL at 08:25

## 2025-07-12 RX ADMIN — APIXABAN 2.5 MG: 2.5 TABLET, FILM COATED ORAL at 08:21

## 2025-07-12 ASSESSMENT — PAIN SCALES - GENERAL
PAINLEVEL_OUTOF10: 0
PAINLEVEL_OUTOF10: 1
PAINLEVEL_OUTOF10: 2
PAINLEVEL_OUTOF10: 0

## 2025-07-12 ASSESSMENT — PAIN DESCRIPTION - DESCRIPTORS: DESCRIPTORS: DISCOMFORT

## 2025-07-12 ASSESSMENT — PAIN DESCRIPTION - LOCATION: LOCATION: CHEST

## 2025-07-12 ASSESSMENT — PAIN DESCRIPTION - ORIENTATION: ORIENTATION: MID

## 2025-07-12 NOTE — PLAN OF CARE
Problem: Chronic Conditions and Co-morbidities  Goal: Patient's chronic conditions and co-morbidity symptoms are monitored and maintained or improved  Outcome: Progressing  Flowsheets (Taken 7/11/2025 2022)  Care Plan - Patient's Chronic Conditions and Co-Morbidity Symptoms are Monitored and Maintained or Improved: Monitor and assess patient's chronic conditions and comorbid symptoms for stability, deterioration, or improvement     Problem: Discharge Planning  Goal: Discharge to home or other facility with appropriate resources  Outcome: Progressing  Flowsheets (Taken 7/11/2025 2022)  Discharge to home or other facility with appropriate resources: Identify barriers to discharge with patient and caregiver     Problem: Safety - Adult  Goal: Free from fall injury  Outcome: Progressing  Flowsheets (Taken 7/11/2025 2022)  Free From Fall Injury: Instruct family/caregiver on patient safety     Problem: ABCDS Injury Assessment  Goal: Absence of physical injury  Outcome: Progressing  Flowsheets (Taken 7/11/2025 2022)  Absence of Physical Injury: Implement safety measures based on patient assessment     Problem: Pain  Goal: Verbalizes/displays adequate comfort level or baseline comfort level  Outcome: Progressing  Flowsheets (Taken 7/11/2025 2022)  Verbalizes/displays adequate comfort level or baseline comfort level: Encourage patient to monitor pain and request assistance

## 2025-07-12 NOTE — CONSULTS
Neurology Consult Note       History:   84-year-old female with history of left PCA CVA, migraines admitted for pacemaker placement.  During hospital stay there have been complaints of worsening blurry vision.  She has had issues with orthostasis which is chronic for her.  She also reports her vision issues are chronic.  She denies headaches but does report typical visual auras with bright lights in her vision.      Exam: Pertinent positives and negatives include:  Awake, alert, and oriented.  Right hemianopsia. no language deficits or dysarthria.  Impaired short-term memory. no involuntary abnormal saccades or nystagmus.  No facial asymmetry. Moving all extremities spontaneously and with purpose.  No abnormal involuntary movements and muscle tone is normal throughout.     Imaging and review of data:   Head without acute abnormalities.  There is chronic left PCA infarct.  Diffuse chronic white matter disease.       Assessment and Plan:   84-year-old female with fluctuating blurry vision during hospital stay.  This is a chronic issue.  This could be due to recrudescence of prior stroke symptoms in the setting of orthostasis, and/or migrainous symptoms.     No need for MRI at this time.  Symptomatic management for orthostasis, per cardiology.  No need for symptomatic migraine management at this time as she does not complain of headaches.    Follow-up with America Conteh NP in clinic.  No further recommendations, we will sign off.      Bowen King,   Neurology      Cumulative time spent today was 45 minutes which included chart review, obtaining history (from patient, family, or other providers), review of images, examining the patient, and counseling the patient and/or family on medical condition.     
Visit for dental examination     SEVERAL YEARS AGO       Date of Evaluation: July 11, 2025    Subjective       HPI: Janine Gupta is a 84 y.o. female patient who presented to [facility] on 7/9/2025 secondary to PPM placement. Per H&P \" 84 year old female with a history of HFrEF, EF 25-30% for over 90 days on stable GMDT, LBBB, NYHA class II symptoms here to discuss treatment options. She has very good evidence for a CRT-P given wide LBBB in the setting of severe HFrEF, and she is motivated to undergo this procedure. She also has PAF and is on chronic OAC. She is interested in the Watchman procedure.  \"Patient underwent PPM placement, was told she is not able to utilize her left upper extremity, notes do not reveal for how long.  PM&R consulted to evaluate patient's rehabilitation and ongoing medical needs to determine best suited placement for patient once discharged from acute care.     Patient seen at bedside this morning.  She reports that she is unable to utilize rolling walker, she is not safe to return home.  Goals and expectations of inpatient rehabilitation were discussed.  All questions and concerns addressed.    Current Functional deficits: Did utilize rolling walker secondary to left upper extremity precautions        Prior Home Situation: Lives in a home with her son, one-story.  No steps      Previous Level of Function / Work / Activity: Modified independent utilized rolling walker for all ambulation.  Independent with ADLs    Current Level of Function / Work / Activity: With mobility, ambulating 2 feet only.  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

## 2025-07-13 PROBLEM — Z87.898 HISTORY OF CHEST PAIN: Status: ACTIVE | Noted: 2025-07-13

## 2025-07-13 PROCEDURE — 2500000003 HC RX 250 WO HCPCS: Performed by: INTERNAL MEDICINE

## 2025-07-13 PROCEDURE — 6370000000 HC RX 637 (ALT 250 FOR IP): Performed by: NURSE PRACTITIONER

## 2025-07-13 PROCEDURE — 99233 SBSQ HOSP IP/OBS HIGH 50: CPT | Performed by: INTERNAL MEDICINE

## 2025-07-13 PROCEDURE — 6370000000 HC RX 637 (ALT 250 FOR IP): Performed by: INTERNAL MEDICINE

## 2025-07-13 RX ORDER — CARVEDILOL 6.25 MG/1
6.25 TABLET ORAL 2 TIMES DAILY WITH MEALS
Status: DISCONTINUED | OUTPATIENT
Start: 2025-07-13 | End: 2025-07-15 | Stop reason: HOSPADM

## 2025-07-13 RX ADMIN — ATORVASTATIN CALCIUM 10 MG: 10 TABLET, FILM COATED ORAL at 21:10

## 2025-07-13 RX ADMIN — SODIUM CHLORIDE, PRESERVATIVE FREE 10 ML: 5 INJECTION INTRAVENOUS at 09:06

## 2025-07-13 RX ADMIN — FAMOTIDINE 40 MG: 20 TABLET, FILM COATED ORAL at 09:02

## 2025-07-13 RX ADMIN — CARVEDILOL 12.5 MG: 12.5 TABLET, FILM COATED ORAL at 09:02

## 2025-07-13 RX ADMIN — NITROFURANTOIN MACROCRYSTALS 50 MG: 50 CAPSULE ORAL at 09:02

## 2025-07-13 RX ADMIN — VALSARTAN 80 MG: 80 TABLET ORAL at 09:02

## 2025-07-13 RX ADMIN — SODIUM CHLORIDE, PRESERVATIVE FREE 5 ML: 5 INJECTION INTRAVENOUS at 21:10

## 2025-07-13 RX ADMIN — POLYETHYLENE GLYCOL 3350 17 G: 17 POWDER, FOR SOLUTION ORAL at 21:10

## 2025-07-13 RX ADMIN — CARVEDILOL 6.25 MG: 6.25 TABLET, FILM COATED ORAL at 17:25

## 2025-07-13 RX ADMIN — ACETAMINOPHEN 650 MG: 325 TABLET ORAL at 21:10

## 2025-07-13 RX ADMIN — APIXABAN 2.5 MG: 2.5 TABLET, FILM COATED ORAL at 09:02

## 2025-07-13 RX ADMIN — APIXABAN 2.5 MG: 2.5 TABLET, FILM COATED ORAL at 21:10

## 2025-07-13 ASSESSMENT — PAIN SCALES - GENERAL
PAINLEVEL_OUTOF10: 0
PAINLEVEL_OUTOF10: 2

## 2025-07-13 ASSESSMENT — PAIN DESCRIPTION - LOCATION: LOCATION: SHOULDER

## 2025-07-13 ASSESSMENT — PAIN DESCRIPTION - DESCRIPTORS: DESCRIPTORS: ACHING

## 2025-07-13 NOTE — PLAN OF CARE
Problem: Chronic Conditions and Co-morbidities  Goal: Patient's chronic conditions and co-morbidity symptoms are monitored and maintained or improved  Outcome: Progressing  Flowsheets (Taken 7/12/2025 2039)  Care Plan - Patient's Chronic Conditions and Co-Morbidity Symptoms are Monitored and Maintained or Improved: Monitor and assess patient's chronic conditions and comorbid symptoms for stability, deterioration, or improvement     Problem: Discharge Planning  Goal: Discharge to home or other facility with appropriate resources  Outcome: Progressing  Flowsheets (Taken 7/12/2025 2039)  Discharge to home or other facility with appropriate resources: Identify barriers to discharge with patient and caregiver     Problem: Safety - Adult  Goal: Free from fall injury  Outcome: Progressing  Flowsheets (Taken 7/12/2025 2039)  Free From Fall Injury: Instruct family/caregiver on patient safety     Problem: ABCDS Injury Assessment  Goal: Absence of physical injury  Outcome: Progressing  Flowsheets (Taken 7/12/2025 2039)  Absence of Physical Injury: Implement safety measures based on patient assessment     Problem: Pain  Goal: Verbalizes/displays adequate comfort level or baseline comfort level  Outcome: Progressing  Flowsheets (Taken 7/12/2025 2039)  Verbalizes/displays adequate comfort level or baseline comfort level: Encourage patient to monitor pain and request assistance

## 2025-07-14 PROBLEM — Z95.810 CARDIAC RESYNCHRONIZATION THERAPY DEFIBRILLATOR (CRT-D) IN PLACE: Status: ACTIVE | Noted: 2025-07-14

## 2025-07-14 LAB
SARS-COV-2 RDRP RESP QL NAA+PROBE: NOT DETECTED
SOURCE: NORMAL

## 2025-07-14 PROCEDURE — 87635 SARS-COV-2 COVID-19 AMP PRB: CPT

## 2025-07-14 PROCEDURE — 97530 THERAPEUTIC ACTIVITIES: CPT

## 2025-07-14 PROCEDURE — 6370000000 HC RX 637 (ALT 250 FOR IP): Performed by: NURSE PRACTITIONER

## 2025-07-14 PROCEDURE — 6370000000 HC RX 637 (ALT 250 FOR IP): Performed by: INTERNAL MEDICINE

## 2025-07-14 PROCEDURE — 2500000003 HC RX 250 WO HCPCS: Performed by: INTERNAL MEDICINE

## 2025-07-14 PROCEDURE — 2140000000 HC CCU INTERMEDIATE R&B

## 2025-07-14 RX ADMIN — APIXABAN 2.5 MG: 2.5 TABLET, FILM COATED ORAL at 21:18

## 2025-07-14 RX ADMIN — NITROFURANTOIN MACROCRYSTALS 50 MG: 50 CAPSULE ORAL at 09:24

## 2025-07-14 RX ADMIN — SODIUM CHLORIDE, PRESERVATIVE FREE 10 ML: 5 INJECTION INTRAVENOUS at 21:18

## 2025-07-14 RX ADMIN — CARVEDILOL 6.25 MG: 6.25 TABLET, FILM COATED ORAL at 09:22

## 2025-07-14 RX ADMIN — CARVEDILOL 6.25 MG: 6.25 TABLET, FILM COATED ORAL at 17:37

## 2025-07-14 RX ADMIN — VALSARTAN 80 MG: 80 TABLET ORAL at 09:22

## 2025-07-14 RX ADMIN — SODIUM CHLORIDE, PRESERVATIVE FREE 10 ML: 5 INJECTION INTRAVENOUS at 09:23

## 2025-07-14 RX ADMIN — FAMOTIDINE 40 MG: 20 TABLET, FILM COATED ORAL at 09:22

## 2025-07-14 RX ADMIN — APIXABAN 2.5 MG: 2.5 TABLET, FILM COATED ORAL at 09:22

## 2025-07-14 RX ADMIN — ATORVASTATIN CALCIUM 10 MG: 10 TABLET, FILM COATED ORAL at 21:18

## 2025-07-14 ASSESSMENT — PAIN SCALES - GENERAL
PAINLEVEL_OUTOF10: 0

## 2025-07-14 NOTE — PLAN OF CARE
Problem: Chronic Conditions and Co-morbidities  Goal: Patient's chronic conditions and co-morbidity symptoms are monitored and maintained or improved  Outcome: Progressing  Flowsheets (Taken 7/13/2025 2052)  Care Plan - Patient's Chronic Conditions and Co-Morbidity Symptoms are Monitored and Maintained or Improved: Monitor and assess patient's chronic conditions and comorbid symptoms for stability, deterioration, or improvement     Problem: Discharge Planning  Goal: Discharge to home or other facility with appropriate resources  Outcome: Progressing  Flowsheets (Taken 7/13/2025 2052)  Discharge to home or other facility with appropriate resources: Identify barriers to discharge with patient and caregiver     Problem: Safety - Adult  Goal: Free from fall injury  Outcome: Progressing  Flowsheets (Taken 7/13/2025 2052)  Free From Fall Injury: Instruct family/caregiver on patient safety     Problem: ABCDS Injury Assessment  Goal: Absence of physical injury  Outcome: Progressing  Flowsheets (Taken 7/13/2025 2052)  Absence of Physical Injury: Implement safety measures based on patient assessment     Problem: Pain  Goal: Verbalizes/displays adequate comfort level or baseline comfort level  Outcome: Progressing

## 2025-07-14 NOTE — CARE COORDINATION
CM checked in with referral placement sites: [facility] and [facility]. CM informed by both facilities that there is bed availability on 7/15. CM met with patient for preference. Patient prefers [facility]. Keanu Tejada reviewing referral.   Covid screen ordered for placement.   Patient reports she prefers her family to transport her by private car. Family at bedside and in agreement.   1155 Bed offered at [facility] on 7/15.  1600 [facility] contacted CM to inform of upgrade to IP.

## 2025-07-15 VITALS
SYSTOLIC BLOOD PRESSURE: 112 MMHG | WEIGHT: 126.4 LBS | OXYGEN SATURATION: 94 % | TEMPERATURE: 98.1 F | HEIGHT: 65 IN | DIASTOLIC BLOOD PRESSURE: 73 MMHG | BODY MASS INDEX: 21.06 KG/M2 | RESPIRATION RATE: 17 BRPM | HEART RATE: 60 BPM

## 2025-07-15 PROBLEM — R07.81 PLEURITIC CHEST PAIN: Status: RESOLVED | Noted: 2025-07-12 | Resolved: 2025-07-15

## 2025-07-15 PROCEDURE — 6370000000 HC RX 637 (ALT 250 FOR IP): Performed by: NURSE PRACTITIONER

## 2025-07-15 PROCEDURE — 2500000003 HC RX 250 WO HCPCS: Performed by: INTERNAL MEDICINE

## 2025-07-15 PROCEDURE — 6370000000 HC RX 637 (ALT 250 FOR IP): Performed by: INTERNAL MEDICINE

## 2025-07-15 RX ORDER — SENNOSIDES 8.6 MG/1
1 TABLET ORAL NIGHTLY
Status: DISCONTINUED | OUTPATIENT
Start: 2025-07-15 | End: 2025-07-15

## 2025-07-15 RX ORDER — SENNOSIDES 8.6 MG/1
1 TABLET ORAL DAILY PRN
Status: DISCONTINUED | OUTPATIENT
Start: 2025-07-15 | End: 2025-07-15 | Stop reason: HOSPADM

## 2025-07-15 RX ORDER — CARVEDILOL 6.25 MG/1
6.25 TABLET ORAL 2 TIMES DAILY WITH MEALS
Qty: 60 TABLET | Refills: 3 | Status: SHIPPED | OUTPATIENT
Start: 2025-07-15

## 2025-07-15 RX ADMIN — FAMOTIDINE 40 MG: 20 TABLET, FILM COATED ORAL at 08:35

## 2025-07-15 RX ADMIN — APIXABAN 2.5 MG: 2.5 TABLET, FILM COATED ORAL at 08:35

## 2025-07-15 RX ADMIN — CARVEDILOL 6.25 MG: 6.25 TABLET, FILM COATED ORAL at 08:34

## 2025-07-15 RX ADMIN — NITROFURANTOIN MACROCRYSTALS 50 MG: 50 CAPSULE ORAL at 08:33

## 2025-07-15 RX ADMIN — SODIUM CHLORIDE, PRESERVATIVE FREE 10 ML: 5 INJECTION INTRAVENOUS at 08:37

## 2025-07-15 RX ADMIN — VALSARTAN 80 MG: 80 TABLET ORAL at 08:35

## 2025-07-15 ASSESSMENT — PAIN SCALES - GENERAL: PAINLEVEL_OUTOF10: 0

## 2025-07-15 NOTE — PLAN OF CARE
Problem: Chronic Conditions and Co-morbidities  Goal: Patient's chronic conditions and co-morbidity symptoms are monitored and maintained or improved  7/15/2025 0933 by Suhail Munoz RN  Outcome: Completed  7/15/2025 0021 by Tasha Luu RN  Outcome: Progressing     Problem: Discharge Planning  Goal: Discharge to home or other facility with appropriate resources  7/15/2025 0933 by Suhail Munoz RN  Outcome: Completed  7/15/2025 0021 by Tasha Luu RN  Outcome: Progressing     Problem: Safety - Adult  Goal: Free from fall injury  7/15/2025 0933 by Suhail Munoz RN  Outcome: Completed  7/15/2025 0021 by Tasha Luu RN  Outcome: Progressing     Problem: ABCDS Injury Assessment  Goal: Absence of physical injury  7/15/2025 0933 by Suhail Munoz RN  Outcome: Completed  7/15/2025 0021 by Tasha Luu RN  Outcome: Progressing     Problem: Pain  Goal: Verbalizes/displays adequate comfort level or baseline comfort level  7/15/2025 0933 by Suhail Munoz RN  Outcome: Completed  7/15/2025 0021 by Tasha Luu RN  Outcome: Progressing

## 2025-07-15 NOTE — MANAGEMENT PLAN
Review of the chart by with and exams by me this admission including review of imaging show no S/S of TB at this time.    BERTO Wolf - CNP  07/15/25  4:10 PM

## 2025-07-15 NOTE — CARE COORDINATION
07/15/25 0932   Services At/After Discharge   Transition of Care Consult (CM Consult) SNF  ([facility])   Partner SNF Yes   Services At/After Discharge Skilled Nursing Facility (SNF)   [place] Resource Information Provided? No   Mode of Transport at Discharge Other (see comment)  (Private car)   Confirm Follow Up Transport Family   Condition of Participation: Discharge Planning   The Plan for Transition of Care is related to the following treatment goals: STR in SNF   The Patient and/or Patient Representative was provided with a Choice of Provider? Patient   The Patient and/Or Patient Representative agree with the Discharge Plan? Yes   Freedom of Choice list was provided with basic dialogue that supports the patient's individualized plan of care/goals, treatment preferences, and shares the quality data associated with the providers?  Yes     Discharge order placed. Patient verbalizes agreement to discharge to [facility] today. PASSR and Covid screen uploaded to [facility].  Room assignment: 317. Report called to [phone] by primary nurse.   Transport by family in private car.

## 2025-07-16 NOTE — PROGRESS NOTES
[facility] CARDIOLOGY PROGRESS NOTE           7/12/2025 10:42 AM    Admit Date: 7/9/2025      Subjective:   Afebrile overnight.  The patient does not report worsening dyspnea or palpitations.  The patient reports chronic intermittent chest pain.  She is a poor historian and will shift the narrative the palpitations when discussing chest pain.  She reports that she has had chest pain since 5 AM though per nursing and resolved by later morning.  Historically, she reports that it is worse with deep inspiration.  It is located at the lower sternum.    ROS:  No obvious pertinent positives on review of systems except for what was outlined above.    Objective:      Vitals:    07/12/25 0057 07/12/25 0440 07/12/25 0455 07/12/25 0752   BP:  (!) 123/58  (!) 141/77   Pulse:  64 64 68   Resp:  18     Temp:       TempSrc:  Axillary     SpO2:  97%  95%   Weight: 53.8 kg (118 lb 9.7 oz)      Height:           Physical Exam:  General-No Acute Distress  Neck- supple, no JVD  CV- regular rate and rhythm no RG  Lung- clear bilaterally  Abd- soft, nontender, nondistended  Ext- no edema bilaterally.  Skin- warm and dry    Data Review:   Recent Labs     07/11/25  0534 07/12/25  0450    139   K 4.0 4.1   MG 1.8 1.9   BUN 28* 23   WBC 8.1 6.9   HGB 10.6* 10.5*   HCT 32.3* 32.7*   * 125*       Lab Results   Component Value Date    CHOL 129 04/01/2025    CHOL 126 11/26/2024    CHOL 124 09/24/2024     Lab Results   Component Value Date    TRIG 103 04/01/2025    TRIG 74 11/26/2024    TRIG 98 09/24/2024     Lab Results   Component Value Date    HDL 54 04/01/2025    HDL 64 (H) 11/26/2024    HDL 43 09/24/2024     No components found for: \"LDLCHOLESTEROL\", \"LDLCALC\"  Lab Results   Component Value Date    VLDL 21 04/01/2025    VLDL 15 11/26/2024    VLDL 20 09/24/2024     Lab Results   Component Value Date    CHOLHDLRATIO 2.4 04/01/2025    CHOLHDLRATIO 2.0 11/26/2024    CHOLHDLRATIO 2.9 09/24/2024        Lab Results 
                         [facility] CARDIOLOGY PROGRESS NOTE           7/13/2025 11:30 AM    Admit Date: 7/9/2025      Subjective:   Afebrile overnight.  The patient does not report worsening dyspnea, chest pain, or palpitations.     ROS:  No obvious pertinent positives on review of systems except for what was outlined above.    Objective:      Vitals:    07/13/25 0047 07/13/25 0405 07/13/25 0444 07/13/25 0736   BP:   109/61 (!) 123/48   Pulse: 61 62 67    Resp:   18 17   Temp:   98.2 °F (36.8 °C) 98.1 °F (36.7 °C)   TempSrc:   Oral Oral   SpO2:   96% 94%   Weight:       Height:           Physical Exam:  General-No Acute Distress  Neck- supple, no JVD  CV- regular rate and rhythm no RG  Lung- clear bilaterally  Abd- soft, nontender, nondistended  Ext- no edema bilaterally.  Skin- warm and dry    Data Review:   Recent Labs     07/11/25  0534 07/12/25  0450    139   K 4.0 4.1   MG 1.8 1.9   BUN 28* 23   WBC 8.1 6.9   HGB 10.6* 10.5*   HCT 32.3* 32.7*   * 125*       Lab Results   Component Value Date    CHOL 129 04/01/2025    CHOL 126 11/26/2024    CHOL 124 09/24/2024     Lab Results   Component Value Date    TRIG 103 04/01/2025    TRIG 74 11/26/2024    TRIG 98 09/24/2024     Lab Results   Component Value Date    HDL 54 04/01/2025    HDL 64 (H) 11/26/2024    HDL 43 09/24/2024     No components found for: \"LDLCHOLESTEROL\", \"LDLCALC\"  Lab Results   Component Value Date    VLDL 21 04/01/2025    VLDL 15 11/26/2024    VLDL 20 09/24/2024     Lab Results   Component Value Date    CHOLHDLRATIO 2.4 04/01/2025    CHOLHDLRATIO 2.0 11/26/2024    CHOLHDLRATIO 2.9 09/24/2024        Lab Results   Component Value Date/Time     07/12/2025 04:50 AM     07/11/2025 05:34 AM     07/09/2025 12:25 PM    K 4.1 07/12/2025 04:50 AM    K 4.0 07/11/2025 05:34 AM    K 4.0 07/09/2025 12:25 PM     07/12/2025 04:50 AM     07/11/2025 05:34 AM     07/09/2025 12:25 PM    CO2 24 07/12/2025 04:50 AM    CO2 23 
                       [facility] CARDIOLOGY PROGRESS NOTE           7/11/2025 6:46 AM    Admit Date: 7/9/2025    Admit Diagnosis: HFrEF (heart failure with reduced ejection fraction) ([facility]) [I50.20]  Status post biventricular pacemaker [Z95.0]      Subjective:   No complaints this AM, no chest pain or shortness of breath, appropriate post op device pocket pain    Interval History: (History of pertinent interval events obtained from nursing staff)  Cardiac device placed yesterday, no events overnight, no immediate complications    ROS:  GEN:  No fever or chills  Cardiovascular:  As noted above  Pulmonary:  As noted above  Neuro:  No new focal motor or sensory loss      Objective:     Vitals:    07/11/25 0000 07/11/25 0054 07/11/25 0411 07/11/25 0446   BP:  (!) 99/52  (!) 140/57   Pulse: 62 64 67 77   Resp:  18  16   Temp:  97.9 °F (36.6 °C)  97.9 °F (36.6 °C)   TempSrc:       SpO2:  96%  98%   Weight:       Height:           Physical Exam:  General-Well Developed, Well Nourished, No Acute Distress, Alert & Oriented x 3, appropriate mood.  CV- regular rate and rhythm no MRG, left infraclavicular pocket with dressing in place, no evidence of hematoma, redness, warmth or excessive drainage  Lung- clear bilaterally  Abd- soft, nontender, nondistended  Ext- no edema bilaterally.    Current Facility-Administered Medications   Medication Dose Route Frequency    carvedilol (COREG) tablet 12.5 mg  12.5 mg Oral BID WC    nitrofurantoin (MACRODANTIN) capsule 50 mg (Patient Supplied)  50 mg Oral Daily with breakfast    apixaban (ELIQUIS) tablet 2.5 mg  2.5 mg Oral BID    famotidine (PEPCID) tablet 40 mg  40 mg Oral Daily    atorvastatin (LIPITOR) tablet 10 mg  10 mg Oral Nightly    valsartan (DIOVAN) tablet 80 mg  80 mg Oral Daily    sodium chloride flush 0.9 % injection 5-40 mL  5-40 mL IntraVENous 2 times per day    sodium chloride flush 0.9 % injection 5-40 mL  5-40 mL IntraVENous PRN    0.9 % sodium chloride infusion   
                       [facility] CARDIOLOGY PROGRESS NOTE           7/14/2025 7:35 AM    Admit Date: 7/9/2025    Admit Diagnosis: HFrEF (heart failure with reduced ejection fraction) ([facility]) [I50.20]  Status post biventricular pacemaker [Z95.0]      Subjective:   No complaints this AM, no chest pain or shortness of breath, appropriate post op device pocket pain    Interval History: (History of pertinent interval events obtained from nursing staff)  Cardiac device placed yesterday, no events overnight, no immediate complications    ROS:  GEN:  No fever or chills  Cardiovascular:  As noted above  Pulmonary:  As noted above  Neuro:  No new focal motor or sensory loss      Objective:     Vitals:    07/14/25 0042 07/14/25 0440 07/14/25 0443 07/14/25 0721   BP: (!) 99/57  127/65 (!) 121/51   Pulse: 68 64 70 72   Resp: 18  16 15   Temp: 97.9 °F (36.6 °C)  98.4 °F (36.9 °C) 97.5 °F (36.4 °C)   TempSrc: Oral  Oral    SpO2: 93%  96% 94%   Weight:       Height:           Physical Exam:  General-Well Developed, Well Nourished, No Acute Distress, Alert & Oriented x 3, appropriate mood.  CV- regular rate and rhythm no MRG, left infraclavicular pocket with dressing in place, no evidence of hematoma, redness, warmth or excessive drainage  Lung- clear bilaterally  Abd- soft, nontender, nondistended  Ext- no edema bilaterally.    Current Facility-Administered Medications   Medication Dose Route Frequency    carvedilol (COREG) tablet 6.25 mg  6.25 mg Oral BID     nitrofurantoin (MACRODANTIN) capsule 50 mg (Patient Supplied)  50 mg Oral Daily with breakfast    apixaban (ELIQUIS) tablet 2.5 mg  2.5 mg Oral BID    famotidine (PEPCID) tablet 40 mg  40 mg Oral Daily    atorvastatin (LIPITOR) tablet 10 mg  10 mg Oral Nightly    valsartan (DIOVAN) tablet 80 mg  80 mg Oral Daily    sodium chloride flush 0.9 % injection 5-40 mL  5-40 mL IntraVENous 2 times per day    sodium chloride flush 0.9 % injection 5-40 mL  5-40 mL IntraVENous PRN    0.9 % 
  Physician Progress Note      PATIENT:               EDENILSON DOMINGUEZ  CSN #:                  [id number]  :                       1940  ADMIT DATE:       2025 11:56 AM  DISCH DATE:        7/15/2025 12:08 PM  RESPONDING  PROVIDER #:        Miguel Benavides NP          QUERY TEXT:    Based on your medical judgment, please clarify these findings and document if   any of the following are being evaluated and/or treated:    The clinical indicators include:  Risk :  Per H&P : \" cad , chronic LBBB, dyslipidemia, NICM with concomitant CAD,  AAA,   CSF leak,  post NSTEMI with depressed EF,  - Left parietal occipital CVA,   focal occlusion distal left P2 PCA, MRI with severe small vessel ischemia and   multiple areas of infarct with new infarct PCA territory, Afib    Clinical indicators History of Afib, CVA , maintained on Eliquis for CVA   protection    Treatment :  Bleeding and clotting precautions, labs, telemetry, EKG, medication review and   management    Thank You,  Norah MERCADO, RN, CRCR  Clinical   P: [phone]  Options provided:  -- Secondary hypercoagulable state in a patient with atrial fibrillation  -- Other - I will add my own diagnosis  -- Disagree - Not applicable / Not valid  -- Disagree - Clinically unable to determine / Unknown  -- Refer to Clinical Documentation Reviewer    PROVIDER RESPONSE TEXT:    This patient has secondary hypercoagulable state in a patient with atrial   fibrillation.    Query created by: Norah Wang on 7/15/2025 8:22 AM      Electronically signed by:  Miguel Benavides NP 2025 9:47 AM          
4 Eyes Skin Assessment     NAME:  aJnine Gupta  YOB: 1940  MEDICAL RECORD NUMBER:  [id number]    The patient is being assessed for  Admission    I agree that at least one RN has performed a thorough Head to Toe Skin Assessment on the patient. ALL assessment sites listed below have been assessed.      Areas assessed by both nurses:    Head, Face, Ears, Shoulders, Back, Chest, Arms, Elbows, Hands, Sacrum. Buttock, Coccyx, Ischium, Legs. Feet and Heels, and Under Medical Devices         Does the Patient have a Wound? No noted wound(s)       Juwan Prevention initiated by RN: No  Wound Care Orders initiated by RN: No    Pressure Injury (Stage 1,2,3,4, Unstageable, DTI, NWPT, and Complex wounds) if present, place Wound referral order by RN under : No    New Ostomies, if present place, Ostomy referral order under : No     Nurse 1 eSignature: Electronically signed by Frannie Chavis RN on 7/9/25 at 6:02 PM EDT    **SHARE this note so that the co-signing nurse can place an eSignature**    Nurse 2 eSignature: Electronically signed by Rose Marie Hassan RN on 7/9/25 at 6:27 PM EDT    
ACUTE OCCUPATIONAL THERAPY GOALS:   (Developed with and agreed upon by patient and/or caregiver.)  1. Pt will toilet with CGA   2. Pt will complete functional mobility for ADLs with CGA using AD as needed  3. Pt will complete lower body dressing with CGA using AE as needed  4. Pt will complete grooming and hygiene at sink with CGA  5. Pt will tolerate 23 minutes functional activity with 1-2 or fewer rest breaks to promote increased endurance for ADLs  6. Pt will complete UB ADL with SBA  7.Pt will independently verbalize/ maintain PPM precautions    Timeframe: 7 visits      OCCUPATIONAL THERAPY Initial Assessment and Daily Note       OT Visit Days: 1  Acknowledge Orders  Time  OT Charge Capture  Rehab Caseload Tracker      Janine Gupta is a 84 y.o. female   PRIMARY DIAGNOSIS: HFrEF (heart failure with reduced ejection fraction) ([facility])  HFrEF (heart failure with reduced ejection fraction) ([facility]) [I50.20]  Status post biventricular pacemaker [Z95.0]  Procedure(s) (LRB):  Insert PPM biv multi (N/A)  Lv lead placement (N/A)  1 Day Post-Op  Reason for Referral: Generalized Muscle Weakness (M62.81)  Outpatient in a bed: Payor: MEDICARE / Plan: MEDICARE PART A AND B / Product Type: *No Product type* /     ASSESSMENT:     REHAB RECOMMENDATIONS:   Recommendation to date pending progress:  Setting:  Short-term Rehab    Equipment:    To Be Determined     ASSESSMENT:  Ms. Gupta was admitted with symptomatic bradycardia, post PPM placement. Pt has hx of L achilles tendon injury and reports heavy use of rollator or rolling walker for mobility.   Pt presented with deficits in mobility, balance, activity tolerance, and limitations of post op restrictions impacting ADLs. Pt with LUE still in sling, educated on PPM precautions and required min cues to maintain. Required mod A to stand and for  mobility to chair with HHA, unsteady and endorsed dizziness with mobility. Mod A for LB dressing, max A for LB bathing and total A 
ACUTE PHYSICAL THERAPY GOALS:   (Developed with and agreed upon by patient and/or caregiver.)  Pt will perform bed mobility with SBA while appropriately maintaining pacemaker precautions in 7 therapy sessions.  Pt will perform sit-to-stand/ stand-to-sit transfers CGA in 7 therapy sessions.  Pt will ambulate 150 ft CGA with use of LRAD/no device and breaks as needed in 7 therapy sessions.  Pt will tolerate 10 minutes of standing activity with LRAD/no device in 7 therapy sessions.  Pt will perform standing dynamic balance activities with minimal postural sway in 7 therapy sessions.  Pt will tolerate multiple sets and reps of BLE exercises in 7 therapy sessions.  Pt will accurately verbalize and demonstrate pacemaker precautions in 7 therapy sessions.     PHYSICAL THERAPY: Daily Note AM   (Link to Caseload Tracking: PT Visit Days : 2  Time In/Out PT Charge Capture  Rehab Caseload Tracker  Orders    Pacemaker precautions    Janine Gupta is a 84 y.o. female   PRIMARY DIAGNOSIS: HFrEF (heart failure with reduced ejection fraction) ([facility])  HFrEF (heart failure with reduced ejection fraction) ([facility]) [I50.20]  Status post biventricular pacemaker [Z95.0]  Cardiac resynchronization therapy defibrillator (CRT-D) in place [Z95.810]  Procedure(s) (LRB):  Insert PPM biv multi (N/A)  Lv lead placement (N/A)  5 Days Post-Op  Inpatient: Payor: MEDICARE / Plan: MEDICARE PART A AND B / Product Type: *No Product type* /     ASSESSMENT:     REHAB RECOMMENDATIONS:   Recommendation to date pending progress:  Setting:  Short-term Rehab    Equipment:    To Be Determined     ASSESSMENT:  Ms. Gupta was sitting up in recliner chair upon contact and agreeable to PT. Of note, patient has pacemaker precautions and unable to utilize rolling walker as she reports she bears down on RW heavily. Patient transferred to standing with min assist and cues for technique/hand placement. Once standing patient ambulated slowly for 30' with HHA, 
Patient pre-assessment complete for BIV PPM scheduled for 25, arrival time 1200. Patient verified using . Patient instructed to bring a list of all home medications on the day of procedure. NPO status reinforced.  Patient instructed to HOLD Eliquis, Valsartan, and Lasix. Instructed they can take all other medications excluding vitamins & supplements. Patient verbalizes understanding of all instructions & denies any questions at this time.      Per patient's daughter- patient is no longer taking Plavix.   
Patient received to CPRU room # 15.  Ambulatory from [facility]. Patient scheduled for BIV today with Dr Bradley. Procedure reviewed & questions answered, voiced good understanding consent obtained & placed on chart. All medications and medical history reviewed. Will prep patient per orders. Patient & family updated on plan of care.      The patient has a fraility score of 4-VULNERABLE, based on pt requires walker during ambulation.   
Pt complaining of severe lightheadedness and vision changes that has been going on since early in the morning. She states she did not tell anyone because she believed it was nothing to worry about. She does state that she has had episodes of lightheadedness that she has been experiencing since prior CVA.     HR 85 biventricular paced with occasional PVCs and bp 136/66.    Discussed situation with Dr. Peña and stat CT head and neurology consult ordered.   
Report called to EDUAR Jeffries for pt transfer to [facility] for STR. Report given in SBAR format with all questions answered. Vitals stable with no distress noted.IV's removed. Family transporting.  
SPIRITUAL HEALTH  Note for Med/Surg Visit                  Room # 417/01    Name: Janine Gupta           Age: 84 y.o.    Gender: female          MRN: [id number]  Protestant: Pentecostalism       Preferred Language: English    Date: 07/15/25  Visit Time: Begin Time: (P) 1040 End Time : (P) 1050 Complexity of Encounter: (P) Low      Visit Summary:  met with patient through regular rounds. Her daughter, Neda, was with her. Patient said she was calm and had good doctors for help. Patient expressed that she is spiritual, has a home Yazdanism, and believes that this is according to God's plan. Patient's social support includes her children and her Taoist.  provided active listening, discussion of illness, space for expression of feelings, and a ministry of presence. We prayed for healing.  will follow up as necessary or at patient's request.      Referral/Consult From: (P) Rounding  Encounter Overview/Reason: (P) Spiritual/Emotional Needs  Encounter Code:     Crisis (if applicable):    Service Provided For: (P) Patient and family together     Patient was available.    Bianca, Belief, Meaning:   Patient identifies as spiritual  is connected with a bianca tradition or spiritual practice  has beliefs or practices that help with coping during difficult times  Family/Friends identifies as spiritual  are connected with a bianca tradition or spiritual practice  have beliefs or practices that help with coping during difficult times  Rituals (if applicable)      Importance and Influence:  Patient does not have spiritual/personal beliefs that influence decisions regarding their health  Family/Friends does not have spiritual/personal beliefs that influence decisions regarding the patient's health    Community:  Patient   is connected with a spiritual community  Support System Includes   (P) Children, Denominational/bianca community   Family/Friends   Support System Includes   Parent/s  Extended family    Assessment 
TRANSFER - IN REPORT:    Verbal report received from jackson garcía on Janine Gupta being received from [facility] lab recovery) for routine progression of care.     Report consisted of patient’s Situation, Background, Assessment and Recommendations(SBAR).     Information from the following report(s) SBAR was reviewed. Opportunity for questions and clarification was provided.      Assessment completed upon patient’s arrival to unit and care assumed.     Patient received to room 417. Patient connected to monitor and assessment completed. Plan of care reviewed. Patient oriented to room and call light. Patient aware to use call light to communicate any chest pain or needs.         
TRANSFER - OUT REPORT:    Verbal report given to Frannie WITT on Janine Gupta  being transferred to [facility] for routine progression of patient care       Report consisted of patient's Situation, Background, Assessment and   Recommendations(SBAR).     Information from the following report(s) Nurse Handoff Report was reviewed with the receiving nurse.           Lines:   Peripheral IV 07/09/25 Right Antecubital (Active)       Peripheral IV 07/09/25 Left Antecubital (Active)        Opportunity for questions and clarification was provided.         
Aborted due to positive orthostatics   Distance 2  feet    DME [employer] Walker    Gait Quality Decreased gracy , Decreased step length, Trunk flexion, and Trunk sway increased    Weightbearing Status Restrictions/Precautions  Restrictions/Precautions: Post Pacemaker Precautions    Stairs      I=Independent, Mod I=Modified Independent, S=Supervision, SBA=Standby Assistance, CGA=Contact Guard Assistance,   Min=Minimal Assistance, Mod=Moderate Assistance, Max=Maximal Assistance, Total=Total Assistance, NT=Not Tested    PLAN:   FREQUENCY AND DURATION: 3 times/week for duration of hospital stay or until stated goals are met, whichever comes first.    THERAPY PROGNOSIS: Good    PROBLEM LIST:   (Skilled intervention is medically necessary to address:)  Decreased ADL/Functional Activities  Decreased Activity Tolerance  Decreased AROM/PROM  Decreased Balance  Decreased Gait Ability  Decreased Strength  Decreased Transfer Abilities  Increased Pain INTERVENTIONS PLANNED:   (Benefits and precautions of physical therapy have been discussed with the patient.)  Self Care Training  Therapeutic Activity  Therapeutic Exercise/HEP  Neuromuscular Re-education  Gait Training  Education         TREATMENT:   EVALUATION: LOW COMPLEXITY: (Untimed Charge)  The initial evaluation charge encompasses clinical chart review, objective assessment, interpretation of assessment, and skilled monitoring of the patient's response to treatment in order to develop a plan of care.     TREATMENT:   Therapeutic Activity (15 Minutes): Therapeutic activity included Scooting, Transfer Training, Ambulation on level ground, Sitting balance , and Standing balance to improve functional Activity tolerance, Balance, Coordination, Mobility, Strength, and ROM.    TREATMENT GRID:  N/A    AFTER TREATMENT PRECAUTIONS: Bed/Chair Locked, Call light within reach, Chair, Needs within reach, RN notified, and Visitors at bedside    INTERDISCIPLINARY COLLABORATION:  RN/ PCT, PT/

## 2025-07-23 ENCOUNTER — CLINICAL SUPPORT (OUTPATIENT)
Age: 85
End: 2025-07-23

## 2025-07-23 DIAGNOSIS — Z95.810 CARDIAC RESYNCHRONIZATION THERAPY DEFIBRILLATOR (CRT-D) IN PLACE: Primary | ICD-10-CM

## 2025-07-29 NOTE — PROGRESS NOTES
[facility] CARDIOLOGY  [address] [place] [address], [address]  [place] [address]  PHONE: [phone]      25    NAME:  Janine Gupta  : 1940  MRN: [id number]         SUBJECTIVE:   Jannie Gupta is a 84 y.o. female seen for a follow up visit regarding the following:     Chief Complaint   Patient presents with    Follow-up    Atrial Fibrillation    Congestive Heart Failure            HPI:  Follow up  Follow-up, Atrial Fibrillation, and Congestive Heart Failure   .    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  Referred to EP last visit to consider CRT-P with baseline  ms    Admitted  - 7/15/25 having undergone elective CRT-P.  Developed blurred vision, neurology was consulted no acute findings.  Discharged to start carvedilol 6.25 mg BID (though she's had difficulty tolerating med adjustments d/t hypotension), and she was taken off metoprolol and clopidogrel. Discharged to [facility] for STR. Says she has been doing well, participating in therapy.  No cp or dyspnea.                  Past cardiac history:   Remote coronary stents   Treatment limited by orthostatic hypotension   Medical therapy of an obstructed PDA, 40% LAD by cath .  Stress nuclear perfusion scan with anterolateral scar, minimal per-scar ischemia with normal wall motion and EF 68% in 2010.  Not significantly changed 12.   2014 - patent stents, EF 45% (50-55 by echo)   2015 - Lexiscan - fixed inferolateral defect, inferior HK, EF 56%   2016- EF 50-55%, mild to mod MR   Oct 2018- EF 49%, no significant valve disease

## 2025-07-30 ENCOUNTER — OFFICE VISIT (OUTPATIENT)
Age: 85
End: 2025-07-30
Payer: MEDICARE

## 2025-07-30 VITALS
SYSTOLIC BLOOD PRESSURE: 120 MMHG | DIASTOLIC BLOOD PRESSURE: 60 MMHG | HEIGHT: 65 IN | BODY MASS INDEX: 21.03 KG/M2 | HEART RATE: 78 BPM

## 2025-07-30 DIAGNOSIS — I95.1 ORTHOSTATIC HYPOTENSION: ICD-10-CM

## 2025-07-30 DIAGNOSIS — Z95.0 CARDIAC RESYNCHRONIZATION THERAPY PACEMAKER (CRT-P) IN PLACE: Primary | ICD-10-CM

## 2025-07-30 DIAGNOSIS — I50.22 CHRONIC HFREF (HEART FAILURE WITH REDUCED EJECTION FRACTION) (HCC): ICD-10-CM

## 2025-07-30 DIAGNOSIS — I48.0 PAROXYSMAL ATRIAL FIBRILLATION (HCC): ICD-10-CM

## 2025-07-30 PROCEDURE — 1036F TOBACCO NON-USER: CPT | Performed by: INTERNAL MEDICINE

## 2025-07-30 PROCEDURE — 1159F MED LIST DOCD IN RCRD: CPT | Performed by: INTERNAL MEDICINE

## 2025-07-30 PROCEDURE — 1160F RVW MEDS BY RX/DR IN RCRD: CPT | Performed by: INTERNAL MEDICINE

## 2025-07-30 PROCEDURE — 1123F ACP DISCUSS/DSCN MKR DOCD: CPT | Performed by: INTERNAL MEDICINE

## 2025-07-30 PROCEDURE — 1090F PRES/ABSN URINE INCON ASSESS: CPT | Performed by: INTERNAL MEDICINE

## 2025-07-30 PROCEDURE — 99214 OFFICE O/P EST MOD 30 MIN: CPT | Performed by: INTERNAL MEDICINE

## 2025-07-30 PROCEDURE — 1126F AMNT PAIN NOTED NONE PRSNT: CPT | Performed by: INTERNAL MEDICINE

## 2025-07-30 PROCEDURE — G8427 DOCREV CUR MEDS BY ELIG CLIN: HCPCS | Performed by: INTERNAL MEDICINE

## 2025-07-30 PROCEDURE — G8420 CALC BMI NORM PARAMETERS: HCPCS | Performed by: INTERNAL MEDICINE

## 2025-07-30 PROCEDURE — 1111F DSCHRG MED/CURRENT MED MERGE: CPT | Performed by: INTERNAL MEDICINE

## 2025-07-30 ASSESSMENT — ENCOUNTER SYMPTOMS: SHORTNESS OF BREATH: 0

## 2025-08-18 ENCOUNTER — CLINICAL SUPPORT (OUTPATIENT)
Age: 85
End: 2025-08-18

## 2025-08-18 DIAGNOSIS — I50.22 CHRONIC HFREF (HEART FAILURE WITH REDUCED EJECTION FRACTION) (HCC): Primary | ICD-10-CM

## 2025-08-18 DIAGNOSIS — I50.20 HFREF (HEART FAILURE WITH REDUCED EJECTION FRACTION) (HCC): ICD-10-CM

## 2025-08-18 DIAGNOSIS — I50.22 CHRONIC SYSTOLIC (CONGESTIVE) HEART FAILURE (HCC): ICD-10-CM

## (undated) DEVICE — INTRODUCER LAT VEIN L62CM OD5.5FR ADV TELSCP SYS RENAL

## (undated) DEVICE — 18G NG KIT WITH 96IN PROBE COVER (10 PK): Brand: SITE-RITE

## (undated) DEVICE — AMPLATZ EXTRA STIFF WIRE GUIDE: Brand: AMPLATZ

## (undated) DEVICE — CATHETER COR DIAG 4.0 5FR 110CM 2 SIDE H

## (undated) DEVICE — GUIDEWIRE 035IN 210CM PTFE COAT FIX COR J TIP 15MM FIRM BODY

## (undated) DEVICE — PLASMABLADE X PS210-030S-LIGHT 3.0SL: Brand: PLASMABLADE™ X

## (undated) DEVICE — GUIDE WIRE WITH HYDROPHILIC COATING: Brand: ACUITY WHISPER VIEW™

## (undated) DEVICE — CATHETER GUID 6FR L100CM DIA0.071IN NYL SHFT JR5.0 W/O SIDE

## (undated) DEVICE — CATH LITHOPLSTY 3X12MM SHOCKWAVE

## (undated) DEVICE — INTRODUCER LD L13CM OD6FR SPLITTABLE DIL W/ J TIP GWIRE SYR

## (undated) DEVICE — DEVICE COMPR 17 CM 120 CC SAFEGUARD FOCUS LF

## (undated) DEVICE — SUTURE ETHIBOND EXCEL SZ 0 L18IN NONABSORBABLE GRN L26MM MO-6 CX45D

## (undated) DEVICE — CATHETER GUID 6FR L150CM RAP EXCHG L25CM TIP 5.1FR PUSHROD

## (undated) DEVICE — GLIDESHEATH SLENDER STAINLESS STEEL KIT: Brand: GLIDESHEATH SLENDER

## (undated) DEVICE — COPILOT BLEEDBACK CONTROL VALVE: Brand: COPILOT

## (undated) DEVICE — BOWL,STERILE,MEDIUM,16 OZ: Brand: MEDLINE

## (undated) DEVICE — CATH BLLN ANGIO 3.50X15MM NC EUPHORA RX

## (undated) DEVICE — CATH BLLN ANGIO 3.50X27MM NC EUPHORIA RX

## (undated) DEVICE — ADHESIVE SKIN CLOSURE WND 8.661X1.5 IN 22 CM LIQUIBAND SECUR

## (undated) DEVICE — KIT ANGIO CNTRST ADMIN W O BWL WORLEY

## (undated) DEVICE — Z DUP USE 2120483 DEVICE COMPR REG 24 CM VASC BND

## (undated) DEVICE — SUTURE ABSORBABLE MONOFILAMENT 4-0 CV15 6 IN PUR V-LOC 90 VLOCM1203

## (undated) DEVICE — HI-TORQUE VERSACORE MODIFIED J GUIDE WIRE SYSTEM 260 CM: Brand: HI-TORQUE VERSACORE

## (undated) DEVICE — CATHETER COR DIAG PIGTAILS PIG 145 CRV 5FR 110CM 6 SIDE H

## (undated) DEVICE — SUTURE ABSORBABLE MONOFILAMENT 3-0 PS 24 CM 26 MM VIO PDS +

## (undated) DEVICE — RUNTHROUGH NS EXTRA FLOPPY PTCA GUIDEWIRE: Brand: RUNTHROUGH

## (undated) DEVICE — CATH BLLN ANGIO 3X15MM NC EUPHORIA RX

## (undated) DEVICE — PTCA DILATATION CATHETER: Brand: NC QUANTUM APEX™

## (undated) DEVICE — AGENT HEMSTAT 3GM PURIFIED PLNT STARCH PWD ABSRB ARISTA AH

## (undated) DEVICE — GUIDE COR SNUS L40CM DIA9FR 0.035IN STD CRV ADV UNIQUE

## (undated) DEVICE — MICROPUNCTURE INTRODUCER SET SILHOUETTE TRANSITIONLESS WITH NITINOL WIRE GUIDE: Brand: MICROPUNCTURE